# Patient Record
Sex: FEMALE | Race: WHITE | NOT HISPANIC OR LATINO
[De-identification: names, ages, dates, MRNs, and addresses within clinical notes are randomized per-mention and may not be internally consistent; named-entity substitution may affect disease eponyms.]

---

## 2017-10-02 ENCOUNTER — APPOINTMENT (OUTPATIENT)
Dept: INTERNAL MEDICINE | Facility: CLINIC | Age: 57
End: 2017-10-02
Payer: COMMERCIAL

## 2017-10-02 VITALS
WEIGHT: 293 LBS | BODY MASS INDEX: 55.32 KG/M2 | SYSTOLIC BLOOD PRESSURE: 132 MMHG | HEART RATE: 74 BPM | DIASTOLIC BLOOD PRESSURE: 80 MMHG | RESPIRATION RATE: 14 BRPM | HEIGHT: 61 IN

## 2017-10-02 PROCEDURE — 90686 IIV4 VACC NO PRSV 0.5 ML IM: CPT

## 2017-10-02 PROCEDURE — 99215 OFFICE O/P EST HI 40 MIN: CPT | Mod: 25

## 2017-10-02 PROCEDURE — G0008: CPT

## 2017-11-03 ENCOUNTER — EMERGENCY (EMERGENCY)
Facility: HOSPITAL | Age: 57
LOS: 1 days | Discharge: HOME | End: 2017-11-03
Admitting: INTERNAL MEDICINE
Payer: COMMERCIAL

## 2017-11-03 DIAGNOSIS — Z88.5 ALLERGY STATUS TO NARCOTIC AGENT: ICD-10-CM

## 2017-11-03 DIAGNOSIS — Z98.890 OTHER SPECIFIED POSTPROCEDURAL STATES: ICD-10-CM

## 2017-11-03 DIAGNOSIS — N83.201 UNSPECIFIED OVARIAN CYST, RIGHT SIDE: ICD-10-CM

## 2017-11-03 DIAGNOSIS — R10.84 GENERALIZED ABDOMINAL PAIN: ICD-10-CM

## 2017-11-03 PROCEDURE — 99285 EMERGENCY DEPT VISIT HI MDM: CPT

## 2017-11-04 PROCEDURE — 82150 ASSAY OF AMYLASE: CPT

## 2017-11-04 PROCEDURE — 96361 HYDRATE IV INFUSION ADD-ON: CPT

## 2017-11-04 PROCEDURE — 76830 TRANSVAGINAL US NON-OB: CPT | Mod: 26

## 2017-11-04 PROCEDURE — 96365 THER/PROPH/DIAG IV INF INIT: CPT | Mod: XU

## 2017-11-04 PROCEDURE — 85027 COMPLETE CBC AUTOMATED: CPT

## 2017-11-04 PROCEDURE — 74177 CT ABD & PELVIS W/CONTRAST: CPT | Mod: 26

## 2017-11-04 PROCEDURE — 81003 URINALYSIS AUTO W/O SCOPE: CPT

## 2017-11-04 PROCEDURE — 85610 PROTHROMBIN TIME: CPT

## 2017-11-04 PROCEDURE — 85730 THROMBOPLASTIN TIME PARTIAL: CPT

## 2017-11-04 PROCEDURE — 76830 TRANSVAGINAL US NON-OB: CPT

## 2017-11-04 PROCEDURE — 80076 HEPATIC FUNCTION PANEL: CPT

## 2017-11-04 PROCEDURE — 99284 EMERGENCY DEPT VISIT MOD MDM: CPT | Mod: 25

## 2017-11-04 PROCEDURE — 74177 CT ABD & PELVIS W/CONTRAST: CPT

## 2017-11-04 PROCEDURE — 96375 TX/PRO/DX INJ NEW DRUG ADDON: CPT

## 2017-11-04 PROCEDURE — 80048 BASIC METABOLIC PNL TOTAL CA: CPT

## 2017-11-04 PROCEDURE — 83690 ASSAY OF LIPASE: CPT

## 2017-11-08 ENCOUNTER — APPOINTMENT (OUTPATIENT)
Dept: SURGERY | Facility: CLINIC | Age: 57
End: 2017-11-08
Payer: COMMERCIAL

## 2017-11-08 VITALS — BODY MASS INDEX: 55.32 KG/M2 | HEIGHT: 61 IN | WEIGHT: 293 LBS

## 2017-11-08 DIAGNOSIS — Z83.3 FAMILY HISTORY OF DIABETES MELLITUS: ICD-10-CM

## 2017-11-08 DIAGNOSIS — Z86.018 PERSONAL HISTORY OF OTHER BENIGN NEOPLASM: ICD-10-CM

## 2017-11-08 PROCEDURE — 99203 OFFICE O/P NEW LOW 30 MIN: CPT

## 2018-02-01 ENCOUNTER — APPOINTMENT (OUTPATIENT)
Dept: INTERNAL MEDICINE | Facility: CLINIC | Age: 58
End: 2018-02-01
Payer: COMMERCIAL

## 2018-02-01 VITALS
HEIGHT: 61 IN | RESPIRATION RATE: 15 BRPM | BODY MASS INDEX: 55.32 KG/M2 | WEIGHT: 293 LBS | HEART RATE: 76 BPM | DIASTOLIC BLOOD PRESSURE: 78 MMHG | TEMPERATURE: 100.3 F | SYSTOLIC BLOOD PRESSURE: 127 MMHG

## 2018-02-01 DIAGNOSIS — Z87.09 PERSONAL HISTORY OF OTHER DISEASES OF THE RESPIRATORY SYSTEM: ICD-10-CM

## 2018-02-01 DIAGNOSIS — M54.9 DORSALGIA, UNSPECIFIED: ICD-10-CM

## 2018-02-01 PROCEDURE — 99214 OFFICE O/P EST MOD 30 MIN: CPT

## 2018-07-13 ENCOUNTER — LABORATORY RESULT (OUTPATIENT)
Age: 58
End: 2018-07-13

## 2018-07-13 ENCOUNTER — APPOINTMENT (OUTPATIENT)
Dept: INTERNAL MEDICINE | Facility: CLINIC | Age: 58
End: 2018-07-13
Payer: COMMERCIAL

## 2018-07-13 VITALS
RESPIRATION RATE: 16 BRPM | SYSTOLIC BLOOD PRESSURE: 136 MMHG | TEMPERATURE: 99 F | WEIGHT: 293 LBS | BODY MASS INDEX: 57.52 KG/M2 | HEART RATE: 82 BPM | DIASTOLIC BLOOD PRESSURE: 70 MMHG | HEIGHT: 60 IN | OXYGEN SATURATION: 98 %

## 2018-07-13 DIAGNOSIS — Z78.9 OTHER SPECIFIED HEALTH STATUS: ICD-10-CM

## 2018-07-13 DIAGNOSIS — Z72.3 LACK OF PHYSICAL EXERCISE: ICD-10-CM

## 2018-07-13 PROCEDURE — 99215 OFFICE O/P EST HI 40 MIN: CPT

## 2018-07-13 RX ORDER — METHOCARBAMOL 500 MG/1
500 TABLET, FILM COATED ORAL
Qty: 45 | Refills: 0 | Status: DISCONTINUED | COMMUNITY
Start: 2017-10-02 | End: 2018-07-13

## 2018-07-13 RX ORDER — NAPROXEN 500 MG/1
500 TABLET ORAL
Qty: 180 | Refills: 2 | Status: DISCONTINUED | COMMUNITY
Start: 2017-10-02 | End: 2018-07-13

## 2018-07-13 RX ORDER — NAPROXEN 500 MG/1
500 TABLET ORAL
Qty: 60 | Refills: 0 | Status: DISCONTINUED | COMMUNITY
Start: 2017-10-02 | End: 2018-07-13

## 2018-07-13 RX ORDER — OSELTAMIVIR PHOSPHATE 75 MG/1
75 CAPSULE ORAL TWICE DAILY
Qty: 10 | Refills: 0 | Status: DISCONTINUED | COMMUNITY
Start: 2018-02-01 | End: 2018-07-13

## 2018-07-13 RX ORDER — IBUPROFEN 800 MG/1
800 TABLET, FILM COATED ORAL
Qty: 30 | Refills: 0 | Status: DISCONTINUED | COMMUNITY
Start: 2017-11-04 | End: 2018-07-13

## 2018-07-13 RX ORDER — AZITHROMYCIN 250 MG/1
250 TABLET, FILM COATED ORAL
Qty: 6 | Refills: 1 | Status: DISCONTINUED | COMMUNITY
Start: 2018-02-01 | End: 2018-07-13

## 2018-07-13 RX ORDER — FLUTICASONE PROPIONATE 50 UG/1
50 SPRAY, METERED NASAL DAILY
Qty: 1 | Refills: 3 | Status: DISCONTINUED | COMMUNITY
Start: 2018-02-01 | End: 2018-07-13

## 2018-07-13 NOTE — PHYSICAL EXAM
[No Acute Distress] : no acute distress [Well Nourished] : well nourished [Well Developed] : well developed [Well-Appearing] : well-appearing [Normal Voice/Communication] : normal voice/communication [Normal Sclera/Conjunctiva] : normal sclera/conjunctiva [PERRL] : pupils equal round and reactive to light [EOMI] : extraocular movements intact [Normal Outer Ear/Nose] : the outer ears and nose were normal in appearance [Normal Oropharynx] : the oropharynx was normal [Normal TMs] : both tympanic membranes were normal [Normal Nasal Mucosa] : the nasal mucosa was normal [No JVD] : no jugular venous distention [Supple] : supple [No Lymphadenopathy] : no lymphadenopathy [Thyroid Normal, No Nodules] : the thyroid was normal and there were no nodules present [No Respiratory Distress] : no respiratory distress  [Clear to Auscultation] : lungs were clear to auscultation bilaterally [No Accessory Muscle Use] : no accessory muscle use [Normal Rate] : normal rate  [Regular Rhythm] : with a regular rhythm [Normal S1, S2] : normal S1 and S2 [No Murmur] : no murmur heard [No Carotid Bruits] : no carotid bruits [No Abdominal Bruit] : a ~M bruit was not heard ~T in the abdomen [No Varicosities] : no varicosities [Pedal Pulses Present] : the pedal pulses are present [No Edema] : there was no peripheral edema [No Extremity Clubbing/Cyanosis] : no extremity clubbing/cyanosis [No Palpable Aorta] : no palpable aorta [Declined Breast Exam] : declined breast exam  [Soft] : abdomen soft [Non Tender] : non-tender [Non-distended] : non-distended [No Masses] : no abdominal mass palpated [No HSM] : no HSM [Normal Bowel Sounds] : normal bowel sounds [No Hernias] : no hernias [Declined Rectal Exam] : declined rectal exam [Normal Supraclavicular Nodes] : no supraclavicular lymphadenopathy [Normal Axillary Nodes] : no axillary lymphadenopathy [Normal Posterior Cervical Nodes] : no posterior cervical lymphadenopathy [Normal Femoral Nodes] : no femoral lymphadenopathy [Normal Anterior Cervical Nodes] : no anterior cervical lymphadenopathy [Normal Inguinal Nodes] : no inguinal lymphadenopathy [No CVA Tenderness] : no CVA  tenderness [No Spinal Tenderness] : no spinal tenderness [No Joint Swelling] : no joint swelling [Grossly Normal Strength/Tone] : grossly normal strength/tone [No Rash] : no rash [No Skin Lesions] : no skin lesions [Normal Gait] : normal gait [Coordination Grossly Intact] : coordination grossly intact [No Focal Deficits] : no focal deficits [Deep Tendon Reflexes (DTR)] : deep tendon reflexes were 2+ and symmetric [Speech Grossly Normal] : speech grossly normal [Memory Grossly Normal] : memory grossly normal [Normal Affect] : the affect was normal [Alert and Oriented x3] : oriented to person, place, and time [Normal Mood] : the mood was normal [Normal Insight/Judgement] : insight and judgment were intact [Kyphosis] : no kyphosis [Scoliosis] : no scoliosis [Acne] : no acne

## 2018-07-13 NOTE — ASSESSMENT
[FreeTextEntry1] : His exam shows obese woman in no acute distress at rest blood pressure 136/75 with 345 pounds BMI of 67.31 temperature 90.9°F orally pulse is 82 respirations 14 HEENT was unremarkable chest with decreased breath sounds cardiovascular exam was regular abdomen was soft and nontender extremities showed no clubbing cyanosis or edema there was no CVA tenderness neurologic exam was nonfocal lower back pain worse with movement no pain with rest or palpation suggest a bulging disc treated with a Medrol pack heat drop-off blood tests and a urine and a urine culture if no improvement by Monday to obtain x-rays and/or MRI

## 2018-07-13 NOTE — HISTORY OF PRESENT ILLNESS
[FreeTextEntry1] : Comes to the office for followup to review her medications discuss her overall health and to speak about some new lower back  pain she's been having for a week  [de-identified] : -58 year-old morbidly obese female comes to the office with a history of lower extremity edema previous back pain complaining for the last week of lower back pain worse on the right side exacerbated by movements. She denies radiating pain down her leg there is no numbness or weakness the pain only hurts when she moves or stands up he has not had any urinary symptoms she has been taking ibuprofen with some relief she denies temperature chills sweats or myalgias she has had no dysuria or urinary frequency or burning denies abdominal pain nausea vomiting diarrhea constipation rectal bleeding or melena her ankles have been swollen chronically her appetite has not changed and her weight is same

## 2018-07-13 NOTE — HEALTH RISK ASSESSMENT
[No falls in past year] : Patient reported no falls in the past year [0] : 2) Feeling down, depressed, or hopeless: Not at all (0) [[id number]] : 0

## 2018-07-14 ENCOUNTER — LABORATORY RESULT (OUTPATIENT)
Age: 58
End: 2018-07-14

## 2018-07-16 LAB
25(OH)D3 SERPL-MCNC: 13.4 NG/ML
ALBUMIN SERPL ELPH-MCNC: 4.1 G/DL
ALP BLD-CCNC: 93 U/L
ALT SERPL-CCNC: 12 U/L
ANION GAP SERPL CALC-SCNC: 12 MMOL/L
APPEARANCE: ABNORMAL
APPEARANCE: ABNORMAL
AST SERPL-CCNC: 19 U/L
BASOPHILS # BLD AUTO: 0.07 K/UL
BASOPHILS NFR BLD AUTO: 0.9 %
BILIRUB SERPL-MCNC: 0.4 MG/DL
BILIRUBIN URINE: NEGATIVE
BILIRUBIN URINE: NEGATIVE
BLOOD URINE: NEGATIVE
BLOOD URINE: NEGATIVE
BUN SERPL-MCNC: 11 MG/DL
CALCIUM SERPL-MCNC: 9.7 MG/DL
CHLORIDE SERPL-SCNC: 102 MMOL/L
CHOLEST SERPL-MCNC: 167 MG/DL
CHOLEST/HDLC SERPL: 3 RATIO
CO2 SERPL-SCNC: 27 MMOL/L
COLOR: YELLOW
COLOR: YELLOW
CREAT SERPL-MCNC: 0.69 MG/DL
CRP SERPL HS-MCNC: 6.2 MG/L
EOSINOPHIL # BLD AUTO: 0 K/UL
EOSINOPHIL NFR BLD AUTO: 0 %
GLUCOSE BS SERPL-MCNC: 118 MG/DL
GLUCOSE QUALITATIVE U: NEGATIVE MG/DL
GLUCOSE QUALITATIVE U: NEGATIVE MG/DL
GLUCOSE SERPL-MCNC: 119 MG/DL
HBA1C MFR BLD HPLC: 6.3 %
HCT VFR BLD CALC: 40.4 %
HDLC SERPL-MCNC: 55 MG/DL
HGB BLD-MCNC: 12.1 G/DL
KETONES URINE: NEGATIVE
KETONES URINE: NEGATIVE
LDLC SERPL CALC-MCNC: 101 MG/DL
LEUKOCYTE ESTERASE URINE: NEGATIVE
LEUKOCYTE ESTERASE URINE: NEGATIVE
LYMPHOCYTES # BLD AUTO: 1.74 K/UL
LYMPHOCYTES NFR BLD AUTO: 22.1 %
MAN DIFF?: NORMAL
MCHC RBC-ENTMCNC: 21.3 PG
MCHC RBC-ENTMCNC: 30 GM/DL
MCV RBC AUTO: 71.1 FL
MONOCYTES # BLD AUTO: 0.56 K/UL
MONOCYTES NFR BLD AUTO: 7.1 %
NEUTROPHILS # BLD AUTO: 5.49 K/UL
NEUTROPHILS NFR BLD AUTO: 69.9 %
NITRITE URINE: NEGATIVE
NITRITE URINE: NEGATIVE
PH URINE: 5.5
PH URINE: 7
PLATELET # BLD AUTO: 308 K/UL
POTASSIUM SERPL-SCNC: 4.6 MMOL/L
PROT SERPL-MCNC: 8.3 G/DL
PROTEIN URINE: 30 MG/DL
PROTEIN URINE: 30 MG/DL
RBC # BLD: 5.68 M/UL
RBC # FLD: 20.1 %
SODIUM SERPL-SCNC: 141 MMOL/L
SPECIFIC GRAVITY URINE: 1.02
SPECIFIC GRAVITY URINE: 1.02
T4 FREE SERPL-MCNC: 1.1 NG/DL
TRIGL SERPL-MCNC: 53 MG/DL
TSH SERPL-ACNC: 1.02 UIU/ML
UROBILINOGEN URINE: NEGATIVE MG/DL
UROBILINOGEN URINE: NEGATIVE MG/DL
VIT B12 SERPL-MCNC: 293 PG/ML
WBC # FLD AUTO: 7.86 K/UL

## 2018-09-09 DIAGNOSIS — R51 HEADACHE: ICD-10-CM

## 2018-10-04 PROBLEM — R51 NEW ONSET OF HEADACHES: Status: ACTIVE | Noted: 2018-10-04

## 2018-10-30 ENCOUNTER — APPOINTMENT (OUTPATIENT)
Dept: NEUROLOGY | Facility: CLINIC | Age: 58
End: 2018-10-30
Payer: COMMERCIAL

## 2018-10-30 VITALS
WEIGHT: 293 LBS | BODY MASS INDEX: 57.52 KG/M2 | OXYGEN SATURATION: 98 % | HEIGHT: 60 IN | DIASTOLIC BLOOD PRESSURE: 80 MMHG | HEART RATE: 91 BPM | SYSTOLIC BLOOD PRESSURE: 126 MMHG

## 2018-10-30 PROCEDURE — 99244 OFF/OP CNSLTJ NEW/EST MOD 40: CPT

## 2018-10-30 RX ORDER — METHYLPREDNISOLONE 4 MG/1
4 TABLET ORAL
Qty: 1 | Refills: 1 | Status: DISCONTINUED | COMMUNITY
Start: 2018-07-13 | End: 2018-10-30

## 2018-11-11 ENCOUNTER — EMERGENCY (EMERGENCY)
Facility: HOSPITAL | Age: 58
LOS: 1 days | Discharge: HOME | End: 2018-11-11
Attending: EMERGENCY MEDICINE | Admitting: EMERGENCY MEDICINE
Payer: COMMERCIAL

## 2018-11-11 VITALS — HEART RATE: 84 BPM | RESPIRATION RATE: 18 BRPM | WEIGHT: 293 LBS | OXYGEN SATURATION: 95 % | TEMPERATURE: 98 F

## 2018-11-11 VITALS
SYSTOLIC BLOOD PRESSURE: 149 MMHG | RESPIRATION RATE: 18 BRPM | HEART RATE: 79 BPM | OXYGEN SATURATION: 96 % | DIASTOLIC BLOOD PRESSURE: 82 MMHG

## 2018-11-11 LAB
ALBUMIN SERPL ELPH-MCNC: 3.5 G/DL — SIGNIFICANT CHANGE UP (ref 3.3–5)
ALP SERPL-CCNC: 102 U/L — SIGNIFICANT CHANGE UP (ref 40–120)
ALT FLD-CCNC: 23 U/L DA — SIGNIFICANT CHANGE UP (ref 10–45)
ANION GAP SERPL CALC-SCNC: 6 MMOL/L — SIGNIFICANT CHANGE UP (ref 5–17)
ANISOCYTOSIS BLD QL: SLIGHT — SIGNIFICANT CHANGE UP
AST SERPL-CCNC: 23 U/L — SIGNIFICANT CHANGE UP (ref 10–40)
BILIRUB SERPL-MCNC: 0.2 MG/DL — SIGNIFICANT CHANGE UP (ref 0.2–1.2)
BUN SERPL-MCNC: 10 MG/DL — SIGNIFICANT CHANGE UP (ref 7–23)
CALCIUM SERPL-MCNC: 8.8 MG/DL — SIGNIFICANT CHANGE UP (ref 8.4–10.5)
CHLORIDE SERPL-SCNC: 102 MMOL/L — SIGNIFICANT CHANGE UP (ref 96–108)
CO2 SERPL-SCNC: 32 MMOL/L — HIGH (ref 22–31)
CREAT SERPL-MCNC: 0.71 MG/DL — SIGNIFICANT CHANGE UP (ref 0.5–1.3)
ELLIPTOCYTES BLD QL SMEAR: SLIGHT — SIGNIFICANT CHANGE UP
GIANT PLATELETS BLD QL SMEAR: PRESENT — SIGNIFICANT CHANGE UP
GLUCOSE SERPL-MCNC: 103 MG/DL — HIGH (ref 70–99)
HCT VFR BLD CALC: 36.6 % — SIGNIFICANT CHANGE UP (ref 34.5–45)
HGB BLD-MCNC: 11.3 G/DL — LOW (ref 11.5–15.5)
HYPOCHROMIA BLD QL: SLIGHT — SIGNIFICANT CHANGE UP
LG PLATELETS BLD QL AUTO: SLIGHT — SIGNIFICANT CHANGE UP
LIDOCAIN IGE QN: 199 U/L — SIGNIFICANT CHANGE UP (ref 73–393)
LYMPHOCYTES # BLD AUTO: 19 % — SIGNIFICANT CHANGE UP (ref 13–44)
MCHC RBC-ENTMCNC: 21.8 PG — LOW (ref 27–34)
MCHC RBC-ENTMCNC: 30.8 GM/DL — LOW (ref 32–36)
MCV RBC AUTO: 70.9 FL — LOW (ref 80–100)
MICROCYTES BLD QL: SIGNIFICANT CHANGE UP
MONOCYTES NFR BLD AUTO: 6 % — SIGNIFICANT CHANGE UP (ref 2–14)
NEUTROPHILS NFR BLD AUTO: 75 % — SIGNIFICANT CHANGE UP (ref 43–77)
PLAT MORPH BLD: NORMAL — SIGNIFICANT CHANGE UP
PLATELET # BLD AUTO: 281 K/UL — SIGNIFICANT CHANGE UP (ref 150–400)
POIKILOCYTOSIS BLD QL AUTO: SLIGHT — SIGNIFICANT CHANGE UP
POTASSIUM SERPL-MCNC: 4 MMOL/L — SIGNIFICANT CHANGE UP (ref 3.5–5.3)
POTASSIUM SERPL-SCNC: 4 MMOL/L — SIGNIFICANT CHANGE UP (ref 3.5–5.3)
PROT SERPL-MCNC: 8.4 G/DL — HIGH (ref 6–8.3)
RBC # BLD: 5.17 M/UL — SIGNIFICANT CHANGE UP (ref 3.8–5.2)
RBC # FLD: 16.9 % — HIGH (ref 10.3–14.5)
RBC BLD AUTO: ABNORMAL
SODIUM SERPL-SCNC: 140 MMOL/L — SIGNIFICANT CHANGE UP (ref 135–145)
TROPONIN I SERPL-MCNC: <.017 NG/ML — LOW (ref 0.02–0.06)
WBC # BLD: 7.5 K/UL — SIGNIFICANT CHANGE UP (ref 3.8–10.5)
WBC # FLD AUTO: 7.5 K/UL — SIGNIFICANT CHANGE UP (ref 3.8–10.5)

## 2018-11-11 PROCEDURE — 96374 THER/PROPH/DIAG INJ IV PUSH: CPT

## 2018-11-11 PROCEDURE — 93005 ELECTROCARDIOGRAM TRACING: CPT

## 2018-11-11 PROCEDURE — 76705 ECHO EXAM OF ABDOMEN: CPT | Mod: 26

## 2018-11-11 PROCEDURE — 80053 COMPREHEN METABOLIC PANEL: CPT

## 2018-11-11 PROCEDURE — 83690 ASSAY OF LIPASE: CPT

## 2018-11-11 PROCEDURE — 93010 ELECTROCARDIOGRAM REPORT: CPT

## 2018-11-11 PROCEDURE — 84484 ASSAY OF TROPONIN QUANT: CPT

## 2018-11-11 PROCEDURE — 76705 ECHO EXAM OF ABDOMEN: CPT

## 2018-11-11 PROCEDURE — 96375 TX/PRO/DX INJ NEW DRUG ADDON: CPT

## 2018-11-11 PROCEDURE — 85027 COMPLETE CBC AUTOMATED: CPT

## 2018-11-11 PROCEDURE — 99284 EMERGENCY DEPT VISIT MOD MDM: CPT | Mod: 25

## 2018-11-11 PROCEDURE — 99285 EMERGENCY DEPT VISIT HI MDM: CPT

## 2018-11-11 RX ORDER — MORPHINE SULFATE 50 MG/1
4 CAPSULE, EXTENDED RELEASE ORAL ONCE
Qty: 0 | Refills: 0 | Status: DISCONTINUED | OUTPATIENT
Start: 2018-11-11 | End: 2018-11-11

## 2018-11-11 RX ORDER — FAMOTIDINE 10 MG/ML
20 INJECTION INTRAVENOUS ONCE
Qty: 0 | Refills: 0 | Status: COMPLETED | OUTPATIENT
Start: 2018-11-11 | End: 2018-11-11

## 2018-11-11 RX ORDER — ONDANSETRON 8 MG/1
1 TABLET, FILM COATED ORAL
Qty: 6 | Refills: 0
Start: 2018-11-11 | End: 2018-11-12

## 2018-11-11 RX ORDER — ONDANSETRON 8 MG/1
4 TABLET, FILM COATED ORAL ONCE
Qty: 0 | Refills: 0 | Status: COMPLETED | OUTPATIENT
Start: 2018-11-11 | End: 2018-11-11

## 2018-11-11 RX ADMIN — MORPHINE SULFATE 4 MILLIGRAM(S): 50 CAPSULE, EXTENDED RELEASE ORAL at 14:34

## 2018-11-11 RX ADMIN — MORPHINE SULFATE 4 MILLIGRAM(S): 50 CAPSULE, EXTENDED RELEASE ORAL at 15:04

## 2018-11-11 RX ADMIN — ONDANSETRON 4 MILLIGRAM(S): 8 TABLET, FILM COATED ORAL at 14:34

## 2018-11-11 RX ADMIN — FAMOTIDINE 20 MILLIGRAM(S): 10 INJECTION INTRAVENOUS at 14:34

## 2018-11-11 RX ADMIN — FAMOTIDINE 100 MILLIGRAM(S): 10 INJECTION INTRAVENOUS at 14:34

## 2018-11-11 NOTE — ED ADULT NURSE NOTE - NSIMPLEMENTINTERV_GEN_ALL_ED
Implemented All Universal Safety Interventions:  [place] to call system. Call bell, personal items and telephone within reach. Instruct patient to call for assistance. Room bathroom lighting operational. Non-slip footwear when patient is off stretcher. Physically safe environment: no spills, clutter or unnecessary equipment. Stretcher in lowest position, wheels locked, appropriate side rails in place.

## 2018-11-11 NOTE — ED PROVIDER NOTE - OBJECTIVE STATEMENT
58 yr old obese female with hx of gallstones presents c/o diffuse abdominal pain, mostly RUQ, with associated vomiting x 4 today. Pain after eating this morning. Pt denies any fever. chills or any other symptoms.

## 2018-11-11 NOTE — ED PROVIDER NOTE - MEDICAL DECISION MAKING DETAILS
58 yr old obese female with hx of gallstones presents c/o diffuse abdominal pain, mostly RUQ, with associated vomiting x 4 today. Pain after eating this morning. Pt denies any fever. chills or any other symptoms.: ttp generalized mostly RUQ: labs, lipase, trop, ekg, ivf, pain control, ct/ us- re eval

## 2018-11-11 NOTE — ED PROVIDER NOTE - ATTENDING CONTRIBUTION TO CARE
Yung with NATALI Garrison. Patient with RUQ tenderness. Obese. Plan for labs and u/s with symptomatic control prn. No RLQ tenderness. Non toxic appearing. I performed a face to face bedside interview with patient regarding history of present illness, review of symptoms and past medical history. I completed an independent physical exam.  I have discussed the patient's plan of care with Physician Assistant (PA). I agree with note as stated above, having amended the EMR as needed to reflect my findings.   This includes History of Present Illness, HIV, Past Medical/Surgical/Family/Social History, Allergies and Home Medications, Review of Systems, Physical Exam, and any Progress Notes during the time I functioned as the attending physician for this patient.

## 2018-11-11 NOTE — ED ADULT NURSE NOTE - OBJECTIVE STATEMENT
pt c/o abd pain. reprots recently dx with gallstones. reports nausea. resp even and unlabored. denies cp, sob, vomiting, chills. no further complaints. pa at bedside.

## 2018-11-11 NOTE — ED PROVIDER NOTE - PROGRESS NOTE DETAILS
US showing gallstones and pt refused CT. Pt feeling better. Abdomen soft and non tender at this time. Pt stable for discharge and to follow up with surgeon outpt. Pt agrees with plan.

## 2018-11-15 ENCOUNTER — APPOINTMENT (OUTPATIENT)
Dept: INTERNAL MEDICINE | Facility: CLINIC | Age: 58
End: 2018-11-15
Payer: COMMERCIAL

## 2018-11-15 VITALS
SYSTOLIC BLOOD PRESSURE: 122 MMHG | RESPIRATION RATE: 15 BRPM | DIASTOLIC BLOOD PRESSURE: 75 MMHG | BODY MASS INDEX: 57.52 KG/M2 | WEIGHT: 293 LBS | HEIGHT: 60 IN | HEART RATE: 88 BPM

## 2018-11-15 PROCEDURE — 99215 OFFICE O/P EST HI 40 MIN: CPT

## 2018-11-16 NOTE — ASSESSMENT
[FreeTextEntry1] : Physical exam shows a morbidly obese female in no acute distress her height was 5 feet her weight was 342 pounds blood pressure 122/75 heart rate of 88 respiratory rate of 16 HEENT was unremarkable chest was clear cardiovascular was regular abdomen was soft obeseo evidence of tenderness rebound or guarding extremities showed no clubbing cyanosis or edema neurologic exam was nonfocal patient mentioned some questionable dark stools several weeks ago I placed her on pantoprazole. and she has made an appointment with a surgeon who is also a bariatric surgeon if symptoms recur she needs to return to the emergency room for immediate attention

## 2018-11-16 NOTE — REVIEW OF SYSTEMS
[Abdominal Pain] : abdominal pain [Vomiting] : vomiting [Fever] : no fever [Chills] : no chills [Fatigue] : no fatigue [Hot Flashes] : no hot flashes [Night Sweats] : no night sweats [Recent Change In Weight] : ~T no recent weight change [Discharge] : no discharge [Pain] : no pain [Redness] : no redness [Dryness] : no dryness  [Vision Problems] : no vision problems [Itching] : no itching [Earache] : no earache [Hearing Loss] : no hearing loss [Nosebleed] : no nosebleeds [Hoarseness] : no hoarseness [Nasal Discharge] : no nasal discharge [Sore Throat] : no sore throat [Postnasal Drip] : no postnasal drip [Chest Pain] : no chest pain [Palpitations] : no palpitations [Leg Claudication] : no leg claudication [Lower Ext Edema] : no lower extremity edema [Orthopnea] : no orthopnea [Paroysmal Nocturnal Dyspnea] : no paroysmal nocturnal dyspnea [Shortness Of Breath] : no shortness of breath [Wheezing] : no wheezing [Cough] : no cough [Dyspnea on Exertion] : no dyspnea on exertion [Nausea] : no nausea [Constipation] : no constipation [Diarrhea] : diarrhea [Heartburn] : no heartburn [Melena] : no melena [Dysuria] : no dysuria [Incontinence] : no incontinence [Nocturia] : no nocturia [Poor Libido] : libido not poor [Hematuria] : no hematuria [Frequency] : no frequency [Vaginal Discharge] : no vaginal discharge [Dysmenorrhea] : no dysmenorrhea [Joint Pain] : no joint pain [Joint Stiffness] : no joint stiffness [Joint Swelling] : no joint swelling [Muscle Weakness] : no muscle weakness [Muscle Pain] : no muscle pain [Back Pain] : no back pain [Itching] : no itching [Mole Changes] : no mole changes [Nail Changes] : no nail changes [Hair Changes] : no hair changes [Skin Rash] : no skin rash [Headache] : no headache [Dizziness] : no dizziness [Fainting] : no fainting [Confusion] : no confusion [Memory Loss] : no memory loss [Unsteady Walking] : no ataxia [Suicidal] : not suicidal [Insomnia] : no insomnia [Anxiety] : no anxiety [Depression] : no depression [Easy Bleeding] : no easy bleeding [Easy Bruising] : no easy bruising [Swollen Glands] : no swollen glands [FreeTextEntry7] : epigastric

## 2018-11-16 NOTE — HEALTH RISK ASSESSMENT
[No falls in past year] : Patient reported no falls in the past year [0] : 2) Feeling down, depressed, or hopeless: Not at all (0) [[id number]] : 0

## 2018-11-16 NOTE — HISTORY OF PRESENT ILLNESS
[FreeTextEntry1] : Comes to the office for followup to review her medications and discuss her overall health recent visit to the emergency room for abdominal pain and vomiting [de-identified] : 58-year-old woman with a history of morbid obesity chronic migraines lower back pain TUMS to the office after being seen in the emergency room for abdominal pain and vomiting patient claims that she has had several episodes prior. The pain was in the upper abdomen/right upper quadrant midepigastric she is currently without symptoms workup in the emergency room her ultrasound which showed gallstones and sludge there was no wall thickening or pericholecystic fluid bowels have been regular she denies diarrhea constipation bright red blood per rectum or black stools her appetite has been good her weight is stable she denies temperature chills sweats myalgias sinus congestion sore throat cough wheezing

## 2018-11-30 ENCOUNTER — APPOINTMENT (OUTPATIENT)
Dept: SURGERY | Facility: CLINIC | Age: 58
End: 2018-11-30
Payer: COMMERCIAL

## 2018-11-30 VITALS
BODY MASS INDEX: 55.32 KG/M2 | HEART RATE: 94 BPM | SYSTOLIC BLOOD PRESSURE: 162 MMHG | DIASTOLIC BLOOD PRESSURE: 88 MMHG | OXYGEN SATURATION: 100 % | TEMPERATURE: 98.2 F | HEIGHT: 61 IN | WEIGHT: 293 LBS | RESPIRATION RATE: 18 BRPM

## 2018-11-30 PROCEDURE — 99245 OFF/OP CONSLTJ NEW/EST HI 55: CPT

## 2018-11-30 RX ORDER — PANTOPRAZOLE 40 MG/1
40 TABLET, DELAYED RELEASE ORAL
Qty: 30 | Refills: 5 | Status: DISCONTINUED | COMMUNITY
Start: 2018-11-15 | End: 2018-11-30

## 2018-12-17 ENCOUNTER — APPOINTMENT (OUTPATIENT)
Dept: NUCLEAR MEDICINE | Facility: IMAGING CENTER | Age: 58
End: 2018-12-17

## 2019-01-05 ENCOUNTER — EMERGENCY (EMERGENCY)
Facility: HOSPITAL | Age: 59
LOS: 1 days | Discharge: HOME | End: 2019-01-05
Attending: EMERGENCY MEDICINE | Admitting: EMERGENCY MEDICINE
Payer: COMMERCIAL

## 2019-01-05 VITALS
SYSTOLIC BLOOD PRESSURE: 154 MMHG | RESPIRATION RATE: 18 BRPM | TEMPERATURE: 100 F | DIASTOLIC BLOOD PRESSURE: 71 MMHG | HEART RATE: 99 BPM | OXYGEN SATURATION: 98 %

## 2019-01-05 VITALS
SYSTOLIC BLOOD PRESSURE: 192 MMHG | DIASTOLIC BLOOD PRESSURE: 88 MMHG | OXYGEN SATURATION: 96 % | TEMPERATURE: 101 F | WEIGHT: 293 LBS | RESPIRATION RATE: 18 BRPM | HEART RATE: 114 BPM | HEIGHT: 60 IN

## 2019-01-05 DIAGNOSIS — R50.9 FEVER, UNSPECIFIED: ICD-10-CM

## 2019-01-05 LAB
ALBUMIN SERPL ELPH-MCNC: 3.7 G/DL — SIGNIFICANT CHANGE UP (ref 3.3–5)
ALP SERPL-CCNC: 101 U/L — SIGNIFICANT CHANGE UP (ref 40–120)
ALT FLD-CCNC: 23 U/L DA — SIGNIFICANT CHANGE UP (ref 10–45)
ANION GAP SERPL CALC-SCNC: 11 MMOL/L — SIGNIFICANT CHANGE UP (ref 5–17)
APPEARANCE UR: CLEAR — SIGNIFICANT CHANGE UP
APTT BLD: 31.5 SEC — SIGNIFICANT CHANGE UP (ref 27.5–36.3)
AST SERPL-CCNC: 26 U/L — SIGNIFICANT CHANGE UP (ref 10–40)
BACTERIA # UR AUTO: NEGATIVE /HPF — SIGNIFICANT CHANGE UP
BASOPHILS # BLD AUTO: 0.1 K/UL — SIGNIFICANT CHANGE UP (ref 0–0.2)
BASOPHILS NFR BLD AUTO: 0.5 % — SIGNIFICANT CHANGE UP (ref 0–2)
BILIRUB SERPL-MCNC: 0.4 MG/DL — SIGNIFICANT CHANGE UP (ref 0.2–1.2)
BILIRUB UR-MCNC: NEGATIVE — SIGNIFICANT CHANGE UP
BUN SERPL-MCNC: 11 MG/DL — SIGNIFICANT CHANGE UP (ref 7–23)
CALCIUM SERPL-MCNC: 9.3 MG/DL — SIGNIFICANT CHANGE UP (ref 8.4–10.5)
CHLORIDE SERPL-SCNC: 98 MMOL/L — SIGNIFICANT CHANGE UP (ref 96–108)
CO2 SERPL-SCNC: 26 MMOL/L — SIGNIFICANT CHANGE UP (ref 22–31)
COLOR SPEC: YELLOW — SIGNIFICANT CHANGE UP
CREAT SERPL-MCNC: 0.77 MG/DL — SIGNIFICANT CHANGE UP (ref 0.5–1.3)
DIFF PNL FLD: ABNORMAL
EOSINOPHIL # BLD AUTO: 0 K/UL — SIGNIFICANT CHANGE UP (ref 0–0.5)
EOSINOPHIL NFR BLD AUTO: 0.3 % — SIGNIFICANT CHANGE UP (ref 0–6)
EPI CELLS # UR: SIGNIFICANT CHANGE UP
FLU A RESULT: SIGNIFICANT CHANGE UP
FLU A RESULT: SIGNIFICANT CHANGE UP
FLUAV AG NPH QL: SIGNIFICANT CHANGE UP
FLUBV AG NPH QL: SIGNIFICANT CHANGE UP
GLUCOSE SERPL-MCNC: 138 MG/DL — HIGH (ref 70–99)
GLUCOSE UR QL: NEGATIVE — SIGNIFICANT CHANGE UP
HCT VFR BLD CALC: 36.1 % — SIGNIFICANT CHANGE UP (ref 34.5–45)
HGB BLD-MCNC: 10.7 G/DL — LOW (ref 11.5–15.5)
INR BLD: 1.16 RATIO — SIGNIFICANT CHANGE UP (ref 0.88–1.16)
KETONES UR-MCNC: ABNORMAL
LACTATE SERPL-SCNC: 1.5 MMOL/L — SIGNIFICANT CHANGE UP (ref 0.7–2)
LEUKOCYTE ESTERASE UR-ACNC: NEGATIVE — SIGNIFICANT CHANGE UP
LYMPHOCYTES # BLD AUTO: 0.8 K/UL — LOW (ref 1–3.3)
LYMPHOCYTES # BLD AUTO: 5.3 % — LOW (ref 13–44)
MCHC RBC-ENTMCNC: 19.5 PG — LOW (ref 27–34)
MCHC RBC-ENTMCNC: 29.6 GM/DL — LOW (ref 32–36)
MCV RBC AUTO: 65.9 FL — LOW (ref 80–100)
MONOCYTES # BLD AUTO: 0.7 K/UL — SIGNIFICANT CHANGE UP (ref 0–0.9)
MONOCYTES NFR BLD AUTO: 4.9 % — SIGNIFICANT CHANGE UP (ref 1–9)
NEUTROPHILS # BLD AUTO: 13.1 K/UL — HIGH (ref 1.8–7.4)
NEUTROPHILS NFR BLD AUTO: 89.1 % — HIGH (ref 43–77)
NITRITE UR-MCNC: NEGATIVE — SIGNIFICANT CHANGE UP
PH UR: 8 — SIGNIFICANT CHANGE UP (ref 5–8)
PLATELET # BLD AUTO: 290 K/UL — SIGNIFICANT CHANGE UP (ref 150–400)
POTASSIUM SERPL-MCNC: 3.7 MMOL/L — SIGNIFICANT CHANGE UP (ref 3.5–5.3)
POTASSIUM SERPL-SCNC: 3.7 MMOL/L — SIGNIFICANT CHANGE UP (ref 3.5–5.3)
PROT SERPL-MCNC: 8.7 G/DL — HIGH (ref 6–8.3)
PROT UR-MCNC: NEGATIVE — SIGNIFICANT CHANGE UP
PROTHROM AB SERPL-ACNC: 13 SEC — HIGH (ref 10–12.9)
RBC # BLD: 5.48 M/UL — HIGH (ref 3.8–5.2)
RBC # FLD: 16.6 % — HIGH (ref 10.3–14.5)
RBC CASTS # UR COMP ASSIST: SIGNIFICANT CHANGE UP /HPF (ref 0–4)
RSV RESULT: SIGNIFICANT CHANGE UP
RSV RNA RESP QL NAA+PROBE: SIGNIFICANT CHANGE UP
SODIUM SERPL-SCNC: 135 MMOL/L — SIGNIFICANT CHANGE UP (ref 135–145)
SP GR SPEC: 1.01 — SIGNIFICANT CHANGE UP (ref 1.01–1.02)
UROBILINOGEN FLD QL: NEGATIVE — SIGNIFICANT CHANGE UP
WBC # BLD: 14.7 K/UL — HIGH (ref 3.8–10.5)
WBC # FLD AUTO: 14.7 K/UL — HIGH (ref 3.8–10.5)
WBC UR QL: NEGATIVE /HPF — SIGNIFICANT CHANGE UP (ref 0–5)

## 2019-01-05 PROCEDURE — 99284 EMERGENCY DEPT VISIT MOD MDM: CPT

## 2019-01-05 PROCEDURE — 80053 COMPREHEN METABOLIC PANEL: CPT

## 2019-01-05 PROCEDURE — 85610 PROTHROMBIN TIME: CPT

## 2019-01-05 PROCEDURE — 85730 THROMBOPLASTIN TIME PARTIAL: CPT

## 2019-01-05 PROCEDURE — 87631 RESP VIRUS 3-5 TARGETS: CPT

## 2019-01-05 PROCEDURE — 83605 ASSAY OF LACTIC ACID: CPT

## 2019-01-05 PROCEDURE — 71045 X-RAY EXAM CHEST 1 VIEW: CPT

## 2019-01-05 PROCEDURE — 87040 BLOOD CULTURE FOR BACTERIA: CPT

## 2019-01-05 PROCEDURE — 71045 X-RAY EXAM CHEST 1 VIEW: CPT | Mod: 26

## 2019-01-05 PROCEDURE — 81001 URINALYSIS AUTO W/SCOPE: CPT

## 2019-01-05 PROCEDURE — 87086 URINE CULTURE/COLONY COUNT: CPT

## 2019-01-05 PROCEDURE — 99284 EMERGENCY DEPT VISIT MOD MDM: CPT | Mod: 25

## 2019-01-05 PROCEDURE — 96360 HYDRATION IV INFUSION INIT: CPT

## 2019-01-05 PROCEDURE — 96361 HYDRATE IV INFUSION ADD-ON: CPT

## 2019-01-05 PROCEDURE — 85027 COMPLETE CBC AUTOMATED: CPT

## 2019-01-05 RX ORDER — IBUPROFEN 200 MG
600 TABLET ORAL ONCE
Qty: 0 | Refills: 0 | Status: COMPLETED | OUTPATIENT
Start: 2019-01-05 | End: 2019-01-05

## 2019-01-05 RX ORDER — ACETAMINOPHEN 500 MG
975 TABLET ORAL ONCE
Qty: 0 | Refills: 0 | Status: COMPLETED | OUTPATIENT
Start: 2019-01-05 | End: 2019-01-05

## 2019-01-05 RX ORDER — OXYCODONE HYDROCHLORIDE 5 MG/1
5 TABLET ORAL ONCE
Qty: 0 | Refills: 0 | Status: DISCONTINUED | OUTPATIENT
Start: 2019-01-05 | End: 2019-01-05

## 2019-01-05 RX ORDER — SODIUM CHLORIDE 9 MG/ML
2000 INJECTION INTRAMUSCULAR; INTRAVENOUS; SUBCUTANEOUS ONCE
Qty: 0 | Refills: 0 | Status: COMPLETED | OUTPATIENT
Start: 2019-01-05 | End: 2019-01-05

## 2019-01-05 RX ORDER — SODIUM CHLORIDE 9 MG/ML
1000 INJECTION INTRAMUSCULAR; INTRAVENOUS; SUBCUTANEOUS ONCE
Qty: 0 | Refills: 0 | Status: COMPLETED | OUTPATIENT
Start: 2019-01-05 | End: 2019-01-05

## 2019-01-05 RX ADMIN — SODIUM CHLORIDE 1000 MILLILITER(S): 9 INJECTION INTRAMUSCULAR; INTRAVENOUS; SUBCUTANEOUS at 20:23

## 2019-01-05 RX ADMIN — SODIUM CHLORIDE 1000 MILLILITER(S): 9 INJECTION INTRAMUSCULAR; INTRAVENOUS; SUBCUTANEOUS at 19:12

## 2019-01-05 RX ADMIN — Medication 975 MILLIGRAM(S): at 18:00

## 2019-01-05 RX ADMIN — Medication 975 MILLIGRAM(S): at 17:02

## 2019-01-05 RX ADMIN — SODIUM CHLORIDE 2000 MILLILITER(S): 9 INJECTION INTRAMUSCULAR; INTRAVENOUS; SUBCUTANEOUS at 18:04

## 2019-01-05 RX ADMIN — Medication 600 MILLIGRAM(S): at 19:12

## 2019-01-05 RX ADMIN — SODIUM CHLORIDE 2000 MILLILITER(S): 9 INJECTION INTRAMUSCULAR; INTRAVENOUS; SUBCUTANEOUS at 17:02

## 2019-01-05 RX ADMIN — Medication 600 MILLIGRAM(S): at 19:53

## 2019-01-05 RX ADMIN — OXYCODONE HYDROCHLORIDE 5 MILLIGRAM(S): 5 TABLET ORAL at 20:26

## 2019-01-05 NOTE — ED PROVIDER NOTE - MEDICAL DECISION MAKING DETAILS
58 yr old obese female presents c/o fever, bodyaches, neck pain starting today after running errands. Pt went to urgent care, and sent to ED for further eval. Pt was given zofran at urgent care prior to arrival. Denies any chest pain, sob, cough, v/d, sick contacts or recent travel. Pt tested for flu at urgent care, but unknown results. sepsis work up, ivf given, hold off on abx due to likely flu

## 2019-01-05 NOTE — ED PROVIDER NOTE - PHYSICAL EXAMINATION
Gen:  alert, awake, no acute distress, obese  Head:  atraumatic, normocephalic  HEENT: PERRLA, EOMI, normal nose, normal oropharynx, no tonsillar edema, erythema, or exudate  full rom neck, no meningismus  CV:  rrr, nl S1, S2, no m/r/g  Pulm:  lungs CTA b/l  Abd: s/nt/nd, +BS  MSK:  moving all extremities, no back midline ttp, no stepoffs, no cva TTP  Neuro:  grossly intact, no focal deficits  Skin:  clear, dry, intact  Psych: AOx3, normal affect, no apparent risk to self or others

## 2019-01-05 NOTE — ED PROVIDER NOTE - ATTENDING CONTRIBUTION TO CARE
Dr. Nj: I performed a face to face bedside interview with patient regarding history of present illness, review of symptoms and past medical history. I completed an independent physical exam.  I have discussed patient's plan of care with PA.   I agree with note as stated above, having amended the EMR as needed to reflect my findings.   This includes HISTORY OF PRESENT ILLNESS, HIV, PAST MEDICAL/SURGICAL/FAMILY/SOCIAL HISTORY, ALLERGIES AND HOME MEDICATIONS, REVIEW OF SYSTEMS, PHYSICAL EXAM, and any PROGRESS NOTES during the time I functioned as the attending physician for this patient.    dr nj: 58 yr old obese female presents c/o fever, bodyaches, neck pain starting today after running errands. Pt went to urgent care, and sent to ED for further eval. Pt was given zofran at urgent care prior to arrival. Denies any chest pain, sob, cough, v/d, sick contacts or recent travel. Pt tested for flu at urgent care, but unknown results. sepsis work up, ivf given, hold off on abx due to likely flu

## 2019-01-05 NOTE — ED ADULT TRIAGE NOTE - CHIEF COMPLAINT QUOTE
I just came from the Urgent care and they told me to come here, I have fever and my neck hurts started today

## 2019-01-05 NOTE — ED ADULT NURSE NOTE - NSIMPLEMENTINTERV_GEN_ALL_ED
Implemented All Universal Safety Interventions:  [place] to call system. Call bell, personal items and telephone within reach. Instruct patient to call for assistance. Room bathroom lighting operational. Non-slip footwear when patient is off stretcher. Physically safe environment: no spills, clutter or unnecessary equipment. Stretcher in lowest position, wheels locked, appropriate side rails in place.

## 2019-01-05 NOTE — ED PROVIDER NOTE - OBJECTIVE STATEMENT
58 yr old obese female presents c/o fever, bodyaches, neck pain starting today after running errands. Pt went to urgent care, and sent to ED for further eval. Pt was given zofran at urgent care prior to arrival. Denies any chest pain, sob, cough, v/d, sick contacts or recent travel. Pt tested for flu at urgent care, but unknown results.

## 2019-01-05 NOTE — ED PROVIDER NOTE - NSFOLLOWUPINSTRUCTIONS_ED_ALL_ED_FT
Follow up with your primary care provider 24-48 hours  Take your tamiflu as prescribed  Take motrin as directed  Any worsening of symptoms or new concerning symptoms return to the ED

## 2019-01-06 ENCOUNTER — TRANSCRIPTION ENCOUNTER (OUTPATIENT)
Age: 59
End: 2019-01-06

## 2019-01-06 LAB
CULTURE RESULTS: SIGNIFICANT CHANGE UP
SPECIMEN SOURCE: SIGNIFICANT CHANGE UP

## 2019-01-07 ENCOUNTER — APPOINTMENT (OUTPATIENT)
Dept: NUCLEAR MEDICINE | Facility: CLINIC | Age: 59
End: 2019-01-07

## 2019-01-08 ENCOUNTER — CHART COPY (OUTPATIENT)
Age: 59
End: 2019-01-08

## 2019-01-10 LAB
CULTURE RESULTS: SIGNIFICANT CHANGE UP
CULTURE RESULTS: SIGNIFICANT CHANGE UP
SPECIMEN SOURCE: SIGNIFICANT CHANGE UP
SPECIMEN SOURCE: SIGNIFICANT CHANGE UP

## 2019-02-01 ENCOUNTER — CHART COPY (OUTPATIENT)
Age: 59
End: 2019-02-01

## 2019-02-04 ENCOUNTER — APPOINTMENT (OUTPATIENT)
Dept: NUCLEAR MEDICINE | Facility: CLINIC | Age: 59
End: 2019-02-04

## 2019-02-11 ENCOUNTER — APPOINTMENT (OUTPATIENT)
Dept: NUCLEAR MEDICINE | Facility: CLINIC | Age: 59
End: 2019-02-11

## 2019-02-11 ENCOUNTER — OUTPATIENT (OUTPATIENT)
Dept: OUTPATIENT SERVICES | Facility: HOSPITAL | Age: 59
LOS: 1 days | End: 2019-02-11
Payer: COMMERCIAL

## 2019-02-11 DIAGNOSIS — Z00.8 ENCOUNTER FOR OTHER GENERAL EXAMINATION: ICD-10-CM

## 2019-02-11 PROCEDURE — 78815 PET IMAGE W/CT SKULL-THIGH: CPT | Mod: 26,PI

## 2019-02-11 PROCEDURE — A9587: CPT

## 2019-02-11 PROCEDURE — 78815 PET IMAGE W/CT SKULL-THIGH: CPT

## 2019-02-12 ENCOUNTER — CHART COPY (OUTPATIENT)
Age: 59
End: 2019-02-12

## 2019-02-15 ENCOUNTER — OUTPATIENT (OUTPATIENT)
Dept: OUTPATIENT SERVICES | Facility: HOSPITAL | Age: 59
LOS: 1 days | Discharge: HOME | End: 2019-02-15

## 2019-02-15 DIAGNOSIS — C7A.8 OTHER MALIGNANT NEUROENDOCRINE TUMORS: ICD-10-CM

## 2019-02-18 ENCOUNTER — OUTPATIENT (OUTPATIENT)
Dept: OUTPATIENT SERVICES | Facility: HOSPITAL | Age: 59
LOS: 1 days | End: 2019-02-18
Payer: COMMERCIAL

## 2019-02-18 ENCOUNTER — APPOINTMENT (OUTPATIENT)
Dept: MRI IMAGING | Facility: CLINIC | Age: 59
End: 2019-02-18
Payer: COMMERCIAL

## 2019-02-18 DIAGNOSIS — R16.0 HEPATOMEGALY, NOT ELSEWHERE CLASSIFIED: ICD-10-CM

## 2019-02-18 PROCEDURE — A9585: CPT

## 2019-02-18 PROCEDURE — 74183 MRI ABD W/O CNTR FLWD CNTR: CPT | Mod: 26

## 2019-02-18 PROCEDURE — 74183 MRI ABD W/O CNTR FLWD CNTR: CPT

## 2019-02-20 ENCOUNTER — APPOINTMENT (OUTPATIENT)
Dept: SURGICAL ONCOLOGY | Facility: CLINIC | Age: 59
End: 2019-02-20
Payer: COMMERCIAL

## 2019-02-20 VITALS
WEIGHT: 293 LBS | HEIGHT: 61 IN | HEART RATE: 92 BPM | DIASTOLIC BLOOD PRESSURE: 84 MMHG | SYSTOLIC BLOOD PRESSURE: 201 MMHG | RESPIRATION RATE: 15 BRPM | BODY MASS INDEX: 55.32 KG/M2

## 2019-02-20 PROCEDURE — 99244 OFF/OP CNSLTJ NEW/EST MOD 40: CPT

## 2019-02-22 ENCOUNTER — RX RENEWAL (OUTPATIENT)
Age: 59
End: 2019-02-22

## 2019-02-22 DIAGNOSIS — K63.9 DISEASE OF INTESTINE, UNSPECIFIED: ICD-10-CM

## 2019-02-26 ENCOUNTER — APPOINTMENT (OUTPATIENT)
Dept: HEMATOLOGY ONCOLOGY | Facility: CLINIC | Age: 59
End: 2019-02-26
Payer: COMMERCIAL

## 2019-02-26 VITALS
TEMPERATURE: 98.2 F | RESPIRATION RATE: 18 BRPM | HEART RATE: 106 BPM | OXYGEN SATURATION: 98 % | WEIGHT: 293 LBS | BODY MASS INDEX: 55.32 KG/M2 | DIASTOLIC BLOOD PRESSURE: 80 MMHG | HEIGHT: 61.02 IN | SYSTOLIC BLOOD PRESSURE: 164 MMHG

## 2019-02-26 PROCEDURE — 99245 OFF/OP CONSLTJ NEW/EST HI 55: CPT

## 2019-02-26 RX ORDER — SUMATRIPTAN 50 MG/1
50 TABLET, FILM COATED ORAL
Qty: 4 | Refills: 5 | Status: DISCONTINUED | COMMUNITY
Start: 2018-10-04 | End: 2019-02-26

## 2019-02-26 RX ORDER — IBUPROFEN 200 MG/1
200 TABLET ORAL 3 TIMES DAILY
Refills: 0 | Status: DISCONTINUED | COMMUNITY
Start: 2018-07-13 | End: 2019-02-26

## 2019-02-26 RX ORDER — POLYETHYLENE GLYCOL 3350, SODIUM SULFATE, SODIUM CHLORIDE, POTASSIUM CHLORIDE, ASCORBIC ACID, SODIUM ASCORBATE 7.5-2.691G
100 KIT ORAL
Qty: 1 | Refills: 0 | Status: DISCONTINUED | COMMUNITY
Start: 2019-02-22 | End: 2019-02-26

## 2019-02-26 RX ORDER — ONDANSETRON 4 MG/1
4 TABLET ORAL
Qty: 10 | Refills: 3 | Status: DISCONTINUED | COMMUNITY
Start: 2018-10-04 | End: 2019-02-26

## 2019-02-26 RX ORDER — DIAZEPAM 5 MG/1
5 TABLET ORAL
Qty: 2 | Refills: 0 | Status: DISCONTINUED | COMMUNITY
Start: 2019-02-01 | End: 2019-02-26

## 2019-02-26 RX ORDER — NAPROXEN 500 MG/1
500 TABLET ORAL
Refills: 0 | Status: DISCONTINUED | COMMUNITY
End: 2019-02-26

## 2019-02-27 ENCOUNTER — APPOINTMENT (OUTPATIENT)
Dept: GASTROENTEROLOGY | Facility: HOSPITAL | Age: 59
End: 2019-02-27

## 2019-02-27 ENCOUNTER — OUTPATIENT (OUTPATIENT)
Dept: OUTPATIENT SERVICES | Facility: HOSPITAL | Age: 59
LOS: 1 days | Discharge: HOME | End: 2019-02-27
Payer: COMMERCIAL

## 2019-02-27 ENCOUNTER — RESULT REVIEW (OUTPATIENT)
Age: 59
End: 2019-02-27

## 2019-02-27 DIAGNOSIS — K63.9 DISEASE OF INTESTINE, UNSPECIFIED: ICD-10-CM

## 2019-02-27 PROCEDURE — 88305 TISSUE EXAM BY PATHOLOGIST: CPT | Mod: 26

## 2019-02-27 PROCEDURE — 43236 UPPR GI SCOPE W/SUBMUC INJ: CPT | Mod: GC,59

## 2019-02-27 PROCEDURE — 45378 DIAGNOSTIC COLONOSCOPY: CPT | Mod: GC

## 2019-02-27 PROCEDURE — 44360 SMALL BOWEL ENDOSCOPY: CPT | Mod: GC,59

## 2019-02-28 NOTE — PHYSICAL EXAM
[Fully active, able to carry on all pre-disease performance without restriction] : Status 0 - Fully active, able to carry on all pre-disease performance without restriction [Obese] : obese [Normal] : affect appropriate [de-identified] : chronic LE edema and venous changes of skin

## 2019-02-28 NOTE — REASON FOR VISIT
[Initial Consultation] : an initial consultation [FreeTextEntry2] : Mesenteric mass, liver lesions suspicious for neuroendocrine tumor

## 2019-02-28 NOTE — HISTORY OF PRESENT ILLNESS
[Disease: _____________________] : Disease: [unfilled] [AJCC Stage: ____] : AJCC Stage: [unfilled] [de-identified] : 58-year-old F with h/o intermittent abdominal pain, n/v over the last several years (at least 9) thought to be 2/2 gallstones. She has been evaluated in the ER 1-2 per year with these attacks.  CT A/P going as far back as 2010 noted a calcified mass within the mesentery of the small bowel suspicious for carcinoid. In Nov 2017 it was found to be increasing in size from previous imaging. She saw a surgeon at the time however it is unclear if any work up was recommended. \par \par In Nov 2018 she was referred to Dr. Foy for evaluation of gallstones. He noted the mesenteric mass which prompted a PET/DOTATATE to be performed. This showed activity in the mesentery (SUV 59.9) as well as liver, ileal  mass though to be the primary, cystic adnexal mass unchanged since 11/17, possible uptake in the skin of left breast. An MRI Abdomen was performed to evaluate the liver mets and this revealed multiple metastatic lesions. Referred to med onc for further work up. \par \par  [de-identified] : n/a [de-identified] : Oxana presents for an initial consultation. She is asymptomatic at this time. Has not had biopsy. A colo is pending for tomorrow with Dr. Santiago. \par Her appetite is good. She has been gaining weight. Denies any pain. No diarrhea, no flushing. \par + has noted several episodes of blood in urine and on toilet paper when wipes. Unclear if vaginal or urinary bleeding. HAs not had her menses in several years. \par She was told she has stage IV cancer that is "slow growing". \par She is not up tod ate with mammo, gyn eval, colo. \par Follows with PCP intermittently.

## 2019-02-28 NOTE — ASSESSMENT
[Palliative] : Goals of care discussed with patient: Palliative [Palliative Care Plan] : not applicable at this time [FreeTextEntry1] : - will order screening mammogram

## 2019-02-28 NOTE — CONSULT LETTER
[Dear  ___] : Dear  [unfilled], [Courtesy Letter:] : I had the pleasure of seeing your patient, [unfilled], in my office today. [Please see my note below.] : Please see my note below. [Consult Closing:] : Thank you very much for allowing me to participate in the care of this patient.  If you have any questions, please do not hesitate to contact me. [Sincerely,] : Sincerely, [FreeTextEntry3] : Katalina Blackmon D.O. \par Attending Physician \par [facility]\par  \par [facility] \par Tel: [phone]\par Fax: [phone]\par

## 2019-03-05 ENCOUNTER — OUTPATIENT (OUTPATIENT)
Dept: OUTPATIENT SERVICES | Facility: HOSPITAL | Age: 59
LOS: 1 days | End: 2019-03-05
Payer: COMMERCIAL

## 2019-03-05 ENCOUNTER — APPOINTMENT (OUTPATIENT)
Dept: MAMMOGRAPHY | Facility: HOSPITAL | Age: 59
End: 2019-03-05
Payer: COMMERCIAL

## 2019-03-05 DIAGNOSIS — Z00.8 ENCOUNTER FOR OTHER GENERAL EXAMINATION: ICD-10-CM

## 2019-03-05 PROCEDURE — 77063 BREAST TOMOSYNTHESIS BI: CPT | Mod: 26

## 2019-03-05 PROCEDURE — 77067 SCR MAMMO BI INCL CAD: CPT

## 2019-03-05 PROCEDURE — 77063 BREAST TOMOSYNTHESIS BI: CPT

## 2019-03-05 PROCEDURE — 77067 SCR MAMMO BI INCL CAD: CPT | Mod: 26

## 2019-03-08 ENCOUNTER — APPOINTMENT (OUTPATIENT)
Dept: HEMATOLOGY ONCOLOGY | Facility: CLINIC | Age: 59
End: 2019-03-08

## 2019-03-11 ENCOUNTER — APPOINTMENT (OUTPATIENT)
Dept: CARDIOLOGY | Facility: CLINIC | Age: 59
End: 2019-03-11
Payer: COMMERCIAL

## 2019-03-11 ENCOUNTER — NON-APPOINTMENT (OUTPATIENT)
Age: 59
End: 2019-03-11

## 2019-03-11 VITALS
OXYGEN SATURATION: 97 % | SYSTOLIC BLOOD PRESSURE: 182 MMHG | WEIGHT: 293 LBS | RESPIRATION RATE: 17 BRPM | BODY MASS INDEX: 63.82 KG/M2 | DIASTOLIC BLOOD PRESSURE: 92 MMHG | HEART RATE: 84 BPM

## 2019-03-11 DIAGNOSIS — Z82.49 FAMILY HISTORY OF ISCHEMIC HEART DISEASE AND OTHER DISEASES OF THE CIRCULATORY SYSTEM: ICD-10-CM

## 2019-03-11 DIAGNOSIS — R07.9 CHEST PAIN, UNSPECIFIED: ICD-10-CM

## 2019-03-11 PROCEDURE — 93000 ELECTROCARDIOGRAM COMPLETE: CPT

## 2019-03-11 PROCEDURE — 93306 TTE W/DOPPLER COMPLETE: CPT

## 2019-03-11 PROCEDURE — 99244 OFF/OP CNSLTJ NEW/EST MOD 40: CPT

## 2019-03-15 ENCOUNTER — APPOINTMENT (OUTPATIENT)
Dept: ULTRASOUND IMAGING | Facility: HOSPITAL | Age: 59
End: 2019-03-15

## 2019-03-15 ENCOUNTER — OUTPATIENT (OUTPATIENT)
Dept: OUTPATIENT SERVICES | Facility: HOSPITAL | Age: 59
LOS: 1 days | End: 2019-03-15
Payer: COMMERCIAL

## 2019-03-15 ENCOUNTER — APPOINTMENT (OUTPATIENT)
Dept: MAMMOGRAPHY | Facility: HOSPITAL | Age: 59
End: 2019-03-15
Payer: COMMERCIAL

## 2019-03-15 DIAGNOSIS — Z00.8 ENCOUNTER FOR OTHER GENERAL EXAMINATION: ICD-10-CM

## 2019-03-15 PROCEDURE — 76641 ULTRASOUND BREAST COMPLETE: CPT | Mod: 26

## 2019-03-15 PROCEDURE — G0279: CPT

## 2019-03-15 PROCEDURE — 77065 DX MAMMO INCL CAD UNI: CPT

## 2019-03-15 PROCEDURE — 77065 DX MAMMO INCL CAD UNI: CPT | Mod: 26,RT

## 2019-03-15 PROCEDURE — 76641 ULTRASOUND BREAST COMPLETE: CPT

## 2019-03-15 PROCEDURE — G0279: CPT | Mod: 26

## 2019-03-22 ENCOUNTER — APPOINTMENT (OUTPATIENT)
Dept: CARDIOLOGY | Facility: CLINIC | Age: 59
End: 2019-03-22

## 2019-03-28 NOTE — HISTORY OF PRESENT ILLNESS
[de-identified] : Ms. Ratliff is a 57 y/o female initially referred to Dr. Foy for consideration to cholecystectomy from symptomatic gallstones.  During work up with CT abdomen/pelvis, she was found to a have a calcified small bowel mesenteric mass measuring 5 cm concerning for neuroendocrine tumor.  She underwent a Dotatate PET/CT which demonstrated uptake in the mesenteric mass as well as ileal mass and right hepatic lobe lesions concerning for metastatic disease.  A subsequent MRI liver demonstrated numerous hepatic lesions consistent with metastatic disease.

## 2019-03-28 NOTE — REASON FOR VISIT
[Initial Consultation] : an initial consultation for [Referred By: ___] : Referred By: [unfilled] [FreeTextEntry2] : liver lesions/mesenteric mass

## 2019-03-28 NOTE — PHYSICAL EXAM
[Normal] : supple, no neck mass and thyroid not enlarged [Normal Neck Lymph Nodes] : normal neck lymph nodes  [Normal Supraclavicular Lymph Nodes] : normal supraclavicular lymph nodes [Normal Groin Lymph Nodes] : normal groin lymph nodes [Normal Axillary Lymph Nodes] : normal axillary lymph nodes [Normal] : oriented to person, place and time, with appropriate affect [de-identified] : morbidly obese

## 2019-03-28 NOTE — ASSESSMENT
[FreeTextEntry1] : 59 y/o with suspected metastatic neuroendocrine tumor.\par Currently asymptomatic.\par \par Plan: Imaging reviewed.  Multiple liver lesions will be difficult to surgically resect/ablate.  Would recommend liver biopsy for tissue diagnosis and medical oncology for systemic treatment with close monitoring.

## 2019-04-12 ENCOUNTER — OTHER (OUTPATIENT)
Age: 59
End: 2019-04-12

## 2019-04-16 ENCOUNTER — APPOINTMENT (OUTPATIENT)
Dept: CARDIOLOGY | Facility: CLINIC | Age: 59
End: 2019-04-16

## 2019-04-18 ENCOUNTER — APPOINTMENT (OUTPATIENT)
Dept: CARDIOLOGY | Facility: CLINIC | Age: 59
End: 2019-04-18

## 2019-04-19 ENCOUNTER — APPOINTMENT (OUTPATIENT)
Dept: CARDIOLOGY | Facility: CLINIC | Age: 59
End: 2019-04-19
Payer: COMMERCIAL

## 2019-04-19 PROCEDURE — 93015 CV STRESS TEST SUPVJ I&R: CPT

## 2019-04-22 ENCOUNTER — APPOINTMENT (OUTPATIENT)
Dept: CT IMAGING | Facility: HOSPITAL | Age: 59
End: 2019-04-22
Payer: COMMERCIAL

## 2019-04-22 ENCOUNTER — APPOINTMENT (OUTPATIENT)
Age: 59
End: 2019-04-22

## 2019-04-22 ENCOUNTER — OUTPATIENT (OUTPATIENT)
Dept: OUTPATIENT SERVICES | Facility: HOSPITAL | Age: 59
LOS: 1 days | End: 2019-04-22
Payer: COMMERCIAL

## 2019-04-22 DIAGNOSIS — K66.8 OTHER SPECIFIED DISORDERS OF PERITONEUM: ICD-10-CM

## 2019-04-22 PROCEDURE — 71260 CT THORAX DX C+: CPT

## 2019-04-22 PROCEDURE — 74177 CT ABD & PELVIS W/CONTRAST: CPT | Mod: 26

## 2019-04-22 PROCEDURE — 74177 CT ABD & PELVIS W/CONTRAST: CPT

## 2019-04-22 PROCEDURE — 71260 CT THORAX DX C+: CPT | Mod: 26

## 2019-04-22 PROCEDURE — 82565 ASSAY OF CREATININE: CPT

## 2019-04-23 ENCOUNTER — EMERGENCY (EMERGENCY)
Facility: HOSPITAL | Age: 59
LOS: 1 days | Discharge: HOME | End: 2019-04-23
Attending: INTERNAL MEDICINE | Admitting: INTERNAL MEDICINE
Payer: COMMERCIAL

## 2019-04-23 ENCOUNTER — APPOINTMENT (OUTPATIENT)
Age: 59
End: 2019-04-23

## 2019-04-23 ENCOUNTER — OUTPATIENT (OUTPATIENT)
Dept: OUTPATIENT SERVICES | Facility: HOSPITAL | Age: 59
LOS: 1 days | Discharge: HOME | End: 2019-04-23

## 2019-04-23 ENCOUNTER — INPATIENT (INPATIENT)
Facility: HOSPITAL | Age: 59
LOS: 3 days | Discharge: HOME | DRG: 827 | End: 2019-04-27
Attending: STUDENT IN AN ORGANIZED HEALTH CARE EDUCATION/TRAINING PROGRAM | Admitting: HOSPITALIST
Payer: COMMERCIAL

## 2019-04-23 VITALS
RESPIRATION RATE: 18 BRPM | WEIGHT: 293 LBS | HEIGHT: 61 IN | DIASTOLIC BLOOD PRESSURE: 120 MMHG | OXYGEN SATURATION: 98 % | TEMPERATURE: 98 F | HEART RATE: 67 BPM | SYSTOLIC BLOOD PRESSURE: 204 MMHG

## 2019-04-23 VITALS
RESPIRATION RATE: 18 BRPM | SYSTOLIC BLOOD PRESSURE: 167 MMHG | DIASTOLIC BLOOD PRESSURE: 86 MMHG | HEART RATE: 85 BPM | OXYGEN SATURATION: 98 % | TEMPERATURE: 99 F

## 2019-04-23 VITALS
DIASTOLIC BLOOD PRESSURE: 94 MMHG | RESPIRATION RATE: 14 BRPM | OXYGEN SATURATION: 98 % | SYSTOLIC BLOOD PRESSURE: 168 MMHG | HEART RATE: 72 BPM | TEMPERATURE: 99 F

## 2019-04-23 DIAGNOSIS — I89.0 LYMPHEDEMA, NOT ELSEWHERE CLASSIFIED: ICD-10-CM

## 2019-04-23 DIAGNOSIS — I10 ESSENTIAL (PRIMARY) HYPERTENSION: ICD-10-CM

## 2019-04-23 DIAGNOSIS — R10.9 UNSPECIFIED ABDOMINAL PAIN: ICD-10-CM

## 2019-04-23 DIAGNOSIS — Z98.891 HISTORY OF UTERINE SCAR FROM PREVIOUS SURGERY: Chronic | ICD-10-CM

## 2019-04-23 DIAGNOSIS — D64.9 ANEMIA, UNSPECIFIED: ICD-10-CM

## 2019-04-23 DIAGNOSIS — E66.01 MORBID (SEVERE) OBESITY DUE TO EXCESS CALORIES: ICD-10-CM

## 2019-04-23 DIAGNOSIS — R11.2 NAUSEA WITH VOMITING, UNSPECIFIED: ICD-10-CM

## 2019-04-23 DIAGNOSIS — C7A.8 OTHER MALIGNANT NEUROENDOCRINE TUMORS: ICD-10-CM

## 2019-04-23 DIAGNOSIS — K63.9 DISEASE OF INTESTINE, UNSPECIFIED: ICD-10-CM

## 2019-04-23 DIAGNOSIS — Z29.9 ENCOUNTER FOR PROPHYLACTIC MEASURES, UNSPECIFIED: ICD-10-CM

## 2019-04-23 DIAGNOSIS — I35.0 NONRHEUMATIC AORTIC (VALVE) STENOSIS: ICD-10-CM

## 2019-04-23 LAB
ALBUMIN SERPL ELPH-MCNC: 3.5 G/DL — SIGNIFICANT CHANGE UP (ref 3.3–5)
ALBUMIN SERPL ELPH-MCNC: 4.1 G/DL — SIGNIFICANT CHANGE UP (ref 3.3–5)
ALP SERPL-CCNC: 96 U/L — SIGNIFICANT CHANGE UP (ref 40–120)
ALP SERPL-CCNC: 98 U/L — SIGNIFICANT CHANGE UP (ref 40–120)
ALT FLD-CCNC: 14 U/L — SIGNIFICANT CHANGE UP (ref 10–45)
ALT FLD-CCNC: 19 U/L DA — SIGNIFICANT CHANGE UP (ref 10–45)
ANION GAP SERPL CALC-SCNC: 10 MMOL/L — SIGNIFICANT CHANGE UP (ref 5–17)
ANION GAP SERPL CALC-SCNC: 14 MMOL/L — SIGNIFICANT CHANGE UP (ref 5–17)
ANISOCYTOSIS BLD QL: SLIGHT — SIGNIFICANT CHANGE UP
APPEARANCE UR: CLEAR — SIGNIFICANT CHANGE UP
AST SERPL-CCNC: 22 U/L — SIGNIFICANT CHANGE UP (ref 10–40)
AST SERPL-CCNC: 22 U/L — SIGNIFICANT CHANGE UP (ref 10–40)
BACTERIA # UR AUTO: NEGATIVE /HPF — SIGNIFICANT CHANGE UP
BASE EXCESS BLDV CALC-SCNC: 3.6 MMOL/L — HIGH (ref -2–2)
BASOPHILS # BLD AUTO: 0 K/UL — SIGNIFICANT CHANGE UP (ref 0–0.2)
BASOPHILS # BLD AUTO: 0.1 K/UL — SIGNIFICANT CHANGE UP (ref 0–0.2)
BASOPHILS NFR BLD AUTO: 0.3 % — SIGNIFICANT CHANGE UP (ref 0–2)
BASOPHILS NFR BLD AUTO: 1.1 % — SIGNIFICANT CHANGE UP (ref 0–2)
BILIRUB SERPL-MCNC: 0.3 MG/DL — SIGNIFICANT CHANGE UP (ref 0.2–1.2)
BILIRUB SERPL-MCNC: 0.3 MG/DL — SIGNIFICANT CHANGE UP (ref 0.2–1.2)
BILIRUB UR-MCNC: NEGATIVE — SIGNIFICANT CHANGE UP
BUN SERPL-MCNC: 7 MG/DL — SIGNIFICANT CHANGE UP (ref 7–23)
BUN SERPL-MCNC: 9 MG/DL — SIGNIFICANT CHANGE UP (ref 7–23)
CALCIUM SERPL-MCNC: 9.3 MG/DL — SIGNIFICANT CHANGE UP (ref 8.4–10.5)
CALCIUM SERPL-MCNC: 9.3 MG/DL — SIGNIFICANT CHANGE UP (ref 8.4–10.5)
CHLORIDE SERPL-SCNC: 102 MMOL/L — SIGNIFICANT CHANGE UP (ref 96–108)
CHLORIDE SERPL-SCNC: 103 MMOL/L — SIGNIFICANT CHANGE UP (ref 96–108)
CO2 BLDV-SCNC: 31 MMOL/L — HIGH (ref 22–30)
CO2 SERPL-SCNC: 24 MMOL/L — SIGNIFICANT CHANGE UP (ref 22–31)
CO2 SERPL-SCNC: 27 MMOL/L — SIGNIFICANT CHANGE UP (ref 22–31)
COLOR SPEC: YELLOW — SIGNIFICANT CHANGE UP
COMMENT - URINE: SIGNIFICANT CHANGE UP
CREAT SERPL-MCNC: 0.56 MG/DL — SIGNIFICANT CHANGE UP (ref 0.5–1.3)
CREAT SERPL-MCNC: 0.61 MG/DL — SIGNIFICANT CHANGE UP (ref 0.5–1.3)
DACRYOCYTES BLD QL SMEAR: SLIGHT — SIGNIFICANT CHANGE UP
DIFF PNL FLD: ABNORMAL
ELLIPTOCYTES BLD QL SMEAR: SLIGHT — SIGNIFICANT CHANGE UP
EOSINOPHIL # BLD AUTO: 0.1 K/UL — SIGNIFICANT CHANGE UP (ref 0–0.5)
EOSINOPHIL # BLD AUTO: 0.2 K/UL — SIGNIFICANT CHANGE UP (ref 0–0.5)
EOSINOPHIL NFR BLD AUTO: 0.8 % — SIGNIFICANT CHANGE UP (ref 0–6)
EOSINOPHIL NFR BLD AUTO: 2 % — SIGNIFICANT CHANGE UP (ref 0–6)
EPI CELLS # UR: SIGNIFICANT CHANGE UP
GAS PNL BLDV: SIGNIFICANT CHANGE UP
GLUCOSE SERPL-MCNC: 119 MG/DL — HIGH (ref 70–99)
GLUCOSE SERPL-MCNC: 129 MG/DL — HIGH (ref 70–99)
GLUCOSE UR QL: NEGATIVE — SIGNIFICANT CHANGE UP
HCO3 BLDV-SCNC: 29 MMOL/L — SIGNIFICANT CHANGE UP (ref 21–29)
HCT VFR BLD CALC: 35 % — SIGNIFICANT CHANGE UP (ref 34.5–45)
HCT VFR BLD CALC: 36.8 % — SIGNIFICANT CHANGE UP (ref 34.5–45)
HGB BLD-MCNC: 10.8 G/DL — LOW (ref 11.5–15.5)
HGB BLD-MCNC: 10.9 G/DL — LOW (ref 11.5–15.5)
HYPOCHROMIA BLD QL: SLIGHT — SIGNIFICANT CHANGE UP
KETONES UR-MCNC: NEGATIVE — SIGNIFICANT CHANGE UP
LEUKOCYTE ESTERASE UR-ACNC: NEGATIVE — SIGNIFICANT CHANGE UP
LIDOCAIN IGE QN: 336 U/L — SIGNIFICANT CHANGE UP (ref 73–393)
LYMPHOCYTES # BLD AUTO: 1.3 K/UL — SIGNIFICANT CHANGE UP (ref 1–3.3)
LYMPHOCYTES # BLD AUTO: 1.6 K/UL — SIGNIFICANT CHANGE UP (ref 1–3.3)
LYMPHOCYTES # BLD AUTO: 17 % — SIGNIFICANT CHANGE UP (ref 13–44)
LYMPHOCYTES # BLD AUTO: 21.8 % — SIGNIFICANT CHANGE UP (ref 13–44)
MCHC RBC-ENTMCNC: 19.4 PG — LOW (ref 27–34)
MCHC RBC-ENTMCNC: 20.4 PG — LOW (ref 27–34)
MCHC RBC-ENTMCNC: 29.5 GM/DL — LOW (ref 32–36)
MCHC RBC-ENTMCNC: 30.9 GM/DL — LOW (ref 32–36)
MCV RBC AUTO: 65.7 FL — LOW (ref 80–100)
MCV RBC AUTO: 66.2 FL — LOW (ref 80–100)
MICROCYTES BLD QL: SIGNIFICANT CHANGE UP
MONOCYTES # BLD AUTO: 0.5 K/UL — SIGNIFICANT CHANGE UP (ref 0–0.9)
MONOCYTES # BLD AUTO: 0.5 K/UL — SIGNIFICANT CHANGE UP (ref 0–0.9)
MONOCYTES NFR BLD AUTO: 6.2 % — SIGNIFICANT CHANGE UP (ref 2–14)
MONOCYTES NFR BLD AUTO: 6.7 % — SIGNIFICANT CHANGE UP (ref 1–9)
NEUTROPHILS # BLD AUTO: 5.2 K/UL — SIGNIFICANT CHANGE UP (ref 1.8–7.4)
NEUTROPHILS # BLD AUTO: 5.7 K/UL — SIGNIFICANT CHANGE UP (ref 1.8–7.4)
NEUTROPHILS NFR BLD AUTO: 70.9 % — SIGNIFICANT CHANGE UP (ref 43–77)
NEUTROPHILS NFR BLD AUTO: 73.1 % — SIGNIFICANT CHANGE UP (ref 43–77)
NITRITE UR-MCNC: NEGATIVE — SIGNIFICANT CHANGE UP
PCO2 BLDV: 53 MMHG — HIGH (ref 35–50)
PH BLDV: 7.36 — SIGNIFICANT CHANGE UP (ref 7.35–7.45)
PH UR: 8 — SIGNIFICANT CHANGE UP (ref 5–8)
PLAT MORPH BLD: NORMAL — SIGNIFICANT CHANGE UP
PLATELET # BLD AUTO: 258 K/UL — SIGNIFICANT CHANGE UP (ref 150–400)
PLATELET # BLD AUTO: 295 K/UL — SIGNIFICANT CHANGE UP (ref 150–400)
PO2 BLDV: 32 MMHG — SIGNIFICANT CHANGE UP (ref 25–45)
POIKILOCYTOSIS BLD QL AUTO: SLIGHT — SIGNIFICANT CHANGE UP
POTASSIUM SERPL-MCNC: 3.9 MMOL/L — SIGNIFICANT CHANGE UP (ref 3.5–5.3)
POTASSIUM SERPL-MCNC: 4.1 MMOL/L — SIGNIFICANT CHANGE UP (ref 3.5–5.3)
POTASSIUM SERPL-SCNC: 3.9 MMOL/L — SIGNIFICANT CHANGE UP (ref 3.5–5.3)
POTASSIUM SERPL-SCNC: 4.1 MMOL/L — SIGNIFICANT CHANGE UP (ref 3.5–5.3)
PROT SERPL-MCNC: 7.9 G/DL — SIGNIFICANT CHANGE UP (ref 6–8.3)
PROT SERPL-MCNC: 8.3 G/DL — SIGNIFICANT CHANGE UP (ref 6–8.3)
PROT UR-MCNC: NEGATIVE — SIGNIFICANT CHANGE UP
RBC # BLD: 5.28 M/UL — HIGH (ref 3.8–5.2)
RBC # BLD: 5.61 M/UL — HIGH (ref 3.8–5.2)
RBC # FLD: 18.6 % — HIGH (ref 10.3–14.5)
RBC # FLD: 20.5 % — HIGH (ref 10.3–14.5)
RBC BLD AUTO: ABNORMAL
RBC CASTS # UR COMP ASSIST: SIGNIFICANT CHANGE UP /HPF (ref 0–4)
SAO2 % BLDV: 55 % — LOW (ref 67–88)
SODIUM SERPL-SCNC: 140 MMOL/L — SIGNIFICANT CHANGE UP (ref 135–145)
SODIUM SERPL-SCNC: 140 MMOL/L — SIGNIFICANT CHANGE UP (ref 135–145)
SP GR SPEC: 1.01 — SIGNIFICANT CHANGE UP (ref 1.01–1.02)
UROBILINOGEN FLD QL: NEGATIVE — SIGNIFICANT CHANGE UP
WBC # BLD: 7.8 K/UL — SIGNIFICANT CHANGE UP (ref 3.8–10.5)
WBC # BLD: 7.8 K/UL — SIGNIFICANT CHANGE UP (ref 3.8–10.5)
WBC # FLD AUTO: 7.8 K/UL — SIGNIFICANT CHANGE UP (ref 3.8–10.5)
WBC # FLD AUTO: 7.8 K/UL — SIGNIFICANT CHANGE UP (ref 3.8–10.5)
WBC UR QL: SIGNIFICANT CHANGE UP /HPF (ref 0–5)

## 2019-04-23 PROCEDURE — 81001 URINALYSIS AUTO W/SCOPE: CPT

## 2019-04-23 PROCEDURE — 99285 EMERGENCY DEPT VISIT HI MDM: CPT | Mod: 25

## 2019-04-23 PROCEDURE — 36415 COLL VENOUS BLD VENIPUNCTURE: CPT

## 2019-04-23 PROCEDURE — 96376 TX/PRO/DX INJ SAME DRUG ADON: CPT

## 2019-04-23 PROCEDURE — 99223 1ST HOSP IP/OBS HIGH 75: CPT | Mod: GC

## 2019-04-23 PROCEDURE — 85027 COMPLETE CBC AUTOMATED: CPT

## 2019-04-23 PROCEDURE — 96374 THER/PROPH/DIAG INJ IV PUSH: CPT

## 2019-04-23 PROCEDURE — 83690 ASSAY OF LIPASE: CPT

## 2019-04-23 PROCEDURE — 96375 TX/PRO/DX INJ NEW DRUG ADDON: CPT

## 2019-04-23 PROCEDURE — 99285 EMERGENCY DEPT VISIT HI MDM: CPT

## 2019-04-23 PROCEDURE — 80053 COMPREHEN METABOLIC PANEL: CPT

## 2019-04-23 RX ORDER — ONDANSETRON 8 MG/1
4 TABLET, FILM COATED ORAL EVERY 6 HOURS
Qty: 0 | Refills: 0 | Status: DISCONTINUED | OUTPATIENT
Start: 2019-04-23 | End: 2019-04-27

## 2019-04-23 RX ORDER — ONDANSETRON 8 MG/1
4 TABLET, FILM COATED ORAL ONCE
Qty: 0 | Refills: 0 | Status: COMPLETED | OUTPATIENT
Start: 2019-04-23 | End: 2019-04-23

## 2019-04-23 RX ORDER — SODIUM CHLORIDE 9 MG/ML
3 INJECTION INTRAMUSCULAR; INTRAVENOUS; SUBCUTANEOUS ONCE
Qty: 0 | Refills: 0 | Status: COMPLETED | OUTPATIENT
Start: 2019-04-23 | End: 2019-04-23

## 2019-04-23 RX ORDER — HYDRALAZINE HCL 50 MG
10 TABLET ORAL EVERY 6 HOURS
Qty: 0 | Refills: 0 | Status: DISCONTINUED | OUTPATIENT
Start: 2019-04-23 | End: 2019-04-27

## 2019-04-23 RX ORDER — FAMOTIDINE 10 MG/ML
20 INJECTION INTRAVENOUS DAILY
Qty: 0 | Refills: 0 | Status: DISCONTINUED | OUTPATIENT
Start: 2019-04-23 | End: 2019-04-23

## 2019-04-23 RX ORDER — ENOXAPARIN SODIUM 100 MG/ML
40 INJECTION SUBCUTANEOUS DAILY
Qty: 0 | Refills: 0 | Status: DISCONTINUED | OUTPATIENT
Start: 2019-04-23 | End: 2019-04-27

## 2019-04-23 RX ORDER — ONDANSETRON 8 MG/1
8 TABLET, FILM COATED ORAL ONCE
Qty: 0 | Refills: 0 | Status: COMPLETED | OUTPATIENT
Start: 2019-04-23 | End: 2019-04-23

## 2019-04-23 RX ORDER — MORPHINE SULFATE 50 MG/1
8 CAPSULE, EXTENDED RELEASE ORAL EVERY 4 HOURS
Qty: 0 | Refills: 0 | Status: DISCONTINUED | OUTPATIENT
Start: 2019-04-23 | End: 2019-04-24

## 2019-04-23 RX ORDER — FAMOTIDINE 10 MG/ML
20 INJECTION INTRAVENOUS DAILY
Qty: 0 | Refills: 0 | Status: DISCONTINUED | OUTPATIENT
Start: 2019-04-23 | End: 2019-04-27

## 2019-04-23 RX ORDER — MORPHINE SULFATE 50 MG/1
8 CAPSULE, EXTENDED RELEASE ORAL ONCE
Qty: 0 | Refills: 0 | Status: DISCONTINUED | OUTPATIENT
Start: 2019-04-23 | End: 2019-04-23

## 2019-04-23 RX ORDER — AMLODIPINE BESYLATE 2.5 MG/1
5 TABLET ORAL DAILY
Qty: 0 | Refills: 0 | Status: DISCONTINUED | OUTPATIENT
Start: 2019-04-23 | End: 2019-04-26

## 2019-04-23 RX ORDER — MORPHINE SULFATE 50 MG/1
4 CAPSULE, EXTENDED RELEASE ORAL ONCE
Qty: 0 | Refills: 0 | Status: DISCONTINUED | OUTPATIENT
Start: 2019-04-23 | End: 2019-04-23

## 2019-04-23 RX ORDER — SODIUM CHLORIDE 9 MG/ML
1000 INJECTION INTRAMUSCULAR; INTRAVENOUS; SUBCUTANEOUS
Qty: 0 | Refills: 0 | Status: DISCONTINUED | OUTPATIENT
Start: 2019-04-23 | End: 2019-04-27

## 2019-04-23 RX ADMIN — MORPHINE SULFATE 8 MILLIGRAM(S): 50 CAPSULE, EXTENDED RELEASE ORAL at 17:19

## 2019-04-23 RX ADMIN — MORPHINE SULFATE 4 MILLIGRAM(S): 50 CAPSULE, EXTENDED RELEASE ORAL at 14:08

## 2019-04-23 RX ADMIN — ONDANSETRON 8 MILLIGRAM(S): 8 TABLET, FILM COATED ORAL at 17:30

## 2019-04-23 RX ADMIN — ONDANSETRON 4 MILLIGRAM(S): 8 TABLET, FILM COATED ORAL at 12:53

## 2019-04-23 RX ADMIN — MORPHINE SULFATE 8 MILLIGRAM(S): 50 CAPSULE, EXTENDED RELEASE ORAL at 21:42

## 2019-04-23 RX ADMIN — MORPHINE SULFATE 8 MILLIGRAM(S): 50 CAPSULE, EXTENDED RELEASE ORAL at 22:16

## 2019-04-23 RX ADMIN — ONDANSETRON 4 MILLIGRAM(S): 8 TABLET, FILM COATED ORAL at 21:51

## 2019-04-23 RX ADMIN — MORPHINE SULFATE 4 MILLIGRAM(S): 50 CAPSULE, EXTENDED RELEASE ORAL at 12:53

## 2019-04-23 RX ADMIN — SODIUM CHLORIDE 3 MILLILITER(S): 9 INJECTION INTRAMUSCULAR; INTRAVENOUS; SUBCUTANEOUS at 12:53

## 2019-04-23 NOTE — ED ADULT NURSE NOTE - OBJECTIVE STATEMENT
54YOF pmh obesity, mesenteric presents to ED transfer from [place] c/o nausea/vomitting. Abd soft, nondistended, nontender. Pt had one episode of emesis at ED. Pt denies blood in vomit. Pt denies CP, SOB, HA, weakness, fever, chills, burning upon urination. Safety and comfort measures provided. No visitors at bedside. Will continue to monitor.

## 2019-04-23 NOTE — H&P ADULT - PROBLEM SELECTOR PLAN 3
- chronic lymphedema- currently at baseline  - no acute intervention at this time - c/w 5mg amlodipine

## 2019-04-23 NOTE — ED ADULT TRIAGE NOTE - BSA (M2)
Abnormal TFTs upon admit.  Unclear if secondary to illness, but with evidence of iatrogenic hyperthyroidism in past while on above weight based dose.     Continue LT4 ng 125mcg daily (decreased from 150mcg)  Repeat TFTs in 4 weeks (due ~4/15)     2.34

## 2019-04-23 NOTE — ED PROVIDER NOTE - ENMT, MLM
85 year old female from home lives with daughter, walks with a walker with PMH of Severe Dementia, Afib (not on AC by choice), CHF echo in 2017 showed severe LV global dysfunction ,Intubations x 4 in past for CHF, CAD s/p 5 stents , HTN, osteoarthritis was brought to the ED by daughter for inability to walk and poor PO, urinary incontinence in take x 1 week. Admitted for FTT, UTI. 85 year old female from home lives with daughter, walks with a walker with PMH of Severe Dementia, Afib (not on AC by choice), CHF echo in 2017 showed severe LV global dysfunction ,Intubations x 4 in past for CHF, CAD s/p 5 stents , HTN, osteoarthritis was brought to the ED by daughter for inability to walk and poor PO, urinary incontinence in take x 1 week. Admitted for FTT, UTI. 85 year old female from home lives with daughter, walks with a walker with PMH of Severe Dementia, Afib (not on AC by choice), CHF echo in 2017 showed severe LV global dysfunction ,Intubations x 4 in past for CHF, CAD s/p 5 stents , HTN, osteoarthritis was brought to the ED by daughter for inability to walk and poor PO, urinary incontinence in take x 1 week. Admitted for FTT, UTI. 85 year old female from home lives with daughter, walks with a walker with PMH of Severe Dementia, Afib (not on AC by choice), CHF echo in 2017 showed severe LV global dysfunction ,Intubations x 4 in past for CHF, CAD s/p 5 stents , HTN, osteoarthritis was brought to the ED by daughter for inability to walk and poor PO, urinary incontinence in take x 1 week. Admitted for FTT, UTI. 85 year old female from home lives with daughter, walks with a walker with PMH of Severe Dementia, Afib (not on AC by choice), CHF echo in 2017 showed severe LV global dysfunction ,Intubations x 4 in past for CHF, CAD s/p 5 stents , HTN, osteoarthritis was brought to the ED by daughter for inability to walk and poor PO, urinary incontinence in take x 1 week. Admitted for FTT, UTI. 85 year old female from home lives with daughter, walks with a walker with PMH of Severe Dementia, Afib (not on AC by choice), CHF echo in 2017 showed severe LV global dysfunction ,Intubations x 4 in past for CHF, CAD s/p 5 stents , HTN, osteoarthritis was brought to the ED by daughter for inability to walk and poor PO, urinary incontinence in take x 1 week. Admitted for FTT, UTI. Patient is a 85 year old female from home lives with daughter, walks with a walker with PMH of Severe Dementia, Afib (not on AC by choice), CHF echo in 2017 showed severe LV global dysfunction ,Intubations x 4 in past for CHF, CAD s/p 5 stents , HTN, osteoarthritis was brought to the ED by daughter for inability to walk and poor PO, urinary incontinence in take x 1 week. Admitted for FTT, UTI. Patient is a 85 year old female from home lives with daughter, walks with a walker with PMH of Severe Dementia, Afib (not on AC by choice), CHF echo in 2017 showed severe LV global dysfunction ,Intubations x 4 in past for CHF, CAD s/p 5 stents , HTN, osteoarthritis was brought to the ED by daughter for inability to walk and poor PO, urinary incontinence in take x 1 week. Admitted for FTT, UTI. Patient is a 85 year old female from home lives with daughter, walks with a walker with PMH of Severe Dementia, Afib (not on AC by choice), CHF echo in 2017 showed severe LV global dysfunction ,Intubations x 4 in past for CHF, CAD s/p 5 stents , HTN, osteoarthritis was brought to the ED by daughter for inability to walk and poor PO, urinary incontinence in take x 1 week. Admitted for FTT, UTI. 85 year old female from home lives with daughter, walks with a walker with PMH of Severe Dementia, Afib (not on AC by choice), CHF echo in 2017 showed severe LV global dysfunction ,Intubations x 4 in past for CHF, CAD s/p 5 stents , HTN, osteoarthritis was brought to the ED by daughter for inability to walk and poor PO, urinary incontinence in take x 1 week. Admitted for FTT, UTI. 85 year old female from home lives with daughter, walks with a walker with PMH of Severe Dementia, Afib (not on AC by choice), CHF echo in 2017 showed severe LV global dysfunction ,Intubations x 4 in past for CHF, CAD s/p 5 stents , HTN, osteoarthritis was brought to the ED by daughter for inability to walk and poor PO, urinary incontinence in take x 1 week. Admitted for FTT, UTI. Airway patent, Nasal mucosa clear. Mouth with normal mucosa. Throat has no vesicles, no oropharyngeal exudates and uvula is midline.

## 2019-04-23 NOTE — ED PROVIDER NOTE - CLINICAL SUMMARY MEDICAL DECISION MAKING FREE TEXT BOX
57 yo F pmHx mesenteric mass followed by Dr. Blackmon of oncology here today for abdominal pain x 1 day. VSS. Mild distress secondary to pain. + abdominal tenderness. CT done yesterday as outpatient with slightly enlarged mesenteric mass from previous scan in 2017. Labs here unremarkable. Patient's pain refractory to 8mg morphine here. Contacted Dr. Blackmon's office, spoke to nurse who recommended transfer to [facility] for evaluation by Oncology team. Initiated transfer, case discussed with Dr. Triplett who will accept the patient for ED to ED transfer.

## 2019-04-23 NOTE — H&P ADULT - NSHPPHYSICALEXAM_GEN_ALL_CORE
Vital Signs Last 24 Hrs  T(C): 37.3 (23 Apr 2019 20:07), Max: 37.3 (23 Apr 2019 20:07)  T(F): 99.1 (23 Apr 2019 20:07), Max: 99.1 (23 Apr 2019 20:07)  HR: 96 (23 Apr 2019 20:07) (67 - 96)  BP: 187/68 (23 Apr 2019 20:07) (167/86 - 204/120)  BP(mean): --  RR: 20 (23 Apr 2019 20:07) (14 - 20)  SpO2: 96% (23 Apr 2019 20:07) (96% - 99%)]    General: morbidly obese female, sitting retching in bed, with blue colored hair  Neurology: A&Ox3, non focal exam  Head:  Normocephalic, atraumatic  ENT:  Mucosa moist, no ulcerations  Neck:  Supple, no sinuses or palpable masses  Respiratory: CTA B/L anteriorly  CV: RRR, S1S2, no murmurs or rubs  Abdominal: Soft, diffusely tender to palpation, +BS, unable to palpate mass  MSK: marked lymphedema, venous stasis dermatitis bilaterally Vital Signs Last 24 Hrs  T(C): 37.3 (23 Apr 2019 20:07), Max: 37.3 (23 Apr 2019 20:07)  T(F): 99.1 (23 Apr 2019 20:07), Max: 99.1 (23 Apr 2019 20:07)  HR: 96 (23 Apr 2019 20:07) (67 - 96)  BP: 187/68 (23 Apr 2019 20:07) (167/86 - 204/120)  BP(mean): --  RR: 20 (23 Apr 2019 20:07) (14 - 20)  SpO2: 96% (23 Apr 2019 20:07) (96% - 99%)]    General: morbidly obese female, sitting retching in bed, with blue colored hair  Neurology: A&Ox3, non focal exam  Head:  Normocephalic, atraumatic  ENT:  Mucosa moist, no ulcerations  Neck:  Supple, no sinuses or palpable masses  Respiratory: CTA B/L anteriorly  CV: RRR, S1S2, no murmurs or rubs  Abdominal: Soft, diffusely tender to palpation, +BS, unable to palpate mass  MSK: marked lymphedema, venous stasis dermatitis bilaterally  PSYCH: normal behavior. flat affect

## 2019-04-23 NOTE — ED ADULT TRIAGE NOTE - CHIEF COMPLAINT QUOTE
I have abdominal pain and vomiting, I was recently diagnosed with stomach cancer and had a CT Yesterday with oral and IV contrast. I have not felt right since

## 2019-04-23 NOTE — H&P ADULT - ASSESSMENT
58F with PMhx of HTN, lymphedema,  and mesenteric mass concerning for neuroendocrine tumor presents to the ED with abdominal pain, likely in setting of PO contrast and abdomina mass.

## 2019-04-23 NOTE — H&P ADULT - PROBLEM SELECTOR PLAN 1
- patient presenting with nausea, vomiting and abdominal - new onset after imaging yesterday  - may be consequence of contrast material  - c/w zofran 4mg IV q6 hours- will check EKG to evaluate for qtc  - c/w morphine 8mg IV q4 hours PRN moderate-severe pain- currently this dose is controlling pain  - advance diet as tolerated - patient presenting with nausea, vomiting and abdominal - new onset after imaging yesterday  - may be consequence of contrast material  - c/w zofran 4mg IV q6 hours- will check EKG to evaluate for qtc  - c/w morphine 8mg IV q4 hours PRN moderate-severe pain- currently this dose is controlling pain  - advance diet as tolerated; gentle hydration for now - patient presenting with nausea, vomiting and abdominal - new onset after imaging yesterday  - pt believes it may be consequence of contrast material, similar to episode in past  - c/w zofran 4mg IV q6 hours- will check EKG to evaluate for qtc  - c/w morphine 8mg IV q4 hours PRN moderate-severe pain- currently this dose is controlling pain  - advance diet as tolerated; gentle hydration for now

## 2019-04-23 NOTE — ED PROVIDER NOTE - CLINICAL SUMMARY MEDICAL DECISION MAKING FREE TEXT BOX
Moises MARAVILLA MD PGY1: 54 F recent mesenteric mass dx p/w persistnet abdominal pain, nausea nad emesis since receiving CTAP yesterday c/f mass effect from mass vs reaction to contrast. Will c/s oncology and admit for further oncology management.

## 2019-04-23 NOTE — H&P ADULT - PROBLEM SELECTOR PLAN 2
- patient with suspicion of metastatic neuroendocrine tumor- has not initiated any treatment at this time  - CT scan shows interval increase of her mesenteric mass and MR from Feb demonstrates metastatic hepatic lesions  - will call oncology in AM to evaluate for any further inpatient workup  - ileal biopsy unrevealing - patient with suspicion of metastatic neuroendocrine tumor- has not initiated any treatment at this time  - CT scan shows interval increase of her mesenteric mass and MR from Feb demonstrates metastatic hepatic lesions  - will call oncology in AM to evaluate for any further inpatient workup  - ileal biopsy unrevealing, may need another biopsy for tissue diagnosis (consider GI or IR evaluation for biopsy- ileal vs mesenteric masss)

## 2019-04-23 NOTE — H&P ADULT - PROBLEM SELECTOR PLAN 4
- c/w 5mg amlodipine - patient with HTN on amlodipine  - A1c in 2018= 6.3  - will need counseling for nutrition and weight loss, and possible outpatient evaluation for surgical intervetion - patient with HTN on amlodipine  - hydralazine 10 mg PO q 6 bp >170  - A1c in 2018= 6.3  - will need counseling for nutrition and weight loss, and possible outpatient evaluation for surgical intervention

## 2019-04-23 NOTE — ED PROVIDER NOTE - OBJECTIVE STATEMENT
Moises MARAVILLA MD PGY1: 54 F PMH obesity and mesenteric mass p/w persistent nausea and emesis and severe abdominal pain s/p CTAP with PO and iV contrast yesterday since this morning. Had CT at [place] that just demonstrated the mesenteric mass again. Patient's current complaint is severe abdominal pain and nausea.

## 2019-04-23 NOTE — PATIENT PROFILE ADULT - TYPE OF EQUIPMENT CURRENTLY USED AT HOME
Patient has missed their second appointment today. Please send the patient a second No-show warning letter.      Thanks,     Deon Cranker, CMA only for walking long distances/cane, straight

## 2019-04-23 NOTE — ED PROVIDER NOTE - ATTENDING CONTRIBUTION TO CARE
Private Physician Jasmin Blackmon onc/azar  58y female pmh mesnnteric mass, Lymphedema, Obesity, gallstones. Comes to ed complains of abd pain. Pt seen earlier today at [place] ed. CT  show interval change with increase size of mass. Pt complains of nausea and vomiting abd pain onset this am. PE Morbidly obese female in obvious abd pain. NCAT neck supple chest clear anterior & posterior abd mild gen abd ttp, Neruog gcs 15 speech fluent power v5/5 all extr  Malachi Phan MD, Facep

## 2019-04-23 NOTE — H&P ADULT - PROBLEM SELECTOR PLAN 5
- outpatient echo revealing moderate AS  - EF 55% and no segmental wall motion abnormalities  - underwent treadmill stress test on 4/19 which had to be stopped due to patient's poor functional capacity (METS=2)

## 2019-04-23 NOTE — H&P ADULT - PROBLEM SELECTOR PLAN 7
- lovenox subq  - clear liquid diet-advance as tolerated - chronic lymphedema- currently at baseline  - no acute intervention at this time

## 2019-04-23 NOTE — H&P ADULT - NSHPLABSRESULTS_GEN_ALL_CORE
10.8   7.8   )-----------( 258      ( 2019 17:21 )             35.0           140  |  102  |  7   ----------------------------<  119<H>  3.9   |  24  |  0.56    Ca    9.3      2019 17:21    TPro  7.9  /  Alb  4.1  /  TBili  0.3  /  DBili  x   /  AST  22  /  ALT  14  /  AlkPhos  96                  17:21 - VBG - pH: 7.36  | pCO2: 53    | pO2: 32    | Lactate:            Urinalysis Basic - ( 2019 12:45 )    Color: Yellow / Appearance: Clear / S.015 / pH: x  Gluc: x / Ketone: Negative  / Bili: Negative / Urobili: Negative   Blood: x / Protein: Negative / Nitrite: Negative   Leuk Esterase: Negative / RBC: 0-4 /HPF / WBC 0-2 /HPF   Sq Epi: x / Non Sq Epi: Neg.-Few / Bacteria: Negative /HPF      < from: CT Abdomen and Pelvis w/ Oral Cont and w/ IV Cont (19 @ 10:54) >    IMPRESSION:     Slight interval enlargement of the partially calcified mesenteric mass   compared to 2017. 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     Stable right adnexal cystic lesion with a septation, not significantly   changed in appearance compared to prior studies.     Multiple small bilateral lung nodules measuring up to 5 mm in the right   lower lobe. Right lower lobe nodules unchanged compared to 2015. Consider   6-12 month follow-up chest CT to assure stability.      < end of copied text > Personally reviewed imaging, labs, ekg.    10.8   7.8   )-----------( 258      ( 2019 17:21 )             35.0           140  |  102  |  7   ----------------------------<  119<H>  3.9   |  24  |  0.56    Ca    9.3      2019 17:21    TPro  7.9  /  Alb  4.1  /  TBili  0.3  /  DBili  x   /  AST  22  /  ALT  14  /  AlkPhos  96                  17:21 - VBG - pH: 7.36  | pCO2: 53    | pO2: 32    | Lactate:            Urinalysis Basic - ( 2019 12:45 )    Color: Yellow / Appearance: Clear / S.015 / pH: x  Gluc: x / Ketone: Negative  / Bili: Negative / Urobili: Negative   Blood: x / Protein: Negative / Nitrite: Negative   Leuk Esterase: Negative / RBC: 0-4 /HPF / WBC 0-2 /HPF   Sq Epi: x / Non Sq Epi: Neg.-Few / Bacteria: Negative /HPF      < from: CT Abdomen and Pelvis w/ Oral Cont and w/ IV Cont (19 @ 10:54) >    IMPRESSION:     Slight interval enlargement of the partially calcified mesenteric mass   compared to 2017. 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     Stable right adnexal cystic lesion with a septation, not significantly   changed in appearance compared to prior studies.     Multiple small bilateral lung nodules measuring up to 5 mm in the right   lower lobe. Right lower lobe nodules unchanged compared to 2015. Consider   6-12 month follow-up chest CT to assure stability.      < end of copied text >

## 2019-04-23 NOTE — ED PROVIDER NOTE - ATTENDING CONTRIBUTION TO CARE
cc abdominal pain recent ct shows enlarged calcific mesentric mass  abd ct epigastric , periumblical tenderness  d/w dr mckay pmd recommended d/w dr rollins pt oncologist  Dr. Austin:  I have reviewed and discussed with the PA/ resident the case specifics, including the history, physical assessment, evaluation, conclusion, laboratory results, and medical plan. I agree with the contents, and conclusions. I have personally examined, and interviewed the patient.

## 2019-04-23 NOTE — ED PROVIDER NOTE - PROGRESS NOTE DETAILS
Case discussed with Dr. Jasmin Blackmon's nurse (patient's oncologist). Recommends patient be transferred to [facility] for evaluation by Oncology team Transfer center contacted, spoke to Dr. Triplett (ED attending) who will accept patient as ED to ED transfer with oncology agreeing to see and eval patient in ED

## 2019-04-23 NOTE — ED PROVIDER NOTE - CONSTITUTIONAL, MLM
normal... Morbidly obese female, mild distress secondary to pain,  awake, alert, oriented to person, place, time/situation and in no apparent distress.

## 2019-04-23 NOTE — ED ADULT NURSE NOTE - CHIEF COMPLAINT
The patient is a 58y Female complaining of abdominal pain. 28y F with no pertinent PMHx, on HSV prophylaxis (Valtrex), nkda, presents to ED from PCP's office for allergic reaction. Pt states she woke up yesterday with mild periorbital hives, which worsened throughout the day. She woke up this morning with swelling to her bilateral knees, arms, ankles, and feet, as well as her upper lip. She also began experiencing nausea, prompting her to see a GP at [facility] earlier today. Pt was not given medications at [facility] 2/2 her being nauseous and tachy, and was referred to ED for IV meds. No hx of asthma or eczema. Pt recently traveled to [place], but denies any hiking or outdoor activities. No known exposure to anything new. Denies tongue discomfort, difficulty swallowing, throat itching, CP, SOB, wheezing, diarrhea, abd cramping, or other sxs.

## 2019-04-23 NOTE — ED ADULT NURSE NOTE - NSIMPLEMENTINTERV_GEN_ALL_ED
Implemented All Fall with Harm Risk Interventions:  [place] to call system. Call bell, personal items and telephone within reach. Instruct patient to call for assistance. Room bathroom lighting operational. Non-slip footwear when patient is off stretcher. Physically safe environment: no spills, clutter or unnecessary equipment. Stretcher in lowest position, wheels locked, appropriate side rails in place. Provide visual cue, wrist band, yellow gown, etc. Monitor gait and stability. Monitor for mental status changes and reorient to person, place, and time. Review medications for side effects contributing to fall risk. Reinforce activity limits and safety measures with patient and family. Provide visual clues: red socks.

## 2019-04-23 NOTE — ED ADULT NURSE NOTE - OBJECTIVE STATEMENT
Pt presents to the ER complaining of abdominal pain that started since yesterday. Pt had an abdominal CT-scan yesterday with contrast. Pt stated that she was getting up for work today when she felt abdominal pain 10/10 and had two periods of vomiting. Pt was diagnose with gastric cancer yesterday.

## 2019-04-23 NOTE — ED ADULT NURSE NOTE - NSIMPLEMENTINTERV_GEN_ALL_ED
Implemented All Universal Safety Interventions:  [place] to call system. Call bell, personal items and telephone within reach. Instruct patient to call for assistance. Room bathroom lighting operational. Non-slip footwear when patient is off stretcher. Physically safe environment: no spills, clutter or unnecessary equipment. Stretcher in lowest position, wheels locked, appropriate side rails in place.

## 2019-04-23 NOTE — ED PROVIDER NOTE - PMH
Cancer  abdominal  Gallstones Cancer  abdominal  Gallstones    Lymphedema    Mesenteric mass    Morbid obesity

## 2019-04-23 NOTE — H&P ADULT - NSICDXPASTMEDICALHX_GEN_ALL_CORE_FT
PAST MEDICAL HISTORY:  Cancer abdominal    Gallstones     Lymphedema     Mesenteric mass     Morbid obesity

## 2019-04-23 NOTE — H&P ADULT - HISTORY OF PRESENT ILLNESS
58F with PMhx of HTN, lymphedema,  and mesenteric mass concerning for neuroendocrine tumor presents to the ED with abdominal pain. Patient has a known mass as far back as 2010, and has undergone imaging since that time. She underwent a PET/DOTATATE scan which showed activity in the mesentery liver and ileal mass thought to be the primary. MRI from earlier this year demonstrated multiple metastatic lesions. She was sent to Dr. Blackmon for further evaluation. 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 She underwent a colonoscopy with ileal biopsy which revealed lymphoid aggregates.    Patient states current symptoms started yesterday after her CT scan which she underwent for evaluation of her disease per her oncologist. She reports she has had a reaction to IV contrast in the past where she had nausea and vomiting. Her scan yesterday required IV and PO contrast. She states she had some nausea after the test yesterday and had to leave early from work. She  drank a lot of juice as she was advised to drink fluids after her test due to the IV contrast. She was able to eat some dinner and went to bed and in the morning was again feeling nauseous with abdominal pain. She called Dr. Blackmon who advised her to take zofran, but patient continued to have marked pain and nausea and went to [facility] ED. She was transferred from there to [facility] after discuss with Dr. Blackmon's office.    Patient endorses headaches when pain is very severe. She denies any chest pain or SOB. She endorses abdominal pain all across the belly, and denies diarhea or constipation. Denies vomiting but endorses marked wretching.     In the ED, patient's /94, HR 72, RR 18, O2 98%, and T 98.6. Patient received IV morphine 4mg x2 in eliz cove and 8mg x1 in NS as well as IV Zofran

## 2019-04-23 NOTE — H&P ADULT - NSHPREVIEWOFSYSTEMS_GEN_ALL_CORE
REVIEW OF SYSTEMS:    CONSTITUTIONAL: No weakness, fevers or chills  EYES/ENT: No visual changes;  No vertigo or throat pain   NECK: No pain or stiffness  RESPIRATORY: No cough, wheezing, hemoptysis; No shortness of breath  CARDIOVASCULAR: No chest pain or palpitations  GASTROINTESTINAL: +abd pain, + nausea, no vomiting, or hematemesis; No diarrhea or constipation. No melena or hematochezia.  GENITOURINARY: No dysuria, frequency or hematuria  NEUROLOGICAL: No numbness or weakness  SKIN: No itching, rashes  LYMPHATIC: chronic lower extremity edema REVIEW OF SYSTEMS:    CONSTITUTIONAL: No weakness, fevers or chills  EYES/ENT: No visual changes;  No vertigo or throat pain   NECK: No pain or stiffness  RESPIRATORY: No cough, wheezing, hemoptysis; No shortness of breath  CARDIOVASCULAR: No chest pain or palpitations  GASTROINTESTINAL: +abd pain, + nausea, no vomiting, or hematemesis; No diarrhea or constipation. No melena or hematochezia.  GENITOURINARY: No dysuria, frequency or hematuria  NEUROLOGICAL: No numbness or weakness  SKIN: No itching, rashes  LYMPHATIC: chronic lower extremity edema  PSYCH: No anxiety

## 2019-04-23 NOTE — ED PROVIDER NOTE - OBJECTIVE STATEMENT
57 yo F pmHx as noted in chart here today for abdominal pain. Of n 57 yo F pmHx as noted in chart here today for abdominal pain x 1 day. Patient describes atraumatic constant sharp intense pain in upper abdomen that began this morning. Patient denies history of similar symptoms. Of note, she states she had an outpatient contrast CT scan of her abdomen yesterday as part of evaluation of mesenteric mass. Patient is concerned current symptoms are the result of contrast dye. Patient notes associated nausea and NBNB vomitting. She states she has not been able to tolerate PO since this morning. She has not taken any medications for her symptoms. She denies any fever, chills, dysuria, hematuria, flank, pain, frequency, chest pain, SOB, headache, or dizziness.

## 2019-04-24 ENCOUNTER — APPOINTMENT (OUTPATIENT)
Dept: HEMATOLOGY ONCOLOGY | Facility: CLINIC | Age: 59
End: 2019-04-24

## 2019-04-24 LAB
ALBUMIN SERPL ELPH-MCNC: 3.8 G/DL — SIGNIFICANT CHANGE UP (ref 3.3–5)
ALP SERPL-CCNC: 92 U/L — SIGNIFICANT CHANGE UP (ref 40–120)
ALT FLD-CCNC: 15 U/L — SIGNIFICANT CHANGE UP (ref 10–45)
ANION GAP SERPL CALC-SCNC: 13 MMOL/L — SIGNIFICANT CHANGE UP (ref 5–17)
AST SERPL-CCNC: 19 U/L — SIGNIFICANT CHANGE UP (ref 10–40)
BASOPHILS # BLD AUTO: 0.1 K/UL — SIGNIFICANT CHANGE UP (ref 0–0.2)
BASOPHILS NFR BLD AUTO: 1 % — SIGNIFICANT CHANGE UP (ref 0–2)
BILIRUB SERPL-MCNC: 0.3 MG/DL — SIGNIFICANT CHANGE UP (ref 0.2–1.2)
BUN SERPL-MCNC: 10 MG/DL — SIGNIFICANT CHANGE UP (ref 7–23)
CALCIUM SERPL-MCNC: 9.3 MG/DL — SIGNIFICANT CHANGE UP (ref 8.4–10.5)
CHLORIDE SERPL-SCNC: 100 MMOL/L — SIGNIFICANT CHANGE UP (ref 96–108)
CO2 SERPL-SCNC: 27 MMOL/L — SIGNIFICANT CHANGE UP (ref 22–31)
CREAT SERPL-MCNC: 0.58 MG/DL — SIGNIFICANT CHANGE UP (ref 0.5–1.3)
EOSINOPHIL # BLD AUTO: 0 K/UL — SIGNIFICANT CHANGE UP (ref 0–0.5)
EOSINOPHIL NFR BLD AUTO: 0.1 % — SIGNIFICANT CHANGE UP (ref 0–6)
FERRITIN SERPL-MCNC: 9 NG/ML — LOW (ref 15–150)
GLUCOSE SERPL-MCNC: 126 MG/DL — HIGH (ref 70–99)
HBA1C BLD-MCNC: 6.4 % — HIGH (ref 4–5.6)
HCT VFR BLD CALC: 35.8 % — SIGNIFICANT CHANGE UP (ref 34.5–45)
HCV AB S/CO SERPL IA: 0.13 S/CO — SIGNIFICANT CHANGE UP (ref 0–0.99)
HCV AB SERPL-IMP: SIGNIFICANT CHANGE UP
HGB BLD-MCNC: 10.3 G/DL — LOW (ref 11.5–15.5)
IRON SATN MFR SERPL: 22 UG/DL — LOW (ref 30–160)
IRON SATN MFR SERPL: 5 % — LOW (ref 14–50)
LYMPHOCYTES # BLD AUTO: 0.9 K/UL — LOW (ref 1–3.3)
LYMPHOCYTES # BLD AUTO: 10.5 % — LOW (ref 13–44)
MAGNESIUM SERPL-MCNC: 2.1 MG/DL — SIGNIFICANT CHANGE UP (ref 1.6–2.6)
MCHC RBC-ENTMCNC: 18.9 PG — LOW (ref 27–34)
MCHC RBC-ENTMCNC: 28.8 GM/DL — LOW (ref 32–36)
MCV RBC AUTO: 65.7 FL — LOW (ref 80–100)
MONOCYTES # BLD AUTO: 0.5 K/UL — SIGNIFICANT CHANGE UP (ref 0–0.9)
MONOCYTES NFR BLD AUTO: 6.2 % — SIGNIFICANT CHANGE UP (ref 2–14)
NEUTROPHILS # BLD AUTO: 7 K/UL — SIGNIFICANT CHANGE UP (ref 1.8–7.4)
NEUTROPHILS NFR BLD AUTO: 82.2 % — HIGH (ref 43–77)
PHOSPHATE SERPL-MCNC: 3.7 MG/DL — SIGNIFICANT CHANGE UP (ref 2.5–4.5)
PLATELET # BLD AUTO: 284 K/UL — SIGNIFICANT CHANGE UP (ref 150–400)
POTASSIUM SERPL-MCNC: 3.8 MMOL/L — SIGNIFICANT CHANGE UP (ref 3.5–5.3)
POTASSIUM SERPL-SCNC: 3.8 MMOL/L — SIGNIFICANT CHANGE UP (ref 3.5–5.3)
PROT SERPL-MCNC: 7.9 G/DL — SIGNIFICANT CHANGE UP (ref 6–8.3)
RBC # BLD: 5.46 M/UL — HIGH (ref 3.8–5.2)
RBC # FLD: 20.4 % — HIGH (ref 10.3–14.5)
SODIUM SERPL-SCNC: 140 MMOL/L — SIGNIFICANT CHANGE UP (ref 135–145)
TIBC SERPL-MCNC: 455 UG/DL — HIGH (ref 220–430)
UIBC SERPL-MCNC: 433 UG/DL — HIGH (ref 110–370)
WBC # BLD: 8.5 K/UL — SIGNIFICANT CHANGE UP (ref 3.8–10.5)
WBC # FLD AUTO: 8.5 K/UL — SIGNIFICANT CHANGE UP (ref 3.8–10.5)

## 2019-04-24 PROCEDURE — 99223 1ST HOSP IP/OBS HIGH 75: CPT

## 2019-04-24 PROCEDURE — 93010 ELECTROCARDIOGRAM REPORT: CPT

## 2019-04-24 PROCEDURE — 99222 1ST HOSP IP/OBS MODERATE 55: CPT | Mod: GC

## 2019-04-24 PROCEDURE — 99233 SBSQ HOSP IP/OBS HIGH 50: CPT | Mod: GC

## 2019-04-24 RX ORDER — FAMOTIDINE 10 MG/ML
20 INJECTION INTRAVENOUS ONCE
Qty: 0 | Refills: 0 | Status: COMPLETED | OUTPATIENT
Start: 2019-04-24 | End: 2019-04-24

## 2019-04-24 RX ORDER — FERROUS SULFATE 325(65) MG
325 TABLET ORAL DAILY
Qty: 0 | Refills: 0 | Status: DISCONTINUED | OUTPATIENT
Start: 2019-04-24 | End: 2019-04-27

## 2019-04-24 RX ORDER — ACETAMINOPHEN 500 MG
1000 TABLET ORAL ONCE
Qty: 0 | Refills: 0 | Status: DISCONTINUED | OUTPATIENT
Start: 2019-04-24 | End: 2019-04-24

## 2019-04-24 RX ORDER — ACETAMINOPHEN 500 MG
650 TABLET ORAL ONCE
Qty: 0 | Refills: 0 | Status: COMPLETED | OUTPATIENT
Start: 2019-04-24 | End: 2019-04-24

## 2019-04-24 RX ORDER — MORPHINE SULFATE 50 MG/1
4 CAPSULE, EXTENDED RELEASE ORAL ONCE
Qty: 0 | Refills: 0 | Status: DISCONTINUED | OUTPATIENT
Start: 2019-04-24 | End: 2019-04-24

## 2019-04-24 RX ORDER — HYDROMORPHONE HYDROCHLORIDE 2 MG/ML
2 INJECTION INTRAMUSCULAR; INTRAVENOUS; SUBCUTANEOUS EVERY 4 HOURS
Qty: 0 | Refills: 0 | Status: DISCONTINUED | OUTPATIENT
Start: 2019-04-24 | End: 2019-04-27

## 2019-04-24 RX ORDER — HYDROMORPHONE HYDROCHLORIDE 2 MG/ML
2 INJECTION INTRAMUSCULAR; INTRAVENOUS; SUBCUTANEOUS ONCE
Qty: 0 | Refills: 0 | Status: DISCONTINUED | OUTPATIENT
Start: 2019-04-24 | End: 2019-04-24

## 2019-04-24 RX ORDER — MORPHINE SULFATE 50 MG/1
4 CAPSULE, EXTENDED RELEASE ORAL EVERY 4 HOURS
Qty: 0 | Refills: 0 | Status: DISCONTINUED | OUTPATIENT
Start: 2019-04-24 | End: 2019-04-24

## 2019-04-24 RX ORDER — IBUPROFEN 200 MG
400 TABLET ORAL ONCE
Qty: 0 | Refills: 0 | Status: COMPLETED | OUTPATIENT
Start: 2019-04-24 | End: 2019-04-24

## 2019-04-24 RX ADMIN — FAMOTIDINE 20 MILLIGRAM(S): 10 INJECTION INTRAVENOUS at 00:56

## 2019-04-24 RX ADMIN — MORPHINE SULFATE 8 MILLIGRAM(S): 50 CAPSULE, EXTENDED RELEASE ORAL at 04:32

## 2019-04-24 RX ADMIN — Medication 10 MILLIGRAM(S): at 06:11

## 2019-04-24 RX ADMIN — MORPHINE SULFATE 4 MILLIGRAM(S): 50 CAPSULE, EXTENDED RELEASE ORAL at 00:26

## 2019-04-24 RX ADMIN — ONDANSETRON 4 MILLIGRAM(S): 8 TABLET, FILM COATED ORAL at 04:13

## 2019-04-24 RX ADMIN — Medication 650 MILLIGRAM(S): at 04:32

## 2019-04-24 RX ADMIN — MORPHINE SULFATE 8 MILLIGRAM(S): 50 CAPSULE, EXTENDED RELEASE ORAL at 05:05

## 2019-04-24 RX ADMIN — AMLODIPINE BESYLATE 5 MILLIGRAM(S): 2.5 TABLET ORAL at 06:12

## 2019-04-24 RX ADMIN — HYDROMORPHONE HYDROCHLORIDE 2 MILLIGRAM(S): 2 INJECTION INTRAMUSCULAR; INTRAVENOUS; SUBCUTANEOUS at 15:58

## 2019-04-24 RX ADMIN — HYDROMORPHONE HYDROCHLORIDE 2 MILLIGRAM(S): 2 INJECTION INTRAMUSCULAR; INTRAVENOUS; SUBCUTANEOUS at 21:00

## 2019-04-24 RX ADMIN — HYDROMORPHONE HYDROCHLORIDE 2 MILLIGRAM(S): 2 INJECTION INTRAMUSCULAR; INTRAVENOUS; SUBCUTANEOUS at 19:58

## 2019-04-24 RX ADMIN — Medication 325 MILLIGRAM(S): at 13:04

## 2019-04-24 RX ADMIN — ENOXAPARIN SODIUM 40 MILLIGRAM(S): 100 INJECTION SUBCUTANEOUS at 12:55

## 2019-04-24 RX ADMIN — Medication 400 MILLIGRAM(S): at 11:00

## 2019-04-24 RX ADMIN — Medication 400 MILLIGRAM(S): at 10:25

## 2019-04-24 RX ADMIN — SODIUM CHLORIDE 75 MILLILITER(S): 9 INJECTION INTRAMUSCULAR; INTRAVENOUS; SUBCUTANEOUS at 00:25

## 2019-04-24 RX ADMIN — Medication 10 MILLIGRAM(S): at 22:51

## 2019-04-24 RX ADMIN — ONDANSETRON 4 MILLIGRAM(S): 8 TABLET, FILM COATED ORAL at 15:29

## 2019-04-24 RX ADMIN — FAMOTIDINE 20 MILLIGRAM(S): 10 INJECTION INTRAVENOUS at 13:05

## 2019-04-24 RX ADMIN — HYDROMORPHONE HYDROCHLORIDE 2 MILLIGRAM(S): 2 INJECTION INTRAMUSCULAR; INTRAVENOUS; SUBCUTANEOUS at 16:30

## 2019-04-24 RX ADMIN — SODIUM CHLORIDE 75 MILLILITER(S): 9 INJECTION INTRAMUSCULAR; INTRAVENOUS; SUBCUTANEOUS at 17:29

## 2019-04-24 RX ADMIN — MORPHINE SULFATE 4 MILLIGRAM(S): 50 CAPSULE, EXTENDED RELEASE ORAL at 00:58

## 2019-04-24 RX ADMIN — Medication 1 MILLIGRAM(S): at 22:50

## 2019-04-24 NOTE — CONSULT NOTE ADULT - ASSESSMENT
58F w/ long-standing mesenteric mass concerning for neuroendocrine tumor, has been undergoing outpatient workup for definitive diagnosis, presenting with headache, nausea and abdominal pain after contrast CT scans as an outpatient.     # Nausea, vomiting:   - started after CT Scans, likely due to contrast  - labs unremarkable, electrolytes, creatinine  - supportive care    # Mesenteric mass: concerning for neuroendocrine tumor  - s/p colonoscopy/enteroscopy Feb 2019 with inconclusive biopsy results  - CT C/A/P done outpatient to see if there was a better place to biopsy, discussion between Dr. Blackmon (oncology) and Dr. Santiago (GI) regarding possible EUS  - [facility] consulted, appreciate input  - patient has been previously evaluated by surgery (Dr. Foy) and IR (Dr. Priest) for biopsy, but unable to be done due to body habitus   - will follow      Tiara Nick MD  Hematology/Oncology Fellow, PGY-4  pager: [phone]  After 5pm or on weekends, please page the on-call fellow.

## 2019-04-24 NOTE — CONSULT NOTE ADULT - SUBJECTIVE AND OBJECTIVE BOX
HPI:  58F with PMhx of HTN, lymphedema, and mesenteric mass concerning for neuroendocrine tumor presents to the ED with abdominal pain. Patient has a known mass as far back as , and has undergone imaging since that time. She underwent a PET/DOTATATE scan which showed activity in the mesentery liver and ileal mass thought to be the primary. MRI from earlier this year demonstrated multiple metastatic lesions. She was sent to Dr. Blackmon for further evaluation. 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 She underwent a colonoscopy with ileal biopsy which revealed lymphoid aggregates.    Patient states current symptoms started yesterday after her CT scan which she underwent for evaluation of her disease per her oncologist. She reports she has had a reaction to IV contrast in the past where she had nausea and vomiting. Her scan yesterday required IV and PO contrast. She states she had some nausea after the test yesterday and had to leave early from work. She  drank a lot of juice as she was advised to drink fluids after her test due to the IV contrast. She was able to eat some dinner and went to bed and in the morning was again feeling nauseous with abdominal pain. She called Dr. Blackmon who advised her to take zofran, but patient continued to have marked pain and nausea and went to [place] ED. She was transferred from there to NS after discuss with Dr. Blackmon's office.    Patient endorses headaches when pain is very severe. She denies any chest pain or SOB. She endorses abdominal pain all across the belly, and denies diarhea or constipation. Denies vomiting but endorses marked wretching.     ONCOLOGIC HISTORY:   Disease: suspicious for NET   Pathology: n/a   AJCC Stage: IV     Paitent with history of intermittent abdominal pain, n/v over the last several years (at least 9) thought to be 2/2 gallstones. She has been evaluated in the ER 1-2 per year with these attacks. CT A/P going as far back as  noted a calcified mass within the mesentery of the small bowel suspicious for carcinoid. In 2017 it was found to be increasing in size from previous imaging. She saw a surgeon at the time however it is unclear if any work up was recommended.     In 2018 she was referred to Dr. Foy for evaluation of gallstones. He noted the mesenteric mass which prompted a PET/DOTATATE to be performed. This showed activity in the mesentery (SUV 59.9) as well as liver, ileal mass though to be the primary, cystic adnexal mass unchanged since , possible uptake in the skin of left breast. An MRI Abdomen was performed to evaluate the liver mets and this revealed multiple metastatic lesions. Referred to med onc for further work up.      PET scan done 2019 showed possible intraluminal ileal mass concerning for neuroendocrine tumor.  She was referred to [facility], Dr. Donald Santiago, and underwent colonoscopy and enteroscopy on 19.  Pathology from terminal ileum biopsy was inconclusive.     Discussed as an outpatient between Dr. Blackmon and Dr. Santiago, plan was to repeat CT C/A/P and do possible EUS for tissue.  Patient went to CT scans on .         PAST MEDICAL & SURGICAL HISTORY:  Lymphedema  Morbid obesity  Mesenteric mass  Cancer: abdominal  Gallstones  H/O  section      Allergies    codeine (Unknown)  fish (Unknown)      MEDICATIONS  (STANDING):  amLODIPine   Tablet 5 milliGRAM(s) Oral daily  enoxaparin Injectable 40 milliGRAM(s) SubCutaneous daily  famotidine Injectable 20 milliGRAM(s) IV Push daily  sodium chloride 0.9%. 1000 milliLiter(s) (75 mL/Hr) IV Continuous <Continuous>    MEDICATIONS  (PRN):  hydrALAZINE 10 milliGRAM(s) Oral every 6 hours PRN Systolic blood pressure >170  morphine  - Injectable 8 milliGRAM(s) IV Push every 4 hours PRN moderate to severe pain  ondansetron Injectable 4 milliGRAM(s) IV Push every 6 hours PRN Nausea and/or Vomiting      FAMILY HISTORY:  No pertinent family history in first degree relatives      SOCIAL HISTORY: No EtOH, no tobacco      REVIEW OF SYSTEMS:  CONSTITUTIONAL: No weakness, fevers or chills  EYES/ENT: No visual changes;  No vertigo or throat pain   NECK: No pain or stiffness  RESPIRATORY: No cough, wheezing, hemoptysis; No shortness of breath  CARDIOVASCULAR: No chest pain or palpitations  GASTROINTESTINAL: No abdominal or epigastric pain. No nausea, vomiting, or hematemesis; No diarrhea or constipation. No melena or hematochezia.  GENITOURINARY: No dysuria, frequency or hematuria  NEUROLOGICAL: No numbness or weakness  SKIN: No itching, burning, rashes, or lesions   All other review of systems is negative unless indicated above.    Height (cm): 154.94 ( @ 21:17), 154.94 ( @ 11:44)  Weight (kg): 154.6 ( 21:17), 149.7 ( 11:44)  BMI (kg/m2): 64.4 ( @ 21:17), 62.4 ( 11:44)  BSA (m2): 2.37 ( 21:17), 2.34 ( 11:44)    T(F): 98.3 (19 @ 04:27), Max: 99.1 (19 @ 20:07)  HR: 95 (19 @ :27)  BP: 173/92 (19 @ 04:27)  RR: 20 (19 @ 04:27)  SpO2: 95% (19:27)  Wt(kg): --    GENERAL: NAD, well-developed  HEAD:  Atraumatic, Normocephalic  EYES: EOMI, PERRLA, conjunctiva and sclera clear  NECK: Supple, No JVD  CHEST/LUNG: Clear to auscultation bilaterally; No wheeze  HEART: Regular rate and rhythm; No murmurs, rubs, or gallops  ABDOMEN: Soft, Nontender, Nondistended; Bowel sounds present  EXTREMITIES:  2+ Peripheral Pulses, No clubbing, cyanosis, or edema  NEUROLOGY: non-focal  SKIN: No rashes or lesions                          10.3   8.5   )-----------( 284      ( 2019 06:53 )             35.8           140  |  100  |  10  ----------------------------<  126<H>  3.8   |  27  |  0.58    Ca    9.3      2019 06:53  Phos  3.7       Mg     2.1         TPro  7.9  /  Alb  3.8  /  TBili  0.3  /  DBili  x   /  AST  19  /  ALT  15  /  AlkPhos  92        Magnesium, Serum: 2.1 mg/dL ( @ 06:53)  Phosphorus Level, Serum: 3.7 mg/dL ( @ 06:53)      RADIOLOGY:   EXAM:  CT CHEST IC    EXAM:  CT ABDOMEN AND PELVIS OC IC      PROCEDURE DATE:  2019      INTERPRETATION:  CLINICAL INFORMATION: Calcified mesenteric mass    COMPARISON: CTs of the abdomen pelvis dated dating back to 2010 as wellas MRI of the abdomen dated 2018.    PROCEDURE:   CT of the Chest, Abdomen and Pelvis was performed with intravenous contrast.   Intravenous contrast: 90 ml Omnipaque 350. 10 ml discarded.  Oral contrast: positive contrast was administered.  Sagittal and coronal reformats were performed.    FINDINGS:    CHEST:     LUNGS AND LARGE AIRWAYS: Patent central airways. Small right basilar atelectasis. There are multiple bilateral small pulmonary nodules. A right lower lobe measuring up to 5 mm (series 3, image 43), unchanged in   appearance from prior studies.  PLEURA: No pleural effusion.  VESSELS: Within normal limits.  HEART: Heart size is normal. No pericardial effusion.  MEDIASTINUM AND MOISÉS: No lymphadenopathy.  CHEST WALL AND LOWER NECK: Multiple venous collaterals in the left chest wall.    ABDOMEN AND PELVIS:    LIVER: Within normal limits.  BILE DUCTS: Normal caliber.  GALLBLADDER: Gallstones.  SPLEEN: Within normal limits.  PANCREAS: Within normal limits.  ADRENALS: Within normal limits.  KIDNEYS/URETERS: Within normal limits.    BLADDER: Within normal limits.  REPRODUCTIVE ORGANS: Fibroid uterus. IUD in place. 6.4 cm right adnexal cyst with a septation.    BOWEL: No bowel obstruction.  PERITONEUM/MESENTERY: There is a mass in the central mesentery containing coarse calcifications, measuring 4.9 x 3.0 x 3.4 cm (previously 4.5 x 2.5 x 3.0 cm remeasured).  VESSELS:  Within normal limits.  RETROPERITONEUM: No lymphadenopathy.    ABDOMINAL WALL: Within normal limits.  BONES: Degenerative changes of the spine.    IMPRESSION:     Slight interval enlargement of the partially calcified mesenteric mass compared to 2017. Findings may represent sclerosing mesenteritis, however malignant lesion such as carcinoid cannot be entirely excluded.     Stable right adnexal cystic lesion with a septation, not significantly changed in appearance compared to prior studies.     Multiple small bilateral lung nodules measuring up to 5 mm in the right lower lobe. Right lower lobe nodules unchanged compared to 2015. Consider 6-12 month follow-up chest CT to assure stability.      JUHI SIDDIQUI   This document has been electronically signed. 2019  4:08PM HPI:  58F with PMhx of HTN, lymphedema, and mesenteric mass concerning for neuroendocrine tumor presents to the ED with abdominal pain. Patient has a known mass as far back as , and has undergone imaging since that time. She underwent a PET/DOTATATE scan which showed activity in the mesentery liver and ileal mass thought to be the primary. MRI from earlier this year demonstrated multiple metastatic lesions. She was sent to Dr. Blackmon for further evaluation. 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 She underwent a colonoscopy with ileal biopsy which revealed lymphoid aggregates.    Patient states current symptoms started yesterday after her CT scan which she underwent for evaluation of her disease per her oncologist. She reports she has had a reaction to IV contrast in the past where she had nausea and vomiting. Her scan yesterday required IV and PO contrast. She states she had some nausea after the test yesterday and had to leave early from work. She  drank a lot of juice as she was advised to drink fluids after her test due to the IV contrast. She was able to eat some dinner and went to bed and in the morning was again feeling nauseous with abdominal pain. She called Dr. Blackmon who advised her to take zofran, but patient continued to have marked pain and nausea and went to [place] ED. She was transferred from there to NS after discuss with Dr. Blackmon's office.    Patient endorses headaches when pain is very severe. She denies any chest pain or SOB. She endorses abdominal pain all across the belly, and denies diarhea or constipation. Denies vomiting but endorses marked wretching.     ONCOLOGIC HISTORY:   Disease: suspicious for NET   Pathology: n/a   AJCC Stage: IV     Paitent with history of intermittent abdominal pain, n/v over the last several years (at least 9) thought to be 2/2 gallstones. She has been evaluated in the ER 1-2 per year with these attacks. CT A/P going as far back as  noted a calcified mass within the mesentery of the small bowel suspicious for carcinoid. In 2017 it was found to be increasing in size from previous imaging. She saw a surgeon at the time however it is unclear if any work up was recommended.     In 2018 she was referred to Dr. Foy for evaluation of gallstones. He noted the mesenteric mass which prompted a PET/DOTATATE to be performed. This showed activity in the mesentery (SUV 59.9) as well as liver, ileal mass though to be the primary, cystic adnexal mass unchanged since , possible uptake in the skin of left breast. An MRI Abdomen was performed to evaluate the liver mets and this revealed multiple metastatic lesions. Referred to med onc for further work up.      PET scan done 2019 showed possible intraluminal ileal mass concerning for neuroendocrine tumor.  She was referred to [facility], Dr. Donald Santiago, and underwent colonoscopy and enteroscopy on 19.  Pathology from terminal ileum biopsy was inconclusive.     Discussed as an outpatient between Dr. Blackmon and Dr. Santiago, plan was to repeat CT C/A/P and do possible EUS for tissue.  Patient went to CT scans on .         PAST MEDICAL & SURGICAL HISTORY:  Lymphedema  Morbid obesity  Mesenteric mass  Cancer: abdominal  Gallstones  H/O  section      Allergies  codeine (Unknown)  fish (Unknown)      MEDICATIONS  (STANDING):  amLODIPine   Tablet 5 milliGRAM(s) Oral daily  enoxaparin Injectable 40 milliGRAM(s) SubCutaneous daily  famotidine Injectable 20 milliGRAM(s) IV Push daily  sodium chloride 0.9%. 1000 milliLiter(s) (75 mL/Hr) IV Continuous <Continuous>    MEDICATIONS  (PRN):  hydrALAZINE 10 milliGRAM(s) Oral every 6 hours PRN Systolic blood pressure >170  morphine  - Injectable 8 milliGRAM(s) IV Push every 4 hours PRN moderate to severe pain  ondansetron Injectable 4 milliGRAM(s) IV Push every 6 hours PRN Nausea and/or Vomiting      FAMILY HISTORY:  No pertinent family history in first degree relatives      SOCIAL HISTORY: No EtOH, no tobacco      REVIEW OF SYSTEMS:  CONSTITUTIONAL: No weakness, fevers or chills  EYES/ENT: No visual changes;  No vertigo or throat pain   NECK: No pain or stiffness  RESPIRATORY: No cough, wheezing, hemoptysis; No shortness of breath  CARDIOVASCULAR: No chest pain or palpitations  GASTROINTESTINAL: No abdominal or epigastric pain. No nausea, vomiting, or hematemesis; No diarrhea or constipation. No melena or hematochezia.  GENITOURINARY: No dysuria, frequency or hematuria  NEUROLOGICAL: No numbness or weakness  SKIN: No itching, burning, rashes, or lesions   All other review of systems is negative unless indicated above.    Height (cm): 154.94 ( @ 21:17), 154.94 ( @ 11:44)  Weight (kg): 154.6 ( 21:17), 149.7 ( 11:44)  BMI (kg/m2): 64.4 ( @ 21:17), 62.4 ( 11:44)  BSA (m2): 2.37 ( 21:17), 2.34 ( 11:44)    T(F): 98.3 (19 @ 04:27), Max: 99.1 (19 @ 20:07)  HR: 95 (19 @ :27)  BP: 173/92 (19 @ 04:27)  RR: 20 (19 @ 04:27)  SpO2: 95% (19:27)  Wt(kg): --    GENERAL: NAD, well-developed  HEAD:  Atraumatic, Normocephalic  EYES: EOMI, PERRLA, conjunctiva and sclera clear  NECK: Supple, No JVD  CHEST/LUNG: Clear to auscultation bilaterally; No wheeze  HEART: Regular rate and rhythm; No murmurs, rubs, or gallops  ABDOMEN: Soft, Nontender, Nondistended; Bowel sounds present  EXTREMITIES:  2+ Peripheral Pulses, No clubbing, cyanosis, or edema  NEUROLOGY: non-focal  SKIN: No rashes or lesions                          10.3   8.5   )-----------( 284      ( 2019 06:53 )             35.8           140  |  100  |  10  ----------------------------<  126<H>  3.8   |  27  |  0.58    Ca    9.3      2019 06:53  Phos  3.7       Mg     2.1         TPro  7.9  /  Alb  3.8  /  TBili  0.3  /  DBili  x   /  AST  19  /  ALT  15  /  AlkPhos  92        Magnesium, Serum: 2.1 mg/dL ( @ 06:53)  Phosphorus Level, Serum: 3.7 mg/dL ( @ 06:53)      RADIOLOGY:   EXAM:  CT CHEST IC    EXAM:  CT ABDOMEN AND PELVIS OC IC      PROCEDURE DATE:  2019      INTERPRETATION:  CLINICAL INFORMATION: Calcified mesenteric mass    COMPARISON: CTs of the abdomen pelvis dated dating back to 2010 as wellas MRI of the abdomen dated 2018.    PROCEDURE:   CT of the Chest, Abdomen and Pelvis was performed with intravenous contrast.   Intravenous contrast: 90 ml Omnipaque 350. 10 ml discarded.  Oral contrast: positive contrast was administered.  Sagittal and coronal reformats were performed.    FINDINGS:    CHEST:     LUNGS AND LARGE AIRWAYS: Patent central airways. Small right basilar atelectasis. There are multiple bilateral small pulmonary nodules. A right lower lobe measuring up to 5 mm (series 3, image 43), unchanged in   appearance from prior studies.  PLEURA: No pleural effusion.  VESSELS: Within normal limits.  HEART: Heart size is normal. No pericardial effusion.  MEDIASTINUM AND MOISÉS: No lymphadenopathy.  CHEST WALL AND LOWER NECK: Multiple venous collaterals in the left chest wall.    ABDOMEN AND PELVIS:    LIVER: Within normal limits.  BILE DUCTS: Normal caliber.  GALLBLADDER: Gallstones.  SPLEEN: Within normal limits.  PANCREAS: Within normal limits.  ADRENALS: Within normal limits.  KIDNEYS/URETERS: Within normal limits.    BLADDER: Within normal limits.  REPRODUCTIVE ORGANS: Fibroid uterus. IUD in place. 6.4 cm right adnexal cyst with a septation.    BOWEL: No bowel obstruction.  PERITONEUM/MESENTERY: There is a mass in the central mesentery containing coarse calcifications, measuring 4.9 x 3.0 x 3.4 cm (previously 4.5 x 2.5 x 3.0 cm remeasured).  VESSELS:  Within normal limits.  RETROPERITONEUM: No lymphadenopathy.    ABDOMINAL WALL: Within normal limits.  BONES: Degenerative changes of the spine.    IMPRESSION:     Slight interval enlargement of the partially calcified mesenteric mass compared to 2017. Findings may represent sclerosing mesenteritis, however malignant lesion such as carcinoid cannot be entirely excluded.     Stable right adnexal cystic lesion with a septation, not significantly changed in appearance compared to prior studies.     Multiple small bilateral lung nodules measuring up to 5 mm in the right lower lobe. Right lower lobe nodules unchanged compared to 2015. Consider 6-12 month follow-up chest CT to assure stability.      JUHI SIDDIQUI   This document has been electronically signed. 2019  4:08PM HPI:  58F with PMhx of HTN, lymphedema, and mesenteric mass concerning for neuroendocrine tumor presents to the ED with abdominal pain. Patient has a known mass as far back as , and has undergone imaging since that time. She underwent a PET/DOTATATE scan which showed activity in the mesentery liver and ileal mass thought to be the primary. MRI from earlier this year demonstrated multiple metastatic lesions. She was sent to Dr. Blackmon for further evaluation. 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 She underwent a colonoscopy with ileal biopsy which revealed lymphoid aggregates.    Patient states current symptoms started yesterday after her CT scan which she underwent for evaluation of her disease per her oncologist. She reports she has had a reaction to IV contrast in the past where she had nausea and vomiting. Her scan yesterday required IV and PO contrast. She states she had some nausea after the test yesterday and had to leave early from work. She  drank a lot of juice as she was advised to drink fluids after her test due to the IV contrast. She was able to eat some dinner and went to bed and in the morning was again feeling nauseous with abdominal pain. She called Dr. Blackmon who advised her to take zofran, but patient continued to have marked pain and nausea and went to [place] ED. She was transferred from there to NS after discuss with Dr. Blackmon's office.    Patient endorses headaches when pain is very severe. She denies any chest pain or SOB. She endorses abdominal pain all across the belly, and denies diarhea or constipation. Denies vomiting but endorses marked wretching.     ONCOLOGIC HISTORY:   Disease: suspicious for NET   Pathology: n/a   AJCC Stage: IV     Paitent with history of intermittent abdominal pain, n/v over the last several years (at least 9) thought to be 2/2 gallstones. She has been evaluated in the ER 1-2 per year with these attacks. CT A/P going as far back as  noted a calcified mass within the mesentery of the small bowel suspicious for carcinoid. In 2017 it was found to be increasing in size from previous imaging. She saw a surgeon at the time however it is unclear if any work up was recommended.     In 2018 she was referred to Dr. Foy for evaluation of gallstones. He noted the mesenteric mass which prompted a PET/DOTATATE to be performed. This showed activity in the mesentery (SUV 59.9) as well as liver, ileal mass though to be the primary, cystic adnexal mass unchanged since , possible uptake in the skin of left breast. An MRI Abdomen was performed to evaluate the liver mets and this revealed multiple metastatic lesions. Referred to med onc for further work up.      PET scan done 2019 showed possible intraluminal ileal mass concerning for neuroendocrine tumor.  She was referred to [facility], Dr. Donald Santiago, and underwent colonoscopy and enteroscopy on 19.  Pathology from terminal ileum biopsy was inconclusive.     Discussed as an outpatient between Dr. Blackmon and Dr. Santiago, plan was to repeat CT C/A/P and do possible EUS for tissue.  Patient went to CT scans on .         PAST MEDICAL & SURGICAL HISTORY:  Lymphedema  Morbid obesity  Mesenteric mass  Cancer: abdominal  Gallstones  H/O  section      Allergies  codeine (Unknown)  fish (Unknown)      MEDICATIONS  (STANDING):  amLODIPine   Tablet 5 milliGRAM(s) Oral daily  enoxaparin Injectable 40 milliGRAM(s) SubCutaneous daily  famotidine Injectable 20 milliGRAM(s) IV Push daily  sodium chloride 0.9%. 1000 milliLiter(s) (75 mL/Hr) IV Continuous <Continuous>    MEDICATIONS  (PRN):  hydrALAZINE 10 milliGRAM(s) Oral every 6 hours PRN Systolic blood pressure >170  morphine  - Injectable 8 milliGRAM(s) IV Push every 4 hours PRN moderate to severe pain  ondansetron Injectable 4 milliGRAM(s) IV Push every 6 hours PRN Nausea and/or Vomiting      FAMILY HISTORY:  No pertinent family history in first degree relatives      SOCIAL HISTORY: No EtOH, no tobacco    REVIEW OF SYSTEMS:  CONSTITUTIONAL: +HEADACHE   EYES/ENT: No visual changes;  No vertigo or throat pain   NECK: No pain or stiffness  RESPIRATORY: No cough, wheezing, hemoptysis; No shortness of breath  CARDIOVASCULAR: No chest pain or palpitations  GASTROINTESTINAL: +ABDOMINAL PAIN, No nausea, vomiting, or hematemesis; No diarrhea or constipation. No melena or hematochezia.  GENITOURINARY: No dysuria, frequency or hematuria  NEUROLOGICAL: No numbness or weakness  SKIN: No itching, burning, rashes, or lesions   All other review of systems is negative unless indicated above.      Height (cm): 154.94 ( @ 21:17), 154.94 ( @ 11:44)  Weight (kg): 154.6 ( 21:17), 149.7 ( 11:44)  BMI (kg/m2): 64.4 ( 21:17), 62.4 ( 11:44)  BSA (m2): 2.37 ( 21:17), 2.34 (:44)      T(F): 98.3 (19 @ :27), Max: 99.1 (19 @ 20:07)  HR: 95 (19 @ :27)  BP: 173/92 (19 @ 04:27)  RR: 20 (19:27)  SpO2: 95% (19:27)      GENERAL: NAD, obese  HEAD:  Atraumatic, Normocephalic  EYES: EOMI, conjunctiva and sclera clear  NECK: Supple  CHEST/LUNG: Clear to auscultation bilaterally; No wheezes or crackles   HEART: Regular rate and rhythm; No murmurs, rubs, or gallops  ABDOMEN: Soft, Nontender, Nondistended; Bowel sounds present  EXTREMITIES:  No clubbing, cyanosis, or edema  NEUROLOGY: non-focal  SKIN: No rashes or lesions                          10.3   8.5   )-----------( 284      ( 2019 06:53 )             35.8           140  |  100  |  10  ----------------------------<  126<H>  3.8   |  27  |  0.58    Ca    9.3      2019 06:53  Phos  3.7       Mg     2.1         TPro  7.9  /  Alb  3.8  /  TBili  0.3  /  DBili  x   /  AST  19  /  ALT  15  /  AlkPhos  92        Magnesium, Serum: 2.1 mg/dL ( @ 06:53)  Phosphorus Level, Serum: 3.7 mg/dL ( @ 06:53)      RADIOLOGY:   EXAM:  CT CHEST IC    EXAM:  CT ABDOMEN AND PELVIS OC IC      PROCEDURE DATE:  2019      INTERPRETATION:  CLINICAL INFORMATION: Calcified mesenteric mass    COMPARISON: CTs of the abdomen pelvis dated dating back to 2010 as wellas MRI of the abdomen dated 2018.    PROCEDURE:   CT of the Chest, Abdomen and Pelvis was performed with intravenous contrast.   Intravenous contrast: 90 ml Omnipaque 350. 10 ml discarded.  Oral contrast: positive contrast was administered.  Sagittal and coronal reformats were performed.    FINDINGS:    CHEST:     LUNGS AND LARGE AIRWAYS: Patent central airways. Small right basilar atelectasis. There are multiple bilateral small pulmonary nodules. A right lower lobe measuring up to 5 mm (series 3, image 43), unchanged in   appearance from prior studies.  PLEURA: No pleural effusion.  VESSELS: Within normal limits.  HEART: Heart size is normal. No pericardial effusion.  MEDIASTINUM AND MOISÉS: No lymphadenopathy.  CHEST WALL AND LOWER NECK: Multiple venous collaterals in the left chest wall.    ABDOMEN AND PELVIS:    LIVER: Within normal limits.  BILE DUCTS: Normal caliber.  GALLBLADDER: Gallstones.  SPLEEN: Within normal limits.  PANCREAS: Within normal limits.  ADRENALS: Within normal limits.  KIDNEYS/URETERS: Within normal limits.    BLADDER: Within normal limits.  REPRODUCTIVE ORGANS: Fibroid uterus. IUD in place. 6.4 cm right adnexal cyst with a septation.    BOWEL: No bowel obstruction.  PERITONEUM/MESENTERY: There is a mass in the central mesentery containing coarse calcifications, measuring 4.9 x 3.0 x 3.4 cm (previously 4.5 x 2.5 x 3.0 cm remeasured).  VESSELS:  Within normal limits.  RETROPERITONEUM: No lymphadenopathy.    ABDOMINAL WALL: Within normal limits.  BONES: Degenerative changes of the spine.    IMPRESSION:     Slight interval enlargement of the partially calcified mesenteric mass compared to 2017. Findings may represent sclerosing mesenteritis, however malignant lesion such as carcinoid cannot be entirely excluded.     Stable right adnexal cystic lesion with a septation, not significantly changed in appearance compared to prior studies.     Multiple small bilateral lung nodules measuring up to 5 mm in the right lower lobe. Right lower lobe nodules unchanged compared to 2015. Consider 6-12 month follow-up chest CT to assure stability.      JUHI SIDDIQUI   This document has been electronically signed. 2019  4:08PM

## 2019-04-24 NOTE — CONSULT NOTE ADULT - SUBJECTIVE AND OBJECTIVE BOX
Chief Complaint:  Patient is a 58y old  Female who presents with a chief complaint of abdominal pain (2019 08:50)      HPI:  58F with HTN, lymphedema, and mesenteric mass concerning for neuroendocrine tumor presents to the ED 19 with abdominal pain. Patient has a known mass as far back as  with surveillance imaging; most recently she had PET scan which showed activity in mesentary, liver and ileum, she is s/p Enteroscopy (unremarkable) and colonoscopy with ileal intubation (ileal inflammation, pathology c/w lymphoid aggregates). The patient underwent repeat CT ; She did not tolerate the procedure well and had nausea and vomiting thought to be secondary to contrast. The patient spoke to Dr. Blackmon's office and then came to ED for evaluation.    Allergies:  codeine (Unknown)  fish (Unknown)      Home Medications:  Home Medications:   * Patient Currently Takes Medications as of 2019 22:22 documented in Structured Notes  · 	Zofran ODT 4 mg oral tablet, disintegratin tab(s) orally 3 times a day PRN zofran, Last Dose Taken:    · 	Norvasc 5 mg oral tablet: 1 tab(s) orally once a day, Last Dose Taken:          Hospital Medications:  amLODIPine   Tablet 5 milliGRAM(s) Oral daily  enoxaparin Injectable 40 milliGRAM(s) SubCutaneous daily  famotidine Injectable 20 milliGRAM(s) IV Push daily  hydrALAZINE 10 milliGRAM(s) Oral every 6 hours PRN  ibuprofen  Tablet. 400 milliGRAM(s) Oral once  morphine  - Injectable 4 milliGRAM(s) IV Push every 4 hours PRN  ondansetron Injectable 4 milliGRAM(s) IV Push every 6 hours PRN  sodium chloride 0.9%. 1000 milliLiter(s) IV Continuous <Continuous>      PMHX/PSHX:  Lymphedema  Morbid obesity  Mesenteric mass  Cancer  Gallstones  H/O  section  No significant past surgical history      Family history:  No pertinent family history in first degree relatives      Social History: no tobacco, no ETOH, no illicit drug use; works as an , lives with sons     ROS:     General:  No wt loss, fevers, chills, night sweats, fatigue,   Eyes:  Good vision, no reported pain  ENT:  No sore throat, pain, runny nose, dysphagia  CV:  No pain, palpitations, hypo/hypertension  Resp:  No dyspnea, cough, tachypnea, wheezing  GI:  No pain, No nausea, No vomiting, No diarrhea, No constipation, No weight loss, No fever, No pruritis, No rectal bleeding, No tarry stools, No dysphagia,  :  No pain, bleeding, incontinence, nocturia  Muscle:  No pain, weakness  Neuro:  No weakness, tingling, memory problems  Psych:  No fatigue, insomnia, mood problems, depression  Endocrine:  No polyuria, polydipsia, cold/heat intolerance  Heme:  No petechiae, ecchymosis, easy bruisability  Skin:  No rash, tattoos, scars, edema      PHYSICAL EXAM:     GENERAL:  Appears stated age, well-groomed, well-nourished, no distress  HEENT:  NC/AT,  conjunctivae clear and pink, no thyromegaly, nodules, adenopathy, no JVD, sclera -anicteric  CHEST:  Full & symmetric excursion, no increased effort, breath sounds clear  HEART:  Regular rhythm, S1, S2, no murmur/rub/S3/S4, no abdominal bruit, no edema  ABDOMEN:  Soft, non-tender, non-distended, normoactive bowel sounds  EXTEREMITIES:  no cyanosis,clubbing or edema  SKIN:  No rash/erythema, intact   NEURO:  Alert, oriented, no asterixis, no tremor, no encephalopathy    Vital Signs:  Vital Signs Last 24 Hrs  T(C): 36.8 (2019 04:27), Max: 37.3 (2019 20:07)  T(F): 98.3 (2019 04:27), Max: 99.1 (2019 20:07)  HR: 95 (2019 04:27) (67 - 99)  BP: 173/92 (2019 04:27) (166/85 - 204/120)  BP(mean): --  RR: 20 (2019 04:27) (14 - 20)  SpO2: 95% (2019 04:27) (95% - 99%)  Daily Height in cm: 154.94 (2019 21:17)    Daily     LABS:                        10.3   8.5   )-----------( 284      ( 2019 06:53 )             35.8     Mean Cell Volume: 65.7 fl (-19 @ 06:53)        140  |  100  |  10  ----------------------------<  126<H>  3.8   |  27  |  0.58    Ca    9.3      2019 06:53  Phos  3.7       Mg     2.1         TPro  7.9  /  Alb  3.8  /  TBili  0.3  /  DBili  x   /  AST  19  /  ALT  15  /  AlkPhos  92      LIVER FUNCTIONS - ( 2019 06:53 )  Alb: 3.8 g/dL / Pro: 7.9 g/dL / ALK PHOS: 92 U/L / ALT: 15 U/L / AST: 19 U/L / GGT: x             Urinalysis Basic - ( 2019 12:45 )    Color: Yellow / Appearance: Clear / S.015 / pH: x  Gluc: x / Ketone: Negative  / Bili: Negative / Urobili: Negative   Blood: x / Protein: Negative / Nitrite: Negative   Leuk Esterase: Negative / RBC: 0-4 /HPF / WBC 0-2 /HPF   Sq Epi: x / Non Sq Epi: Neg.-Few / Bacteria: Negative /HPF      Amylase Serum--      Lipase [id number]       Ammonia--                          10.3   8.5   )-----------( 284      ( 2019 06:53 )             35.8                         10.8   7.8   )-----------( 258      ( 2019 17:21 )             35.0                         10.9   7.8   )-----------( 295      ( 2019 12:30 )             36.8     Imaging:  < from: CT Abdomen and Pelvis w/ Oral Cont and w/ IV Cont (19 @ 10:54) >  EXAM:  CT CHEST IC    EXAM:  CT ABDOMEN AND PELVIS OC IC        PROCEDURE DATE:  2019          INTERPRETATION:  CLINICAL INFORMATION: Calcified mesenteric mass    COMPARISON: CTs of the abdomen pelvis dated dating back to 2010 as   wellas MRI of the abdomen dated 2018.    PROCEDURE:   CT of the Chest, Abdomen and Pelvis was performed with intravenous   contrast.   Intravenous contrast: 90 ml Omnipaque 350. 10 ml discarded.  Oral contrast: positive contrast was administered.  Sagittal and coronal reformats were performed.    FINDINGS:    CHEST:     LUNGS AND LARGE AIRWAYS: Patent central airways. Small right basilar   atelectasis. There are multiple bilateral small pulmonary nodules. A   right lower lobe measuring up to 5 mm (series 3, image 43), unchanged in   appearance from prior studies.  PLEURA: No pleural effusion.  VESSELS: Within normal limits.  HEART: Heart size is normal. No pericardial effusion.  MEDIASTINUM AND MOISÉS: No lymphadenopathy.  CHEST WALL AND LOWER NECK: Multiple venous collaterals in the left chest   wall.    ABDOMEN AND PELVIS:    LIVER: Within normal limits.  BILE DUCTS: Normal caliber.  GALLBLADDER: Gallstones.  SPLEEN: Within normal limits.  PANCREAS: Within normal limits.  ADRENALS: Within normal limits.  KIDNEYS/URETERS: Within normal limits.    BLADDER: Within normal limits.  REPRODUCTIVE ORGANS: Fibroid uterus. IUD in place. 6.4 cm right adnexal   cyst with a septation.    BOWEL: No bowel obstruction.  PERITONEUM/MESENTERY: There is a mass in the central mesentery containing   coarse calcifications, measuring 4.9 x 3.0 x 3.4 cm (previously 4.5 x 2.5   x 3.0 cm remeasured).  VESSELS:  Within normal limits.  RETROPERITONEUM: No lymphadenopathy.    ABDOMINAL WALL: Within normal limits.  BONES: Degenerative changes of the spine.    IMPRESSION:     Slight interval enlargement of the partially calcified mesenteric mass   compared to 2017. 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     Stable right adnexal cystic lesion with a septation, not significantly   changed in appearance compared to prior studies.     Multiple small bilateral lung nodules measuring up to 5 mm in the right   lower lobe. Right lower lobe nodules unchanged compared to 2015. Consider   6-12 month follow-up chest CT to assure stability.    < end of copied text >      < from: Colonoscopy (19 @ 10:09) >  [facility]  _______________________________________________________________________________  Patient Name: Oxana Ratliff          Procedure Date: 2019 10:09 AM  MRN: [id number]                     Account Number: [id number]  YOB: 1960              Admit Type: Outpatient  Room: [place] [address]                         Gender: Female  Attending MD: DANIA MARTINS MD        _______________________________________________________________________________     Procedure:           Colonoscopy  Indications:         Abnormal PET scan of the GI tract, avid lesions in the                        small bowel  Providers:           DANIA MARTINS MD, OSCAR HAJI MD (Fellow), DOMENICA KESSLER MD (Fellow)  Medicines:           Monitored Anesthesia Care  Complications:       No immediate complications. Estimated blood loss: None.  Procedure:           Pre-Anesthesia Assessment:                       - Prior to the procedure, a History and Physical was                        performed, and patient medications and allergies were                        reviewed. The risks and benefits of the procedure and                        the sedation options and risks were discussed with the          patient. All questions were answered and informed                        consent was obtained. Patient identification and                        proposed procedure were verified by the physician, the                        nurse and the anesthesiologist in the pre-procedure area                        in the procedure room in the endoscopy suite.                        Prophylactic Antibiotics: The patient does not require                        prophylactic antibiotics. Prior Anticoagulants: The                        patient has taken no previous anticoagulant or                        antiplatelet agents. ASA Grade Assessment: III - A                        patient with severe systemic disease. After reviewing   the risks and benefits, the patient was deemed in                        satisfactory condition to undergo the procedure. The                        anesthesia plan was to use monitored anesthesia care                        (MAC). Immediately prior to administration of                        medications, the patient was re-assessed for adequacy to                        receive sedatives. The heart rate, respiratory rate,                        oxygen saturations, blood pressure, adequacy of                      pulmonary ventilation, and response to care were                        monitored throughout the procedure. The physical status                        of the patient was re-assessed after the procedure.                       After I obtained informed consent, the scope was passed                        under direct vision. Throughout the procedure, the                        patient's blood pressure, pulse, and oxygen saturations                        were monitored continuously. The Colonoscope was                        introduced through the anus and advanced to the terminal                        ileum. The colonoscopy was performed without difficulty.                        The patient tolerated the procedure well. The quality of                        the bowel preparation was fair.                                                                                   Findings:       The perianal and digital rectal examinations were normal. Pertinent        negatives include no palpable rectal lesions.       A diffuse area of the terminal ileum, particularly the IC valve was        nodular and friable. Biopsies were taken with a cold forceps for        histology.       The terminal ileum was evaluated up to 30 cm from the ICV       The remainder of the exam in the terminal ileum was normal.       The exam was otherwise normal throughout the examined colon.                                                                                   Impression:          - Nodular friable mucosa in the terminal ileum,                        particularly the IC valve. Biopsied.  Recommendation:      - Await pathology results.                       - Outpatient follow up with Dr Martins ([phone]).                                                             Attending Participation:       I was present and participated during the entire procedure, including        non-key portions.                    < end of copied text >    < from: Enteroscopy With Fluoroscopy (19 @ 10:08) >  [facility]  _______________________________________________________________________________  Patient Name: Oxana Ratliff          Procedure Date: 2019 10:08 AM  MRN: [id number]                     Account Number: [id number]  YOB: 1960              Admit Type: Outpatient  Room: [place] [address]                         Gender: Female  Attending MD: DANIA MARTINS MD        _______________________________________________________________________________     Procedure:           Upper Device-Assisted Enteroscopy with Fluoroscopy  Indications:         Abnormal PET scan of the GI tract, avid lesions in the                        small bowel  Providers:           DANIA MARTINS MD, OSCAR HAJI MD (Fellow), DOMENICA KESSLER MD (Fellow)  Medicines:           Monitored Anesthesia Care  Complications:       No immediate complications. Estimated blood loss: None.  Procedure:           Pre-Anesthesia Assessment:                       - Prior tothe procedure, a History and Physical was                        performed, and patient medications and allergies were                        reviewed. The risks and benefits of the procedure and                        the sedation options and risks were discussed with the                        patient. All questions were answered and informed                        consent was obtained. Patient identification and                        proposed procedure were verified by the physician, the                     nurse and the anesthesiologist in the pre-procedure area                        in the procedure room in the endoscopy suite.                        Prophylactic Antibiotics: The patient does not require                        prophylactic antibiotics. Prior Anticoagulants: The                        patient has taken no previous anticoagulant or                        antiplatelet agents. ASA Grade Assessment: III - A                        patient with severe systemic disease.After reviewing                        the risks and benefits, the patient was deemed in                        satisfactory condition to undergo the procedure. The                        anesthesia plan was to use monitored anesthesia care                (MAC). Immediately prior to administration of                        medications, the patient was re-assessed for adequacy to                        receive sedatives. The heart rate, respiratory rate,                        oxygen saturations, blood pressure, adequacy of                        pulmonary ventilation, and response to care were                        monitored throughout the procedure. The physical status                        of the patient was re-assessed after the procedure.                       After obtaining informed consent, the endoscope was                        passed under direct vision and under fluoroscopic                        guidance using the device-assisted technique. Throughout            the procedure, the patient's blood pressure, pulse, and                        oxygen saturations were monitored continuously. The                        Colonoscope was introduced through the mouth and                        advanced to thedistal jejunum. The upper                        device-assisted enteroscopy was accomplished without                        difficulty. The patient tolerated the procedure well.            Findings:       The examined esophagus was normal.       The Z-line was regular and was found 35 cm from the incisors.       The entire examined stomach was normal.       The exam of the duodenum was otherwise normal.       There was no evidence of significant pathology in the jejunum up to the        distal jejunum. Area was tattooed with an injection of Maggie ink to susie        the most distal extent.                                                                                   Impression:          - Normal esophagus.                       - Z-line regular, 35 cm from the incisors.                       - Normal stomach.                       - The examined portion of the jejunum was normal.                        Tattooed.                     - No specimens collected.  Recommendation:      - Resume previous diet.                       - Follow up pathology results from colonoscopy.                       - Outpatient follow up with Dr Martins ([phone]).                                                                      < end of copied text >    Surgical Pathology Report (19 @ 11:05)    Surgical Pathology Report:   ACCESSION No:  [id number]    CHOLOOXANA PHILLIPS                       1        Surgical Final Report          Final Diagnosis  Terminal ileum, biopsy  - Ileal mucosa with prominent lymphoid aggregates    Verified by: Maria Del Rosario Pierre M.D.  (Electronic Signature)  Reported on: 19 10:50 EST, [address] [facility], [place]  [address]  _________________________________________________________________    Clinical History  58 female with liver metastases, suspected neuroendocrine tumor    Specimen(s) Submitted  1-Terminal ileum bx    Gross Description  The specimen is received in formalin and the specimen container  is labeled: Terminal ileum biopsy.  It consists of five pieces of  pink-white soft tissue ranging from 0.2-0.3 cm in greatest  dimension.  Entirely submitted.  One cassette.    In addition to other data that may appear on the specimen  container, the label has been inspected to confirm the presence  of the patient's name and date of birth.  Erica Brown 2019 18:45

## 2019-04-24 NOTE — PROGRESS NOTE ADULT - ATTENDING COMMENTS
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 Try to minimize use of opioids as pt with worsening HA afterwards  Plan for inpatient EUS for diagnosis  Appreciate heme/onc and GI recs    Kimberly Goldstein MD  Division of Hospital Medicine  Pager: [phone]  Office: [phone]

## 2019-04-24 NOTE — CONSULT NOTE ADULT - ASSESSMENT
Impression:  1)Mesenteric mass- unclear etiology, potential NET. Impression:  1)Mesenteric mass- unclear etiology, potential NET. s/p Enteroscopy and Colonoscopy with TI intubation that was grossly unrevealing.     Recommendations:  -diet as tolerated  -monitor WBC and CMP  -CT imaging reviewed, suspect we can reach mass via EUS from duodenum.  Will discuss with Dr. Santiago further re: timing of procedure.  -supportive care per primary team  -discussed with Oncology team     Please call with questions  Hortencia Dutton  GI Fellow  Pager: [phone]

## 2019-04-24 NOTE — PROGRESS NOTE ADULT - SUBJECTIVE AND OBJECTIVE BOX
Authored by Dr Jose Raul Paulson [phone] [facility] / [id number] [facility]    Patient is a 58y old  Female who presents with a chief complaint of abdominal pain (2019 22:11)    SUBJECTIVE / OVERNIGHT EVENTS:    58F with PMhx of HTN, lymphedema,  and mesenteric mass concerning for neuroendocrine tumor who p/w abdominal pain. Patient has a known mass as far back as , and has undergone imaging since that time. She underwent a PET/DOTATATE scan which showed activity in the mesentery liver and ileal mass thought to be the primary. MRI from earlier this year demonstrated multiple metastatic lesions. She was sent to Dr. Blackmon for further evaluation. 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 She underwent a colonoscopy with ileal biopsy which revealed lymphoid aggregates.    Patient states current symptoms started  after her CT scan which she underwent for evaluation of her disease per her oncologist. She reported she has had a reaction to IV contrast in the past where she had nausea and vomiting. Her scan yesterday required IV and PO contrast. She states she had some nausea after the test and had to leave early from work. She drank a lot of juice as she was advised to drink fluids after her test due to the IV contrast. She was able to eat some dinner and went to bed and in the morning was again feeling nauseous with abdominal pain. She called Dr. Blackmon who advised her to take zofran, but patient continued to have marked pain and nausea and went to [place] ED. She was transferred from there to NS after discuss with Dr. Blackmon's office.    In the ED, patient's /94, HR 72, RR 18, O2 98%, and T 98.6. Patient received IV morphine 4mg x2 in eliz cove and 8mg x1 in NS as well as IV Zofran         MEDICATIONS  (STANDING):  amLODIPine   Tablet 5 milliGRAM(s) Oral daily  enoxaparin Injectable 40 milliGRAM(s) SubCutaneous daily  famotidine Injectable 20 milliGRAM(s) IV Push daily  sodium chloride 0.9%. 1000 milliLiter(s) (75 mL/Hr) IV Continuous <Continuous>    MEDICATIONS  (PRN):  hydrALAZINE 10 milliGRAM(s) Oral every 6 hours PRN Systolic blood pressure >170  morphine  - Injectable 8 milliGRAM(s) IV Push every 4 hours PRN moderate to severe pain  ondansetron Injectable 4 milliGRAM(s) IV Push every 6 hours PRN Nausea and/or Vomiting      Vital Signs Last 24 Hrs  T(C): 36.8 (2019 04:27), Max: 37.3 (2019 20:07)  T(F): 98.3 (2019 04:27), Max: 99.1 (2019 20:07)  HR: 95 (2019 04:27) (67 - 99)  BP: 173/92 (2019 04:27) (166/85 - 204/120)  BP(mean): --  RR: 20 (2019 04:27) (14 - 20)  SpO2: 95% (2019 04:27) (95% - 99%)  CAPILLARY BLOOD GLUCOSE        I&O's Summary      PHYSICAL EXAM  General: morbidly obese female, sitting retching in bed, with blue colored hair  Neurology: A&Ox3, non focal exam  Head:  Normocephalic, atraumatic  ENT:  Mucosa moist, no ulcerations  Neck:  Supple, no sinuses or palpable masses  Respiratory: CTA B/L anteriorly  CV: RRR, S1S2, no murmurs or rubs  Abdominal: Soft, diffusely tender to palpation, +BS, unable to palpate mass  MSK: marked lymphedema, venous stasis dermatitis bilaterally  PSYCH: normal behavior. flat affect    LABS:                        10.8   7.8   )-----------( 258      ( 2019 17:21 )             35.0     04-24    140  |  100  |  10  ----------------------------<  126<H>  3.8   |  27  |  0.58    Ca    9.3      2019 06:53  Phos  3.7     04-24  Mg     2.1     04-24    TPro  7.9  /  Alb  3.8  /  TBili  0.3  /  DBili  x   /  AST  19  /  ALT  15  /  AlkPhos  92  04-24          Urinalysis Basic - ( 2019 12:45 )    Color: Yellow / Appearance: Clear / S.015 / pH: x  Gluc: x / Ketone: Negative  / Bili: Negative / Urobili: Negative   Blood: x / Protein: Negative / Nitrite: Negative   Leuk Esterase: Negative / RBC: 0-4 /HPF / WBC 0-2 /HPF   Sq Epi: x / Non Sq Epi: Neg.-Few / Bacteria: Negative /HPF          RADIOLOGY & ADDITIONAL TESTS:    Imaging Personally Reviewed:  Consultant(s) Notes Reviewed:    Care Discussed with Consultants/Other Providers: Authored by Dr Jose Raul Paulson [phone] [facility] / [id number] [facility]    Patient is a 58y old  Female who presents with a chief complaint of abdominal pain (2019 22:11)    SUBJECTIVE / OVERNIGHT EVENTS:    58F with PMhx of HTN, lymphedema,  and mesenteric mass concerning for neuroendocrine tumor who p/w abdominal pain. Patient has a known mass as far back as , and has undergone imaging since that time. She underwent a PET/DOTATATE scan which showed activity in the mesentery liver and ileal mass thought to be the primary. MRI from earlier this year demonstrated multiple metastatic lesions. She was sent to Dr. Blackmon for further evaluation. 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 She underwent a colonoscopy with ileal biopsy which revealed lymphoid aggregates.    Patient states current symptoms started  after her CT scan which she underwent for evaluation of her disease per her oncologist. She reported she has had a reaction to IV contrast in the past where she had nausea and vomiting. Her scan yesterday required IV and PO contrast. She states she had some nausea after the test and had to leave early from work. She drank a lot of juice as she was advised to drink fluids after her test due to the IV contrast. She was able to eat some dinner and went to bed and in the morning was again feeling nauseous with abdominal pain. She called Dr. Blackmon who advised her to take zofran, but patient continued to have marked pain and nausea and went to [place] ED. She was transferred from there to NS after discuss with Dr. Blackmon's office.    In the ED, patient's /94, HR 72, RR 18, O2 98%, and T 98.6. Patient received IV morphine 4mg x2 in eliz cove and 8mg x1 in NS as well as IV Zofran     This morning pt endorses continued N/V, headache and abd pain. She described sharp diffuse abd pain, worse at the epigastrum, which began after N/V. Endorses minimal PO intake (half an apple juice consumed at bedside).    MEDICATIONS  (STANDING):  amLODIPine   Tablet 5 milliGRAM(s) Oral daily  enoxaparin Injectable 40 milliGRAM(s) SubCutaneous daily  famotidine Injectable 20 milliGRAM(s) IV Push daily  sodium chloride 0.9%. 1000 milliLiter(s) (75 mL/Hr) IV Continuous <Continuous>    MEDICATIONS  (PRN):  hydrALAZINE 10 milliGRAM(s) Oral every 6 hours PRN Systolic blood pressure >170  morphine  - Injectable 8 milliGRAM(s) IV Push every 4 hours PRN moderate to severe pain  ondansetron Injectable 4 milliGRAM(s) IV Push every 6 hours PRN Nausea and/or Vomiting      Vital Signs Last 24 Hrs  T(C): 36.8 (2019 04:27), Max: 37.3 (2019 20:07)  T(F): 98.3 (2019 04:27), Max: 99.1 (2019 20:07)  HR: 95 (2019 04:27) (67 - 99)  BP: 173/92 (2019 04:27) (166/85 - 204/120)  BP(mean): --  RR: 20 (2019 04:27) (14 - 20)  SpO2: 95% (2019 04:27) (95% - 99%)  CAPILLARY BLOOD GLUCOSE        I&O's Summary      PHYSICAL EXAM  General: morbidly obese female, sitting retching in bed, with blue colored hair  Neurology: A&Ox3, non focal exam  Head:  Normocephalic, atraumatic  ENT:  Mucosa moist, no ulcerations  Neck:  Supple, no sinuses or palpable masses  Respiratory: CTA B/L anteriorly  CV: RRR, S1S2, no murmurs or rubs  Abdominal: Soft, diffusely tender to palpation, +BS, unable to palpate mass  MSK: marked lymphedema, venous stasis dermatitis bilaterally  PSYCH: normal behavior. flat affect    LABS:                        10.8   7.8   )-----------( 258      ( 2019 17:21 )             35.0     04-24    140  |  100  |  10  ----------------------------<  126<H>  3.8   |  27  |  0.58    Ca    9.3      2019 06:53  Phos  3.7     04-24  Mg     2.1     04-24    TPro  7.9  /  Alb  3.8  /  TBili  0.3  /  DBili  x   /  AST  19  /  ALT  15  /  AlkPhos  92  04-24          Urinalysis Basic - ( 2019 12:45 )    Color: Yellow / Appearance: Clear / S.015 / pH: x  Gluc: x / Ketone: Negative  / Bili: Negative / Urobili: Negative   Blood: x / Protein: Negative / Nitrite: Negative   Leuk Esterase: Negative / RBC: 0-4 /HPF / WBC 0-2 /HPF   Sq Epi: x / Non Sq Epi: Neg.-Few / Bacteria: Negative /HPF          RADIOLOGY & ADDITIONAL TESTS:    Imaging Personally Reviewed:  Consultant(s) Notes Reviewed: GI, Heme/onc    Care Discussed with Consultants/Other Providers: Heme/onc

## 2019-04-25 ENCOUNTER — RESULT REVIEW (OUTPATIENT)
Age: 59
End: 2019-04-25

## 2019-04-25 LAB
ANION GAP SERPL CALC-SCNC: 10 MMOL/L — SIGNIFICANT CHANGE UP (ref 5–17)
ANION GAP SERPL CALC-SCNC: 12 MMOL/L — SIGNIFICANT CHANGE UP (ref 5–17)
APTT BLD: 28.3 SEC — SIGNIFICANT CHANGE UP (ref 27.5–36.3)
BUN SERPL-MCNC: 11 MG/DL — SIGNIFICANT CHANGE UP (ref 7–23)
BUN SERPL-MCNC: 9 MG/DL — SIGNIFICANT CHANGE UP (ref 7–23)
CALCIUM SERPL-MCNC: 6.6 MG/DL — LOW (ref 8.4–10.5)
CALCIUM SERPL-MCNC: 9.3 MG/DL — SIGNIFICANT CHANGE UP (ref 8.4–10.5)
CHLORIDE SERPL-SCNC: 101 MMOL/L — SIGNIFICANT CHANGE UP (ref 96–108)
CHLORIDE SERPL-SCNC: 113 MMOL/L — HIGH (ref 96–108)
CO2 SERPL-SCNC: 20 MMOL/L — LOW (ref 22–31)
CO2 SERPL-SCNC: 27 MMOL/L — SIGNIFICANT CHANGE UP (ref 22–31)
CREAT SERPL-MCNC: 0.43 MG/DL — LOW (ref 0.5–1.3)
CREAT SERPL-MCNC: 0.62 MG/DL — SIGNIFICANT CHANGE UP (ref 0.5–1.3)
GLUCOSE SERPL-MCNC: 102 MG/DL — HIGH (ref 70–99)
GLUCOSE SERPL-MCNC: 78 MG/DL — SIGNIFICANT CHANGE UP (ref 70–99)
HCT VFR BLD CALC: 29.3 % — LOW (ref 34.5–45)
HCT VFR BLD CALC: 34.4 % — LOW (ref 34.5–45)
HGB BLD-MCNC: 10.3 G/DL — LOW (ref 11.5–15.5)
HGB BLD-MCNC: 8.7 G/DL — LOW (ref 11.5–15.5)
INR BLD: 1.24 RATIO — HIGH (ref 0.88–1.16)
MAGNESIUM SERPL-MCNC: 1.6 MG/DL — SIGNIFICANT CHANGE UP (ref 1.6–2.6)
MCHC RBC-ENTMCNC: 20 PG — LOW (ref 27–34)
MCHC RBC-ENTMCNC: 20 PG — LOW (ref 27–34)
MCHC RBC-ENTMCNC: 29.8 GM/DL — LOW (ref 32–36)
MCHC RBC-ENTMCNC: 30 GM/DL — LOW (ref 32–36)
MCV RBC AUTO: 66.6 FL — LOW (ref 80–100)
MCV RBC AUTO: 67.1 FL — LOW (ref 80–100)
PHOSPHATE SERPL-MCNC: 2.5 MG/DL — SIGNIFICANT CHANGE UP (ref 2.5–4.5)
PLATELET # BLD AUTO: 207 K/UL — SIGNIFICANT CHANGE UP (ref 150–400)
PLATELET # BLD AUTO: 244 K/UL — SIGNIFICANT CHANGE UP (ref 150–400)
POTASSIUM SERPL-MCNC: 2.8 MMOL/L — CRITICAL LOW (ref 3.5–5.3)
POTASSIUM SERPL-MCNC: 3.7 MMOL/L — SIGNIFICANT CHANGE UP (ref 3.5–5.3)
POTASSIUM SERPL-SCNC: 2.8 MMOL/L — CRITICAL LOW (ref 3.5–5.3)
POTASSIUM SERPL-SCNC: 3.7 MMOL/L — SIGNIFICANT CHANGE UP (ref 3.5–5.3)
PROTHROM AB SERPL-ACNC: 14.3 SEC — HIGH (ref 10–12.9)
RBC # BLD: 4.37 M/UL — SIGNIFICANT CHANGE UP (ref 3.8–5.2)
RBC # BLD: 5.16 M/UL — SIGNIFICANT CHANGE UP (ref 3.8–5.2)
RBC # FLD: 20.2 % — HIGH (ref 10.3–14.5)
RBC # FLD: 20.4 % — HIGH (ref 10.3–14.5)
SODIUM SERPL-SCNC: 140 MMOL/L — SIGNIFICANT CHANGE UP (ref 135–145)
SODIUM SERPL-SCNC: 143 MMOL/L — SIGNIFICANT CHANGE UP (ref 135–145)
WBC # BLD: 10.2 K/UL — SIGNIFICANT CHANGE UP (ref 3.8–10.5)
WBC # BLD: 7.9 K/UL — SIGNIFICANT CHANGE UP (ref 3.8–10.5)
WBC # FLD AUTO: 10.2 K/UL — SIGNIFICANT CHANGE UP (ref 3.8–10.5)
WBC # FLD AUTO: 7.9 K/UL — SIGNIFICANT CHANGE UP (ref 3.8–10.5)

## 2019-04-25 PROCEDURE — 99232 SBSQ HOSP IP/OBS MODERATE 35: CPT | Mod: GC

## 2019-04-25 PROCEDURE — 99233 SBSQ HOSP IP/OBS HIGH 50: CPT | Mod: GC

## 2019-04-25 RX ORDER — IBUPROFEN 200 MG
600 TABLET ORAL EVERY 8 HOURS
Qty: 0 | Refills: 0 | Status: DISCONTINUED | OUTPATIENT
Start: 2019-04-25 | End: 2019-04-25

## 2019-04-25 RX ORDER — IBUPROFEN 200 MG
600 TABLET ORAL EVERY 8 HOURS
Qty: 0 | Refills: 0 | Status: DISCONTINUED | OUTPATIENT
Start: 2019-04-25 | End: 2019-04-27

## 2019-04-25 RX ADMIN — HYDROMORPHONE HYDROCHLORIDE 2 MILLIGRAM(S): 2 INJECTION INTRAMUSCULAR; INTRAVENOUS; SUBCUTANEOUS at 10:21

## 2019-04-25 RX ADMIN — SODIUM CHLORIDE 75 MILLILITER(S): 9 INJECTION INTRAMUSCULAR; INTRAVENOUS; SUBCUTANEOUS at 05:09

## 2019-04-25 RX ADMIN — ONDANSETRON 4 MILLIGRAM(S): 8 TABLET, FILM COATED ORAL at 06:43

## 2019-04-25 RX ADMIN — Medication 325 MILLIGRAM(S): at 11:16

## 2019-04-25 RX ADMIN — HYDROMORPHONE HYDROCHLORIDE 2 MILLIGRAM(S): 2 INJECTION INTRAMUSCULAR; INTRAVENOUS; SUBCUTANEOUS at 23:45

## 2019-04-25 RX ADMIN — HYDROMORPHONE HYDROCHLORIDE 2 MILLIGRAM(S): 2 INJECTION INTRAMUSCULAR; INTRAVENOUS; SUBCUTANEOUS at 23:16

## 2019-04-25 RX ADMIN — Medication 600 MILLIGRAM(S): at 10:15

## 2019-04-25 RX ADMIN — Medication 600 MILLIGRAM(S): at 23:45

## 2019-04-25 RX ADMIN — HYDROMORPHONE HYDROCHLORIDE 2 MILLIGRAM(S): 2 INJECTION INTRAMUSCULAR; INTRAVENOUS; SUBCUTANEOUS at 02:30

## 2019-04-25 RX ADMIN — HYDROMORPHONE HYDROCHLORIDE 2 MILLIGRAM(S): 2 INJECTION INTRAMUSCULAR; INTRAVENOUS; SUBCUTANEOUS at 17:24

## 2019-04-25 RX ADMIN — SODIUM CHLORIDE 75 MILLILITER(S): 9 INJECTION INTRAMUSCULAR; INTRAVENOUS; SUBCUTANEOUS at 18:05

## 2019-04-25 RX ADMIN — ENOXAPARIN SODIUM 40 MILLIGRAM(S): 100 INJECTION SUBCUTANEOUS at 11:15

## 2019-04-25 RX ADMIN — HYDROMORPHONE HYDROCHLORIDE 2 MILLIGRAM(S): 2 INJECTION INTRAMUSCULAR; INTRAVENOUS; SUBCUTANEOUS at 06:30

## 2019-04-25 RX ADMIN — Medication 600 MILLIGRAM(S): at 11:03

## 2019-04-25 RX ADMIN — HYDROMORPHONE HYDROCHLORIDE 2 MILLIGRAM(S): 2 INJECTION INTRAMUSCULAR; INTRAVENOUS; SUBCUTANEOUS at 07:00

## 2019-04-25 RX ADMIN — Medication 600 MILLIGRAM(S): at 23:15

## 2019-04-25 RX ADMIN — FAMOTIDINE 20 MILLIGRAM(S): 10 INJECTION INTRAVENOUS at 11:16

## 2019-04-25 RX ADMIN — AMLODIPINE BESYLATE 5 MILLIGRAM(S): 2.5 TABLET ORAL at 05:09

## 2019-04-25 RX ADMIN — HYDROMORPHONE HYDROCHLORIDE 2 MILLIGRAM(S): 2 INJECTION INTRAMUSCULAR; INTRAVENOUS; SUBCUTANEOUS at 16:42

## 2019-04-25 RX ADMIN — HYDROMORPHONE HYDROCHLORIDE 2 MILLIGRAM(S): 2 INJECTION INTRAMUSCULAR; INTRAVENOUS; SUBCUTANEOUS at 01:29

## 2019-04-25 RX ADMIN — HYDROMORPHONE HYDROCHLORIDE 2 MILLIGRAM(S): 2 INJECTION INTRAMUSCULAR; INTRAVENOUS; SUBCUTANEOUS at 11:03

## 2019-04-25 NOTE — PROGRESS NOTE ADULT - ATTENDING COMMENTS
Pt with severe abdominal pain, nausea/vomiting, requiring IV dilaudid, zofran as well as ativan  Continue supportive care for symptom control  Plan for EGD/EUS tomorrow for biopsy of the mass    Kimberly Goldstein MD  Division of Hospital Medicine  Pager: [phone]  Office: [phone]

## 2019-04-25 NOTE — PROGRESS NOTE ADULT - SUBJECTIVE AND OBJECTIVE BOX
Chief Complaint:  Patient is a 58y old  Female who presents with a chief complaint of abdominal pain (2019 07:29)      Interval Events:   Patient complaining of persistent abdominal pain today; comes and goes. Mild nausea, no vomiting. No fevers.     Hospital Medications:  aluminum hydroxide/magnesium hydroxide/simethicone Suspension 30 milliLiter(s) Oral every 4 hours PRN  amLODIPine   Tablet 5 milliGRAM(s) Oral daily  enoxaparin Injectable 40 milliGRAM(s) SubCutaneous daily  famotidine Injectable 20 milliGRAM(s) IV Push daily  ferrous    sulfate 325 milliGRAM(s) Oral daily  hydrALAZINE 10 milliGRAM(s) Oral every 6 hours PRN  HYDROmorphone  Injectable 2 milliGRAM(s) IV Push every 4 hours PRN  LORazepam     Tablet 1 milliGRAM(s) Oral every 8 hours PRN  ondansetron Injectable 4 milliGRAM(s) IV Push every 6 hours PRN  sodium chloride 0.9%. 1000 milliLiter(s) IV Continuous <Continuous>        PHYSICAL EXAM:   Vital Signs:  Vital Signs Last 24 Hrs  T(C): 36.7 (2019 04:31), Max: 36.9 (2019 21:39)  T(F): 98 (2019 04:31), Max: 98.4 (2019 21:39)  HR: 88 (2019 04:31) (71 - 88)  BP: 161/85 (2019 04:31) (137/87 - 161/85)  BP(mean): --  RR: 18 (2019 04:31) (18 - 18)  SpO2: 95% (2019 04:31) (93% - 98%)  Daily     Daily     GENERAL:  Appears stated age,  no distress  HEENT:   sclera -anicteric  CHEST:   no increased effort, breath sounds clear  HEART:  Regular rhythm, S1, S2,   ABDOMEN:  Soft, non-tender, non-distended, normoactive bowel sounds,  Morbid ovesity   EXTEREMITIES:  no cyanosis,clubbing or edema  SKIN:  No rash/no jaundice   NEURO:  Alert, oriented    LABS:                        8.7    7.9   )-----------( 207      ( 2019 06:37 )             29.3     Mean Cell Volume: 67.1 fl (-19 @ 06:37)        143  |  113<H>  |  9   ----------------------------<  78  2.8<LL>   |  20<L>  |  0.43<L>    Ca    6.6<L>      2019 06:36  Phos  2.5       Mg     1.6         TPro  7.9  /  Alb  3.8  /  TBili  0.3  /  DBili  x   /  AST  19  /  ALT  15  /  AlkPhos  92      LIVER FUNCTIONS - ( 2019 06:53 )  Alb: 3.8 g/dL / Pro: 7.9 g/dL / ALK PHOS: 92 U/L / ALT: 15 U/L / AST: 19 U/L / GGT: x           PT/INR - ( 2019 06:37 )   PT: 14.3 sec;   INR: 1.24 ratio         PTT - ( 2019 06:37 )  PTT:28.3 sec  Urinalysis Basic - ( 2019 12:45 )    Color: Yellow / Appearance: Clear / S.015 / pH: x  Gluc: x / Ketone: Negative  / Bili: Negative / Urobili: Negative   Blood: x / Protein: Negative / Nitrite: Negative   Leuk Esterase: Negative / RBC: 0-4 /HPF / WBC 0-2 /HPF   Sq Epi: x / Non Sq Epi: Neg.-Few / Bacteria: Negative /HPF                              8.7    7.9   )-----------( 207      ( 2019 06:37 )             29.3                         10.3   8.5   )-----------( 284      ( 2019 06:53 )             35.8                         10.8   7.8   )-----------( 258      ( 2019 17:21 )             35.0                         10.9   7.8   )-----------( 295      ( 2019 12:30 )             36.8     Imaging:  < from: CT Abdomen and Pelvis w/ Oral Cont and w/ IV Cont (19 @ 10:54) >  EXAM:  CT CHEST IC    EXAM:  CT ABDOMEN AND PELVIS OC IC        PROCEDURE DATE:  2019          INTERPRETATION:  CLINICAL INFORMATION: Calcified mesenteric mass    COMPARISON: CTs of the abdomen pelvis dated dating back to 2010 as   wellas MRI of the abdomen dated 2018.    PROCEDURE:   CT of the Chest, Abdomen and Pelvis was performed with intravenous   contrast.   Intravenous contrast: 90 ml Omnipaque 350. 10 ml discarded.  Oral contrast: positive contrast was administered.  Sagittal and coronal reformats were performed.    FINDINGS:    CHEST:     LUNGS AND LARGE AIRWAYS: Patent central airways. Small right basilar   atelectasis. There are multiple bilateral small pulmonary nodules. A   right lower lobe measuring up to 5 mm (series 3, image 43), unchanged in   appearance from prior studies.  PLEURA: No pleural effusion.  VESSELS: Within normal limits.  HEART: Heart size is normal. No pericardial effusion.  MEDIASTINUM AND MOISÉS: No lymphadenopathy.  CHEST WALL AND LOWER NECK: Multiple venous collaterals in the left chest   wall.    ABDOMEN AND PELVIS:    LIVER: Within normal limits.  BILE DUCTS: Normal caliber.  GALLBLADDER: Gallstones.  SPLEEN: Within normal limits.  PANCREAS: Within normal limits.  ADRENALS: Within normal limits.  KIDNEYS/URETERS: Within normal limits.    BLADDER: Within normal limits.  REPRODUCTIVE ORGANS: Fibroid uterus. IUD in place. 6.4 cm right adnexal   cyst with a septation.    BOWEL: No bowel obstruction.  PERITONEUM/MESENTERY: There is a mass in the central mesentery containing   coarse calcifications, measuring 4.9 x 3.0 x 3.4 cm (previously 4.5 x 2.5   x 3.0 cm remeasured).  VESSELS:  Within normal limits.  RETROPERITONEUM: No lymphadenopathy.    ABDOMINAL WALL: Within normal limits.  BONES: Degenerative changes of the spine.    IMPRESSION:     Slight interval enlargement of the partially calcified mesenteric mass   compared to 2017. 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     Stable right adnexal cystic lesion with a septation, not significantly   changed in appearance compared to prior studies.     Multiple small bilateral lung nodules measuring up to 5 mm in the right   lower lobe. Right lower lobe nodules unchanged compared to 2015. Consider   6-12 month follow-up chest CT to assure stability.    < end of copied text >

## 2019-04-25 NOTE — PROGRESS NOTE ADULT - SUBJECTIVE AND OBJECTIVE BOX
Authored by Dr Jose Raul Paulson [phone] [facility] / [id number] [facility]    Patient is a 58y old  Female who presents with a chief complaint of abdominal pain (2019 10:08)    SUBJECTIVE / OVERNIGHT EVENTS: No acute events overnight     MEDICATIONS  (STANDING):  amLODIPine   Tablet 5 milliGRAM(s) Oral daily  enoxaparin Injectable 40 milliGRAM(s) SubCutaneous daily  famotidine Injectable 20 milliGRAM(s) IV Push daily  ferrous    sulfate 325 milliGRAM(s) Oral daily  sodium chloride 0.9%. 1000 milliLiter(s) (75 mL/Hr) IV Continuous <Continuous>    MEDICATIONS  (PRN):  aluminum hydroxide/magnesium hydroxide/simethicone Suspension 30 milliLiter(s) Oral every 4 hours PRN Dyspepsia  hydrALAZINE 10 milliGRAM(s) Oral every 6 hours PRN Systolic blood pressure >170  HYDROmorphone  Injectable 2 milliGRAM(s) IV Push every 4 hours PRN Pain  LORazepam     Tablet 1 milliGRAM(s) Oral every 8 hours PRN Anxiety and/or Nausea/Vomiting  ondansetron Injectable 4 milliGRAM(s) IV Push every 6 hours PRN Nausea and/or Vomiting      Vital Signs Last 24 Hrs  T(C): 36.7 (2019 04:31), Max: 36.9 (2019 21:39)  T(F): 98 (2019 04:31), Max: 98.4 (2019 21:39)  HR: 88 (2019 04:31) (71 - 88)  BP: 161/85 (2019 04:31) (137/87 - 161/85)  BP(mean): --  RR: 18 (2019 04:31) (18 - 18)  SpO2: 95% (2019 04:31) (93% - 98%)  CAPILLARY BLOOD GLUCOSE        I&O's Summary      PHYSICAL EXAM  General: morbidly obese female, sitting retching in bed, with blue colored hair  Neurology: A&Ox3, non focal exam  Head:  Normocephalic, atraumatic  ENT:  Mucosa moist, no ulcerations  Neck:  Supple, no sinuses or palpable masses  Respiratory: CTA B/L anteriorly  CV: RRR, S1S2, no murmurs or rubs  Abdominal: Soft, diffusely tender to palpation, +BS, unable to palpate mass  MSK: marked lymphedema, venous stasis dermatitis bilaterally  PSYCH: normal behavior. flat affect    LABS:                        8.7    7.9   )-----------( 207      ( 2019 06:37 )             29.3         143  |  113<H>  |  9   ----------------------------<  78  x    |  20<L>  |  0.43<L>    Ca    6.6<L>      2019 06:36  Phos  2.5       Mg     1.6         TPro  7.9  /  Alb  3.8  /  TBili  0.3  /  DBili  x   /  AST  19  /  ALT  15  /  AlkPhos  92      PT/INR - ( 2019 06:37 )   PT: 14.3 sec;   INR: 1.24 ratio         PTT - ( 2019 06:37 )  PTT:28.3 sec      Urinalysis Basic - ( 2019 12:45 )    Color: Yellow / Appearance: Clear / S.015 / pH: x  Gluc: x / Ketone: Negative  / Bili: Negative / Urobili: Negative   Blood: x / Protein: Negative / Nitrite: Negative   Leuk Esterase: Negative / RBC: 0-4 /HPF / WBC 0-2 /HPF   Sq Epi: x / Non Sq Epi: Neg.-Few / Bacteria: Negative /HPF          RADIOLOGY & ADDITIONAL TESTS:    Imaging Personally Reviewed:  Consultant(s) Notes Reviewed:    Care Discussed with Consultants/Other Providers: Authored by Dr Jose Raul Paulson [phone] [facility] / [id number] [facility]    Patient is a 58y old  Female who presents with a chief complaint of abdominal pain (2019 10:08)    SUBJECTIVE / OVERNIGHT EVENTS: No acute events overnight     This morning pt reports continued N/V, but not headache or abd pain.     MEDICATIONS  (STANDING):  amLODIPine   Tablet 5 milliGRAM(s) Oral daily  enoxaparin Injectable 40 milliGRAM(s) SubCutaneous daily  famotidine Injectable 20 milliGRAM(s) IV Push daily  ferrous    sulfate 325 milliGRAM(s) Oral daily  sodium chloride 0.9%. 1000 milliLiter(s) (75 mL/Hr) IV Continuous <Continuous>    MEDICATIONS  (PRN):  aluminum hydroxide/magnesium hydroxide/simethicone Suspension 30 milliLiter(s) Oral every 4 hours PRN Dyspepsia  hydrALAZINE 10 milliGRAM(s) Oral every 6 hours PRN Systolic blood pressure >170  HYDROmorphone  Injectable 2 milliGRAM(s) IV Push every 4 hours PRN Pain  LORazepam     Tablet 1 milliGRAM(s) Oral every 8 hours PRN Anxiety and/or Nausea/Vomiting  ondansetron Injectable 4 milliGRAM(s) IV Push every 6 hours PRN Nausea and/or Vomiting      Vital Signs Last 24 Hrs  T(C): 36.7 (2019 04:31), Max: 36.9 (2019 21:39)  T(F): 98 (2019 04:31), Max: 98.4 (2019 21:39)  HR: 88 (2019 04:31) (71 - 88)  BP: 161/85 (2019 04:31) (137/87 - 161/85)  BP(mean): --  RR: 18 (2019 04:31) (18 - 18)  SpO2: 95% (2019 04:31) (93% - 98%)  CAPILLARY BLOOD GLUCOSE        I&O's Summary      PHYSICAL EXAM  General: morbidly obese female, sitting retching in bed, with blue colored hair  Neurology: A&Ox3, non focal exam  Head:  Normocephalic, atraumatic  ENT:  Mucosa moist, no ulcerations  Neck:  Supple, no sinuses or palpable masses  Respiratory: CTA B/L anteriorly  CV: RRR, S1S2, no murmurs or rubs  Abdominal: Soft, diffusely tender to palpation, +BS, unable to palpate mass  MSK: marked lymphedema, venous stasis dermatitis bilaterally  PSYCH: normal behavior. flat affect    LABS:                        8.7    7.9   )-----------( 207      ( 2019 06:37 )             29.3         143  |  113<H>  |  9   ----------------------------<  78  x    |  20<L>  |  0.43<L>    Ca    6.6<L>      2019 06:36  Phos  2.5       Mg     1.6         TPro  7.9  /  Alb  3.8  /  TBili  0.3  /  DBili  x   /  AST  19  /  ALT  15  /  AlkPhos  92  -    PT/INR - ( 2019 06:37 )   PT: 14.3 sec;   INR: 1.24 ratio         PTT - ( 2019 06:37 )  PTT:28.3 sec      Urinalysis Basic - ( 2019 12:45 )    Color: Yellow / Appearance: Clear / S.015 / pH: x  Gluc: x / Ketone: Negative  / Bili: Negative / Urobili: Negative   Blood: x / Protein: Negative / Nitrite: Negative   Leuk Esterase: Negative / RBC: 0-4 /HPF / WBC 0-2 /HPF   Sq Epi: x / Non Sq Epi: Neg.-Few / Bacteria: Negative /HPF          RADIOLOGY & ADDITIONAL TESTS:    Imaging Personally Reviewed:  Consultant(s) Notes Reviewed:    Care Discussed with Consultants/Other Providers:

## 2019-04-26 ENCOUNTER — RESULT REVIEW (OUTPATIENT)
Age: 59
End: 2019-04-26

## 2019-04-26 ENCOUNTER — TRANSCRIPTION ENCOUNTER (OUTPATIENT)
Age: 59
End: 2019-04-26

## 2019-04-26 LAB
ALBUMIN SERPL ELPH-MCNC: 3.7 G/DL — SIGNIFICANT CHANGE UP (ref 3.3–5)
ALP SERPL-CCNC: 95 U/L — SIGNIFICANT CHANGE UP (ref 40–120)
ALT FLD-CCNC: 10 U/L — SIGNIFICANT CHANGE UP (ref 10–45)
ANION GAP SERPL CALC-SCNC: 11 MMOL/L — SIGNIFICANT CHANGE UP (ref 5–17)
APTT BLD: 28.6 SEC — SIGNIFICANT CHANGE UP (ref 27.5–36.3)
AST SERPL-CCNC: 17 U/L — SIGNIFICANT CHANGE UP (ref 10–40)
BILIRUB SERPL-MCNC: 0.4 MG/DL — SIGNIFICANT CHANGE UP (ref 0.2–1.2)
BUN SERPL-MCNC: 10 MG/DL — SIGNIFICANT CHANGE UP (ref 7–23)
CALCIUM SERPL-MCNC: 9.3 MG/DL — SIGNIFICANT CHANGE UP (ref 8.4–10.5)
CHLORIDE SERPL-SCNC: 101 MMOL/L — SIGNIFICANT CHANGE UP (ref 96–108)
CO2 SERPL-SCNC: 26 MMOL/L — SIGNIFICANT CHANGE UP (ref 22–31)
CREAT SERPL-MCNC: 0.54 MG/DL — SIGNIFICANT CHANGE UP (ref 0.5–1.3)
GLUCOSE SERPL-MCNC: 98 MG/DL — SIGNIFICANT CHANGE UP (ref 70–99)
HCT VFR BLD CALC: 33 % — LOW (ref 34.5–45)
HGB BLD-MCNC: 9.8 G/DL — LOW (ref 11.5–15.5)
INR BLD: 1.17 RATIO — HIGH (ref 0.88–1.16)
MCHC RBC-ENTMCNC: 19.8 PG — LOW (ref 27–34)
MCHC RBC-ENTMCNC: 29.7 GM/DL — LOW (ref 32–36)
MCV RBC AUTO: 66.7 FL — LOW (ref 80–100)
PLATELET # BLD AUTO: 238 K/UL — SIGNIFICANT CHANGE UP (ref 150–400)
POTASSIUM SERPL-MCNC: 3.7 MMOL/L — SIGNIFICANT CHANGE UP (ref 3.5–5.3)
POTASSIUM SERPL-SCNC: 3.7 MMOL/L — SIGNIFICANT CHANGE UP (ref 3.5–5.3)
PROT SERPL-MCNC: 7.7 G/DL — SIGNIFICANT CHANGE UP (ref 6–8.3)
PROTHROM AB SERPL-ACNC: 13.4 SEC — HIGH (ref 10–12.9)
RBC # BLD: 4.95 M/UL — SIGNIFICANT CHANGE UP (ref 3.8–5.2)
RBC # FLD: 20.5 % — HIGH (ref 10.3–14.5)
SODIUM SERPL-SCNC: 138 MMOL/L — SIGNIFICANT CHANGE UP (ref 135–145)
WBC # BLD: 9.1 K/UL — SIGNIFICANT CHANGE UP (ref 3.8–10.5)
WBC # FLD AUTO: 9.1 K/UL — SIGNIFICANT CHANGE UP (ref 3.8–10.5)

## 2019-04-26 PROCEDURE — 99233 SBSQ HOSP IP/OBS HIGH 50: CPT | Mod: GC

## 2019-04-26 PROCEDURE — 88313 SPECIAL STAINS GROUP 2: CPT | Mod: 26

## 2019-04-26 PROCEDURE — 88312 SPECIAL STAINS GROUP 1: CPT | Mod: 26

## 2019-04-26 PROCEDURE — 88305 TISSUE EXAM BY PATHOLOGIST: CPT | Mod: 26

## 2019-04-26 PROCEDURE — 43242 EGD US FINE NEEDLE BX/ASPIR: CPT | Mod: GC

## 2019-04-26 PROCEDURE — 88342 IMHCHEM/IMCYTCHM 1ST ANTB: CPT | Mod: 26

## 2019-04-26 PROCEDURE — 88307 TISSUE EXAM BY PATHOLOGIST: CPT | Mod: 26

## 2019-04-26 PROCEDURE — 88173 CYTOPATH EVAL FNA REPORT: CPT | Mod: 26

## 2019-04-26 RX ORDER — LABETALOL HCL 100 MG
10 TABLET ORAL ONCE
Qty: 0 | Refills: 0 | Status: DISCONTINUED | OUTPATIENT
Start: 2019-04-26 | End: 2019-04-26

## 2019-04-26 RX ORDER — AMLODIPINE BESYLATE 2.5 MG/1
10 TABLET ORAL DAILY
Qty: 0 | Refills: 0 | Status: DISCONTINUED | OUTPATIENT
Start: 2019-04-26 | End: 2019-04-27

## 2019-04-26 RX ORDER — ACETAMINOPHEN 500 MG
650 TABLET ORAL ONCE
Qty: 0 | Refills: 0 | Status: COMPLETED | OUTPATIENT
Start: 2019-04-26 | End: 2019-04-26

## 2019-04-26 RX ADMIN — ONDANSETRON 4 MILLIGRAM(S): 8 TABLET, FILM COATED ORAL at 12:33

## 2019-04-26 RX ADMIN — Medication 325 MILLIGRAM(S): at 13:33

## 2019-04-26 RX ADMIN — ENOXAPARIN SODIUM 40 MILLIGRAM(S): 100 INJECTION SUBCUTANEOUS at 13:33

## 2019-04-26 RX ADMIN — HYDROMORPHONE HYDROCHLORIDE 2 MILLIGRAM(S): 2 INJECTION INTRAMUSCULAR; INTRAVENOUS; SUBCUTANEOUS at 09:39

## 2019-04-26 RX ADMIN — Medication 600 MILLIGRAM(S): at 19:45

## 2019-04-26 RX ADMIN — Medication 600 MILLIGRAM(S): at 09:38

## 2019-04-26 RX ADMIN — Medication 650 MILLIGRAM(S): at 04:15

## 2019-04-26 RX ADMIN — Medication 600 MILLIGRAM(S): at 10:15

## 2019-04-26 RX ADMIN — HYDROMORPHONE HYDROCHLORIDE 2 MILLIGRAM(S): 2 INJECTION INTRAMUSCULAR; INTRAVENOUS; SUBCUTANEOUS at 10:15

## 2019-04-26 RX ADMIN — Medication 600 MILLIGRAM(S): at 20:27

## 2019-04-26 RX ADMIN — FAMOTIDINE 20 MILLIGRAM(S): 10 INJECTION INTRAVENOUS at 12:35

## 2019-04-26 RX ADMIN — Medication 1 MILLIGRAM(S): at 13:04

## 2019-04-26 RX ADMIN — AMLODIPINE BESYLATE 5 MILLIGRAM(S): 2.5 TABLET ORAL at 06:08

## 2019-04-26 NOTE — DISCHARGE NOTE PROVIDER - NSDCCPCAREPLAN_GEN_ALL_CORE_FT
PRINCIPAL DISCHARGE DIAGNOSIS  Diagnosis: Mesenteric mass  Assessment and Plan of Treatment: You came to the hospital with abdominal pain, nausea and vomiting following your CT scan with constrast. You were treated with pain medications, fluids, and anti-nausea medications. Oncology specialists recommended a biopsy for diagnosis of your abdominal mass. The advanced gastroenterology team was consulted and performed and ultrasound guided endoscopic biopsy.      SECONDARY DISCHARGE DIAGNOSES  Diagnosis: Nausea & vomiting  Assessment and Plan of Treatment: PRINCIPAL DISCHARGE DIAGNOSIS  Diagnosis: Mesenteric mass  Assessment and Plan of Treatment: You came to the hospital with abdominal pain, nausea and vomiting following your CT scan with constrast. You were treated with pain medications, fluids, and anti-nausea medications. Oncology specialists recommended a biopsy for diagnosis of your abdominal mass. The advanced gastroenterology team was consulted and performed and ultrasound guided endoscopic biopsy. You will need to schedule an appointment with your oncologist and gastroenterologist when you leave the hospital to follow up on the results of the biopsy. We have given you a short supply of pain medication to be taken if needed for severe pain. Please call your doctor if your symptoms persist or acutely worsen. PRINCIPAL DISCHARGE DIAGNOSIS  Diagnosis: Mesenteric mass  Assessment and Plan of Treatment: You came to the hospital with abdominal pain, nausea and vomiting following your CT scan with constrast. You were treated with pain medications, fluids, and anti-nausea medications.   Oncology specialists recommended a biopsy for diagnosis of your abdominal mass. The advanced gastroenterology team was consulted and performed and ultrasound guided endoscopic biopsy. You will need to schedule an appointment with your oncologist and gastroenterologist when you leave the hospital to follow up on the results of the biopsy.   As for your pain and gastrointestinal symptoms, it is possible that you are very sensitive to contrast agents and the IV contrast you received is what triggered these symptoms. We have given you a short supply of pain medication (hydromorphone) to be taken if needed for severe pain. You may also take ibuprofen as needed for less severe pain, and Zofran as needed for nausea and/or vomiting.   Please call your doctor if your symptoms persist or acutely worsen.      SECONDARY DISCHARGE DIAGNOSES  Diagnosis: Anemia  Assessment and Plan of Treatment: Blood tests showed that you have anemia (low blood counts) as a result of iron deficiency. We started an iron supplement (ferrous sulfate) which you should continue to take when you leave the hospital. Please be aware, this medication can make your stool appear darker than usual or even black. It can also cause constipation. You may want to take a stool softener for the next couple of weeks to prevent constipation.    Diagnosis: Hypertension  Assessment and Plan of Treatment: Your blood pressure was high during your hospitalization. We adjusted one of your medications, amlodipine, so that you will now be taking a higher dose of 10 milligrams per day. Please follow up with your primary care doctor to check your blood pressure and make sure it is adequately controlled. Your blood pressure should be less than 130/80.

## 2019-04-26 NOTE — PROGRESS NOTE ADULT - SUBJECTIVE AND OBJECTIVE BOX
Pre-Endoscopy Evaluation      Referring Physician: dr. beth slaughter                                Procedure: eus    Indication for Procedure: abdominal mass    Pertinent History: 58y female with PMhx of HTN, Lymphedema,  and mesenteric mass concerning for neuroendocrine tumor with mesenteric mass      Sedation by Anesthesia [X]    PAST MEDICAL & SURGICAL HISTORY:  Lymphedema  Morbid obesity  Mesenteric mass  Cancer: abdominal  Gallstones  H/O  section      PMH of Gastroparesis [ ]  Gastric Surgery [ ]  Gastric Outlet Obstruction [ ]    Allergies:    codeine (Unknown)  fish (Unknown)    Intolerances:    Latex allergy: [ ] yes [X] no    Medications:MEDICATIONS  (STANDING):  amLODIPine   Tablet 5 milliGRAM(s) Oral daily  enoxaparin Injectable 40 milliGRAM(s) SubCutaneous daily  famotidine Injectable 20 milliGRAM(s) IV Push daily  ferrous    sulfate 325 milliGRAM(s) Oral daily  LORazepam   Injectable 1 milliGRAM(s) IV Push once  sodium chloride 0.9%. 1000 milliLiter(s) (75 mL/Hr) IV Continuous <Continuous>    MEDICATIONS  (PRN):  aluminum hydroxide/magnesium hydroxide/simethicone Suspension 30 milliLiter(s) Oral every 4 hours PRN Dyspepsia  hydrALAZINE 10 milliGRAM(s) Oral every 6 hours PRN Systolic blood pressure >170  HYDROmorphone  Injectable 2 milliGRAM(s) IV Push every 4 hours PRN Pain  ibuprofen  Tablet. 600 milliGRAM(s) Oral every 8 hours PRN Moderate Pain (4 - 6)  LORazepam     Tablet 1 milliGRAM(s) Oral every 8 hours PRN Anxiety and/or Nausea/Vomiting  ondansetron Injectable 4 milliGRAM(s) IV Push every 6 hours PRN Nausea and/or Vomiting      Smoking: [ ] yes  [x] no    AICD/PPM: [ ] yes   [X] no    Pertinent lab data:                        9.8    9.1   )-----------( 238      ( 2019 07:20 )             33.0     04-26    138  |  101  |  10  ----------------------------<  98  3.7   |  26  |  0.54    Ca    9.3      2019 07:20  Phos  2.5     04-25  Mg     1.6     04-25    TPro  7.7  /  Alb  3.7  /  TBili  0.4  /  DBili  x   /  AST  17  /  ALT  10  /  AlkPhos  95  04-26    PT/INR - ( 2019 07:20 )   PT: 13.4 sec;   INR: 1.17 ratio         PTT - ( 2019 07:20 )  PTT:28.6 sec        Physical Examination:    Daily   Vital Signs Last 24 Hrs  T(C): 36.9 (2019 11:32), Max: 36.9 (2019 22:01)  T(F): 98.5 (2019 11:32), Max: 98.5 (2019 22:01)  HR: 94 (2019 12:04) (78 - 94)  BP: 162/77 (2019 12:04) (144/76 - 202/104)  BP(mean): --  RR: 18 (2019 11:32) (18 - 18)  SpO2: 95% (2019 11:32) (93% - 95%)    Drug Dosing Weight  Height (cm): 154.94 (2019 21:17)  Weight (kg): 154.6 (2019 21:17)  BMI (kg/m2): 64.4 (2019 21:17)  BSA (m2): 2.37 (2019 21:17)    Constitutional: NAD     Neck:  No JVD    Respiratory: CTAB/L    Cardiovascular: S1 and S2    Gastrointestinal: BS+, soft, NT/ND    Extremities: No peripheral edema    Neurological: A/O x 3    : No Galloway    Skin: No rashes    Comments:    ASA Class: I [ ]  II [ ]  III [x]  IV [ ]    The patient is a suitable candidate for the planned procedure unless box checked [ ]  No, explain:

## 2019-04-26 NOTE — DISCHARGE NOTE PROVIDER - HOSPITAL COURSE
Ms. Ratliff is a 57y/o F with PMhx of HTN, lymphedema, and mesenteric mass concerning for neuroendocrine tumor who presented to the ED with abdominal pain. Patient has a known mass as far back as 2010, and has undergone imaging since that time. She underwent a PET/DOTATATE scan which showed activity in the mesentery liver and ileal mass thought to be the primary. MRI from earlier this year demonstrated multiple metastatic lesions. She was sent to Dr. Blackmon for further evaluation. 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 She underwent a colonoscopy with ileal biopsy which revealed lymphoid aggregates.        Patient reported symptoms started after her CT scan PO&IV contrast with N/V, abdominal pain, and headaches when pain is severe.        In the ED, patient's /94, HR 72, RR 18, O2 98%, and T 98.6. Patient received IV morphine 4mg x2 in eliz cove and 8mg x1 in NS as well as IV Zofran     Pt was treated with supportive care for her pain, headache and nausea/vomiting. Oncology was consulted and recommended EUS guided biopsy for definitive diagnosis of her disease. [facility] was consulted and performed EUS biospy. Ms. Ratliff is a 57y/o F with PMhx of HTN, lymphedema, and mesenteric mass concerning for neuroendocrine tumor who presented to the ED with abdominal pain. Patient has a known mass as far back as 2010, and has undergone imaging since that time. She underwent a PET/DOTATATE scan which showed activity in the mesentery liver and ileal mass thought to be the primary. MRI from earlier this year demonstrated multiple metastatic lesions. She was sent to Dr. Blackmon for further evaluation. 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 She underwent a colonoscopy with ileal biopsy which revealed lymphoid aggregates.        Patient reported symptoms started after her CT scan PO&IV contrast with N/V, abdominal pain, and headaches when pain is severe.        In the ED, patient's /94, HR 72, RR 18, O2 98%, and T 98.6. Patient received IV morphine 4mg x2 in eliz cove and 8mg x1 in NS as well as IV Zofran     Pt was treated with supportive care for her pain, headache and nausea/vomiting. Oncology was consulted and recommended EUS guided biopsy for definitive diagnosis of her disease. [facility] was consulted and performed EUS biospy on 4/26. Her symptoms improved and she was able to tolerate a regular diet on 4/27. She was medically stable for discharge on 4/27 with instructions to follow up with her oncologist and gastroenterologist upon discharge. Ms. Ratliff is a 57y/o F with PMhx of HTN, lymphedema, and mesenteric mass concerning for neuroendocrine tumor who presented to the ED with abdominal pain. Patient has a known mass as far back as 2010, and has undergone imaging since that time. She underwent a PET/DOTATATE scan which showed activity in the mesentery liver and ileal mass thought to be the primary. MRI from earlier this year demonstrated multiple metastatic lesions. She was sent to Dr. Blackmon for further evaluation. 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 She underwent a colonoscopy with ileal biopsy which revealed lymphoid aggregates.        Patient reported symptoms started after her CT scan PO&IV contrast with N/V, abdominal pain, and headaches when pain is severe.        In the ED, patient's /94, HR 72, RR 18, O2 98%, and T 98.6. Patient received IV morphine 4mg x2 in eliz cove and 8mg x1 in NS as well as IV Zofran.     Pt was treated with analgesics and antiemetics for her pain, headache and nausea/vomiting. Oncology was consulted and recommended EUS guided biopsy for definitive diagnosis of her disease. [facility] was consulted and performed EUS biospy on 4/26. Her symptoms improved and she was able to tolerate a regular diet on 4/27. She was medically stable for discharge on 4/27 with instructions to follow up with her oncologist and gastroenterologist upon discharge.

## 2019-04-26 NOTE — PROGRESS NOTE ADULT - SUBJECTIVE AND OBJECTIVE BOX
Authored by Dr Jose Raul Paulson [phone] [facility] / [id number] [address]    Patient is a 58y old  Female who presents with a chief complaint of abdominal pain (25 Apr 2019 09:24)    SUBJECTIVE / OVERNIGHT EVENTS: Overnight pt reported Pain/HA and received Ibuprofen, tylenol, and Dilaudid     MEDICATIONS  (STANDING):  amLODIPine   Tablet 5 milliGRAM(s) Oral daily  enoxaparin Injectable 40 milliGRAM(s) SubCutaneous daily  famotidine Injectable 20 milliGRAM(s) IV Push daily  ferrous    sulfate 325 milliGRAM(s) Oral daily  sodium chloride 0.9%. 1000 milliLiter(s) (75 mL/Hr) IV Continuous <Continuous>    MEDICATIONS  (PRN):  aluminum hydroxide/magnesium hydroxide/simethicone Suspension 30 milliLiter(s) Oral every 4 hours PRN Dyspepsia  hydrALAZINE 10 milliGRAM(s) Oral every 6 hours PRN Systolic blood pressure >170  HYDROmorphone  Injectable 2 milliGRAM(s) IV Push every 4 hours PRN Pain  ibuprofen  Tablet. 600 milliGRAM(s) Oral every 8 hours PRN Moderate Pain (4 - 6)  LORazepam     Tablet 1 milliGRAM(s) Oral every 8 hours PRN Anxiety and/or Nausea/Vomiting  ondansetron Injectable 4 milliGRAM(s) IV Push every 6 hours PRN Nausea and/or Vomiting      Vital Signs Last 24 Hrs  T(C): 36.6 (26 Apr 2019 07:02), Max: 36.9 (25 Apr 2019 22:01)  T(F): 97.9 (26 Apr 2019 07:02), Max: 98.5 (25 Apr 2019 22:01)  HR: 81 (26 Apr 2019 07:02) (78 - 81)  BP: 202/104 (26 Apr 2019 07:02) (176/105 - 202/104)  BP(mean): --  RR: 18 (26 Apr 2019 07:02) (18 - 18)  SpO2: 94% (26 Apr 2019 07:02) (93% - 94%)  CAPILLARY BLOOD GLUCOSE        I&O's Summary      PHYSICAL EXAM  General: morbidly obese female, resting in bed in no NAD  Neurology: A&Ox3, non focal exam  Head:  Normocephalic, atraumatic  ENT:  Mucosa moist, no ulcerations  Neck:  Supple, no sinuses or palpable masses  Respiratory: CTA B/L anteriorly  CV: RRR, S1S2, no murmurs or rubs  Abdominal: Soft, diffusely tender to palpation, +BS, unable to palpate mass  MSK: marked lymphedema, venous stasis dermatitis bilaterally  PSYCH: normal behavior. flat affect    LABS:                        10.3   10.2  )-----------( 244      ( 25 Apr 2019 11:09 )             34.4     04-25    140  |  101  |  11  ----------------------------<  102<H>  3.7   |  27  |  0.62    Ca    9.3      25 Apr 2019 11:09  Phos  2.5     04-25  Mg     1.6     04-25      PT/INR - ( 25 Apr 2019 06:37 )   PT: 14.3 sec;   INR: 1.24 ratio         PTT - ( 25 Apr 2019 06:37 )  PTT:28.3 sec            RADIOLOGY & ADDITIONAL TESTS:    Imaging Personally Reviewed:  Consultant(s) Notes Reviewed:  GI  Care Discussed with Consultants/Other Providers: Authored by Dr Jose Raul Paulson [phone] [facility] / [id number] [address]    Patient is a 58y old  Female who presents with a chief complaint of abdominal pain (25 Apr 2019 09:24)    SUBJECTIVE / OVERNIGHT EVENTS: Overnight pt reported Pain/HA and received Ibuprofen, tylenol, and Dilaudid. BP overnight elevated to 202/104, however provider not notified. This morning BP rechecked using larger cuff and was 144/76.    This morning pt endorses back pain as well as continued nausea, and anxiety related to her procedure today.     MEDICATIONS  (STANDING):  amLODIPine   Tablet 5 milliGRAM(s) Oral daily  enoxaparin Injectable 40 milliGRAM(s) SubCutaneous daily  famotidine Injectable 20 milliGRAM(s) IV Push daily  ferrous    sulfate 325 milliGRAM(s) Oral daily  sodium chloride 0.9%. 1000 milliLiter(s) (75 mL/Hr) IV Continuous <Continuous>    MEDICATIONS  (PRN):  aluminum hydroxide/magnesium hydroxide/simethicone Suspension 30 milliLiter(s) Oral every 4 hours PRN Dyspepsia  hydrALAZINE 10 milliGRAM(s) Oral every 6 hours PRN Systolic blood pressure >170  HYDROmorphone  Injectable 2 milliGRAM(s) IV Push every 4 hours PRN Pain  ibuprofen  Tablet. 600 milliGRAM(s) Oral every 8 hours PRN Moderate Pain (4 - 6)  LORazepam     Tablet 1 milliGRAM(s) Oral every 8 hours PRN Anxiety and/or Nausea/Vomiting  ondansetron Injectable 4 milliGRAM(s) IV Push every 6 hours PRN Nausea and/or Vomiting      Vital Signs Last 24 Hrs  T(C): 36.6 (26 Apr 2019 07:02), Max: 36.9 (25 Apr 2019 22:01)  T(F): 97.9 (26 Apr 2019 07:02), Max: 98.5 (25 Apr 2019 22:01)  HR: 81 (26 Apr 2019 07:02) (78 - 81)  BP: 202/104 (26 Apr 2019 07:02) (176/105 - 202/104)  BP(mean): --  RR: 18 (26 Apr 2019 07:02) (18 - 18)  SpO2: 94% (26 Apr 2019 07:02) (93% - 94%)  CAPILLARY BLOOD GLUCOSE        I&O's Summary      PHYSICAL EXAM  General: morbidly obese female, resting in bed in no NAD  Neurology: A&Ox3, non focal exam  Head:  Normocephalic, atraumatic  ENT:  Mucosa moist, no ulcerations  Neck:  Supple, no sinuses or palpable masses  Respiratory: CTA B/L anteriorly  CV: RRR, S1S2, no murmurs or rubs  Abdominal: Soft, diffusely tender to palpation, +BS, unable to palpate mass  MSK: marked lymphedema, venous stasis dermatitis bilaterally  PSYCH: normal behavior. flat affect, anxiety    LABS:                        10.3   10.2  )-----------( 244      ( 25 Apr 2019 11:09 )             34.4     04-25    140  |  101  |  11  ----------------------------<  102<H>  3.7   |  27  |  0.62    Ca    9.3      25 Apr 2019 11:09  Phos  2.5     04-25  Mg     1.6     04-25      PT/INR - ( 25 Apr 2019 06:37 )   PT: 14.3 sec;   INR: 1.24 ratio         PTT - ( 25 Apr 2019 06:37 )  PTT:28.3 sec            RADIOLOGY & ADDITIONAL TESTS:    Imaging Personally Reviewed:  Consultant(s) Notes Reviewed:  GI  Care Discussed with Consultants/Other Providers:

## 2019-04-26 NOTE — PROGRESS NOTE ADULT - ATTENDING COMMENTS
Pt with severe pain/headache as well as hypertensive urgency - likely pain and anxiety driven.  Continue IV dilaudid, ativan, zofran for symptom control  Plan for EUS this afternoon for tissue diagnosis    Kimberly Goldstein MD  Division of Hospital Medicine  Pager: [phone]  Office: [phone]

## 2019-04-26 NOTE — DISCHARGE NOTE PROVIDER - PROVIDER TOKENS
PROVIDER:[TOKEN:[[id number]],FOLLOWUP:[2 weeks]],PROVIDER:[TOKEN:[[id number]],FOLLOWUP:[2 weeks]]

## 2019-04-26 NOTE — DISCHARGE NOTE PROVIDER - CARE PROVIDER_API CALL
Katalina Blackmon (DO)  HematologyOncology; Internal Medicine  [address] [place] [address]  Phone: [phone]  Fax: [phone]  Follow Up Time: 2 weeks    Donald Santiago (MD)  Gastroenterology; Internal Medicine  [address] [place] [id number]  Phone: [phone]  Fax: [phone]  Follow Up Time: 2 weeks

## 2019-04-27 ENCOUNTER — TRANSCRIPTION ENCOUNTER (OUTPATIENT)
Age: 59
End: 2019-04-27

## 2019-04-27 VITALS
RESPIRATION RATE: 18 BRPM | DIASTOLIC BLOOD PRESSURE: 80 MMHG | TEMPERATURE: 98 F | OXYGEN SATURATION: 98 % | HEART RATE: 77 BPM | SYSTOLIC BLOOD PRESSURE: 131 MMHG

## 2019-04-27 PROCEDURE — 80053 COMPREHEN METABOLIC PANEL: CPT

## 2019-04-27 PROCEDURE — 83036 HEMOGLOBIN GLYCOSYLATED A1C: CPT

## 2019-04-27 PROCEDURE — 88312 SPECIAL STAINS GROUP 1: CPT

## 2019-04-27 PROCEDURE — 88342 IMHCHEM/IMCYTCHM 1ST ANTB: CPT

## 2019-04-27 PROCEDURE — 99239 HOSP IP/OBS DSCHRG MGMT >30: CPT | Mod: GC

## 2019-04-27 PROCEDURE — 99285 EMERGENCY DEPT VISIT HI MDM: CPT | Mod: 25

## 2019-04-27 PROCEDURE — 88307 TISSUE EXAM BY PATHOLOGIST: CPT

## 2019-04-27 PROCEDURE — 80048 BASIC METABOLIC PNL TOTAL CA: CPT

## 2019-04-27 PROCEDURE — 84100 ASSAY OF PHOSPHORUS: CPT

## 2019-04-27 PROCEDURE — 88305 TISSUE EXAM BY PATHOLOGIST: CPT

## 2019-04-27 PROCEDURE — 82728 ASSAY OF FERRITIN: CPT

## 2019-04-27 PROCEDURE — 82803 BLOOD GASES ANY COMBINATION: CPT

## 2019-04-27 PROCEDURE — 83735 ASSAY OF MAGNESIUM: CPT

## 2019-04-27 PROCEDURE — 85610 PROTHROMBIN TIME: CPT

## 2019-04-27 PROCEDURE — 96374 THER/PROPH/DIAG INJ IV PUSH: CPT

## 2019-04-27 PROCEDURE — 93005 ELECTROCARDIOGRAM TRACING: CPT

## 2019-04-27 PROCEDURE — 96375 TX/PRO/DX INJ NEW DRUG ADDON: CPT

## 2019-04-27 PROCEDURE — 83550 IRON BINDING TEST: CPT

## 2019-04-27 PROCEDURE — 83540 ASSAY OF IRON: CPT

## 2019-04-27 PROCEDURE — 85730 THROMBOPLASTIN TIME PARTIAL: CPT

## 2019-04-27 PROCEDURE — 88173 CYTOPATH EVAL FNA REPORT: CPT

## 2019-04-27 PROCEDURE — 88313 SPECIAL STAINS GROUP 2: CPT

## 2019-04-27 PROCEDURE — 86803 HEPATITIS C AB TEST: CPT

## 2019-04-27 PROCEDURE — 85027 COMPLETE CBC AUTOMATED: CPT

## 2019-04-27 RX ORDER — HYDROMORPHONE HYDROCHLORIDE 2 MG/ML
1 INJECTION INTRAMUSCULAR; INTRAVENOUS; SUBCUTANEOUS
Qty: 20 | Refills: 0
Start: 2019-04-27 | End: 2019-05-01

## 2019-04-27 RX ORDER — AMLODIPINE BESYLATE 2.5 MG/1
0.5 TABLET ORAL
Qty: 30 | Refills: 0 | DISCHARGE
Start: 2019-04-27 | End: 2019-05-26

## 2019-04-27 RX ORDER — FERROUS SULFATE 325(65) MG
1 TABLET ORAL
Qty: 30 | Refills: 0
Start: 2019-04-27 | End: 2019-05-26

## 2019-04-27 RX ORDER — AMLODIPINE BESYLATE 2.5 MG/1
1 TABLET ORAL
Qty: 0 | Refills: 0 | COMMUNITY

## 2019-04-27 RX ORDER — IBUPROFEN 200 MG
1 TABLET ORAL
Qty: 0 | Refills: 0 | DISCHARGE
Start: 2019-04-27

## 2019-04-27 RX ORDER — AMLODIPINE BESYLATE 2.5 MG/1
1 TABLET ORAL
Qty: 30 | Refills: 0
Start: 2019-04-27 | End: 2019-05-26

## 2019-04-27 RX ADMIN — Medication 600 MILLIGRAM(S): at 13:45

## 2019-04-27 RX ADMIN — FAMOTIDINE 20 MILLIGRAM(S): 10 INJECTION INTRAVENOUS at 13:07

## 2019-04-27 RX ADMIN — Medication 600 MILLIGRAM(S): at 04:37

## 2019-04-27 RX ADMIN — Medication 325 MILLIGRAM(S): at 11:13

## 2019-04-27 RX ADMIN — Medication 600 MILLIGRAM(S): at 13:07

## 2019-04-27 RX ADMIN — ENOXAPARIN SODIUM 40 MILLIGRAM(S): 100 INJECTION SUBCUTANEOUS at 11:13

## 2019-04-27 RX ADMIN — AMLODIPINE BESYLATE 10 MILLIGRAM(S): 2.5 TABLET ORAL at 05:16

## 2019-04-27 NOTE — PROGRESS NOTE ADULT - PROBLEM SELECTOR PLAN 2
- patient with suspicion of metastatic neuroendocrine tumor- has not initiated any treatment at this time  - CT scan shows interval increase of her mesenteric mass and MR from Feb demonstrates metastatic hepatic lesions  - oncology c/s pending to evaluate for any further inpatient workup  - ileal biopsy unrevealing, may need another biopsy for tissue diagnosis (consider GI or IR evaluation for biopsy- ileal vs mesenteric masss)
- patient with suspicion of metastatic neuroendocrine tumor- has not initiated any treatment at this time  - CT scan shows interval increase of her mesenteric mass and MR from Feb demonstrates metastatic hepatic lesions  - oncology c/s: requesting EUS biopsy for definitive Dx  - [facility] c/s:  EUS Bx 4/26
- patient with suspicion of metastatic neuroendocrine tumor- has not initiated any treatment at this time  - CT scan shows interval increase of her mesenteric mass and MR from Feb demonstrates metastatic hepatic lesions  - S/p EUS 4/26  - F/u outpatient with Oncology/[facility] for biopsy results
- patient with suspicion of metastatic neuroendocrine tumor- has not initiated any treatment at this time  - CT scan shows interval increase of her mesenteric mass and MR from Feb demonstrates metastatic hepatic lesions  - oncology c/s: requesting EUS biopsy for definitive Dx  - [facility] c/s: pending scheduling for EUS Bx

## 2019-04-27 NOTE — PROGRESS NOTE ADULT - PROBLEM SELECTOR PROBLEM 4
Morbid obesity with BMI of 60.0-69.9, adult

## 2019-04-27 NOTE — PROGRESS NOTE ADULT - PROBLEM SELECTOR PROBLEM 5
Aortic stenosis, moderate

## 2019-04-27 NOTE — PROGRESS NOTE ADULT - PROBLEM SELECTOR PLAN 1
- patient presenting with nausea, vomiting and abdominal - new onset after imaging   - pt believes it may be consequence of contrast material, similar to episode in past  - c/w zofran 4mg IV q6 hours- will check EKG to evaluate for qtc  - dilaudid, ibuprofen/tylenol for pain. ativan for anxiety/nausea    - NPO for EUS
- patient presenting with nausea, vomiting and abdominal - new onset after imaging yesterday  - pt believes it may be consequence of contrast material, similar to episode in past  - c/w zofran 4mg IV q6 hours- will check EKG to evaluate for qtc  - c/w morphine 8mg IV q4 hours PRN moderate-severe pain- currently this dose is controlling pain  - advance diet as tolerated; gentle hydration for now
- patient presenting with nausea, vomiting and abdominal - new onset after imaging   - pt believes it may be consequence of contrast material, similar to episode in past  - c/w zofran 4mg IV q6 hours- will check EKG to evaluate for qtc  - dilaudid, ibuprofen/tylenol for pain. ativan for anxiety/nausea
- patient presenting with nausea, vomiting and abdominal - new onset after imaging yesterday  - pt believes it may be consequence of contrast material, similar to episode in past  - c/w zofran 4mg IV q6 hours- will check EKG to evaluate for qtc  - d/c opioids: ibuprofen/tylenol for pain. ativan for anxiety/nausea    - advance diet as tolerated; gentle hydration for now

## 2019-04-27 NOTE — PROGRESS NOTE ADULT - PROBLEM SELECTOR PLAN 7
- chronic lymphedema- currently at baseline  - no acute intervention at this time

## 2019-04-27 NOTE — DISCHARGE NOTE NURSING/CASE MANAGEMENT/SOCIAL WORK - NSDCDPATPORTLINK_GEN_ALL_CORE
You can access the [facility] Portal, offered by [facility], by registering with the following website: [email or web address]

## 2019-04-27 NOTE — PROGRESS NOTE ADULT - ASSESSMENT
58F with PMhx of HTN, lymphedema,  and mesenteric mass concerning for neuroendocrine tumor presents to the ED with abdominal pain, likely in setting of PO contrast and abdomina mass.
58F with PMhx of HTN, lymphedema,  and mesenteric mass concerning for neuroendocrine tumor presents to the ED with abdominal pain, likely in setting of PO contrast and abdomina mass. Now pending EUS with biopsy.
Impression:  1)Mesenteric mass- unclear etiology, potential NET. s/p Enteroscopy and Colonoscopy with TI intubation that was grossly unrevealing.     Recommendations:  -diet as tolerated  -monitor WBC and CMP  -plan for EGD, EUS tomorrow with biopsies if possible  Risks, benefits, alternatives described to patient including, but not limited to, bleeding, infection, organ damage, perforation, missed lesions and risks of anesthesia. Patient understands and agrees to these risks.  -please make NPO past MN tonight   -supportive care per primary team        Please call with questions  Hortencia Dutton  GI Fellow  Pager: [phone]
58F with PMhx of HTN, lymphedema,  and mesenteric mass concerning for neuroendocrine tumor presents to the ED with abdominal pain, likely in setting of PO contrast and abdomina mass, now s/p EUS with biopsy.
58F with PMhx of HTN, lymphedema,  and mesenteric mass concerning for neuroendocrine tumor presents to the ED with abdominal pain, likely in setting of PO contrast and abdomina mass.

## 2019-04-27 NOTE — PROGRESS NOTE ADULT - ATTENDING COMMENTS
58F with presumed neuroendocrine tumor s/p EUS guided biopsy (4/26) for tissue diagnosis without complication. Her abdominal/back pain now resolved and residual headache. Pt feels she would do better at home now that pain is under control. She is medically stable for discharge with close follow up with Dr. Blackmon upon discharge to follow up on the biopsy result and to discuss further treatment options. Pt in agreement with dc planning. All questions and concerns were addressed    48 minutes spent on discharge process    Kimberly Goldstein MD  Division of Hospital Medicine  Pager: [phone]  Office: [phone]

## 2019-04-27 NOTE — PROGRESS NOTE ADULT - PROBLEM SELECTOR PLAN 6
- Hgb 10.8- at baseline (microcytic)  - stable, continue to monitor
- Hgb 10.8- at baseline (microcytic)  - will check iron studies with AM labs; consider possible beta thal

## 2019-04-27 NOTE — PROGRESS NOTE ADULT - PROBLEM SELECTOR PLAN 8
- lovenox subq  - DASH/TLC  - Dispo: home today
- lovenox subq  - clear liquid diet-advance as tolerated
- lovenox subq  - NPO
- lovenox subq  - clear liquid diet-advance as tolerated

## 2019-04-27 NOTE — PROVIDER CONTACT NOTE (OTHER) - ACTION/TREATMENT ORDERED:
dr to come see pt dr to come see pt--- give pt prn motrin order and monitor DKA (diabetic ketoacidoses)

## 2019-04-27 NOTE — PROGRESS NOTE ADULT - SUBJECTIVE AND OBJECTIVE BOX
CHOLOUCHE PHILLIPS  Patient is a 58y old  Female who presents with a chief complaint of abdominal pain (26 Apr 2019 18:00)    INTERVAL HPI / SUBJECTIVE:  Underwent EUS with biopsy yesterday. No overnight events reported. States symptoms are improved today. Minimal nausea or pain.     REVIEW OF SYSTEMS:   Constitutional: no fatigue, fever, chills, generalized weakness  HEENT: no eye pain, vision changes, rhinorrhea, sore throat  Respiratory: no cough, wheezing, shortness of breath  CV: no chest pain, palpitations, dyspnea on exertion, orthopnea, PND  GI: no decreased appetite, nausea, vomiting, abdominal pain, diarrhea, constipation, melena  : no dysuria, hematuria, urinary frequency, incontinence, nocturia  MSK: no joint or muscle pain, muscle weakness, joint swelling  Skin: no rashes, bruising, hair loss, color change  Neuro: no headache, dizziness, fainting, paresthesias, memory loss  Endo: no heat or cold intolerance, increased thirst, hot flashes  Psych: no changes in mood    MEDICATIONS:   MEDICATIONS  (STANDING):  amLODIPine   Tablet 10 milliGRAM(s) Oral daily  enoxaparin Injectable 40 milliGRAM(s) SubCutaneous daily  famotidine Injectable 20 milliGRAM(s) IV Push daily  ferrous    sulfate 325 milliGRAM(s) Oral daily  LORazepam   Injectable 1 milliGRAM(s) IV Push once  sodium chloride 0.9%. 1000 milliLiter(s) (75 mL/Hr) IV Continuous <Continuous>    MEDICATIONS  (PRN):  aluminum hydroxide/magnesium hydroxide/simethicone Suspension 30 milliLiter(s) Oral every 4 hours PRN Dyspepsia  hydrALAZINE 10 milliGRAM(s) Oral every 6 hours PRN Systolic blood pressure >170  HYDROmorphone  Injectable 2 milliGRAM(s) IV Push every 4 hours PRN Pain  ibuprofen  Tablet. 600 milliGRAM(s) Oral every 8 hours PRN Moderate Pain (4 - 6)  LORazepam     Tablet 1 milliGRAM(s) Oral every 8 hours PRN Anxiety and/or Nausea/Vomiting  ondansetron Injectable 4 milliGRAM(s) IV Push every 6 hours PRN Nausea and/or Vomiting    ALLERGIES:   codeine (Unknown)  fish (Unknown)      OBJECTIVE:  Vital Signs Last 24 Hrs  T(C): 36.8 (27 Apr 2019 14:29), Max: 36.9 (26 Apr 2019 21:33)  T(F): 98.2 (27 Apr 2019 14:29), Max: 98.4 (26 Apr 2019 21:33)  HR: 77 (27 Apr 2019 14:29) (77 - 87)  BP: 131/80 (27 Apr 2019 14:29) (126/81 - 148/78)  RR: 18 (27 Apr 2019 14:29) (16 - 18)  SpO2: 98% (27 Apr 2019 14:29) (94% - 98%)    PHYSICAL EXAM:  General: NAD  HEENT: EOMI, PERRL, conjunctiva and sclera clear, normal oropharynx  Neck: Supple, no JVD, normal thyroid  Chest/Lungs: CTA B/L, no rales, rhonchi, rub or wheezing  Heart: RRR, normal S1, S2, no murmurs or gallops  Abdomen: Soft, obese, mild diffuse tenderness to palpation, normoactive bowel sounds  Extremities: Bilateral lymphedema, difficult to palpate LE pulses  Skin: Warm, well-perfused, no rashes or lesions  Neurological: A&Ox3, no focal deficits      LABS:  No AM labs    IMAGING:   No new imaging.     Labs and imaging personally reviewed and interpreted.   Consult notes reviewed.

## 2019-04-27 NOTE — PROGRESS NOTE ADULT - PROBLEM SELECTOR PLAN 3
- c/w 5mg amlodipine
- continue increased dose amlodipine 10mg daily

## 2019-04-27 NOTE — PROGRESS NOTE ADULT - PROBLEM SELECTOR PLAN 4
- patient with HTN on amlodipine  - hydralazine 10 mg PO q 6 bp >170  - A1c in 2018= 6.3  - will need counseling for nutrition and weight loss, and possible outpatient evaluation for surgical intervention

## 2019-04-29 ENCOUNTER — INBOUND DOCUMENT (OUTPATIENT)
Age: 59
End: 2019-04-29

## 2019-04-29 PROBLEM — I89.0 LYMPHEDEMA, NOT ELSEWHERE CLASSIFIED: Chronic | Status: ACTIVE | Noted: 2019-04-23

## 2019-04-29 PROBLEM — K63.9 DISEASE OF INTESTINE, UNSPECIFIED: Chronic | Status: ACTIVE | Noted: 2019-04-23

## 2019-04-29 PROBLEM — E66.01 MORBID (SEVERE) OBESITY DUE TO EXCESS CALORIES: Chronic | Status: ACTIVE | Noted: 2019-04-23

## 2019-05-01 LAB — SURGICAL PATHOLOGY STUDY: SIGNIFICANT CHANGE UP

## 2019-05-03 ENCOUNTER — APPOINTMENT (OUTPATIENT)
Dept: HEMATOLOGY ONCOLOGY | Facility: CLINIC | Age: 59
End: 2019-05-03
Payer: COMMERCIAL

## 2019-05-03 VITALS
BODY MASS INDEX: 62.43 KG/M2 | RESPIRATION RATE: 17 BRPM | TEMPERATURE: 98 F | OXYGEN SATURATION: 100 % | WEIGHT: 293 LBS | DIASTOLIC BLOOD PRESSURE: 75 MMHG | HEART RATE: 88 BPM | SYSTOLIC BLOOD PRESSURE: 170 MMHG

## 2019-05-03 VITALS — DIASTOLIC BLOOD PRESSURE: 83 MMHG | SYSTOLIC BLOOD PRESSURE: 144 MMHG

## 2019-05-03 PROCEDURE — 99213 OFFICE O/P EST LOW 20 MIN: CPT

## 2019-05-05 NOTE — CONSULT LETTER
[Dear  ___] : Dear  [unfilled], [Courtesy Letter:] : I had the pleasure of seeing your patient, [unfilled], in my office today. [Consult Closing:] : Thank you very much for allowing me to participate in the care of this patient.  If you have any questions, please do not hesitate to contact me. [Please see my note below.] : Please see my note below. [Sincerely,] : Sincerely, [FreeTextEntry3] : Katalina Blackmon D.O. \par Attending Physician \par [facility]\par  \par [facility] \par Tel: [phone]\par Fax: [phone]\par

## 2019-05-05 NOTE — HISTORY OF PRESENT ILLNESS
[Disease: _____________________] : Disease: [unfilled] [AJCC Stage: ____] : AJCC Stage: [unfilled] [de-identified] : 58-year-old F with h/o intermittent abdominal pain, n/v over the last several years (at least 9) thought to be 2/2 gallstones. She has been evaluated in the ER 1-2 per year with these attacks.  CT A/P going as far back as 2010 noted a calcified mass within the mesentery of the small bowel suspicious for carcinoid. In Nov 2017 it was found to be increasing in size from previous imaging. She saw a surgeon at the time however it is unclear if any work up was recommended. \par \par In Nov 2018 she was referred to Dr. Foy for evaluation of gallstones. He noted the mesenteric mass which prompted a PET/DOTATATE to be performed. This showed activity in the mesentery (SUV 59.9) as well as liver, ileal  mass though to be the primary, cystic adnexal mass unchanged since 11/17, possible uptake in the skin of left breast. An MRI Abdomen was performed to evaluate the liver mets and this revealed multiple metastatic lesions. Referred to med onc for further work up. \par \par 2/14/19: Enteroscopy/[place]: no mass noted, erythema in the terminal ileum, s/p biopsy with negative path \par 4/22/19: CT CAP: multiple small b/l lung nodules, slight enlargement of calcified mesenteric mass. \par 4/23-4/26/19: admitted to [facility] with N/V post scan. Underwent EUS with negative biopsy \par \par \par  [de-identified] : n/a [de-identified] : [place] presents for f/u. Was recently d/c from hospital after being admitted for n/v and back pain following ct scan. Underwent EUS with bx of liver which was negative. \par She denies any n/v, abdominal or back pain today. Frustrated that there is not a diagnosis yet.

## 2019-05-05 NOTE — PHYSICAL EXAM
[Fully active, able to carry on all pre-disease performance without restriction] : Status 0 - Fully active, able to carry on all pre-disease performance without restriction [Obese] : obese [Normal] : grossly intact [de-identified] : chronic LE edema and venous changes of skin

## 2019-05-05 NOTE — ASSESSMENT
[Palliative Care Plan] : not applicable at this time [Palliative] : Goals of care discussed with patient: Palliative

## 2019-05-21 ENCOUNTER — APPOINTMENT (OUTPATIENT)
Dept: CARDIOLOGY | Facility: CLINIC | Age: 59
End: 2019-05-21
Payer: COMMERCIAL

## 2019-05-21 PROCEDURE — 93015 CV STRESS TEST SUPVJ I&R: CPT

## 2019-05-23 ENCOUNTER — APPOINTMENT (OUTPATIENT)
Dept: CARDIOLOGY | Facility: CLINIC | Age: 59
End: 2019-05-23
Payer: COMMERCIAL

## 2019-05-23 PROCEDURE — A9500: CPT

## 2019-05-23 PROCEDURE — 78452 HT MUSCLE IMAGE SPECT MULT: CPT

## 2019-05-23 PROCEDURE — ZZZZZ: CPT

## 2019-05-29 ENCOUNTER — OUTPATIENT (OUTPATIENT)
Dept: OUTPATIENT SERVICES | Facility: HOSPITAL | Age: 59
LOS: 1 days | End: 2019-05-29
Payer: COMMERCIAL

## 2019-05-29 VITALS
HEIGHT: 61 IN | WEIGHT: 293 LBS | DIASTOLIC BLOOD PRESSURE: 86 MMHG | OXYGEN SATURATION: 98 % | RESPIRATION RATE: 14 BRPM | TEMPERATURE: 99 F | SYSTOLIC BLOOD PRESSURE: 181 MMHG | HEART RATE: 96 BPM

## 2019-05-29 DIAGNOSIS — Z98.891 HISTORY OF UTERINE SCAR FROM PREVIOUS SURGERY: Chronic | ICD-10-CM

## 2019-05-29 DIAGNOSIS — R94.39 ABNORMAL RESULT OF OTHER CARDIOVASCULAR FUNCTION STUDY: ICD-10-CM

## 2019-05-29 LAB
ALBUMIN SERPL ELPH-MCNC: 4.5 G/DL — SIGNIFICANT CHANGE UP (ref 3.3–5)
ALP SERPL-CCNC: 103 U/L — SIGNIFICANT CHANGE UP (ref 40–120)
ALT FLD-CCNC: 18 U/L — SIGNIFICANT CHANGE UP (ref 10–45)
ANION GAP SERPL CALC-SCNC: 12 MMOL/L — SIGNIFICANT CHANGE UP (ref 5–17)
AST SERPL-CCNC: 19 U/L — SIGNIFICANT CHANGE UP (ref 10–40)
BILIRUB SERPL-MCNC: 0.3 MG/DL — SIGNIFICANT CHANGE UP (ref 0.2–1.2)
BUN SERPL-MCNC: 10 MG/DL — SIGNIFICANT CHANGE UP (ref 7–23)
CALCIUM SERPL-MCNC: 10 MG/DL — SIGNIFICANT CHANGE UP (ref 8.4–10.5)
CHLORIDE SERPL-SCNC: 100 MMOL/L — SIGNIFICANT CHANGE UP (ref 96–108)
CO2 SERPL-SCNC: 26 MMOL/L — SIGNIFICANT CHANGE UP (ref 22–31)
CREAT SERPL-MCNC: 0.6 MG/DL — SIGNIFICANT CHANGE UP (ref 0.5–1.3)
GLUCOSE SERPL-MCNC: 176 MG/DL — HIGH (ref 70–99)
HCT VFR BLD CALC: 42.6 % — SIGNIFICANT CHANGE UP (ref 34.5–45)
HGB BLD-MCNC: 13.1 G/DL — SIGNIFICANT CHANGE UP (ref 11.5–15.5)
MCHC RBC-ENTMCNC: 22.2 PG — LOW (ref 27–34)
MCHC RBC-ENTMCNC: 30.6 GM/DL — LOW (ref 32–36)
MCV RBC AUTO: 72.6 FL — LOW (ref 80–100)
PLATELET # BLD AUTO: 287 K/UL — SIGNIFICANT CHANGE UP (ref 150–400)
POTASSIUM SERPL-MCNC: 4.7 MMOL/L — SIGNIFICANT CHANGE UP (ref 3.5–5.3)
POTASSIUM SERPL-SCNC: 4.7 MMOL/L — SIGNIFICANT CHANGE UP (ref 3.5–5.3)
PROT SERPL-MCNC: 8.5 G/DL — HIGH (ref 6–8.3)
RBC # BLD: 5.88 M/UL — HIGH (ref 3.8–5.2)
RBC # FLD: 24 % — HIGH (ref 10.3–14.5)
SODIUM SERPL-SCNC: 138 MMOL/L — SIGNIFICANT CHANGE UP (ref 135–145)
WBC # BLD: 5.4 K/UL — SIGNIFICANT CHANGE UP (ref 3.8–10.5)
WBC # FLD AUTO: 5.4 K/UL — SIGNIFICANT CHANGE UP (ref 3.8–10.5)

## 2019-05-29 PROCEDURE — 93454 CORONARY ARTERY ANGIO S&I: CPT

## 2019-05-29 PROCEDURE — 99152 MOD SED SAME PHYS/QHP 5/>YRS: CPT

## 2019-05-29 PROCEDURE — 99152 MOD SED SAME PHYS/QHP 5/>YRS: CPT | Mod: GC

## 2019-05-29 PROCEDURE — C1769: CPT

## 2019-05-29 PROCEDURE — 80053 COMPREHEN METABOLIC PANEL: CPT

## 2019-05-29 PROCEDURE — 85027 COMPLETE CBC AUTOMATED: CPT

## 2019-05-29 PROCEDURE — 93454 CORONARY ARTERY ANGIO S&I: CPT | Mod: 26,GC

## 2019-05-29 PROCEDURE — C1887: CPT

## 2019-05-29 PROCEDURE — 93010 ELECTROCARDIOGRAM REPORT: CPT

## 2019-05-29 PROCEDURE — 93005 ELECTROCARDIOGRAM TRACING: CPT

## 2019-05-29 PROCEDURE — C1894: CPT

## 2019-05-29 RX ORDER — AMLODIPINE BESYLATE 2.5 MG/1
10 TABLET ORAL ONCE
Refills: 0 | Status: COMPLETED | OUTPATIENT
Start: 2019-05-29 | End: 2019-05-29

## 2019-05-29 RX ORDER — SODIUM CHLORIDE 9 MG/ML
3 INJECTION INTRAMUSCULAR; INTRAVENOUS; SUBCUTANEOUS EVERY 8 HOURS
Refills: 0 | Status: DISCONTINUED | OUTPATIENT
Start: 2019-05-29 | End: 2019-06-13

## 2019-05-29 RX ADMIN — AMLODIPINE BESYLATE 10 MILLIGRAM(S): 2.5 TABLET ORAL at 18:23

## 2019-05-29 NOTE — H&P CARDIOLOGY - HISTORY OF PRESENT ILLNESS
58F with PMhx of HTN, lymphedema,  and mesenteric mass concerning for neuroendocrine tumor presents today for cardiac angiogram.    Patient states current symptoms started yesterday after her CT scan which she underwent for evaluation of her disease per her oncologist. She reports she has had a reaction to IV contrast in the past where she had nausea and vomiting. Her scan yesterday required IV and PO contrast. She states she had some nausea after the test yesterday and had to leave early from work. She  drank a lot of juice as she was advised to drink fluids after her test due to the IV contrast. She was able to eat some dinner and went to bed and in the morning was again feeling nauseous with abdominal pain. She called Dr. Blackmon who advised her to take zofran, but patient continued to have marked pain and nausea and went to [place] ED. She was transferred from there to [facility] after discuss with Dr. Blackmon's office. 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 Patient seen and evaluated by Dr. Murray, recommend NST, which reveals large to moderate to severe defects in anterior and anterospetal, anterolateral walls that are predominantly reversible consistent with ischemia. Patient denies SOB, PND orthopnea, chest pain or palpitations

## 2019-05-29 NOTE — H&P CARDIOLOGY - PMH
Cancer  abdominal  Gallstones    Lymphedema    Mesenteric mass    Morbid obesity Anemia  Fe def  Cancer  abdominal  Gallstones    Lymphedema    Mesenteric mass    Morbid obesity

## 2019-06-05 ENCOUNTER — APPOINTMENT (OUTPATIENT)
Dept: GASTROENTEROLOGY | Facility: HOSPITAL | Age: 59
End: 2019-06-05

## 2019-06-05 ENCOUNTER — RESULT REVIEW (OUTPATIENT)
Age: 59
End: 2019-06-05

## 2019-06-05 ENCOUNTER — OUTPATIENT (OUTPATIENT)
Dept: OUTPATIENT SERVICES | Facility: HOSPITAL | Age: 59
LOS: 1 days | End: 2019-06-05
Payer: COMMERCIAL

## 2019-06-05 DIAGNOSIS — K63.9 DISEASE OF INTESTINE, UNSPECIFIED: ICD-10-CM

## 2019-06-05 DIAGNOSIS — Z98.891 HISTORY OF UTERINE SCAR FROM PREVIOUS SURGERY: Chronic | ICD-10-CM

## 2019-06-05 PROCEDURE — 88341 IMHCHEM/IMCYTCHM EA ADD ANTB: CPT | Mod: 26,59

## 2019-06-05 PROCEDURE — 88313 SPECIAL STAINS GROUP 2: CPT | Mod: 26

## 2019-06-05 PROCEDURE — 88341 IMHCHEM/IMCYTCHM EA ADD ANTB: CPT

## 2019-06-05 PROCEDURE — 88360 TUMOR IMMUNOHISTOCHEM/MANUAL: CPT

## 2019-06-05 PROCEDURE — 88360 TUMOR IMMUNOHISTOCHEM/MANUAL: CPT | Mod: 26

## 2019-06-05 PROCEDURE — 43242 EGD US FINE NEEDLE BX/ASPIR: CPT

## 2019-06-05 PROCEDURE — 43238 EGD US FINE NEEDLE BX/ASPIR: CPT | Mod: GC,52

## 2019-06-05 PROCEDURE — 88307 TISSUE EXAM BY PATHOLOGIST: CPT

## 2019-06-05 PROCEDURE — 88342 IMHCHEM/IMCYTCHM 1ST ANTB: CPT | Mod: 26,59

## 2019-06-05 PROCEDURE — 88313 SPECIAL STAINS GROUP 2: CPT

## 2019-06-05 PROCEDURE — 88307 TISSUE EXAM BY PATHOLOGIST: CPT | Mod: 26

## 2019-06-10 ENCOUNTER — MEDICATION RENEWAL (OUTPATIENT)
Age: 59
End: 2019-06-10

## 2019-06-11 LAB — SURGICAL PATHOLOGY STUDY: SIGNIFICANT CHANGE UP

## 2019-06-13 ENCOUNTER — OUTPATIENT (OUTPATIENT)
Dept: OUTPATIENT SERVICES | Facility: HOSPITAL | Age: 59
LOS: 1 days | Discharge: HOME | End: 2019-06-13

## 2019-06-13 DIAGNOSIS — Z98.891 HISTORY OF UTERINE SCAR FROM PREVIOUS SURGERY: Chronic | ICD-10-CM

## 2019-06-13 DIAGNOSIS — C7A.8 OTHER MALIGNANT NEUROENDOCRINE TUMORS: ICD-10-CM

## 2019-06-14 ENCOUNTER — APPOINTMENT (OUTPATIENT)
Dept: CARDIOLOGY | Facility: CLINIC | Age: 59
End: 2019-06-14
Payer: COMMERCIAL

## 2019-06-14 ENCOUNTER — NON-APPOINTMENT (OUTPATIENT)
Age: 59
End: 2019-06-14

## 2019-06-14 VITALS
WEIGHT: 293 LBS | RESPIRATION RATE: 18 BRPM | OXYGEN SATURATION: 98 % | BODY MASS INDEX: 55.32 KG/M2 | HEIGHT: 61 IN | SYSTOLIC BLOOD PRESSURE: 172 MMHG | DIASTOLIC BLOOD PRESSURE: 78 MMHG | HEART RATE: 79 BPM

## 2019-06-14 PROCEDURE — 99214 OFFICE O/P EST MOD 30 MIN: CPT

## 2019-06-14 PROCEDURE — 93000 ELECTROCARDIOGRAM COMPLETE: CPT

## 2019-06-17 ENCOUNTER — RESULT REVIEW (OUTPATIENT)
Age: 59
End: 2019-06-17

## 2019-06-17 ENCOUNTER — APPOINTMENT (OUTPATIENT)
Dept: HEMATOLOGY ONCOLOGY | Facility: CLINIC | Age: 59
End: 2019-06-17
Payer: COMMERCIAL

## 2019-06-17 VITALS
TEMPERATURE: 98.5 F | OXYGEN SATURATION: 98 % | RESPIRATION RATE: 18 BRPM | WEIGHT: 293 LBS | SYSTOLIC BLOOD PRESSURE: 148 MMHG | BODY MASS INDEX: 63.32 KG/M2 | DIASTOLIC BLOOD PRESSURE: 80 MMHG | HEART RATE: 101 BPM

## 2019-06-17 LAB
BASOPHILS # BLD AUTO: 0.1 K/UL — SIGNIFICANT CHANGE UP (ref 0–0.2)
BASOPHILS NFR BLD AUTO: 1.3 % — SIGNIFICANT CHANGE UP (ref 0–2)
EOSINOPHIL # BLD AUTO: 0.4 K/UL — SIGNIFICANT CHANGE UP (ref 0–0.5)
EOSINOPHIL NFR BLD AUTO: 6.2 % — HIGH (ref 0–6)
HCT VFR BLD CALC: 44 % — SIGNIFICANT CHANGE UP (ref 34.5–45)
HGB BLD-MCNC: 13.5 G/DL — SIGNIFICANT CHANGE UP (ref 11.5–15.5)
LYMPHOCYTES # BLD AUTO: 1.6 K/UL — SIGNIFICANT CHANGE UP (ref 1–3.3)
LYMPHOCYTES # BLD AUTO: 26.8 % — SIGNIFICANT CHANGE UP (ref 13–44)
MCHC RBC-ENTMCNC: 23.1 PG — LOW (ref 27–34)
MCHC RBC-ENTMCNC: 30.8 G/DL — LOW (ref 32–36)
MCV RBC AUTO: 74.9 FL — LOW (ref 80–100)
MONOCYTES # BLD AUTO: 0.5 K/UL — SIGNIFICANT CHANGE UP (ref 0–0.9)
MONOCYTES NFR BLD AUTO: 7.9 % — SIGNIFICANT CHANGE UP (ref 2–14)
NEUTROPHILS # BLD AUTO: 3.5 K/UL — SIGNIFICANT CHANGE UP (ref 1.8–7.4)
NEUTROPHILS NFR BLD AUTO: 57.7 % — SIGNIFICANT CHANGE UP (ref 43–77)
PLATELET # BLD AUTO: 264 K/UL — SIGNIFICANT CHANGE UP (ref 150–400)
RBC # BLD: 5.87 M/UL — HIGH (ref 3.8–5.2)
RBC # FLD: 24.8 % — HIGH (ref 10.3–14.5)
WBC # BLD: 6 K/UL — SIGNIFICANT CHANGE UP (ref 3.8–10.5)
WBC # FLD AUTO: 6 K/UL — SIGNIFICANT CHANGE UP (ref 3.8–10.5)

## 2019-06-17 PROCEDURE — 99213 OFFICE O/P EST LOW 20 MIN: CPT

## 2019-06-21 ENCOUNTER — APPOINTMENT (OUTPATIENT)
Age: 59
End: 2019-06-21

## 2019-06-21 LAB
ALBUMIN SERPL ELPH-MCNC: 4.4 G/DL
ALP BLD-CCNC: 103 U/L
ALT SERPL-CCNC: 16 U/L
ANION GAP SERPL CALC-SCNC: 13 MMOL/L
AST SERPL-CCNC: 17 U/L
BILIRUB SERPL-MCNC: 0.2 MG/DL
BUN SERPL-MCNC: 10 MG/DL
CALCIUM SERPL-MCNC: 9.5 MG/DL
CGA SERPL-MCNC: 64 NG/ML
CHLORIDE SERPL-SCNC: 104 MMOL/L
CO2 SERPL-SCNC: 26 MMOL/L
CREAT SERPL-MCNC: 0.72 MG/DL
GLUCOSE SERPL-MCNC: 105 MG/DL
POTASSIUM SERPL-SCNC: 4.6 MMOL/L
PROT SERPL-MCNC: 7.8 G/DL
SODIUM SERPL-SCNC: 143 MMOL/L

## 2019-06-22 ENCOUNTER — MOBILE ON CALL (OUTPATIENT)
Age: 59
End: 2019-06-22

## 2019-06-25 ENCOUNTER — MEDICATION RENEWAL (OUTPATIENT)
Age: 59
End: 2019-06-25

## 2019-06-28 ENCOUNTER — MEDICATION RENEWAL (OUTPATIENT)
Age: 59
End: 2019-06-28

## 2019-06-30 NOTE — HISTORY OF PRESENT ILLNESS
[Disease: _____________________] : Disease: [unfilled] [AJCC Stage: ____] : AJCC Stage: [unfilled] [de-identified] : 58-year-old F with h/o intermittent abdominal pain, n/v over the last several years (at least 9) thought to be 2/2 gallstones. She has been evaluated in the ER 1-2 per year with these attacks.  CT A/P going as far back as 2010 noted a calcified mass within the mesentery of the small bowel suspicious for carcinoid. In Nov 2017 it was found to be increasing in size from previous imaging. She saw a surgeon at the time however it is unclear if any work up was recommended. \par \par In Nov 2018 she was referred to Dr. Foy for evaluation of gallstones. He noted the mesenteric mass which prompted a PET/DOTATATE to be performed. This showed activity in the mesentery (SUV 59.9) as well as liver, ileal  mass though to be the primary, cystic adnexal mass unchanged since 11/17, possible uptake in the skin of left breast. An MRI Abdomen was performed to evaluate the liver mets and this revealed multiple metastatic lesions. Referred to med onc for further work up. \par \par 2/14/19: Enteroscopy/[place]: no mass noted, erythema in the terminal ileum, s/p biopsy with negative path \par 4/22/19: CT CAP: multiple small b/l lung nodules, slight enlargement of calcified mesenteric mass. \par 4/23-4/26/19: admitted to [facility] with N/V post scan. 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 Pt overall feels well. Denies any c/o. \par Had cardiac angiogram, no blockage seen. \par Denies any pain, diarrhea, n/v.

## 2019-06-30 NOTE — PHYSICAL EXAM
[Fully active, able to carry on all pre-disease performance without restriction] : Status 0 - Fully active, able to carry on all pre-disease performance without restriction [Obese] : obese [Normal] : affect appropriate [de-identified] : chronic LE edema and venous changes of skin

## 2019-07-09 ENCOUNTER — RX RENEWAL (OUTPATIENT)
Age: 59
End: 2019-07-09

## 2019-07-15 ENCOUNTER — APPOINTMENT (OUTPATIENT)
Dept: INTERNAL MEDICINE | Facility: CLINIC | Age: 59
End: 2019-07-15
Payer: COMMERCIAL

## 2019-07-15 VITALS
DIASTOLIC BLOOD PRESSURE: 80 MMHG | SYSTOLIC BLOOD PRESSURE: 128 MMHG | BODY MASS INDEX: 55.32 KG/M2 | RESPIRATION RATE: 16 BRPM | HEIGHT: 61 IN | WEIGHT: 293 LBS | HEART RATE: 78 BPM

## 2019-07-15 PROCEDURE — 99215 OFFICE O/P EST HI 40 MIN: CPT

## 2019-07-15 RX ORDER — AMLODIPINE BESYLATE 5 MG/1
5 TABLET ORAL DAILY
Qty: 30 | Refills: 1 | Status: DISCONTINUED | COMMUNITY
Start: 2019-03-11 | End: 2019-07-15

## 2019-07-15 RX ORDER — METOPROLOL SUCCINATE 100 MG/1
100 TABLET, EXTENDED RELEASE ORAL
Refills: 0 | Status: DISCONTINUED | COMMUNITY
End: 2019-07-15

## 2019-07-15 RX ORDER — DILTIAZEM HYDROCHLORIDE 240 MG/1
240 CAPSULE, EXTENDED RELEASE ORAL
Refills: 0 | Status: DISCONTINUED | COMMUNITY
End: 2019-07-15

## 2019-07-16 ENCOUNTER — OUTPATIENT (OUTPATIENT)
Dept: OUTPATIENT SERVICES | Facility: HOSPITAL | Age: 59
LOS: 1 days | Discharge: HOME | End: 2019-07-16

## 2019-07-16 ENCOUNTER — OUTPATIENT (OUTPATIENT)
Dept: OUTPATIENT SERVICES | Facility: HOSPITAL | Age: 59
LOS: 1 days | End: 2019-07-16
Payer: COMMERCIAL

## 2019-07-16 ENCOUNTER — APPOINTMENT (OUTPATIENT)
Dept: MRI IMAGING | Facility: HOSPITAL | Age: 59
End: 2019-07-16
Payer: COMMERCIAL

## 2019-07-16 ENCOUNTER — APPOINTMENT (OUTPATIENT)
Dept: CT IMAGING | Facility: HOSPITAL | Age: 59
End: 2019-07-16
Payer: COMMERCIAL

## 2019-07-16 DIAGNOSIS — C7A.8 OTHER MALIGNANT NEUROENDOCRINE TUMORS: ICD-10-CM

## 2019-07-16 DIAGNOSIS — Z00.8 ENCOUNTER FOR OTHER GENERAL EXAMINATION: ICD-10-CM

## 2019-07-16 DIAGNOSIS — Z98.891 HISTORY OF UTERINE SCAR FROM PREVIOUS SURGERY: Chronic | ICD-10-CM

## 2019-07-16 PROCEDURE — 71250 CT THORAX DX C-: CPT | Mod: 26

## 2019-07-16 PROCEDURE — 71250 CT THORAX DX C-: CPT

## 2019-07-16 NOTE — REVIEW OF SYSTEMS

## 2019-07-16 NOTE — HISTORY OF PRESENT ILLNESS
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 She denies temperature chills sweats or myalgias she's had no headaches sinus congestion sore throat cough wheezing pleurisy chest pain shortness of breath exertional dyspnea lightheadedness palpitations dizziness vertigo or syncope . She has mild abdominal pain slight nausea at times without vomiting diarrhea constipation bright red blood per rectum or black stools her appetite has been good her weight has been stable she denies significant musculoskeletal complaints except at night once to urinate but denies dysuria or gross hematuria she has had no recent skin rashes

## 2019-07-16 NOTE — ASSESSMENT
[FreeTextEntry1] : Physical exam shows an obese female in no acute distress pressure 128/80 height 5 feet 1 inch weight 328 pounds heart rate is 74 respiratory rate of 16 HEENT was unremarkable chest was clear cardiovascular exam shows a normal S1 and S2 abdomen was soft and nontender extremities show trace bilateral edema neurologic exam was nonfocal she is scheduled for imaging studies to reevaluate her tomorrow slip was given for complete blood tests including CBC full chemistries lipid profile thyroid profile hemoglobin A1c CRP urinalysis measles mumps and rubella antibodies and vitamin D 3 and B12.. of note a year ago she underwent a cardiac catheterization with clean coronary arteries she is up-to-date with her ophthalmologist will attempt to see a dermatologist this year.Blood pressure well controlled at present visit

## 2019-07-16 NOTE — PHYSICAL EXAM
[No Acute Distress] : no acute distress [Well Nourished] : well nourished [Well Developed] : well developed [Well-Appearing] : well-appearing [Normal Voice/Communication] : normal voice/communication [Normal Sclera/Conjunctiva] : normal sclera/conjunctiva [PERRL] : pupils equal round and reactive to light [EOMI] : extraocular movements intact [Normal Outer Ear/Nose] : the outer ears and nose were normal in appearance [Normal Oropharynx] : the oropharynx was normal [Normal TMs] : both tympanic membranes were normal [Normal Nasal Mucosa] : the nasal mucosa was normal [No JVD] : no jugular venous distention [No Lymphadenopathy] : no lymphadenopathy [Supple] : supple [Thyroid Normal, No Nodules] : the thyroid was normal and there were no nodules present [No Respiratory Distress] : no respiratory distress  [No Accessory Muscle Use] : no accessory muscle use [Clear to Auscultation] : lungs were clear to auscultation bilaterally [Normal Percussion] : the chest was normal to percussion [Normal Rate] : normal rate  [Regular Rhythm] : with a regular rhythm [Normal S1, S2] : normal S1 and S2 [No Murmur] : no murmur heard [No Carotid Bruits] : no carotid bruits [No Abdominal Bruit] : a ~M bruit was not heard ~T in the abdomen [No Varicosities] : no varicosities [Pedal Pulses Present] : the pedal pulses are present [No Edema] : there was no peripheral edema [No Palpable Aorta] : no palpable aorta [No Extremity Clubbing/Cyanosis] : no extremity clubbing/cyanosis [Declined Breast Exam] : declined breast exam  [Soft] : abdomen soft [Non Tender] : non-tender [Non-distended] : non-distended [No Masses] : no abdominal mass palpated [No HSM] : no HSM [Normal Bowel Sounds] : normal bowel sounds [No Hernias] : no hernias [Declined Rectal Exam] : declined rectal exam [Normal Supraclavicular Nodes] : no supraclavicular lymphadenopathy [Normal Axillary Nodes] : no axillary lymphadenopathy [Normal Posterior Cervical Nodes] : no posterior cervical lymphadenopathy [Normal Anterior Cervical Nodes] : no anterior cervical lymphadenopathy [Normal Inguinal Nodes] : no inguinal lymphadenopathy [Normal Femoral Nodes] : no femoral lymphadenopathy [No CVA Tenderness] : no CVA  tenderness [No Spinal Tenderness] : no spinal tenderness [No Joint Swelling] : no joint swelling [Grossly Normal Strength/Tone] : grossly normal strength/tone [No Rash] : no rash [No Skin Lesions] : no skin lesions [Coordination Grossly Intact] : coordination grossly intact [No Focal Deficits] : no focal deficits [Normal Gait] : normal gait [Deep Tendon Reflexes (DTR)] : deep tendon reflexes were 2+ and symmetric [Speech Grossly Normal] : speech grossly normal [Memory Grossly Normal] : memory grossly normal [Normal Affect] : the affect was normal [Alert and Oriented x3] : oriented to person, place, and time [Normal Mood] : the mood was normal [Normal Insight/Judgement] : insight and judgment were intact [Acne] : no acne

## 2019-07-19 ENCOUNTER — APPOINTMENT (OUTPATIENT)
Dept: HEMATOLOGY ONCOLOGY | Facility: CLINIC | Age: 59
End: 2019-07-19
Payer: COMMERCIAL

## 2019-07-19 ENCOUNTER — APPOINTMENT (OUTPATIENT)
Age: 59
End: 2019-07-19

## 2019-07-19 VITALS
DIASTOLIC BLOOD PRESSURE: 81 MMHG | TEMPERATURE: 99.4 F | OXYGEN SATURATION: 98 % | SYSTOLIC BLOOD PRESSURE: 142 MMHG | BODY MASS INDEX: 64.57 KG/M2 | HEART RATE: 86 BPM | WEIGHT: 293 LBS | RESPIRATION RATE: 15 BRPM

## 2019-07-19 DIAGNOSIS — R92.8 OTHER ABNORMAL AND INCONCLUSIVE FINDINGS ON DIAGNOSTIC IMAGING OF BREAST: ICD-10-CM

## 2019-07-19 PROCEDURE — 99214 OFFICE O/P EST MOD 30 MIN: CPT

## 2019-07-23 ENCOUNTER — FORM ENCOUNTER (OUTPATIENT)
Age: 59
End: 2019-07-23

## 2019-07-24 ENCOUNTER — APPOINTMENT (OUTPATIENT)
Dept: MRI IMAGING | Facility: CLINIC | Age: 59
End: 2019-07-24
Payer: COMMERCIAL

## 2019-07-24 ENCOUNTER — OUTPATIENT (OUTPATIENT)
Dept: OUTPATIENT SERVICES | Facility: HOSPITAL | Age: 59
LOS: 1 days | End: 2019-07-24
Payer: COMMERCIAL

## 2019-07-24 DIAGNOSIS — Z98.891 HISTORY OF UTERINE SCAR FROM PREVIOUS SURGERY: Chronic | ICD-10-CM

## 2019-07-24 DIAGNOSIS — Z00.8 ENCOUNTER FOR OTHER GENERAL EXAMINATION: ICD-10-CM

## 2019-07-24 PROCEDURE — 74183 MRI ABD W/O CNTR FLWD CNTR: CPT

## 2019-07-24 PROCEDURE — 74183 MRI ABD W/O CNTR FLWD CNTR: CPT | Mod: 26

## 2019-07-24 PROCEDURE — A9585: CPT

## 2019-07-24 PROCEDURE — 72197 MRI PELVIS W/O & W/DYE: CPT | Mod: 26

## 2019-07-24 PROCEDURE — 72197 MRI PELVIS W/O & W/DYE: CPT

## 2019-08-02 ENCOUNTER — LABORATORY RESULT (OUTPATIENT)
Age: 59
End: 2019-08-02

## 2019-08-03 PROBLEM — R92.8 ABNORMAL MAMMOGRAM: Status: ACTIVE | Noted: 2019-07-19

## 2019-08-03 NOTE — HISTORY OF PRESENT ILLNESS
[Disease: _____________________] : Disease: [unfilled] [AJCC Stage: ____] : AJCC Stage: [unfilled] [Therapy: ___] : Therapy: [unfilled] [de-identified] : 58-year-old F with h/o intermittent abdominal pain, n/v over the last several years (at least 9) thought to be 2/2 gallstones. She has been evaluated in the ER 1-2 per year with these attacks.  CT A/P going as far back as 2010 noted a calcified mass within the mesentery of the small bowel suspicious for carcinoid. In Nov 2017 it was found to be increasing in size from previous imaging. She saw a surgeon at the time however it is unclear if any work up was recommended. \par \par In Nov 2018 she was referred to Dr. Foy for evaluation of gallstones. He noted the mesenteric mass which prompted a PET/DOTATATE to be performed. This showed activity in the mesentery (SUV 59.9) as well as liver, ileal  mass though to be the primary, cystic adnexal mass unchanged since 11/17, possible uptake in the skin of left breast. An MRI Abdomen was performed to evaluate the liver mets and this revealed multiple metastatic lesions. Referred to med onc for further work up. \par \par 2/14/19: Enteroscopy/[place]: no mass noted, erythema in the terminal ileum, s/p biopsy with negative path \par 4/22/19: CT CAP: multiple small b/l lung nodules, slight enlargement of calcified mesenteric mass. \par 4/23-4/26/19: admitted to [facility] with N/V post scan. 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 This now has resolved. Gained 15 lbs since last visit. \par c/o post prandial RUQ/epigastric pain. Is concerned that she may have inflamed GB

## 2019-08-03 NOTE — CONSULT LETTER
[Courtesy Letter:] : I had the pleasure of seeing your patient, [unfilled], in my office today. [Dear  ___] : Dear  [unfilled], [Please see my note below.] : Please see my note below. [Consult Closing:] : Thank you very much for allowing me to participate in the care of this patient.  If you have any questions, please do not hesitate to contact me. [Sincerely,] : Sincerely, [FreeTextEntry3] : Katalina Blackmon D.O. \par Attending Physician \par [facility]\par  \par [facility] \par Tel: [phone]\par Fax: [phone]\par

## 2019-08-03 NOTE — PHYSICAL EXAM
[Fully active, able to carry on all pre-disease performance without restriction] : Status 0 - Fully active, able to carry on all pre-disease performance without restriction [Obese] : obese [Normal] : affect appropriate [de-identified] : chronic LE edema and venous changes of skin

## 2019-08-03 NOTE — REVIEW OF SYSTEMS
[Recent Change In Weight] : ~T recent weight change [Abdominal Pain] : abdominal pain [Negative] : Allergic/Immunologic

## 2019-08-07 ENCOUNTER — RX RENEWAL (OUTPATIENT)
Age: 59
End: 2019-08-07

## 2019-08-12 NOTE — PROGRESS NOTE ADULT - PROBLEM/PLAN-2
DISPLAY PLAN FREE TEXT
DISPLAY PLAN FREE TEXT
(0) no hurt
DISPLAY PLAN FREE TEXT
DISPLAY PLAN FREE TEXT

## 2019-08-14 LAB
25(OH)D3 SERPL-MCNC: 10.8 NG/ML
ALBUMIN SERPL ELPH-MCNC: 4.1 G/DL
ALP BLD-CCNC: 95 U/L
ALT SERPL-CCNC: 15 U/L
ANION GAP SERPL CALC-SCNC: 17 MMOL/L
AST SERPL-CCNC: 13 U/L
BASOPHILS # BLD AUTO: 0.1 K/UL
BASOPHILS NFR BLD AUTO: 1.8 %
BILIRUB SERPL-MCNC: 0.3 MG/DL
BUN SERPL-MCNC: 12 MG/DL
CALCIUM SERPL-MCNC: 9.2 MG/DL
CHLORIDE SERPL-SCNC: 102 MMOL/L
CHOLEST SERPL-MCNC: 162 MG/DL
CHOLEST/HDLC SERPL: 3.1 RATIO
CO2 SERPL-SCNC: 26 MMOL/L
CREAT SERPL-MCNC: 0.73 MG/DL
CRP SERPL HS-MCNC: 5.86 MG/L
EOSINOPHIL # BLD AUTO: 0.41 K/UL
EOSINOPHIL NFR BLD AUTO: 7.3 %
ESTIMATED AVERAGE GLUCOSE: 140 MG/DL
GLUCOSE BS SERPL-MCNC: 138 MG/DL
GLUCOSE SERPL-MCNC: 139 MG/DL
HBA1C MFR BLD HPLC: 6.5 %
HCT VFR BLD CALC: 46.7 %
HDLC SERPL-MCNC: 53 MG/DL
HGB BLD-MCNC: 13.8 G/DL
LDLC SERPL CALC-MCNC: 91 MG/DL
LYMPHOCYTES # BLD AUTO: 1.38 K/UL
LYMPHOCYTES NFR BLD AUTO: 24.6 %
MAN DIFF?: NORMAL
MCHC RBC-ENTMCNC: 25.2 PG
MCHC RBC-ENTMCNC: 29.6 GM/DL
MCV RBC AUTO: 85.2 FL
MEV IGG FLD QL IA: >300 AU/ML
MEV IGG+IGM SER-IMP: POSITIVE
MONOCYTES # BLD AUTO: 0.2 K/UL
MONOCYTES NFR BLD AUTO: 3.6 %
MUV AB SER-ACNC: POSITIVE
MUV IGG SER QL IA: >300 AU/ML
NEUTROPHILS # BLD AUTO: 3.52 K/UL
NEUTROPHILS NFR BLD AUTO: 62.7 %
PLATELET # BLD AUTO: 256 K/UL
POTASSIUM SERPL-SCNC: 6.1 MMOL/L
PROT SERPL-MCNC: 7.4 G/DL
RBC # BLD: 5.48 M/UL
RBC # FLD: 20.4 %
RUBV IGG FLD-ACNC: 23.1 INDEX
RUBV IGG SER-IMP: POSITIVE
SODIUM SERPL-SCNC: 145 MMOL/L
T4 FREE SERPL-MCNC: 0.9 NG/DL
TRIGL SERPL-MCNC: 91 MG/DL
TSH SERPL-ACNC: 1.76 UIU/ML
VIT B12 SERPL-MCNC: 277 PG/ML
WBC # FLD AUTO: 5.62 K/UL

## 2019-08-15 ENCOUNTER — OUTPATIENT (OUTPATIENT)
Dept: OUTPATIENT SERVICES | Facility: HOSPITAL | Age: 59
LOS: 1 days | Discharge: HOME | End: 2019-08-15

## 2019-08-15 DIAGNOSIS — Z98.891 HISTORY OF UTERINE SCAR FROM PREVIOUS SURGERY: Chronic | ICD-10-CM

## 2019-08-15 DIAGNOSIS — C7A.8 OTHER MALIGNANT NEUROENDOCRINE TUMORS: ICD-10-CM

## 2019-08-19 ENCOUNTER — APPOINTMENT (OUTPATIENT)
Age: 59
End: 2019-08-19
Payer: COMMERCIAL

## 2019-08-19 ENCOUNTER — APPOINTMENT (OUTPATIENT)
Age: 59
End: 2019-08-19

## 2019-08-19 VITALS
TEMPERATURE: 97.8 F | RESPIRATION RATE: 16 BRPM | SYSTOLIC BLOOD PRESSURE: 148 MMHG | BODY MASS INDEX: 64.57 KG/M2 | DIASTOLIC BLOOD PRESSURE: 82 MMHG | HEART RATE: 74 BPM | OXYGEN SATURATION: 97 % | WEIGHT: 293 LBS

## 2019-08-19 DIAGNOSIS — E87.5 HYPERKALEMIA: ICD-10-CM

## 2019-08-19 DIAGNOSIS — R21 RASH AND OTHER NONSPECIFIC SKIN ERUPTION: ICD-10-CM

## 2019-08-19 LAB
ALBUMIN SERPL ELPH-MCNC: 4 G/DL
ALP BLD-CCNC: 95 U/L
ALT SERPL-CCNC: 14 U/L
ANION GAP SERPL CALC-SCNC: 11 MMOL/L
AST SERPL-CCNC: 18 U/L
BILIRUB SERPL-MCNC: 0.3 MG/DL
BUN SERPL-MCNC: 14 MG/DL
CALCIUM SERPL-MCNC: 9.2 MG/DL
CHLORIDE SERPL-SCNC: 103 MMOL/L
CO2 SERPL-SCNC: 25 MMOL/L
CREAT SERPL-MCNC: 0.69 MG/DL
GLUCOSE SERPL-MCNC: 162 MG/DL
POTASSIUM SERPL-SCNC: 4.7 MMOL/L
PROT SERPL-MCNC: 7.3 G/DL
SODIUM SERPL-SCNC: 139 MMOL/L

## 2019-08-19 PROCEDURE — 99214 OFFICE O/P EST MOD 30 MIN: CPT

## 2019-08-30 ENCOUNTER — RX CHANGE (OUTPATIENT)
Age: 59
End: 2019-08-30

## 2019-09-02 PROBLEM — E87.5 HYPERKALEMIA: Status: ACTIVE | Noted: 2019-08-14

## 2019-09-02 PROBLEM — R21 RASH: Status: ACTIVE | Noted: 2019-08-14

## 2019-09-02 NOTE — CONSULT LETTER
[Dear  ___] : Dear  [unfilled], [Please see my note below.] : Please see my note below. [Courtesy Letter:] : I had the pleasure of seeing your patient, [unfilled], in my office today. [Consult Closing:] : Thank you very much for allowing me to participate in the care of this patient.  If you have any questions, please do not hesitate to contact me. [Sincerely,] : Sincerely, [DrKelle  ___] : Dr. DU [FreeTextEntry3] : Katalina Blackmon D.O. \par Attending Physician \par [facility]\par  \par [facility] \par Tel: [phone]\par Fax: [phone]\par

## 2019-09-02 NOTE — HISTORY OF PRESENT ILLNESS
[Disease: _____________________] : Disease: [unfilled] [AJCC Stage: ____] : AJCC Stage: [unfilled] [Therapy: ___] : Therapy: [unfilled] [de-identified] : 58-year-old F with h/o intermittent abdominal pain, n/v over the last several years (at least 9) thought to be 2/2 gallstones. She has been evaluated in the ER 1-2 per year with these attacks.  CT A/P going as far back as 2010 noted a calcified mass within the mesentery of the small bowel suspicious for carcinoid. In Nov 2017 it was found to be increasing in size from previous imaging. She saw a surgeon at the time however it is unclear if any work up was recommended. \par \par In Nov 2018 she was referred to Dr. Foy for evaluation of gallstones. He noted the mesenteric mass which prompted a PET/DOTATATE to be performed. This showed activity in the mesentery (SUV 59.9) as well as liver, ileal  mass though to be the primary, cystic adnexal mass unchanged since 11/17, possible uptake in the skin of left breast. An MRI Abdomen was performed to evaluate the liver mets and this revealed multiple metastatic lesions. Referred to med onc for further work up. \par \par 2/14/19: Enteroscopy/[place]: no mass noted, erythema in the terminal ileum, s/p biopsy with negative path \par 4/22/19: CT CAP: multiple small b/l lung nodules, slight enlargement of calcified mesenteric mass. \par 4/23-4/26/19: admitted to [facility] with N/V post scan. 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 Weight is stable. Denies any n/v. No significant diarrhea after most recent injection. \par Energy level is stable. No other new c/o. we reviewed results of most recent MRI Ab/pelvis\par \par Dr. Eliel Zafar (vascular surgeon)

## 2019-09-02 NOTE — PHYSICAL EXAM
[Fully active, able to carry on all pre-disease performance without restriction] : Status 0 - Fully active, able to carry on all pre-disease performance without restriction [Obese] : obese [Normal] : grossly intact [de-identified] : chronic LE edema and venous changes of skin

## 2019-09-12 ENCOUNTER — APPOINTMENT (OUTPATIENT)
Dept: ORTHOPEDIC SURGERY | Facility: CLINIC | Age: 59
End: 2019-09-12

## 2019-09-13 ENCOUNTER — FORM ENCOUNTER (OUTPATIENT)
Age: 59
End: 2019-09-13

## 2019-09-14 ENCOUNTER — OUTPATIENT (OUTPATIENT)
Dept: OUTPATIENT SERVICES | Facility: HOSPITAL | Age: 59
LOS: 1 days | End: 2019-09-14
Payer: COMMERCIAL

## 2019-09-14 ENCOUNTER — APPOINTMENT (OUTPATIENT)
Dept: MAMMOGRAPHY | Facility: CLINIC | Age: 59
End: 2019-09-14
Payer: COMMERCIAL

## 2019-09-14 DIAGNOSIS — Z00.8 ENCOUNTER FOR OTHER GENERAL EXAMINATION: ICD-10-CM

## 2019-09-14 DIAGNOSIS — Z98.891 HISTORY OF UTERINE SCAR FROM PREVIOUS SURGERY: Chronic | ICD-10-CM

## 2019-09-14 PROCEDURE — G0279: CPT

## 2019-09-14 PROCEDURE — G0279: CPT | Mod: 26

## 2019-09-14 PROCEDURE — 77065 DX MAMMO INCL CAD UNI: CPT | Mod: 26,RT

## 2019-09-14 PROCEDURE — 77065 DX MAMMO INCL CAD UNI: CPT

## 2019-09-16 ENCOUNTER — APPOINTMENT (OUTPATIENT)
Dept: ORTHOPEDIC SURGERY | Facility: CLINIC | Age: 59
End: 2019-09-16
Payer: COMMERCIAL

## 2019-09-16 VITALS
DIASTOLIC BLOOD PRESSURE: 87 MMHG | HEART RATE: 78 BPM | HEIGHT: 61 IN | WEIGHT: 293 LBS | BODY MASS INDEX: 55.32 KG/M2 | SYSTOLIC BLOOD PRESSURE: 156 MMHG

## 2019-09-16 PROCEDURE — 73562 X-RAY EXAM OF KNEE 3: CPT | Mod: 50

## 2019-09-16 PROCEDURE — 20610 DRAIN/INJ JOINT/BURSA W/O US: CPT | Mod: 50

## 2019-09-16 PROCEDURE — 99204 OFFICE O/P NEW MOD 45 MIN: CPT | Mod: 25

## 2019-09-18 ENCOUNTER — OUTPATIENT (OUTPATIENT)
Dept: OUTPATIENT SERVICES | Facility: HOSPITAL | Age: 59
LOS: 1 days | Discharge: HOME | End: 2019-09-18

## 2019-09-18 DIAGNOSIS — C7A.8 OTHER MALIGNANT NEUROENDOCRINE TUMORS: ICD-10-CM

## 2019-09-18 DIAGNOSIS — Z98.891 HISTORY OF UTERINE SCAR FROM PREVIOUS SURGERY: Chronic | ICD-10-CM

## 2019-09-20 ENCOUNTER — APPOINTMENT (OUTPATIENT)
Dept: INFUSION THERAPY | Facility: HOSPITAL | Age: 59
End: 2019-09-20

## 2019-10-02 ENCOUNTER — RX RENEWAL (OUTPATIENT)
Age: 59
End: 2019-10-02

## 2019-10-04 ENCOUNTER — APPOINTMENT (OUTPATIENT)
Dept: HEMATOLOGY ONCOLOGY | Facility: CLINIC | Age: 59
End: 2019-10-04
Payer: COMMERCIAL

## 2019-10-04 VITALS
WEIGHT: 293 LBS | OXYGEN SATURATION: 99 % | TEMPERATURE: 97.6 F | DIASTOLIC BLOOD PRESSURE: 90 MMHG | BODY MASS INDEX: 63.94 KG/M2 | SYSTOLIC BLOOD PRESSURE: 173 MMHG | HEART RATE: 89 BPM | RESPIRATION RATE: 20 BRPM

## 2019-10-04 PROCEDURE — 99213 OFFICE O/P EST LOW 20 MIN: CPT

## 2019-10-13 ENCOUNTER — FORM ENCOUNTER (OUTPATIENT)
Age: 59
End: 2019-10-13

## 2019-10-14 ENCOUNTER — OUTPATIENT (OUTPATIENT)
Dept: OUTPATIENT SERVICES | Facility: HOSPITAL | Age: 59
LOS: 1 days | End: 2019-10-14
Payer: COMMERCIAL

## 2019-10-14 ENCOUNTER — APPOINTMENT (OUTPATIENT)
Dept: MRI IMAGING | Facility: CLINIC | Age: 59
End: 2019-10-14
Payer: COMMERCIAL

## 2019-10-14 DIAGNOSIS — Z98.891 HISTORY OF UTERINE SCAR FROM PREVIOUS SURGERY: Chronic | ICD-10-CM

## 2019-10-14 DIAGNOSIS — Z00.8 ENCOUNTER FOR OTHER GENERAL EXAMINATION: ICD-10-CM

## 2019-10-14 PROCEDURE — 74183 MRI ABD W/O CNTR FLWD CNTR: CPT | Mod: 26

## 2019-10-14 PROCEDURE — 74183 MRI ABD W/O CNTR FLWD CNTR: CPT

## 2019-10-18 ENCOUNTER — APPOINTMENT (OUTPATIENT)
Age: 59
End: 2019-10-18

## 2019-10-21 ENCOUNTER — OUTPATIENT (OUTPATIENT)
Dept: OUTPATIENT SERVICES | Facility: HOSPITAL | Age: 59
LOS: 1 days | Discharge: HOME | End: 2019-10-21

## 2019-10-21 DIAGNOSIS — C7A.8 OTHER MALIGNANT NEUROENDOCRINE TUMORS: ICD-10-CM

## 2019-10-21 DIAGNOSIS — Z98.891 HISTORY OF UTERINE SCAR FROM PREVIOUS SURGERY: Chronic | ICD-10-CM

## 2019-10-23 ENCOUNTER — INBOUND DOCUMENT (OUTPATIENT)
Age: 59
End: 2019-10-23

## 2019-10-24 ENCOUNTER — APPOINTMENT (OUTPATIENT)
Dept: ORTHOPEDIC SURGERY | Facility: CLINIC | Age: 59
End: 2019-10-24
Payer: COMMERCIAL

## 2019-10-24 ENCOUNTER — RX RENEWAL (OUTPATIENT)
Age: 59
End: 2019-10-24

## 2019-10-24 DIAGNOSIS — M25.512 PAIN IN LEFT SHOULDER: ICD-10-CM

## 2019-10-24 DIAGNOSIS — M75.82 OTHER SHOULDER LESIONS, LEFT SHOULDER: ICD-10-CM

## 2019-10-24 PROCEDURE — 20610 DRAIN/INJ JOINT/BURSA W/O US: CPT | Mod: LT

## 2019-10-24 PROCEDURE — 73030 X-RAY EXAM OF SHOULDER: CPT | Mod: LT

## 2019-10-24 PROCEDURE — 99214 OFFICE O/P EST MOD 30 MIN: CPT | Mod: 25

## 2019-10-25 ENCOUNTER — RESULT REVIEW (OUTPATIENT)
Age: 59
End: 2019-10-25

## 2019-10-25 ENCOUNTER — APPOINTMENT (OUTPATIENT)
Dept: HEMATOLOGY ONCOLOGY | Facility: CLINIC | Age: 59
End: 2019-10-25
Payer: COMMERCIAL

## 2019-10-25 VITALS
RESPIRATION RATE: 20 BRPM | HEART RATE: 69 BPM | TEMPERATURE: 98.4 F | WEIGHT: 293 LBS | OXYGEN SATURATION: 100 % | BODY MASS INDEX: 63.73 KG/M2 | SYSTOLIC BLOOD PRESSURE: 159 MMHG | DIASTOLIC BLOOD PRESSURE: 76 MMHG

## 2019-10-25 LAB
BASOPHILS # BLD AUTO: 0 K/UL — SIGNIFICANT CHANGE UP (ref 0–0.2)
BASOPHILS NFR BLD AUTO: 0.1 % — SIGNIFICANT CHANGE UP (ref 0–2)
EOSINOPHIL # BLD AUTO: 0 K/UL — SIGNIFICANT CHANGE UP (ref 0–0.5)
EOSINOPHIL NFR BLD AUTO: 0.3 % — SIGNIFICANT CHANGE UP (ref 0–6)
HCT VFR BLD CALC: 45.2 % — HIGH (ref 34.5–45)
HGB BLD-MCNC: 14.9 G/DL — SIGNIFICANT CHANGE UP (ref 11.5–15.5)
LYMPHOCYTES # BLD AUTO: 1.6 K/UL — SIGNIFICANT CHANGE UP (ref 1–3.3)
LYMPHOCYTES # BLD AUTO: 13.6 % — SIGNIFICANT CHANGE UP (ref 13–44)
MCHC RBC-ENTMCNC: 30 PG — SIGNIFICANT CHANGE UP (ref 27–34)
MCHC RBC-ENTMCNC: 33 G/DL — SIGNIFICANT CHANGE UP (ref 32–36)
MCV RBC AUTO: 90.9 FL — SIGNIFICANT CHANGE UP (ref 80–100)
MONOCYTES # BLD AUTO: 0.6 K/UL — SIGNIFICANT CHANGE UP (ref 0–0.9)
MONOCYTES NFR BLD AUTO: 4.8 % — SIGNIFICANT CHANGE UP (ref 2–14)
NEUTROPHILS # BLD AUTO: 9.6 K/UL — HIGH (ref 1.8–7.4)
NEUTROPHILS NFR BLD AUTO: 81.3 % — HIGH (ref 43–77)
PLATELET # BLD AUTO: 310 K/UL — SIGNIFICANT CHANGE UP (ref 150–400)
RBC # BLD: 4.97 M/UL — SIGNIFICANT CHANGE UP (ref 3.8–5.2)
RBC # FLD: 14.3 % — SIGNIFICANT CHANGE UP (ref 10.3–14.5)
WBC # BLD: 11.8 K/UL — HIGH (ref 3.8–10.5)
WBC # FLD AUTO: 11.8 K/UL — HIGH (ref 3.8–10.5)

## 2019-10-25 PROCEDURE — 99213 OFFICE O/P EST LOW 20 MIN: CPT

## 2019-10-26 LAB
ALBUMIN SERPL ELPH-MCNC: 4.5 G/DL
ALP BLD-CCNC: 118 U/L
ALT SERPL-CCNC: 29 U/L
ANION GAP SERPL CALC-SCNC: 13 MMOL/L
AST SERPL-CCNC: 22 U/L
BILIRUB SERPL-MCNC: 0.3 MG/DL
BUN SERPL-MCNC: 16 MG/DL
CALCIUM SERPL-MCNC: 10 MG/DL
CHLORIDE SERPL-SCNC: 102 MMOL/L
CO2 SERPL-SCNC: 27 MMOL/L
CREAT SERPL-MCNC: 0.67 MG/DL
GLUCOSE SERPL-MCNC: 124 MG/DL
POTASSIUM SERPL-SCNC: 5.3 MMOL/L
PROT SERPL-MCNC: 8 G/DL
SODIUM SERPL-SCNC: 142 MMOL/L

## 2019-10-28 ENCOUNTER — FORM ENCOUNTER (OUTPATIENT)
Age: 59
End: 2019-10-28

## 2019-10-29 ENCOUNTER — APPOINTMENT (OUTPATIENT)
Dept: CT IMAGING | Facility: CLINIC | Age: 59
End: 2019-10-29
Payer: COMMERCIAL

## 2019-10-29 ENCOUNTER — OUTPATIENT (OUTPATIENT)
Dept: OUTPATIENT SERVICES | Facility: HOSPITAL | Age: 59
LOS: 1 days | End: 2019-10-29
Payer: COMMERCIAL

## 2019-10-29 DIAGNOSIS — Z98.891 HISTORY OF UTERINE SCAR FROM PREVIOUS SURGERY: Chronic | ICD-10-CM

## 2019-10-29 DIAGNOSIS — D3A.8 OTHER BENIGN NEUROENDOCRINE TUMORS: ICD-10-CM

## 2019-10-29 LAB — CGA SERPL-MCNC: 41 NG/ML

## 2019-10-29 PROCEDURE — 71250 CT THORAX DX C-: CPT

## 2019-10-29 PROCEDURE — 71250 CT THORAX DX C-: CPT | Mod: 26

## 2019-11-09 NOTE — PHYSICAL EXAM
[Fully active, able to carry on all pre-disease performance without restriction] : Status 0 - Fully active, able to carry on all pre-disease performance without restriction [Obese] : obese [Normal] : affect appropriate [de-identified] : chronic LE edema and venous changes of skin

## 2019-11-09 NOTE — HISTORY OF PRESENT ILLNESS
[Disease: _____________________] : Disease: [unfilled] [AJCC Stage: ____] : AJCC Stage: [unfilled] [Therapy: ___] : Therapy: [unfilled] [de-identified] : 58-year-old F with h/o intermittent abdominal pain, n/v over the last several years (at least 9) thought to be 2/2 gallstones. She has been evaluated in the ER 1-2 per year with these attacks.  CT A/P going as far back as 2010 noted a calcified mass within the mesentery of the small bowel suspicious for carcinoid. In Nov 2017 it was found to be increasing in size from previous imaging. She saw a surgeon at the time however it is unclear if any work up was recommended. \par \par In Nov 2018 she was referred to Dr. Foy for evaluation of gallstones. He noted the mesenteric mass which prompted a PET/DOTATATE to be performed. This showed activity in the mesentery (SUV 59.9) as well as liver, ileal  mass though to be the primary, cystic adnexal mass unchanged since 11/17, possible uptake in the skin of left breast. An MRI Abdomen was performed to evaluate the liver mets and this revealed multiple metastatic lesions. Referred to med onc for further work up. \par \par 2/14/19: Enteroscopy/[place]: no mass noted, erythema in the terminal ileum, s/p biopsy with negative path \par 4/22/19: CT CAP: multiple small b/l lung nodules, slight enlargement of calcified mesenteric mass. \par 4/23-4/26/19: admitted to [facility] with N/V post scan. 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 Was premedicated with Valium and zofran which did not help. \par Today c/o shoulder pain. Saw ortho who gave an injection. \par

## 2019-11-09 NOTE — CONSULT LETTER
[Dear  ___] : Dear  [unfilled], [Courtesy Letter:] : I had the pleasure of seeing your patient, [unfilled], in my office today. [Please see my note below.] : Please see my note below. [Consult Closing:] : Thank you very much for allowing me to participate in the care of this patient.  If you have any questions, please do not hesitate to contact me. [Sincerely,] : Sincerely, [FreeTextEntry3] : Katalina Blackmon D.O. \par Attending Physician \par [facility]\par  \par [facility] \par Tel: [phone]\par Fax: [phone]\par

## 2019-11-15 ENCOUNTER — APPOINTMENT (OUTPATIENT)
Age: 59
End: 2019-11-15

## 2019-11-19 NOTE — HISTORY OF PRESENT ILLNESS
[Disease: _____________________] : Disease: [unfilled] [AJCC Stage: ____] : AJCC Stage: [unfilled] [Therapy: ___] : Therapy: [unfilled] [de-identified] : 58-year-old F with h/o intermittent abdominal pain, n/v over the last several years (at least 9) thought to be 2/2 gallstones. She has been evaluated in the ER 1-2 per year with these attacks.  CT A/P going as far back as 2010 noted a calcified mass within the mesentery of the small bowel suspicious for carcinoid. In Nov 2017 it was found to be increasing in size from previous imaging. She saw a surgeon at the time however it is unclear if any work up was recommended. \par \par In Nov 2018 she was referred to Dr. Foy for evaluation of gallstones. He noted the mesenteric mass which prompted a PET/DOTATATE to be performed. This showed activity in the mesentery (SUV 59.9) as well as liver, ileal  mass though to be the primary, cystic adnexal mass unchanged since 11/17, possible uptake in the skin of left breast. An MRI Abdomen was performed to evaluate the liver mets and this revealed multiple metastatic lesions. Referred to med onc for further work up. \par \par 2/14/19: Enteroscopy/[place]: no mass noted, erythema in the terminal ileum, s/p biopsy with negative path \par 4/22/19: CT CAP: multiple small b/l lung nodules, slight enlargement of calcified mesenteric mass. \par 4/23-4/26/19: admitted to [facility] with N/V post scan. 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 Appetite and energy level are stable. No n/v.

## 2019-11-24 ENCOUNTER — RX RENEWAL (OUTPATIENT)
Age: 59
End: 2019-11-24

## 2019-12-06 ENCOUNTER — OUTPATIENT (OUTPATIENT)
Dept: OUTPATIENT SERVICES | Facility: HOSPITAL | Age: 59
LOS: 1 days | Discharge: HOME | End: 2019-12-06

## 2019-12-06 DIAGNOSIS — C7A.8 OTHER MALIGNANT NEUROENDOCRINE TUMORS: ICD-10-CM

## 2019-12-06 DIAGNOSIS — Z98.891 HISTORY OF UTERINE SCAR FROM PREVIOUS SURGERY: Chronic | ICD-10-CM

## 2019-12-10 ENCOUNTER — APPOINTMENT (OUTPATIENT)
Dept: OBGYN | Facility: CLINIC | Age: 59
End: 2019-12-10
Payer: COMMERCIAL

## 2019-12-10 VITALS
BODY MASS INDEX: 55.32 KG/M2 | HEART RATE: 89 BPM | OXYGEN SATURATION: 98 % | WEIGHT: 293 LBS | SYSTOLIC BLOOD PRESSURE: 142 MMHG | DIASTOLIC BLOOD PRESSURE: 72 MMHG | HEIGHT: 61 IN

## 2019-12-10 DIAGNOSIS — Z87.42 PERSONAL HISTORY OF OTHER DISEASES OF THE FEMALE GENITAL TRACT: ICD-10-CM

## 2019-12-10 PROCEDURE — 99214 OFFICE O/P EST MOD 30 MIN: CPT

## 2019-12-10 NOTE — REVIEW OF SYSTEMS
[Lower Ext Edema] : lower extremity edema [Abdominal Pain] : abdominal pain [Abn Vag Bleeding] : abnormal vaginal bleeding [Fever] : no fever [Feeling Tired] : not feeling tired [Recent Wt Gain ___ Lbs] : no recent weight gain [Pain] : no pain [Redness] : no redness [Sight Problems] : no sight problems [Discharge] : no discharge [Dec Hearing] : no hearing loss [Nosebleeds] : no nosebleeds [Nasal Discharge] : no nasal discharge [Sore Throat] : no sore throat [Heart Rate Is Fast] : the heart rate was not fast [Chest Pain] : no chest pain [Palpitations] : no palpitations [Dyspnea] : no shortness of breath [Wheezing] : no wheezing [Cough] : no cough [SOB on Exertion] : no shortness of breath during exertion [Vomiting] : no vomiting [Constipation] : no constipation [Diarrhea] : no diarrhea [Heartburn] : no heartburn [Melena] : no melena [Dysuria] : no dysuria [Incontinence] : no incontinence [Pelvic Pain] : no pelvic pain [Frequency] : no frequency [Urgency] : no urgency [Urethral Discharge] : no abnormal urethral discharge [Arthralgias] : no arthralgias [Joint Pain] : no joint pain [Joint Swelling] : no joint swelling [Joint Stiffness] : no joint stiffness [Skin Lesions] : no skin lesions [Change In A Mole] : no change in a mole [Breast Lump] : no breast lump [Breast Pain] : no breast pain [Convulsions] : no convulsions [Dizziness] : no dizziness [Fainting] : no fainting [Headache] : no headache [Sleep Disturbances] : no sleep disturbances [Anxiety] : no anxiety [Depression] : no depression [Hot Flashes] : no hot flashes [Muscle Weakness] : no muscle weakness [Deepening Voice] : no deepening of the voice [Easy Bleeding] : does not bleed easily [Easy Bruising] : does not bruise easily [Swollen Glands] : no swollen glands [Swollen Glands In The Neck] : no swollen glands in the neck [Feeling Weak] : no feelings of weakness

## 2019-12-10 NOTE — PHYSICAL EXAM
[No Lesions] : no genitalia lesions [Vulvitis] : no vulvitis [Vulvar Atrophy] : no vulvar atrophy [Labia Majora Erythema] : no erythema of the labia majora [Labia Minora Erythema] : no erythema of the labia minora [Labia Majora] : labia major [Atrophy] : atrophy [Labia Minora] : labia minora [Normal] : clitoris [Erythema] : no erythema [Cystocele] : no cystocele [Rectocele] : no rectocele [Dry Mucosa] : dry mucosa [Moderate] : there was moderate vaginal bleeding [Uterine Prolapse] : no uterine prolapse [Discharge] : had no discharge [Erosion] : had no erosions [Soft] :  the cervix was soft [Motion Tenderness] : there was no cervical motion tenderness [Pap Obtained] : a Pap smear was performed [Enlarged ___ wks] : not enlarged [Normal Position] : in a normal position [Tenderness] : nontender [Adnexa Tenderness] : were not tender [Uterine Adnexae] : were not tender and not enlarged [Mass ___ cm] : no uterine mass was palpated [Ovarian Mass (___ Cm)] : there were no adnexal masses

## 2019-12-10 NOTE — CHIEF COMPLAINT
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

## 2019-12-12 LAB — HPV HIGH+LOW RISK DNA PNL CVX: NOT DETECTED

## 2019-12-13 ENCOUNTER — APPOINTMENT (OUTPATIENT)
Age: 59
End: 2019-12-13

## 2019-12-16 ENCOUNTER — FORM ENCOUNTER (OUTPATIENT)
Age: 59
End: 2019-12-16

## 2019-12-17 ENCOUNTER — OUTPATIENT (OUTPATIENT)
Dept: OUTPATIENT SERVICES | Facility: HOSPITAL | Age: 59
LOS: 1 days | End: 2019-12-17
Payer: COMMERCIAL

## 2019-12-17 ENCOUNTER — RX RENEWAL (OUTPATIENT)
Age: 59
End: 2019-12-17

## 2019-12-17 ENCOUNTER — APPOINTMENT (OUTPATIENT)
Dept: ULTRASOUND IMAGING | Facility: CLINIC | Age: 59
End: 2019-12-17
Payer: COMMERCIAL

## 2019-12-17 DIAGNOSIS — Z00.8 ENCOUNTER FOR OTHER GENERAL EXAMINATION: ICD-10-CM

## 2019-12-17 DIAGNOSIS — Z98.891 HISTORY OF UTERINE SCAR FROM PREVIOUS SURGERY: Chronic | ICD-10-CM

## 2019-12-17 PROCEDURE — 76830 TRANSVAGINAL US NON-OB: CPT | Mod: 26

## 2019-12-17 PROCEDURE — 76856 US EXAM PELVIC COMPLETE: CPT

## 2019-12-17 PROCEDURE — 76830 TRANSVAGINAL US NON-OB: CPT

## 2019-12-17 PROCEDURE — 76856 US EXAM PELVIC COMPLETE: CPT | Mod: 26,59

## 2019-12-19 LAB — CYTOLOGY CVX/VAG DOC THIN PREP: ABNORMAL

## 2019-12-20 ENCOUNTER — RESULT REVIEW (OUTPATIENT)
Age: 59
End: 2019-12-20

## 2019-12-20 ENCOUNTER — APPOINTMENT (OUTPATIENT)
Dept: HEMATOLOGY ONCOLOGY | Facility: CLINIC | Age: 59
End: 2019-12-20
Payer: COMMERCIAL

## 2019-12-20 VITALS
WEIGHT: 293 LBS | BODY MASS INDEX: 63.73 KG/M2 | RESPIRATION RATE: 22 BRPM | HEART RATE: 77 BPM | OXYGEN SATURATION: 99 % | TEMPERATURE: 98.7 F | SYSTOLIC BLOOD PRESSURE: 174 MMHG | DIASTOLIC BLOOD PRESSURE: 95 MMHG

## 2019-12-20 LAB
ALBUMIN SERPL ELPH-MCNC: 4.4 G/DL
ALP BLD-CCNC: 97 U/L
ALT SERPL-CCNC: 14 U/L
ANION GAP SERPL CALC-SCNC: 12 MMOL/L
AST SERPL-CCNC: 18 U/L
BASOPHILS # BLD AUTO: 0.1 K/UL — SIGNIFICANT CHANGE UP (ref 0–0.2)
BASOPHILS NFR BLD AUTO: 1.8 % — SIGNIFICANT CHANGE UP (ref 0–2)
BILIRUB SERPL-MCNC: 0.3 MG/DL
BUN SERPL-MCNC: 14 MG/DL
CALCIUM SERPL-MCNC: 9.7 MG/DL
CHLORIDE SERPL-SCNC: 101 MMOL/L
CO2 SERPL-SCNC: 28 MMOL/L
CREAT SERPL-MCNC: 0.76 MG/DL
EOSINOPHIL # BLD AUTO: 0.3 K/UL — SIGNIFICANT CHANGE UP (ref 0–0.5)
EOSINOPHIL NFR BLD AUTO: 4.5 % — SIGNIFICANT CHANGE UP (ref 0–6)
GLUCOSE SERPL-MCNC: 129 MG/DL
HCT VFR BLD CALC: 45.3 % — HIGH (ref 34.5–45)
HGB BLD-MCNC: 14.6 G/DL — SIGNIFICANT CHANGE UP (ref 11.5–15.5)
LYMPHOCYTES # BLD AUTO: 2 K/UL — SIGNIFICANT CHANGE UP (ref 1–3.3)
LYMPHOCYTES # BLD AUTO: 28.4 % — SIGNIFICANT CHANGE UP (ref 13–44)
MCHC RBC-ENTMCNC: 29.6 PG — SIGNIFICANT CHANGE UP (ref 27–34)
MCHC RBC-ENTMCNC: 32.2 G/DL — SIGNIFICANT CHANGE UP (ref 32–36)
MCV RBC AUTO: 91.8 FL — SIGNIFICANT CHANGE UP (ref 80–100)
MONOCYTES # BLD AUTO: 0.5 K/UL — SIGNIFICANT CHANGE UP (ref 0–0.9)
MONOCYTES NFR BLD AUTO: 6.6 % — SIGNIFICANT CHANGE UP (ref 2–14)
NEUTROPHILS # BLD AUTO: 4.1 K/UL — SIGNIFICANT CHANGE UP (ref 1.8–7.4)
NEUTROPHILS NFR BLD AUTO: 58.6 % — SIGNIFICANT CHANGE UP (ref 43–77)
PLATELET # BLD AUTO: 256 K/UL — SIGNIFICANT CHANGE UP (ref 150–400)
POTASSIUM SERPL-SCNC: 4.6 MMOL/L
PROT SERPL-MCNC: 7.4 G/DL
RBC # BLD: 4.93 M/UL — SIGNIFICANT CHANGE UP (ref 3.8–5.2)
RBC # FLD: 12.9 % — SIGNIFICANT CHANGE UP (ref 10.3–14.5)
SODIUM SERPL-SCNC: 141 MMOL/L
WBC # BLD: 7 K/UL — SIGNIFICANT CHANGE UP (ref 3.8–10.5)
WBC # FLD AUTO: 7 K/UL — SIGNIFICANT CHANGE UP (ref 3.8–10.5)

## 2019-12-20 PROCEDURE — 99214 OFFICE O/P EST MOD 30 MIN: CPT

## 2019-12-20 NOTE — HISTORY OF PRESENT ILLNESS
[de-identified] : 58-year-old F with h/o intermittent abdominal pain, n/v over the last several years (at least 9) thought to be 2/2 gallstones. She has been evaluated in the ER 1-2 per year with these attacks.  CT A/P going as far back as 2010 noted a calcified mass within the mesentery of the small bowel suspicious for carcinoid. In Nov 2017 it was found to be increasing in size from previous imaging. She saw a surgeon at the time however it is unclear if any work up was recommended. \par \par In Nov 2018 she was referred to Dr. Foy for evaluation of gallstones. He noted the mesenteric mass which prompted a PET/DOTATATE to be performed. This showed activity in the mesentery (SUV 59.9) as well as liver, ileal  mass though to be the primary, cystic adnexal mass unchanged since 11/17, possible uptake in the skin of left breast. An MRI Abdomen was performed to evaluate the liver mets and this revealed multiple metastatic lesions. Referred to med onc for further work up. \par \par 2/14/19: Enteroscopy/[place]: no mass noted, erythema in the terminal ileum, s/p biopsy with negative path \par 4/22/19: CT CAP: multiple small b/l lung nodules, slight enlargement of calcified mesenteric mass. \par 4/23-4/26/19: admitted to [facility] with N/V post scan. 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 Saw gyn who ordered TV US. He recommended a D&C. This has not yet been scheduled yet. \par Has been having mild intermittent RUQ pain. Does not take anything for it. \elyssa Planning to go to [place] at the end of Jan.

## 2019-12-23 ENCOUNTER — APPOINTMENT (OUTPATIENT)
Dept: OBGYN | Facility: CLINIC | Age: 59
End: 2019-12-23
Payer: COMMERCIAL

## 2019-12-23 VITALS
WEIGHT: 293 LBS | BODY MASS INDEX: 55.32 KG/M2 | OXYGEN SATURATION: 99 % | SYSTOLIC BLOOD PRESSURE: 122 MMHG | HEIGHT: 61 IN | DIASTOLIC BLOOD PRESSURE: 58 MMHG | HEART RATE: 91 BPM

## 2019-12-23 DIAGNOSIS — Z87.42 PERSONAL HISTORY OF OTHER DISEASES OF THE FEMALE GENITAL TRACT: ICD-10-CM

## 2019-12-23 LAB — CGA SERPL-MCNC: 44 NG/ML

## 2019-12-23 PROCEDURE — 99213 OFFICE O/P EST LOW 20 MIN: CPT

## 2019-12-23 NOTE — CHIEF COMPLAINT
[Follow Up] : follow up GYN visit [FreeTextEntry1] : F/U PMB  Pelvic sono showed 7 cm Rt ovarian cyst and IUD in place  is 48

## 2019-12-24 LAB — CANCER AG125 SERPL-ACNC: 49 U/ML

## 2019-12-27 ENCOUNTER — RX RENEWAL (OUTPATIENT)
Age: 59
End: 2019-12-27

## 2020-01-06 ENCOUNTER — APPOINTMENT (OUTPATIENT)
Dept: GYNECOLOGIC ONCOLOGY | Facility: CLINIC | Age: 60
End: 2020-01-06
Payer: COMMERCIAL

## 2020-01-06 ENCOUNTER — INPATIENT (INPATIENT)
Facility: HOSPITAL | Age: 60
LOS: 2 days | Discharge: HOME | DRG: 603 | End: 2020-01-09
Attending: INTERNAL MEDICINE | Admitting: INTERNAL MEDICINE
Payer: COMMERCIAL

## 2020-01-06 ENCOUNTER — EMERGENCY (EMERGENCY)
Facility: HOSPITAL | Age: 60
LOS: 1 days | End: 2020-01-06

## 2020-01-06 VITALS
OXYGEN SATURATION: 99 % | SYSTOLIC BLOOD PRESSURE: 155 MMHG | HEIGHT: 61 IN | RESPIRATION RATE: 16 BRPM | TEMPERATURE: 98 F | HEART RATE: 89 BPM | DIASTOLIC BLOOD PRESSURE: 74 MMHG | WEIGHT: 293 LBS

## 2020-01-06 VITALS
HEART RATE: 79 BPM | DIASTOLIC BLOOD PRESSURE: 85 MMHG | OXYGEN SATURATION: 100 % | WEIGHT: 293 LBS | HEIGHT: 61 IN | TEMPERATURE: 98 F | RESPIRATION RATE: 16 BRPM | SYSTOLIC BLOOD PRESSURE: 143 MMHG

## 2020-01-06 VITALS
HEIGHT: 61 IN | WEIGHT: 293 LBS | SYSTOLIC BLOOD PRESSURE: 120 MMHG | DIASTOLIC BLOOD PRESSURE: 50 MMHG | BODY MASS INDEX: 55.32 KG/M2

## 2020-01-06 DIAGNOSIS — Z98.891 HISTORY OF UTERINE SCAR FROM PREVIOUS SURGERY: Chronic | ICD-10-CM

## 2020-01-06 LAB
ANION GAP SERPL CALC-SCNC: 9 MMOL/L — SIGNIFICANT CHANGE UP (ref 5–17)
APTT BLD: 27.6 SEC — SIGNIFICANT CHANGE UP (ref 27.5–36.3)
BASOPHILS # BLD AUTO: 0.05 K/UL — SIGNIFICANT CHANGE UP (ref 0–0.2)
BASOPHILS NFR BLD AUTO: 0.7 % — SIGNIFICANT CHANGE UP (ref 0–2)
BUN SERPL-MCNC: 15 MG/DL — SIGNIFICANT CHANGE UP (ref 7–23)
CALCIUM SERPL-MCNC: 8.9 MG/DL — SIGNIFICANT CHANGE UP (ref 8.4–10.5)
CHLORIDE SERPL-SCNC: 102 MMOL/L — SIGNIFICANT CHANGE UP (ref 96–108)
CO2 SERPL-SCNC: 28 MMOL/L — SIGNIFICANT CHANGE UP (ref 22–31)
CREAT SERPL-MCNC: 0.81 MG/DL — SIGNIFICANT CHANGE UP (ref 0.5–1.3)
EOSINOPHIL # BLD AUTO: 0.25 K/UL — SIGNIFICANT CHANGE UP (ref 0–0.5)
EOSINOPHIL NFR BLD AUTO: 3.6 % — SIGNIFICANT CHANGE UP (ref 0–6)
GLUCOSE SERPL-MCNC: 181 MG/DL — HIGH (ref 70–99)
HCT VFR BLD CALC: 42.5 % — SIGNIFICANT CHANGE UP (ref 34.5–45)
HGB BLD-MCNC: 13.6 G/DL — SIGNIFICANT CHANGE UP (ref 11.5–15.5)
IMM GRANULOCYTES NFR BLD AUTO: 0.6 % — SIGNIFICANT CHANGE UP (ref 0–1.5)
INR BLD: 1.12 RATIO — SIGNIFICANT CHANGE UP (ref 0.88–1.16)
LYMPHOCYTES # BLD AUTO: 1.43 K/UL — SIGNIFICANT CHANGE UP (ref 1–3.3)
LYMPHOCYTES # BLD AUTO: 20.3 % — SIGNIFICANT CHANGE UP (ref 13–44)
MCHC RBC-ENTMCNC: 29.1 PG — SIGNIFICANT CHANGE UP (ref 27–34)
MCHC RBC-ENTMCNC: 32 GM/DL — SIGNIFICANT CHANGE UP (ref 32–36)
MCV RBC AUTO: 91 FL — SIGNIFICANT CHANGE UP (ref 80–100)
MONOCYTES # BLD AUTO: 0.5 K/UL — SIGNIFICANT CHANGE UP (ref 0–0.9)
MONOCYTES NFR BLD AUTO: 7.1 % — SIGNIFICANT CHANGE UP (ref 2–14)
NEUTROPHILS # BLD AUTO: 4.76 K/UL — SIGNIFICANT CHANGE UP (ref 1.8–7.4)
NEUTROPHILS NFR BLD AUTO: 67.7 % — SIGNIFICANT CHANGE UP (ref 43–77)
NRBC # BLD: 0 /100 WBCS — SIGNIFICANT CHANGE UP (ref 0–0)
PLATELET # BLD AUTO: 240 K/UL — SIGNIFICANT CHANGE UP (ref 150–400)
POTASSIUM SERPL-MCNC: 4.8 MMOL/L — SIGNIFICANT CHANGE UP (ref 3.5–5.3)
POTASSIUM SERPL-SCNC: 4.8 MMOL/L — SIGNIFICANT CHANGE UP (ref 3.5–5.3)
PROTHROM AB SERPL-ACNC: 12.6 SEC — SIGNIFICANT CHANGE UP (ref 10–12.9)
RBC # BLD: 4.67 M/UL — SIGNIFICANT CHANGE UP (ref 3.8–5.2)
RBC # FLD: 13.3 % — SIGNIFICANT CHANGE UP (ref 10.3–14.5)
SODIUM SERPL-SCNC: 139 MMOL/L — SIGNIFICANT CHANGE UP (ref 135–145)
WBC # BLD: 7.03 K/UL — SIGNIFICANT CHANGE UP (ref 3.8–10.5)
WBC # FLD AUTO: 7.03 K/UL — SIGNIFICANT CHANGE UP (ref 3.8–10.5)

## 2020-01-06 PROCEDURE — 93971 EXTREMITY STUDY: CPT | Mod: 26,RT

## 2020-01-06 PROCEDURE — 99205 OFFICE O/P NEW HI 60 MIN: CPT

## 2020-01-06 PROCEDURE — 99285 EMERGENCY DEPT VISIT HI MDM: CPT

## 2020-01-06 PROCEDURE — 71045 X-RAY EXAM CHEST 1 VIEW: CPT | Mod: 26

## 2020-01-06 RX ORDER — AMPICILLIN SODIUM AND SULBACTAM SODIUM 250; 125 MG/ML; MG/ML
3 INJECTION, POWDER, FOR SUSPENSION INTRAMUSCULAR; INTRAVENOUS ONCE
Refills: 0 | Status: COMPLETED | OUTPATIENT
Start: 2020-01-06 | End: 2020-01-06

## 2020-01-06 RX ORDER — VANCOMYCIN HCL 1 G
1000 VIAL (EA) INTRAVENOUS ONCE
Refills: 0 | Status: COMPLETED | OUTPATIENT
Start: 2020-01-06 | End: 2020-01-06

## 2020-01-06 RX ADMIN — AMPICILLIN SODIUM AND SULBACTAM SODIUM 200 GRAM(S): 250; 125 INJECTION, POWDER, FOR SUSPENSION INTRAMUSCULAR; INTRAVENOUS at 22:50

## 2020-01-06 RX ADMIN — AMPICILLIN SODIUM AND SULBACTAM SODIUM 3 GRAM(S): 250; 125 INJECTION, POWDER, FOR SUSPENSION INTRAMUSCULAR; INTRAVENOUS at 23:50

## 2020-01-06 RX ADMIN — Medication 250 MILLIGRAM(S): at 23:55

## 2020-01-06 NOTE — ED ADULT NURSE NOTE - OBJECTIVE STATEMENT
Pt is alert ,came to the ER due to right leg pain and swelling for 3 days now. Pt had DVT on the same leg 3  years ago. No SOB or chest pain.

## 2020-01-06 NOTE — ED ADULT TRIAGE NOTE - CHIEF COMPLAINT QUOTE
Patient presents with right lower leg swelling, warm and pain for 1.5 days. Patient presents with right lower leg swelling, warmth and pain for 1.5 days.

## 2020-01-06 NOTE — ED ADULT NURSE NOTE - NS PRO AD BILL OF RIGHTS
Acute Diarrhea in Children   AMBULATORY CARE:   Acute diarrhea  starts quickly and lasts a short time, usually 1 to 3 days  It can last up to 2 weeks  Signs and symptoms that may happen with acute diarrhea:  Your child may have several loose bowel movements throughout the day  He or she may also have any of the following:  · A rash    · Abdominal pain     · Fever    · Nausea and vomiting    · Loss of appetite    · Symptoms of dehydration such as dry mouth and lips, crying without tears, dark yellow urine, and urinating little or not at all  Call 911 for any of the following:   · You cannot wake your child  · Your child has a seizure   Seek care immediately if:   · Your child seems confused  · Your child has repeated vomiting and cannot drink any liquids  · Your child's bowel movements contain blood or mucus  · Your child cries without tears  · Your child's eyes look sunken in, or the soft spot on your infant's head looks sunken in     · Your child has severe abdominal pain  · Your child urinates less than usual, or his urine is dark yellow  · Your child has no wet diapers for 6 to 8 hours  Contact your child's healthcare provider if:   · Your child has a fever of 102°F (38 8°C) or higher  · Your child has worsening abdominal pain  · Your child is more irritable, fussy, or tired than usual      · Your child has a dry mouth and lips  · Your child has dry, cool skin  · Your child is losing weight  · Your child's diarrhea lasts longer than 1 to 2 weeks  · You have questions or concerns about your child's condition or care  Treatment for your child's acute diarrhea:  Acute diarrhea usually gets better without treatment  Medicines may be given to treat an infection caused by bacteria or parasites  Do not give your child over-the-counter diarrhea medicine unless directed by his or her healthcare provider     Manage your child's diarrhea:   · Give your child plenty of liquids  This will help prevent dehydration  Ask how much liquid your child should drink each day and which liquids are best for him or her  Give your baby extra breast milk or formula to prevent dehydration  If you feed your baby formula, give him or her lactose free formula while he or she is sick  · Give your child oral rehydration solution as directed  Oral rehydration solution (ORS) has the right amounts of water, salts, and sugar that your child needs to replace lost body fluids  Ask what kind of ORS your child needs and how much he or she should drink  You can buy an ORS at most grocery stores and pharmacies  · Continue to feed your child regular foods  Your child can continue to eat the foods he or she normally eats  You may need to feed your child smaller amounts of food than normal  You may also need to give your child foods that he or she can tolerate  These may include rice, potatoes, and bread  It also includes fruits (bananas, melon), and well-cooked vegetables  Avoid giving your child foods that are high in fiber, fat, and sugar  Also avoid giving your child dairy and red meat until his or her diarrhea is gone  Prevent acute diarrhea:   · Remind your child to wash his or her hands well and often  He or she should use soap and water  Your child should wash his or her hands after using the toilet and before he or she eats  You should wash your hands before you prepare your child's food and after you change a diaper  · Keep bathroom surfaces clean  This helps prevent the spread of germs that cause acute diarrhea  · Cook meat as directed before you feed it to your child  ¨ Cook ground meat  to 160°F      ¨ Cook ground poultry, whole poultry, or cuts of poultry  to at least 165°F  Remove the meat from heat  Let it stand for 3 minutes before you feed it to your child  ¨ Cook whole cuts of meat other than poultry  to at least 145°F  Remove the meat from heat   Let it stand for 3 minutes before you feed it to your child  · Place raw or cooked meat in the refrigerator as soon as possible  Bacteria can grow in meat that is left at room temperature too long  · Peel and wash fruits and vegetables before you feed them to your child  This will help remove any germs that might be on the food  · Wash dishes that have touched raw meat in hot water with soap  This includes cutting boards, utensils, dishes, and serving containers  · Ask your child's healthcare provider about the rotavirus vaccine  This vaccine helps to prevent diarrhea caused by the rotavirus  · Give your child filtered or treated water when you travel  If you and your child travel to countries outside of the [address] [facility] and [place], make sure the drinking water is safe  If you do not know if the water is safe, you and your child should drink bottled water only  Do not put ice in your child's drinks  · Do not give your child raw or undercooked oysters, clams, or mussels  These foods may be contaminated and cause infection  Follow up with your child's healthcare provider as directed:  Write down your questions so you remember to ask them during your child's visits  © 2017 [employer]  Information is for End User's use only and may not be sold, redistributed or otherwise used for commercial purposes  All illustrations and images included in CareNotes® are the copyrighted property of A D [employer] M [employer] Inc  or [employer]  The above information is an  only  It is not intended as medical advice for individual conditions or treatments  Talk to your doctor, nurse or pharmacist before following any medical regimen to see if it is safe and effective for you  Yes

## 2020-01-07 DIAGNOSIS — L03.115 CELLULITIS OF RIGHT LOWER LIMB: ICD-10-CM

## 2020-01-07 LAB
ALBUMIN SERPL ELPH-MCNC: 3.3 G/DL — SIGNIFICANT CHANGE UP (ref 3.3–5)
ALP SERPL-CCNC: 95 U/L — SIGNIFICANT CHANGE UP (ref 40–120)
ALT FLD-CCNC: 18 U/L — SIGNIFICANT CHANGE UP (ref 10–45)
ANION GAP SERPL CALC-SCNC: 11 MMOL/L — SIGNIFICANT CHANGE UP (ref 5–17)
AST SERPL-CCNC: 42 U/L — HIGH (ref 10–40)
BILIRUB SERPL-MCNC: 0.7 MG/DL — SIGNIFICANT CHANGE UP (ref 0.2–1.2)
BUN SERPL-MCNC: 15 MG/DL — SIGNIFICANT CHANGE UP (ref 7–23)
CALCIUM SERPL-MCNC: 8.6 MG/DL — SIGNIFICANT CHANGE UP (ref 8.4–10.5)
CHLORIDE SERPL-SCNC: 104 MMOL/L — SIGNIFICANT CHANGE UP (ref 96–108)
CO2 SERPL-SCNC: 27 MMOL/L — SIGNIFICANT CHANGE UP (ref 22–31)
CREAT SERPL-MCNC: 0.76 MG/DL — SIGNIFICANT CHANGE UP (ref 0.5–1.3)
GLUCOSE SERPL-MCNC: 121 MG/DL — HIGH (ref 70–99)
HBA1C BLD-MCNC: 6.6 % — HIGH (ref 4–5.6)
HCT VFR BLD CALC: 39.1 % — SIGNIFICANT CHANGE UP (ref 34.5–45)
HGB BLD-MCNC: 12.3 G/DL — SIGNIFICANT CHANGE UP (ref 11.5–15.5)
HPV HIGH+LOW RISK DNA PNL CVX: NOT DETECTED
MCHC RBC-ENTMCNC: 28.5 PG — SIGNIFICANT CHANGE UP (ref 27–34)
MCHC RBC-ENTMCNC: 31.5 GM/DL — LOW (ref 32–36)
MCV RBC AUTO: 90.7 FL — SIGNIFICANT CHANGE UP (ref 80–100)
NRBC # BLD: 0 /100 WBCS — SIGNIFICANT CHANGE UP (ref 0–0)
PLATELET # BLD AUTO: 239 K/UL — SIGNIFICANT CHANGE UP (ref 150–400)
POTASSIUM SERPL-MCNC: 3.7 MMOL/L — SIGNIFICANT CHANGE UP (ref 3.5–5.3)
POTASSIUM SERPL-SCNC: 3.7 MMOL/L — SIGNIFICANT CHANGE UP (ref 3.5–5.3)
PROT SERPL-MCNC: 8.5 G/DL — HIGH (ref 6–8.3)
RBC # BLD: 4.31 M/UL — SIGNIFICANT CHANGE UP (ref 3.8–5.2)
RBC # FLD: 13.3 % — SIGNIFICANT CHANGE UP (ref 10.3–14.5)
SODIUM SERPL-SCNC: 142 MMOL/L — SIGNIFICANT CHANGE UP (ref 135–145)
WBC # BLD: 6.19 K/UL — SIGNIFICANT CHANGE UP (ref 3.8–10.5)
WBC # FLD AUTO: 6.19 K/UL — SIGNIFICANT CHANGE UP (ref 3.8–10.5)

## 2020-01-07 PROCEDURE — 99222 1ST HOSP IP/OBS MODERATE 55: CPT

## 2020-01-07 PROCEDURE — 99235 HOSP IP/OBS SAME DATE MOD 70: CPT

## 2020-01-07 PROCEDURE — 93010 ELECTROCARDIOGRAM REPORT: CPT

## 2020-01-07 RX ORDER — PANTOPRAZOLE SODIUM 20 MG/1
40 TABLET, DELAYED RELEASE ORAL
Refills: 0 | Status: DISCONTINUED | OUTPATIENT
Start: 2020-01-07 | End: 2020-01-09

## 2020-01-07 RX ORDER — ENOXAPARIN SODIUM 100 MG/ML
40 INJECTION SUBCUTANEOUS DAILY
Refills: 0 | Status: DISCONTINUED | OUTPATIENT
Start: 2020-01-07 | End: 2020-01-09

## 2020-01-07 RX ORDER — AMLODIPINE BESYLATE 2.5 MG/1
10 TABLET ORAL DAILY
Refills: 0 | Status: DISCONTINUED | OUTPATIENT
Start: 2020-01-07 | End: 2020-01-09

## 2020-01-07 RX ORDER — LOSARTAN POTASSIUM 100 MG/1
100 TABLET, FILM COATED ORAL DAILY
Refills: 0 | Status: DISCONTINUED | OUTPATIENT
Start: 2020-01-07 | End: 2020-01-09

## 2020-01-07 RX ORDER — FERROUS SULFATE 325(65) MG
325 TABLET ORAL DAILY
Refills: 0 | Status: DISCONTINUED | OUTPATIENT
Start: 2020-01-07 | End: 2020-01-09

## 2020-01-07 RX ORDER — ACETAMINOPHEN 500 MG
650 TABLET ORAL EVERY 6 HOURS
Refills: 0 | Status: DISCONTINUED | OUTPATIENT
Start: 2020-01-07 | End: 2020-01-09

## 2020-01-07 RX ORDER — CEFAZOLIN SODIUM 1 G
1000 VIAL (EA) INJECTION EVERY 8 HOURS
Refills: 0 | Status: DISCONTINUED | OUTPATIENT
Start: 2020-01-07 | End: 2020-01-09

## 2020-01-07 RX ADMIN — Medication 1000 MILLIGRAM(S): at 00:55

## 2020-01-07 RX ADMIN — LOSARTAN POTASSIUM 100 MILLIGRAM(S): 100 TABLET, FILM COATED ORAL at 06:39

## 2020-01-07 RX ADMIN — AMLODIPINE BESYLATE 10 MILLIGRAM(S): 2.5 TABLET ORAL at 06:39

## 2020-01-07 RX ADMIN — Medication 325 MILLIGRAM(S): at 12:44

## 2020-01-07 RX ADMIN — Medication 100 MILLIGRAM(S): at 06:39

## 2020-01-07 RX ADMIN — Medication 100 MILLIGRAM(S): at 21:26

## 2020-01-07 RX ADMIN — Medication 100 MILLIGRAM(S): at 14:43

## 2020-01-07 RX ADMIN — ENOXAPARIN SODIUM 40 MILLIGRAM(S): 100 INJECTION SUBCUTANEOUS at 12:44

## 2020-01-07 RX ADMIN — PANTOPRAZOLE SODIUM 40 MILLIGRAM(S): 20 TABLET, DELAYED RELEASE ORAL at 06:39

## 2020-01-07 NOTE — CHART NOTE - NSCHARTNOTEFT_GEN_A_CORE
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 Bass, labs and consultant notes reviewed.   Anticipate D/C in next 24-48 hrs.

## 2020-01-07 NOTE — ED PROVIDER NOTE - CLINICAL SUMMARY MEDICAL DECISION MAKING FREE TEXT BOX
RLE swelling, redness, pain with fever.  no sepsis criteria met. likely RLE cellulitis. r/o dvt.  check labs, US Duplex .  check labs. give iv abx.  given comorbidities and chronic lymphedema, and rapid progression of sxs, would admit for iv abx.

## 2020-01-07 NOTE — ED PROVIDER NOTE - ENMT, MLM
Airway patent, Nasal mucosa clear. Mouth with normal mucosa. Throat has no vesicles, no oropharyngeal exudates and uvula is midline. TMs normal, intact

## 2020-01-07 NOTE — H&P ADULT - NSHPLABSRESULTS_GEN_ALL_CORE
13.6   7.03  )-----------( 240      ( 06 Jan 2020 22:45 )             42.5       01-06    139  |  102  |  15  ----------------------------<  181<H>  4.8   |  28  |  0.81    Ca    8.9      06 Jan 2020 22:45    TPro  8.5<H>  /  Alb  3.3  /  TBili  0.7  /  DBili  x   /  AST  42<H>  /  ALT  18  /  AlkPhos  95  01-06         LIVER FUNCTIONS - ( 06 Jan 2020 22:45 )  Alb: 3.3 g/dL / Pro: 8.5 g/dL / ALK PHOS: 95 U/L / ALT: 18 U/L / AST: 42 U/L / GGT: x               PT/INR - ( 06 Jan 2020 22:45 )   PT: 12.6 sec;   INR: 1.12 ratio         PTT - ( 06 Jan 2020 22:45 )  PTT:27.6 sec      EKG: NSR at 76bpm, no acute st changes      CXR: wet read increased interstitial markings.

## 2020-01-07 NOTE — ED PROVIDER NOTE - OBJECTIVE STATEMENT
pt c h/o chronic lymphedema, stage 4 mesenteric cancer, p/w RLE pain, moderate, sharp for last 2 days with increased warmth, swelling, redness. no injury. + fever 100.5  2 days ago.  no chest pain, sob. no n/v.

## 2020-01-07 NOTE — H&P ADULT - NSHPPHYSICALEXAM_GEN_ALL_CORE
Vital Signs Last 24 Hrs  T(C): 36.9 (06 Jan 2020 21:51), Max: 36.9 (06 Jan 2020 21:51)  T(F): 98.5 (06 Jan 2020 21:51), Max: 98.5 (06 Jan 2020 21:51)  HR: 79 (06 Jan 2020 21:51) (79 - 89)  BP: 143/85 (06 Jan 2020 21:51) (143/85 - 155/74)  BP(mean): --  RR: 16 (06 Jan 2020 21:51) (16 - 16)  SpO2: 100% (06 Jan 2020 21:51) (99% - 100%)  Daily Height in cm: 154.94 (06 Jan 2020 21:51)    Daily   CAPILLARY BLOOD GLUCOSE        I&O's Summary      GENERAL: NAD, morbid obesity  HEAD:  Normocephalic  EYES: EOMI, PERRLA, conjunctiva and sclera clear  ENMT: No tonsillar erythema, exudates, or enlargement; Moist mucous membranes, No lesions  NECK: Supple, No JVD, no bruit, normal thyroid  NERVOUS SYSTEM:  Alert & Oriented X3, Good concentration; grossly  Motor Strength 5/5 B/L upper and lower extremities; DTRs 2+ intact and symmetric  CHEST/LUNG: Clear to auscultation bilaterally; No rales, rhonchi, wheezing, or rubs  HEART: Regular rate and rhythm; No murmurs, rubs, or gallops  ABDOMEN: Soft, Nontender, Nondistended; Bowel sounds present  EXTREMITIES:  2+ Peripheral Pulses, No clubbing, cyanosis. RLE with cellulitis along dorsum of foot extending to calf and distal thigh. no open lesions or blisters  LYMPH: No lymphadenopathy noted  SKIN: No rashes or lesions

## 2020-01-07 NOTE — ED PROVIDER NOTE - SKIN RASH DESCRIPTION
R lower leg with diffuse anterior moderate erythema with increased warmth, mild ttp. chronic lymphedema , R lower leg with increased swelling vs left.  2+ DP. nl distal sensation and strength

## 2020-01-07 NOTE — PATIENT PROFILE ADULT - TRANSPORTATION
Discussed with pt. Will ERX HCTZ to [facility]. He will call next week with readings prior to going out of town.   no

## 2020-01-07 NOTE — H&P ADULT - NSICDXPASTMEDICALHX_GEN_ALL_CORE_FT
PAST MEDICAL HISTORY:  Anemia Fe def    Cancer abdominal    Gallstones     Lymphedema     Mesenteric mass     Morbid obesity

## 2020-01-07 NOTE — CONSULT NOTE ADULT - SUBJECTIVE AND OBJECTIVE BOX
HPI:   Patient is a 59y female with neruoendocrine tumor mets to lung and liver or lutreotide shots, bilateral leg lymphedema, prior episodes of cellulitis legs who comes in with red, swollen painful right leg, had some fever. No trauma, has a cat but no scratch.     REVIEW OF SYSTEMS:  All other review of systems negative (Comprehensive ROS)    PAST MEDICAL & SURGICAL HISTORY:  Anemia: Fe def  Lymphedema  Morbid obesity  Mesenteric mass  Cancer: abdominal  Gallstones  H/O  section      Allergies    codeine (Unknown)  fish (Unknown)  IV contrast (Short breath; Anaphylaxis)    Intolerances        Antimicrobials Day #  :2  ceFAZolin   IVPB 1000 milliGRAM(s) IV Intermittent every 8 hours    Other Medications:  acetaminophen   Tablet .. 650 milliGRAM(s) Oral every 6 hours PRN  amLODIPine   Tablet 10 milliGRAM(s) Oral daily  enoxaparin Injectable 40 milliGRAM(s) SubCutaneous daily  ferrous    sulfate 325 milliGRAM(s) Oral daily  losartan 100 milliGRAM(s) Oral daily  pantoprazole    Tablet 40 milliGRAM(s) Oral before breakfast      FAMILY HISTORY:  FH: HTN (hypertension)      SOCIAL HISTORY:  Smoking: [ ]Yes [ x]No  ETOH: [ ]Yes [ x]No  Drug Use: [ ]Yes [ x]No   [ x] Single[ ]    T(F): 98.3 (20 @ 10:23), Max: 99 (20 @ 05:22)  HR: 71 (20 @ 10:23)  BP: 141/76 (20 @ 10:23)  RR: 16 (20 @ 10:23)  SpO2: 96% (20 @ 10:23)  Wt(kg): --    PHYSICAL EXAM:  General: alert, no acute distress  Eyes:  anicteric, no conjunctival injection, no discharge  Oropharynx: no lesions or injection 	  Neck: supple, without adenopathy  Lungs: clear to auscultation  Heart: regular rate and rhythm; 2/6 sys m  Abdomen: soft, nondistended, nontender, without mass or organomegaly  Skin: no lesions  Extremities: right leg edema, red, receding from line, no streak, no groin tender. left leg no redness  Neurologic: alert, oriented, moves all extremities    LAB RESULTS:                        12.3   6.19  )-----------( 239      ( 2020 06:20 )             39.1         142  |  104  |  15  ----------------------------<  121<H>  3.7   |  27  |  0.76    Ca    8.6      2020 06:20    TPro  8.5<H>  /  Alb  3.3  /  TBili  0.7  /  DBili  x   /  AST  42<H>  /  ALT  18  /  AlkPhos  95  06    LIVER FUNCTIONS - ( 2020 22:45 )  Alb: 3.3 g/dL / Pro: 8.5 g/dL / ALK PHOS: 95 U/L / ALT: 18 U/L / AST: 42 U/L / GGT: x                   RADIOLOGY REVIEWED:  < from:  Duplex Venous Lower Ext Ltd, Right (20 @ 23:45) >  IMPRESSION:     No evidence of right lower extremity deep venous thrombosis.    < end of copied text >    Impression: Patient with leg lymphedema with met neuroendocrine tumor on lutreotide shots comes in with red swollen painful right leg. SHe has a cat but no scratch and is responding to cefazolin. Doubt pasteurella.   Recommendations:  continue cefazolin  elevate leg  hope for po antibiotic in next 24-48 hours  would recommend keeping antibiotics at home after this in case of another case of cellulitis which she remains at risk for

## 2020-01-07 NOTE — H&P ADULT - HISTORY OF PRESENT ILLNESS
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 Pt denied any specific trauma to the R leg but noticed increased swelling, redness and tenderness in the RLE started 2 days ago with some difficulty ambulating. Initially had a fever of 100.8 2 days ago but none since. Denied any chills, NVD, SOB, cough, dysuria. Pt has hx of cellulitis about every 2 years or so with no hx of MRSA. In ED, afebrile, did not meet sepsis criteria, wbc: 7.

## 2020-01-07 NOTE — H&P ADULT - ASSESSMENT
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     # HTN  cont amlodipine and losartan    #DVT/GI proph.

## 2020-01-07 NOTE — H&P ADULT - NSHPREVIEWOFSYSTEMS_GEN_ALL_CORE
CONSTITUTIONAL: + fever, weight loss, or fatigue  EYES: No eye pain, visual disturbances, or discharge  ENMT:  No difficulty hearing, tinnitus, vertigo; No sinus or throat pain  NECK: No pain or stiffness  RESPIRATORY: No cough, wheezing, chills or hemoptysis; No shortness of breath  CARDIOVASCULAR: No chest pain, palpitations, dizziness, or leg swelling  GASTROINTESTINAL: No abdominal or epigastric pain. No nausea, vomiting, or hematemesis; No diarrhea or constipation. No melena or hematochezia.  GENITOURINARY: No dysuria, frequency, hematuria, or incontinence  NEUROLOGICAL: No headaches, memory loss, loss of strength, numbness, or tremors  SKIN: No itching, burning, rashes, or lesions   LYMPH NODES: No enlarged glands  ENDOCRINE: No heat or cold intolerance; No hair loss  MUSCULOSKELETAL: RLE pain as per HPI  PSYCHIATRIC: No depression, anxiety, mood swings, or difficulty sleeping  HEME/LYMPH: No easy bruising, or bleeding gums  ALLERY AND IMMUNOLOGIC: No hives or eczema    IMPROVE VTE Individual Risk Assessment          RISK                                                          Points  [  ] Previous VTE                                                3  [  ] Thrombophilia                                             2  [  ] Lower limb paralysis                                   2        (unable to hold up >15 seconds)    [ 2 ] Current Cancer                                             2         (within 6 months)  [  ] Immobilization > 24 hrs                              1  [  ] ICU/CCU stay > 24 hours                             1  [  ] Age > 60                                                         1    IMPROVE VTE Score:         [     2    ]    Total Risk Factor Score:    0 - 1:   Consider IPC  >2 - 3:  Thromboprophylaxis required (enoxaparin or SQ heparin)        >4:   High Risk: Thromboprophylaxis required (enoxaparin or SQ heparin), optional add IPC  **If CONTRAINDICATION to enoxaparin or SQ heparin, USE IPCs**

## 2020-01-08 ENCOUNTER — OUTPATIENT (OUTPATIENT)
Dept: OUTPATIENT SERVICES | Facility: HOSPITAL | Age: 60
LOS: 1 days | Discharge: HOME | End: 2020-01-08

## 2020-01-08 DIAGNOSIS — Z98.891 HISTORY OF UTERINE SCAR FROM PREVIOUS SURGERY: Chronic | ICD-10-CM

## 2020-01-08 DIAGNOSIS — C7A.8 OTHER MALIGNANT NEUROENDOCRINE TUMORS: ICD-10-CM

## 2020-01-08 DIAGNOSIS — E66.01 MORBID (SEVERE) OBESITY DUE TO EXCESS CALORIES: ICD-10-CM

## 2020-01-08 PROCEDURE — 99233 SBSQ HOSP IP/OBS HIGH 50: CPT

## 2020-01-08 RX ADMIN — Medication 325 MILLIGRAM(S): at 11:53

## 2020-01-08 RX ADMIN — ENOXAPARIN SODIUM 40 MILLIGRAM(S): 100 INJECTION SUBCUTANEOUS at 11:53

## 2020-01-08 RX ADMIN — Medication 100 MILLIGRAM(S): at 21:02

## 2020-01-08 RX ADMIN — Medication 100 MILLIGRAM(S): at 13:11

## 2020-01-08 RX ADMIN — PANTOPRAZOLE SODIUM 40 MILLIGRAM(S): 20 TABLET, DELAYED RELEASE ORAL at 05:42

## 2020-01-08 RX ADMIN — LOSARTAN POTASSIUM 100 MILLIGRAM(S): 100 TABLET, FILM COATED ORAL at 05:42

## 2020-01-08 RX ADMIN — Medication 100 MILLIGRAM(S): at 05:42

## 2020-01-08 RX ADMIN — AMLODIPINE BESYLATE 10 MILLIGRAM(S): 2.5 TABLET ORAL at 05:42

## 2020-01-08 NOTE — PROGRESS NOTE ADULT - ASSESSMENT
Afebrile white count normal  infectious disease note appreciated less tenderness continue IV antibiotics for now patient's morbid obesity we discussed with her at length hemoglobin A1c noted to be 6.6

## 2020-01-09 ENCOUNTER — TRANSCRIPTION ENCOUNTER (OUTPATIENT)
Age: 60
End: 2020-01-09

## 2020-01-09 VITALS — HEIGHT: 61 IN | WEIGHT: 293 LBS

## 2020-01-09 PROCEDURE — 93971 EXTREMITY STUDY: CPT

## 2020-01-09 PROCEDURE — 96365 THER/PROPH/DIAG IV INF INIT: CPT

## 2020-01-09 PROCEDURE — 83036 HEMOGLOBIN GLYCOSYLATED A1C: CPT

## 2020-01-09 PROCEDURE — 87040 BLOOD CULTURE FOR BACTERIA: CPT

## 2020-01-09 PROCEDURE — 36415 COLL VENOUS BLD VENIPUNCTURE: CPT

## 2020-01-09 PROCEDURE — 71045 X-RAY EXAM CHEST 1 VIEW: CPT

## 2020-01-09 PROCEDURE — 99239 HOSP IP/OBS DSCHRG MGMT >30: CPT

## 2020-01-09 PROCEDURE — 93005 ELECTROCARDIOGRAM TRACING: CPT

## 2020-01-09 PROCEDURE — 99285 EMERGENCY DEPT VISIT HI MDM: CPT | Mod: 25

## 2020-01-09 PROCEDURE — G0378: CPT

## 2020-01-09 PROCEDURE — 80048 BASIC METABOLIC PNL TOTAL CA: CPT

## 2020-01-09 PROCEDURE — 85027 COMPLETE CBC AUTOMATED: CPT

## 2020-01-09 PROCEDURE — 85610 PROTHROMBIN TIME: CPT

## 2020-01-09 PROCEDURE — 85730 THROMBOPLASTIN TIME PARTIAL: CPT

## 2020-01-09 PROCEDURE — 80053 COMPREHEN METABOLIC PANEL: CPT

## 2020-01-09 PROCEDURE — 96367 TX/PROPH/DG ADDL SEQ IV INF: CPT

## 2020-01-09 RX ORDER — CEPHALEXIN 500 MG
1 CAPSULE ORAL
Qty: 20 | Refills: 0
Start: 2020-01-09 | End: 2020-01-13

## 2020-01-09 RX ADMIN — Medication 100 MILLIGRAM(S): at 05:24

## 2020-01-09 RX ADMIN — LOSARTAN POTASSIUM 100 MILLIGRAM(S): 100 TABLET, FILM COATED ORAL at 05:25

## 2020-01-09 RX ADMIN — Medication 100 MILLIGRAM(S): at 12:15

## 2020-01-09 RX ADMIN — PANTOPRAZOLE SODIUM 40 MILLIGRAM(S): 20 TABLET, DELAYED RELEASE ORAL at 05:24

## 2020-01-09 RX ADMIN — ENOXAPARIN SODIUM 40 MILLIGRAM(S): 100 INJECTION SUBCUTANEOUS at 12:15

## 2020-01-09 RX ADMIN — AMLODIPINE BESYLATE 10 MILLIGRAM(S): 2.5 TABLET ORAL at 05:25

## 2020-01-09 RX ADMIN — Medication 325 MILLIGRAM(S): at 12:15

## 2020-01-09 NOTE — DISCHARGE NOTE NURSING/CASE MANAGEMENT/SOCIAL WORK - PATIENT PORTAL LINK FT
You can access the FollowMyHealth Patient Portal offered by [facility] by registering at the following website: [email or web address]. By joining [employer]’s FollowMyHealth portal, you will also be able to view your health information using other applications (apps) compatible with our system.

## 2020-01-09 NOTE — DISCHARGE NOTE PROVIDER - NSDCCPCAREPLAN_GEN_ALL_CORE_FT
PRINCIPAL DISCHARGE DIAGNOSIS  Diagnosis: Cellulitis of right lower leg  Assessment and Plan of Treatment:

## 2020-01-09 NOTE — DISCHARGE NOTE PROVIDER - NSDCMRMEDTOKEN_GEN_ALL_CORE_FT
amLODIPine 10 mg oral tablet: 1 tab(s) orally once a day  ferrous sulfate 325 mg (65 mg elemental iron) oral tablet: 1 tab(s) orally once a day   Keflex 500 mg oral capsule: 1 cap(s) orally 4 times a day   losartan 100 mg oral tablet: 1 tab(s) orally once a day  Vitamin D3 1000 intl units (25 mcg) oral tablet: 1 tab(s) orally once a day

## 2020-01-09 NOTE — DISCHARGE NOTE PROVIDER - CARE PROVIDER_API CALL
Delroy Bass)  Gastroenterology; Internal Medicine  [address] [place] [address], [address]  [place], [place] [id number]  Phone: [phone]  Fax: [phone]  Follow Up Time:

## 2020-01-09 NOTE — CHART NOTE - NSCHARTNOTEFT_GEN_A_CORE
Reviewed prior progress notes, labs and imaging.    Discussed with Dr. Bass    Primary Diagnosis: right lower ext cellulitis, lymphedema, neuroendocrine tumor      Plan: s/p cefazolin now on po keflex, id following        Anticipated Discharge: today on po keflex

## 2020-01-09 NOTE — DISCHARGE NOTE PROVIDER - NSDCFUSCHEDAPPT_GEN_ALL_CORE_FT
UCHE RICHTER ; 01/10/2020 ; [facility]  UCHE RICHTER ; 01/13/2020 ; [facility]  UCHE RICHTER ; 02/14/2020 ; [facility]  UCHE RICHTER ; 02/28/2020 ; [facility]

## 2020-01-09 NOTE — DISCHARGE NOTE PROVIDER - HOSPITAL COURSE
59 female with stage 4 neuroendocrine tumor of small bowel with mets on monthly lanreotide, cholelithiasis, mod aortic stenosis, lymphedema admitted with right lower extremity cellulitis.  ID consulted, managed with two days of iv cefazolin, clinically improved, cleared for discharge on oral keflex for five more days. Discussed with Dr. Alves who is aware of plan of care.

## 2020-01-10 ENCOUNTER — APPOINTMENT (OUTPATIENT)
Age: 60
End: 2020-01-10

## 2020-01-14 LAB — CYTOLOGY CVX/VAG DOC THIN PREP: NORMAL

## 2020-01-15 ENCOUNTER — APPOINTMENT (OUTPATIENT)
Dept: INTERNAL MEDICINE | Facility: CLINIC | Age: 60
End: 2020-01-15
Payer: COMMERCIAL

## 2020-01-15 VITALS
WEIGHT: 293 LBS | BODY MASS INDEX: 55.32 KG/M2 | HEART RATE: 88 BPM | DIASTOLIC BLOOD PRESSURE: 60 MMHG | HEIGHT: 61 IN | SYSTOLIC BLOOD PRESSURE: 124 MMHG | RESPIRATION RATE: 16 BRPM

## 2020-01-15 VITALS — TEMPERATURE: 98.6 F

## 2020-01-15 DIAGNOSIS — N93.9 ABNORMAL UTERINE AND VAGINAL BLEEDING, UNSPECIFIED: ICD-10-CM

## 2020-01-15 DIAGNOSIS — L03.115 CELLULITIS OF RIGHT LOWER LIMB: ICD-10-CM

## 2020-01-15 PROCEDURE — 99496 TRANSJ CARE MGMT HIGH F2F 7D: CPT | Mod: 25

## 2020-01-15 PROCEDURE — G0447 BEHAVIOR COUNSEL OBESITY 15M: CPT

## 2020-01-15 NOTE — REVIEW OF SYSTEMS
[Lower Ext Edema] : lower extremity edema [Nocturia] : nocturia [Joint Stiffness] : joint stiffness [Anxiety] : anxiety [Fever] : no fever [Chills] : no chills [Hot Flashes] : no hot flashes [Fatigue] : no fatigue [Night Sweats] : no night sweats [Recent Change In Weight] : ~T no recent weight change [Discharge] : no discharge [Pain] : no pain [Redness] : no redness [Vision Problems] : no vision problems [Dryness] : no dryness  [Earache] : no earache [Itching] : no itching [Nosebleed] : no nosebleeds [Hearing Loss] : no hearing loss [Hoarseness] : no hoarseness [Nasal Discharge] : no nasal discharge [Sore Throat] : no sore throat [Postnasal Drip] : no postnasal drip [Chest Pain] : no chest pain [Palpitations] : no palpitations [Orthopnea] : no orthopnea [Leg Claudication] : no leg claudication [Wheezing] : no wheezing [Shortness Of Breath] : no shortness of breath [Dyspnea on Exertion] : no dyspnea on exertion [Abdominal Pain] : no abdominal pain [Cough] : no cough [Nausea] : no nausea [Diarrhea] : diarrhea [Vomiting] : no vomiting [Constipation] : no constipation [Heartburn] : no heartburn [Dysuria] : no dysuria [Melena] : no melena [Incontinence] : no incontinence [Hematuria] : no hematuria [Poor Libido] : libido not poor [Frequency] : no frequency [Vaginal Discharge] : no vaginal discharge [Dysmenorrhea] : no dysmenorrhea [Joint Pain] : no joint pain [Joint Swelling] : no joint swelling [Muscle Pain] : no muscle pain [Muscle Weakness] : no muscle weakness [Back Pain] : no back pain [Mole Changes] : no mole changes [Nail Changes] : no nail changes [Hair Changes] : no hair changes [Skin Rash] : no skin rash [Dizziness] : no dizziness [Headache] : no headache [Fainting] : no fainting [Confusion] : no confusion [Memory Loss] : no memory loss [Unsteady Walking] : no ataxia [Suicidal] : not suicidal [Insomnia] : no insomnia [Easy Bleeding] : no easy bleeding [Depression] : no depression [Easy Bruising] : no easy bruising [Swollen Glands] : no swollen glands [de-identified] : resolved cellulitis RLE

## 2020-01-15 NOTE — PHYSICAL EXAM
[No Acute Distress] : no acute distress [Well Nourished] : well nourished [Well Developed] : well developed [Well-Appearing] : well-appearing [Normal Voice/Communication] : normal voice/communication [Normal Sclera/Conjunctiva] : normal sclera/conjunctiva [EOMI] : extraocular movements intact [PERRL] : pupils equal round and reactive to light [Normal Outer Ear/Nose] : the outer ears and nose were normal in appearance [Normal Oropharynx] : the oropharynx was normal [Normal TMs] : both tympanic membranes were normal [No JVD] : no jugular venous distention [Normal Nasal Mucosa] : the nasal mucosa was normal [No Lymphadenopathy] : no lymphadenopathy [Supple] : supple [Thyroid Normal, No Nodules] : the thyroid was normal and there were no nodules present [No Respiratory Distress] : no respiratory distress  [No Accessory Muscle Use] : no accessory muscle use [Clear to Auscultation] : lungs were clear to auscultation bilaterally [Normal Percussion] : the chest was normal to percussion [Normal Rate] : normal rate  [Normal S1, S2] : normal S1 and S2 [Regular Rhythm] : with a regular rhythm [No Murmur] : no murmur heard [No Carotid Bruits] : no carotid bruits [No Abdominal Bruit] : a ~M bruit was not heard ~T in the abdomen [Pedal Pulses Present] : the pedal pulses are present [No Varicosities] : no varicosities [No Edema] : there was no peripheral edema [No Palpable Aorta] : no palpable aorta [No Extremity Clubbing/Cyanosis] : no extremity clubbing/cyanosis [Declined Breast Exam] : declined breast exam  [Soft] : abdomen soft [Non Tender] : non-tender [No Masses] : no abdominal mass palpated [Non-distended] : non-distended [No HSM] : no HSM [Normal Bowel Sounds] : normal bowel sounds [No Hernias] : no hernias [Declined Rectal Exam] : declined rectal exam [Normal Supraclavicular Nodes] : no supraclavicular lymphadenopathy [Normal Axillary Nodes] : no axillary lymphadenopathy [Normal Posterior Cervical Nodes] : no posterior cervical lymphadenopathy [Normal Anterior Cervical Nodes] : no anterior cervical lymphadenopathy [Normal Inguinal Nodes] : no inguinal lymphadenopathy [Normal Femoral Nodes] : no femoral lymphadenopathy [No CVA Tenderness] : no CVA  tenderness [No Spinal Tenderness] : no spinal tenderness [No Joint Swelling] : no joint swelling [Grossly Normal Strength/Tone] : grossly normal strength/tone [No Rash] : no rash [No Skin Lesions] : no skin lesions [Coordination Grossly Intact] : coordination grossly intact [No Focal Deficits] : no focal deficits [Normal Gait] : normal gait [Deep Tendon Reflexes (DTR)] : deep tendon reflexes were 2+ and symmetric [Speech Grossly Normal] : speech grossly normal [Memory Grossly Normal] : memory grossly normal [Normal Affect] : the affect was normal [Alert and Oriented x3] : oriented to person, place, and time [Normal Mood] : the mood was normal [Normal Insight/Judgement] : insight and judgment were intact [Acne] : no acne

## 2020-01-15 NOTE — HISTORY OF PRESENT ILLNESS
[Post-hospitalization from ___ Hospital] : Post-hospitalization from [unfilled] Hospital [Discharged on ___] : discharged on [unfilled] [FreeTextEntry2] : 59-year-old woman with morbid obesity no endocrine neoplasm of the GI tract chronic migraines recent vaginal bleeding and hypertension comes to the office for transitional care after recent hospitalization for cellulitis of her right lower extremity she has completed a course of Keflex with complete resolution of the redness and tenderness in her right lower leg she denies temperature chills sweats or myalgias she's had no headache sinus congestion sore throat cough wheezing pleurisy chest pain shortness of breath exertional dyspnea lightheadedness palpitations dizziness vertigo or syncope she denies abdominal pain nausea vomiting diarrhea constipation bright red blood per rectum or black stools her appetite has been better weight has been stable she denies significant skeletal complaints she has chronic bilateral leg edema and has had no recent dysuria or gross hematuria she denies any recent skin rashes

## 2020-01-15 NOTE — COUNSELING
[Benefits of weight loss discussed] : Benefits of weight loss discussed [Potential consequences of obesity discussed] : Potential consequences of obesity discussed [Structured Weight Management Program suggested:] : Structured weight management program suggested [Encouraged to increase physical activity] : Encouraged to increase physical activity [Good understanding] : Patient has a good understanding of disease, goals and obesity follow-up plan [FreeTextEntry1] : weight watchers [FreeTextEntry4] : 15

## 2020-01-15 NOTE — ASSESSMENT
[FreeTextEntry1] : Physical exam shows a well-developed female in no acute distress temperature 98.6°F blood pressure 124/60 height of 5 feet 1 inches weight of 330 pounds BMI of 62.35 HEENT was unremarkable chest was clear vascular was regular with no extra heart sounds or murmurs abdomen was soft and nontender extremities showed bilateral edema resolved cellulitis of the right lower extremity neurologic exam was nonfocal patient was given Keflex to keep her around the house if the cellulitis in her leg which has been recurrent thoughts to happen again started up the Keflex patient was seriously told to concerning her weight and the severe medical consequences. She was reinforced at the time to act is now . She seems overwhelmed with the health of her  was in the hospital presently and doesn't seem to think she can now be energy to work diligently on diet Periactin surgery was discussed as well as Weight Watchers or a dietitian

## 2020-01-15 NOTE — HEALTH RISK ASSESSMENT
[HIV test declined] : HIV test declined [Hepatitis C test declined] : Hepatitis C test declined [MammogramDate] : 09/19

## 2020-01-19 ENCOUNTER — RX RENEWAL (OUTPATIENT)
Age: 60
End: 2020-01-19

## 2020-01-24 ENCOUNTER — RX RENEWAL (OUTPATIENT)
Age: 60
End: 2020-01-24

## 2020-01-28 ENCOUNTER — FORM ENCOUNTER (OUTPATIENT)
Age: 60
End: 2020-01-28

## 2020-01-28 RX ORDER — CEPHALEXIN 500 MG/1
500 TABLET ORAL EVERY 8 HOURS
Qty: 42 | Refills: 0 | Status: DISCONTINUED | COMMUNITY
Start: 2020-01-15 | End: 2020-01-28

## 2020-01-29 ENCOUNTER — OUTPATIENT (OUTPATIENT)
Dept: OUTPATIENT SERVICES | Facility: HOSPITAL | Age: 60
LOS: 1 days | End: 2020-01-29
Payer: COMMERCIAL

## 2020-01-29 ENCOUNTER — APPOINTMENT (OUTPATIENT)
Dept: CT IMAGING | Facility: CLINIC | Age: 60
End: 2020-01-29
Payer: COMMERCIAL

## 2020-01-29 DIAGNOSIS — Z98.891 HISTORY OF UTERINE SCAR FROM PREVIOUS SURGERY: Chronic | ICD-10-CM

## 2020-01-29 DIAGNOSIS — D3A.8 OTHER BENIGN NEUROENDOCRINE TUMORS: ICD-10-CM

## 2020-01-29 PROCEDURE — 71250 CT THORAX DX C-: CPT | Mod: 26

## 2020-01-29 PROCEDURE — 71250 CT THORAX DX C-: CPT

## 2020-01-30 ENCOUNTER — MESSAGE (OUTPATIENT)
Age: 60
End: 2020-01-30

## 2020-02-01 ENCOUNTER — FORM ENCOUNTER (OUTPATIENT)
Age: 60
End: 2020-02-01

## 2020-02-02 ENCOUNTER — APPOINTMENT (OUTPATIENT)
Dept: MRI IMAGING | Facility: CLINIC | Age: 60
End: 2020-02-02
Payer: COMMERCIAL

## 2020-02-02 ENCOUNTER — OUTPATIENT (OUTPATIENT)
Dept: OUTPATIENT SERVICES | Facility: HOSPITAL | Age: 60
LOS: 1 days | End: 2020-02-02
Payer: COMMERCIAL

## 2020-02-02 DIAGNOSIS — D3A.8 OTHER BENIGN NEUROENDOCRINE TUMORS: ICD-10-CM

## 2020-02-02 DIAGNOSIS — Z98.891 HISTORY OF UTERINE SCAR FROM PREVIOUS SURGERY: Chronic | ICD-10-CM

## 2020-02-02 PROCEDURE — A9585: CPT

## 2020-02-02 PROCEDURE — 72197 MRI PELVIS W/O & W/DYE: CPT | Mod: 26

## 2020-02-02 PROCEDURE — 72197 MRI PELVIS W/O & W/DYE: CPT

## 2020-02-02 PROCEDURE — 74183 MRI ABD W/O CNTR FLWD CNTR: CPT

## 2020-02-02 PROCEDURE — 74183 MRI ABD W/O CNTR FLWD CNTR: CPT | Mod: 26

## 2020-02-10 ENCOUNTER — OUTPATIENT (OUTPATIENT)
Dept: OUTPATIENT SERVICES | Facility: HOSPITAL | Age: 60
LOS: 1 days | Discharge: HOME | End: 2020-02-10

## 2020-02-10 DIAGNOSIS — C7A.8 OTHER MALIGNANT NEUROENDOCRINE TUMORS: ICD-10-CM

## 2020-02-10 DIAGNOSIS — Z98.891 HISTORY OF UTERINE SCAR FROM PREVIOUS SURGERY: Chronic | ICD-10-CM

## 2020-02-11 ENCOUNTER — RX RENEWAL (OUTPATIENT)
Age: 60
End: 2020-02-11

## 2020-02-14 ENCOUNTER — APPOINTMENT (OUTPATIENT)
Age: 60
End: 2020-02-14

## 2020-02-25 ENCOUNTER — RX RENEWAL (OUTPATIENT)
Age: 60
End: 2020-02-25

## 2020-02-28 ENCOUNTER — APPOINTMENT (OUTPATIENT)
Dept: HEMATOLOGY ONCOLOGY | Facility: CLINIC | Age: 60
End: 2020-02-28
Payer: COMMERCIAL

## 2020-02-28 VITALS
RESPIRATION RATE: 18 BRPM | OXYGEN SATURATION: 99 % | DIASTOLIC BLOOD PRESSURE: 77 MMHG | HEART RATE: 75 BPM | SYSTOLIC BLOOD PRESSURE: 150 MMHG | TEMPERATURE: 98.3 F | WEIGHT: 293 LBS | BODY MASS INDEX: 65.4 KG/M2

## 2020-02-28 PROCEDURE — 99213 OFFICE O/P EST LOW 20 MIN: CPT

## 2020-03-04 ENCOUNTER — APPOINTMENT (OUTPATIENT)
Dept: INTERNAL MEDICINE | Facility: CLINIC | Age: 60
End: 2020-03-04
Payer: COMMERCIAL

## 2020-03-04 VITALS
HEIGHT: 61 IN | TEMPERATURE: 97.9 F | OXYGEN SATURATION: 98 % | BODY MASS INDEX: 55.32 KG/M2 | HEART RATE: 79 BPM | RESPIRATION RATE: 16 BRPM | DIASTOLIC BLOOD PRESSURE: 80 MMHG | SYSTOLIC BLOOD PRESSURE: 138 MMHG | WEIGHT: 293 LBS

## 2020-03-04 DIAGNOSIS — R10.9 UNSPECIFIED ABDOMINAL PAIN: ICD-10-CM

## 2020-03-04 PROCEDURE — G0447 BEHAVIOR COUNSEL OBESITY 15M: CPT

## 2020-03-04 PROCEDURE — G0444 DEPRESSION SCREEN ANNUAL: CPT

## 2020-03-04 PROCEDURE — 99215 OFFICE O/P EST HI 40 MIN: CPT | Mod: 25

## 2020-03-05 NOTE — HISTORY OF PRESENT ILLNESS
[FreeTextEntry1] : Comes to the office for followup to review her medications and discuss her overall health recently complaining of intermittent right upper quadrant discomfort [de-identified] : 59-year-old woman comes to the office for followup with a history of morbid obesity normal endocrine neoplasm metastatic to the liver right adnexal mass hypertension aortic stenosis and chronic bilateral leg edema patient is known to have gallstones and is in complaining of intermittent mild right upper quadrant discomfort there is no clear association of the discomfort to meals it is 2 / 10 and does not radiate to her back. She denies temperature chills sweats or myalgias he's had no headache sinus congestion sore throat cough wheezing pleurisy chest pain shortness of breath exertional dyspnea lightheadedness palpitations dizziness vertigo or syncope she denies abdominal pain nausea vomiting diarrhea constipation bright red blood per rectum or black stools her appetite has been good and her weight has been stable his chronic lower back pain and occasional pain in her knees she urinates frequently but denies dysuria or gross hematuria she has had no recent skin rashes

## 2020-03-05 NOTE — PHYSICAL EXAM
[No Acute Distress] : no acute distress [Well Nourished] : well nourished [Well Developed] : well developed [Well-Appearing] : well-appearing [Normal Voice/Communication] : normal voice/communication [Normal Sclera/Conjunctiva] : normal sclera/conjunctiva [PERRL] : pupils equal round and reactive to light [EOMI] : extraocular movements intact [Normal Outer Ear/Nose] : the outer ears and nose were normal in appearance [Normal Oropharynx] : the oropharynx was normal [Normal TMs] : both tympanic membranes were normal [Normal Nasal Mucosa] : the nasal mucosa was normal [No JVD] : no jugular venous distention [No Lymphadenopathy] : no lymphadenopathy [Supple] : supple [Thyroid Normal, No Nodules] : the thyroid was normal and there were no nodules present [No Respiratory Distress] : no respiratory distress  [No Accessory Muscle Use] : no accessory muscle use [Clear to Auscultation] : lungs were clear to auscultation bilaterally [Normal Percussion] : the chest was normal to percussion [Normal Rate] : normal rate  [Regular Rhythm] : with a regular rhythm [Normal S1, S2] : normal S1 and S2 [No Murmur] : no murmur heard [No Carotid Bruits] : no carotid bruits [No Abdominal Bruit] : a ~M bruit was not heard ~T in the abdomen [No Varicosities] : no varicosities [Pedal Pulses Present] : the pedal pulses are present [No Edema] : there was no peripheral edema [No Palpable Aorta] : no palpable aorta [No Extremity Clubbing/Cyanosis] : no extremity clubbing/cyanosis [Declined Breast Exam] : declined breast exam  [Soft] : abdomen soft [Non-distended] : non-distended [No Masses] : no abdominal mass palpated [No HSM] : no HSM [Normal Bowel Sounds] : normal bowel sounds [No Hernias] : no hernias [Declined Rectal Exam] : declined rectal exam [Normal Supraclavicular Nodes] : no supraclavicular lymphadenopathy [Normal Axillary Nodes] : no axillary lymphadenopathy [Normal Posterior Cervical Nodes] : no posterior cervical lymphadenopathy [Normal Anterior Cervical Nodes] : no anterior cervical lymphadenopathy [Normal Inguinal Nodes] : no inguinal lymphadenopathy [Normal Femoral Nodes] : no femoral lymphadenopathy [No CVA Tenderness] : no CVA  tenderness [No Spinal Tenderness] : no spinal tenderness [No Joint Swelling] : no joint swelling [Grossly Normal Strength/Tone] : grossly normal strength/tone [No Rash] : no rash [No Skin Lesions] : no skin lesions [Coordination Grossly Intact] : coordination grossly intact [No Focal Deficits] : no focal deficits [Normal Gait] : normal gait [Deep Tendon Reflexes (DTR)] : deep tendon reflexes were 2+ and symmetric [Speech Grossly Normal] : speech grossly normal [Memory Grossly Normal] : memory grossly normal [Normal Affect] : the affect was normal [Alert and Oriented x3] : oriented to person, place, and time [Normal Mood] : the mood was normal [Normal Insight/Judgement] : insight and judgment were intact [Acne] : no acne [de-identified] : mild right upper qudrant tenderness

## 2020-03-05 NOTE — HEALTH RISK ASSESSMENT
[] : No [Yes] : Yes [No falls in past year] : Patient reported no falls in the past year [0] : 1) Little interest or pleasure doing things: Not at all (0) [1] : 2) Feeling down, depressed, or hopeless for several days (1) [de-identified] : rarely [AUC4Nixkm] : 1

## 2020-03-05 NOTE — COUNSELING
[Potential consequences of obesity discussed] : Potential consequences of obesity discussed [Benefits of weight loss discussed] : Benefits of weight loss discussed [Structured Weight Management Program suggested:] : Structured weight management program suggested [Encouraged to increase physical activity] : Encouraged to increase physical activity [Good understanding] : Patient has a good understanding of disease, goals and obesity follow-up plan [FreeTextEntry1] : weight watchers [FreeTextEntry4] : 15

## 2020-03-05 NOTE — REVIEW OF SYSTEMS
[Lower Ext Edema] : lower extremity edema [Abdominal Pain] : abdominal pain [Nocturia] : nocturia [Joint Stiffness] : joint stiffness [Anxiety] : anxiety [Fever] : no fever [Chills] : no chills [Fatigue] : no fatigue [Hot Flashes] : no hot flashes [Night Sweats] : no night sweats [Recent Change In Weight] : ~T no recent weight change [Discharge] : no discharge [Pain] : no pain [Redness] : no redness [Dryness] : no dryness  [Vision Problems] : no vision problems [Itching] : no itching [Earache] : no earache [Hearing Loss] : no hearing loss [Nosebleed] : no nosebleeds [Hoarseness] : no hoarseness [Nasal Discharge] : no nasal discharge [Sore Throat] : no sore throat [Postnasal Drip] : no postnasal drip [Chest Pain] : no chest pain [Palpitations] : no palpitations [Leg Claudication] : no leg claudication [Orthopnea] : no orthopnea [Shortness Of Breath] : no shortness of breath [Wheezing] : no wheezing [Cough] : no cough [Dyspnea on Exertion] : no dyspnea on exertion [Nausea] : no nausea [Constipation] : no constipation [Diarrhea] : diarrhea [Vomiting] : no vomiting [Heartburn] : no heartburn [Melena] : no melena [Dysuria] : no dysuria [Incontinence] : no incontinence [Poor Libido] : libido not poor [Hematuria] : no hematuria [Frequency] : no frequency [Vaginal Discharge] : no vaginal discharge [Dysmenorrhea] : no dysmenorrhea [Joint Pain] : no joint pain [Joint Swelling] : no joint swelling [Muscle Weakness] : no muscle weakness [Muscle Pain] : no muscle pain [Back Pain] : no back pain [Mole Changes] : no mole changes [Nail Changes] : no nail changes [Hair Changes] : no hair changes [Skin Rash] : no skin rash [Headache] : no headache [Dizziness] : no dizziness [Fainting] : no fainting [Confusion] : no confusion [Memory Loss] : no memory loss [Unsteady Walking] : no ataxia [Suicidal] : not suicidal [Insomnia] : no insomnia [Depression] : no depression [Easy Bleeding] : no easy bleeding [Easy Bruising] : no easy bruising [Swollen Glands] : no swollen glands [FreeTextEntry7] : mild right upper quadrant discomfort

## 2020-03-05 NOTE — ASSESSMENT
[FreeTextEntry1] : Physical exam shows a morbidly obese female in no acute distress temperature 97.9°F blood pressure 138/80 heart rate is 79 and respiratory rate of 15 HEENT was unremarkable chest was clear cardiovascular exam was regular with a 1/6 systolic murmur abdomen was soft with mild right upper quadrant discomfort with no guarding rebound or peritoneal signs the liver edge was not enlarged was questionably irregular extremities showed bilateral edema neurologic exam was nonfocal right upper quadrant pain and usually mild for gallbladder related discomfort might be secondary to her known liver metastasis from her neuroendocrine tumor aborted. Ultrasound and blood tests including lipase and amylase and liver functions she will make an appointment after she obtains these with her general surgeon . She also is overdue to return to her oncologist Dr. Blackmon. Patient will call immediately for worsening symptoms or fever is up to date with her ophthalmologist she is aware of the need to see a dermatologist for cancer screen she had a mammography in September of 2019 she also went through an MRI of the abdomen and pelvis with and without contrast stable disease noted of her malignancy.Her obesity was discussed she is aware of the medical ramifications methods of losing weight as well as programs to facilitate this was discussed

## 2020-03-09 ENCOUNTER — RX RENEWAL (OUTPATIENT)
Age: 60
End: 2020-03-09

## 2020-03-10 ENCOUNTER — OUTPATIENT (OUTPATIENT)
Dept: OUTPATIENT SERVICES | Facility: HOSPITAL | Age: 60
LOS: 1 days | Discharge: HOME | End: 2020-03-10

## 2020-03-10 DIAGNOSIS — Z98.891 HISTORY OF UTERINE SCAR FROM PREVIOUS SURGERY: Chronic | ICD-10-CM

## 2020-03-10 DIAGNOSIS — C7A.8 OTHER MALIGNANT NEUROENDOCRINE TUMORS: ICD-10-CM

## 2020-03-11 ENCOUNTER — FORM ENCOUNTER (OUTPATIENT)
Age: 60
End: 2020-03-11

## 2020-03-12 ENCOUNTER — APPOINTMENT (OUTPATIENT)
Dept: ULTRASOUND IMAGING | Facility: HOSPITAL | Age: 60
End: 2020-03-12
Payer: COMMERCIAL

## 2020-03-12 ENCOUNTER — OUTPATIENT (OUTPATIENT)
Dept: OUTPATIENT SERVICES | Facility: HOSPITAL | Age: 60
LOS: 1 days | End: 2020-03-12
Payer: COMMERCIAL

## 2020-03-12 DIAGNOSIS — Z98.891 HISTORY OF UTERINE SCAR FROM PREVIOUS SURGERY: Chronic | ICD-10-CM

## 2020-03-12 DIAGNOSIS — R10.9 UNSPECIFIED ABDOMINAL PAIN: ICD-10-CM

## 2020-03-12 PROCEDURE — 76700 US EXAM ABDOM COMPLETE: CPT | Mod: 26

## 2020-03-12 PROCEDURE — 76700 US EXAM ABDOM COMPLETE: CPT

## 2020-03-13 ENCOUNTER — APPOINTMENT (OUTPATIENT)
Age: 60
End: 2020-03-13

## 2020-03-13 NOTE — ED ADULT NURSE NOTE - NSFALLRSKUNASSIST_ED_ALL_ED
Physical Therapy    Facility/Department: [facility] ICU  Initial Assessment    NAME: Idalmis Lorenzo  : 1971  MRN: [id number]    Date of Service: 3/13/2020    Discharge Recommendations: Idalmis Lorenzo scored a 19/24 on the AM-PAC short mobility form. Current research shows that an AM-PAC score of 18 or greater is typically associated with a discharge to the patient's home setting. Based on the patients AM-PAC score and their current functional mobility deficits, it is recommended that the patient have 2-3 sessions per week of Physical Therapy at d/c to increase the patients independence. HOME HEALTH CARE: LEVEL 1 STANDARD    - Initial home health evaluation to occur within 24-48 hours, in patient home   - Therapy to evaluate with goal of regaining prior level of functioning   - Therapy to evaluate if patient has [id number] needs for personal care    If patient discharges prior to next session this note will serve as a discharge summary. Please see below for the latest assessment towards goals. PT Equipment Recommendations  Equipment Needed: No    Assessment   Body structures, Functions, Activity limitations: Decreased endurance;Decreased strength;Decreased balance;Decreased safe awareness  Assessment: Pt able to ambulate this date with CGA for 280'. Pt presents below baseline and will benefit from skilled PT to increase balance and safety to be able to return to PLOF. Treatment Diagnosis: impaired gait and balance  Prognosis: Good  Decision Making: Low Complexity  PT Education: Goals;PT Role;Plan of Care  Patient Education: d/c recommendations  Barriers to Learning: none  REQUIRES PT FOLLOW UP: Yes  Activity Tolerance  Activity Tolerance: Patient Tolerated treatment well       Patient Diagnosis(es): The primary encounter diagnosis was Syncope and collapse.  Diagnoses of Acute on chronic congestive heart failure, unspecified heart failure type ([address] Hypotension, unspecified hypotension type, Malignant neoplasm of colon, unspecified part of colon ([facility] [address] Hyperkalemia, and ALEN (acute kidney injury) ([facility] [address] were also pertinent to this visit. has a past medical history of Allergic, Anesthesia, Arthritis, CAD (coronary artery disease), Cancer ([address] Chemotherapy adverse reaction, CHF (congestive heart failure) ([address] Chronic knee pain, Clostridium difficile infection, Depression motion, Diverticulitis, Diverticulosis, History of alcohol abuse, Hyperlipidemia, Hypertension, Irregular heart beat, Pneumonia, Right knee pain, Shoulder injury, and Sleep difficulties. has a past surgical history that includes sinus surgery (4 surgeries); Inner ear surgery (tube right ear); knee surgery (08/24/2011); other surgical history (7/1/13); Knee arthroscopy (Right, [id number]); Knee arthroscopy (Right, 8/4/2014); Umbilical hernia repair (11/11/14); joint replacement; Shoulder arthroscopy (Right, 9/22/15); Colonoscopy (08/29/2016); Endoscopic ultrasonography, GI (09/14/2016); Cardiac catheterization (09/14/2016); Small intestine surgery (01/11/2017); Tunneled venous port placement (Left, 02/13/2017); colectomy; Abscess Drainage; Tunneled venous port placement; hernia repair; Upper gastrointestinal endoscopy (10/03/2017); hernia repair (12/01/2017); Small intestine surgery (12/01/2017); other surgical history (12/01/2017); Colonoscopy (03/19/2018); Revision Colostomy (04/18/2018); hernia repair (04/18/2018); sigmoidoscopy (N/A, 11/23/2018); Breast surgery (Right, 1/21/2019); Abdomen surgery; Endoscopy, colon, diagnostic; Cardiac defibrillator placement (10/2017); Colonoscopy (N/A, 5/14/2019); pacemaker placement; and Small intestine surgery (N/A, 8/21/2019).     Restrictions  Restrictions/Precautions  Restrictions/Precautions: Fall Risk, Modified Diet(high fall risk, cardiac diet)  Required Braces or Orthoses?: No  Position Activity Restriction  Other position/activity restrictions: Saturnino Huertas is a 51 y.o. male presents no

## 2020-03-21 NOTE — HISTORY OF PRESENT ILLNESS
[Disease: _____________________] : Disease: [unfilled] [AJCC Stage: ____] : AJCC Stage: [unfilled] [Therapy: ___] : Therapy: [unfilled] [de-identified] : 58-year-old F with h/o intermittent abdominal pain, n/v over the last several years (at least 9) thought to be 2/2 gallstones. She has been evaluated in the ER 1-2 per year with these attacks.  CT A/P going as far back as 2010 noted a calcified mass within the mesentery of the small bowel suspicious for carcinoid. In Nov 2017 it was found to be increasing in size from previous imaging. She saw a surgeon at the time however it is unclear if any work up was recommended. \par \par In Nov 2018 she was referred to Dr. Foy for evaluation of gallstones. He noted the mesenteric mass which prompted a PET/DOTATATE to be performed. This showed activity in the mesentery (SUV 59.9) as well as liver, ileal  mass though to be the primary, cystic adnexal mass unchanged since 11/17, possible uptake in the skin of left breast. An MRI Abdomen was performed to evaluate the liver mets and this revealed multiple metastatic lesions. Referred to med onc for further work up. \par \par 2/14/19: Enteroscopy/[facility]: no mass noted, erythema in the terminal ileum, s/p biopsy with negative path \par 4/22/19: CT CAP: multiple small b/l lung nodules, slight enlargement of calcified mesenteric mass. \par 4/23-4/26/19: admitted to [facility] with N/V post scan. 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 was hospitalized with cellulitis of LE in Spring. Now resolved. \par We reviewed results of recent scans, stable disease. Denies any n/v. Occasional RUQ abdominal pain. Not related to food or position. No diarrhea/constipation. \par Her  has been ill and she has been very stressed out going back and forth to rehab and hospital

## 2020-03-24 ENCOUNTER — APPOINTMENT (OUTPATIENT)
Dept: OBGYN | Facility: CLINIC | Age: 60
End: 2020-03-24
Payer: COMMERCIAL

## 2020-03-24 LAB
25(OH)D3 SERPL-MCNC: 25.6 NG/ML
ALBUMIN SERPL ELPH-MCNC: 4.1 G/DL
ALP BLD-CCNC: 97 U/L
ALT SERPL-CCNC: 12 U/L
AMYLASE/CREAT SERPL: 61 U/L
ANION GAP SERPL CALC-SCNC: 13 MMOL/L
APPEARANCE: CLEAR
AST SERPL-CCNC: 15 U/L
BASOPHILS # BLD AUTO: 0.05 K/UL
BASOPHILS NFR BLD AUTO: 0.8 %
BILIRUB SERPL-MCNC: 0.4 MG/DL
BILIRUBIN URINE: NEGATIVE
BLOOD URINE: NEGATIVE
BUN SERPL-MCNC: 12 MG/DL
CALCIUM SERPL-MCNC: 9.4 MG/DL
CHLORIDE SERPL-SCNC: 101 MMOL/L
CHOLEST SERPL-MCNC: 153 MG/DL
CHOLEST/HDLC SERPL: 2.8 RATIO
CO2 SERPL-SCNC: 26 MMOL/L
COLOR: YELLOW
CREAT SERPL-MCNC: 0.68 MG/DL
CRP SERPL HS-MCNC: 8.86 MG/L
EOSINOPHIL # BLD AUTO: 0.3 K/UL
EOSINOPHIL NFR BLD AUTO: 4.6 %
ESTIMATED AVERAGE GLUCOSE: 160 MG/DL
GLUCOSE BS SERPL-MCNC: 152 MG/DL
GLUCOSE QUALITATIVE U: NEGATIVE
GLUCOSE SERPL-MCNC: 156 MG/DL
HBA1C MFR BLD HPLC: 7.2 %
HCT VFR BLD CALC: 45.5 %
HDLC SERPL-MCNC: 54 MG/DL
HGB BLD-MCNC: 13.7 G/DL
IMM GRANULOCYTES NFR BLD AUTO: 0.5 %
KETONES URINE: NEGATIVE
LDLC SERPL CALC-MCNC: 82 MG/DL
LEUKOCYTE ESTERASE URINE: NEGATIVE
LPL SERPL-CCNC: 26 U/L
LYMPHOCYTES # BLD AUTO: 1.66 K/UL
LYMPHOCYTES NFR BLD AUTO: 25.7 %
MAN DIFF?: NORMAL
MCHC RBC-ENTMCNC: 27.2 PG
MCHC RBC-ENTMCNC: 30.1 GM/DL
MCV RBC AUTO: 90.5 FL
MONOCYTES # BLD AUTO: 0.53 K/UL
MONOCYTES NFR BLD AUTO: 8.2 %
NEUTROPHILS # BLD AUTO: 3.89 K/UL
NEUTROPHILS NFR BLD AUTO: 60.2 %
NITRITE URINE: NEGATIVE
PH URINE: 6
PLATELET # BLD AUTO: 267 K/UL
POTASSIUM SERPL-SCNC: 4.8 MMOL/L
PROT SERPL-MCNC: 7.4 G/DL
PROTEIN URINE: NORMAL
RBC # BLD: 5.03 M/UL
RBC # FLD: 14.5 %
SODIUM SERPL-SCNC: 140 MMOL/L
SPECIFIC GRAVITY URINE: 1.02
T4 FREE SERPL-MCNC: 1 NG/DL
TRIGL SERPL-MCNC: 83 MG/DL
TSH SERPL-ACNC: 2.16 UIU/ML
UROBILINOGEN URINE: NORMAL
VIT B12 SERPL-MCNC: 281 PG/ML
WBC # FLD AUTO: 6.46 K/UL

## 2020-03-24 PROCEDURE — G2012 BRIEF CHECK IN BY MD/QHP: CPT

## 2020-03-26 ENCOUNTER — RX RENEWAL (OUTPATIENT)
Age: 60
End: 2020-03-26

## 2020-04-03 ENCOUNTER — RX RENEWAL (OUTPATIENT)
Age: 60
End: 2020-04-03

## 2020-04-13 ENCOUNTER — OUTPATIENT (OUTPATIENT)
Dept: OUTPATIENT SERVICES | Facility: HOSPITAL | Age: 60
LOS: 1 days | Discharge: HOME | End: 2020-04-13

## 2020-04-13 DIAGNOSIS — Z98.891 HISTORY OF UTERINE SCAR FROM PREVIOUS SURGERY: Chronic | ICD-10-CM

## 2020-04-13 DIAGNOSIS — C7A.8 OTHER MALIGNANT NEUROENDOCRINE TUMORS: ICD-10-CM

## 2020-04-17 ENCOUNTER — APPOINTMENT (OUTPATIENT)
Age: 60
End: 2020-04-17

## 2020-04-17 ENCOUNTER — RX RENEWAL (OUTPATIENT)
Age: 60
End: 2020-04-17

## 2020-05-01 ENCOUNTER — RX RENEWAL (OUTPATIENT)
Age: 60
End: 2020-05-01

## 2020-05-04 ENCOUNTER — APPOINTMENT (OUTPATIENT)
Dept: MAMMOGRAPHY | Facility: CLINIC | Age: 60
End: 2020-05-04

## 2020-05-05 ENCOUNTER — APPOINTMENT (OUTPATIENT)
Dept: NEUROLOGY | Facility: CLINIC | Age: 60
End: 2020-05-05
Payer: COMMERCIAL

## 2020-05-05 DIAGNOSIS — R25.9 UNSPECIFIED ABNORMAL INVOLUNTARY MOVEMENTS: ICD-10-CM

## 2020-05-05 PROCEDURE — 99215 OFFICE O/P EST HI 40 MIN: CPT | Mod: 95

## 2020-05-05 NOTE — PHYSICAL EXAM
[Person] : oriented to person [Place] : oriented to place [Time] : oriented to time [Short Term Intact] : short term memory intact [Span Intact] : the attention span was normal [Naming Objects] : no difficulty naming common objects [Fluency] : fluency intact [Current Events] : adequate knowledge of current events [Cranial Nerves Oculomotor (III)] : extraocular motion intact [Cranial Nerves Facial (VII)] : face symmetrical [Cranial Nerves Vestibulocochlear (VIII)] : hearing was intact bilaterally [Cranial Nerves Hypoglossal (XII)] : there was no tongue deviation with protrusion [Cranial Nerves Accessory (XI - Cranial And Spinal)] : head turning and shoulder shrug symmetric [Involuntary Movements] : no involuntary movements were seen [Paresis Pronator Drift Right-Sided] : no pronator drift on the right [Paresis Pronator Drift Left-Sided] : no pronator drift on the left [Abnormal Walk] : normal gait [Coordination - Dysmetria Impaired Finger-to-Nose Bilateral] : not present

## 2020-05-05 NOTE — DISCUSSION/SUMMARY
[FreeTextEntry1] : 59 W who is here for followup of new symptom of right facial twitch. Only relevant history is hx of bell's palsy 10 years ago. No sequelae. GIven history of NET malignancy, will check mri of brain w and w/o gado. She has upcoming appt with her oncologist. Will try to coordinate the scans. \par \par physician location: home\par patient location: home\par \par I spent 40 minutes of face to face time, during which more than 50% of the time was spent on counseling. I explained the diagnosis, other possible diagnoses, workup, and management, as well as answered any questions.\par \par This is a telemedicine visit that was performed using real time 2-way audiovisual technology. Verbal consent to participate in video visit was obtained and patient was aware of their right to refuse to participate in services delivered via telemedicine and the alternative and potential limitations of participating in a telemedicine visit vs a face-to-face visit; I have also informed the patient of my current location in the [facility] and the names of all persons participating in the telemedicine service and their role in the encounter. The patient agrees to have this service via Telehealth.\par \par  This visit occurred during the Coronavirus (COVID-19) Public Health Emergency. I discussed with the patient the nature of our telemedicine visits, that: \par • I would evaluate the patient and recommend diagnostics and treatments based on my assessment \par • Our sessions are not being recorded and that personal health information is protected \par • Our team would provide follow up care in person if/when the patient needs it.\par

## 2020-05-05 NOTE — HISTORY OF PRESENT ILLNESS
[FreeTextEntry1] : verbal consent given on 05/05/2020 and 08:59  by UCHE RICHTER at [address] [place] [address]\[place] [address]\par \par 59 W who was last seen in 2018 for headaches. Since then, she has been dx with metastatic neuroendocrine tumor. She states about a few weeks ago, she started having right facial twitches. IT's daily and it lasts 15 minutes. There was only the start of metformin but this was happening even before she was started on metformin. \par She has no videos of this. She states family is able to see the facial contractions. It doesn't interfere with her ability to talk.\par \par \par CONSENT FOR VIDEO MEDICAL VISIT1. I understand that my physician wishes me to engage in a telemedicine consultation.2. My physician has explained to me how the video conferencing technology will be used to affect such a consultation will not be the same as a direct patient/health care provider visit due to the fact that I will not be in the same room as my physician 3. I understand there are potential risks to this technology, including interruptions, unauthorized access and technical difficulties. I understand that my health care provider or I can discontinue the telemedicine consult/visit if it is felt that the videoconferencing connections are not adequate for the situation.4. I understand that my healthcare information may be shared with other individuals for scheduling and billing purposes. Others may also be present during the consultation other than my physician and consulting health care provider in order to operate the video equipment. The above mentioned people will all maintain confidentiality of the information obtained. I further understand that I will be informed of their presence in the consultation and thus will have the right to request the following: (1) omit specific details of my medical history/physical examination that are personally sensitive to me; (2) ask non-medical personnel to leave the telemedicine examination room: and or (3) terminate the consultation at any time.5. I have had the alternatives to a telemedicine consultation explained to me, and in choosing to participate in a telemedicine consultation. I understand that some parts of the exam involving physical tests may be conducted by individuals at my location at the direction of the consulting health care provider.6. In an emergent consultation, I understand that the responsibility of the telemedicine consulting specialist is to advise my local practitioner and that the specialist’s responsibility will conclude upon the termination of the video conference connection.7. I understand that billing will occur from both my physician and as a facility fee from the site from which I am presented.8. I have had a direct conversation with my physician, during which I had the opportunity to ask questions in regard to this procedure. My questions have been answered and the risks, benefits and any practical alternatives have been discussed with me in a language in which I understand\par

## 2020-05-12 ENCOUNTER — OUTPATIENT (OUTPATIENT)
Dept: OUTPATIENT SERVICES | Facility: HOSPITAL | Age: 60
LOS: 1 days | Discharge: HOME | End: 2020-05-12

## 2020-05-12 DIAGNOSIS — Z98.891 HISTORY OF UTERINE SCAR FROM PREVIOUS SURGERY: Chronic | ICD-10-CM

## 2020-05-12 DIAGNOSIS — C7A.8 OTHER MALIGNANT NEUROENDOCRINE TUMORS: ICD-10-CM

## 2020-05-15 ENCOUNTER — APPOINTMENT (OUTPATIENT)
Age: 60
End: 2020-05-15

## 2020-05-15 ENCOUNTER — RX RENEWAL (OUTPATIENT)
Age: 60
End: 2020-05-15

## 2020-05-19 ENCOUNTER — APPOINTMENT (OUTPATIENT)
Dept: MRI IMAGING | Facility: CLINIC | Age: 60
End: 2020-05-19
Payer: COMMERCIAL

## 2020-05-19 ENCOUNTER — APPOINTMENT (OUTPATIENT)
Age: 60
End: 2020-05-19
Payer: COMMERCIAL

## 2020-05-19 ENCOUNTER — APPOINTMENT (OUTPATIENT)
Dept: CT IMAGING | Facility: CLINIC | Age: 60
End: 2020-05-19
Payer: COMMERCIAL

## 2020-05-19 PROCEDURE — 99441: CPT

## 2020-05-19 PROCEDURE — 71250 CT THORAX DX C-: CPT | Mod: 26

## 2020-05-22 ENCOUNTER — RX RENEWAL (OUTPATIENT)
Age: 60
End: 2020-05-22

## 2020-05-26 ENCOUNTER — RESULT REVIEW (OUTPATIENT)
Age: 60
End: 2020-05-26

## 2020-05-26 ENCOUNTER — APPOINTMENT (OUTPATIENT)
Dept: ULTRASOUND IMAGING | Facility: HOSPITAL | Age: 60
End: 2020-05-26

## 2020-05-26 ENCOUNTER — OUTPATIENT (OUTPATIENT)
Dept: OUTPATIENT SERVICES | Facility: HOSPITAL | Age: 60
LOS: 1 days | End: 2020-05-26
Payer: COMMERCIAL

## 2020-05-26 ENCOUNTER — APPOINTMENT (OUTPATIENT)
Dept: MAMMOGRAPHY | Facility: HOSPITAL | Age: 60
End: 2020-05-26
Payer: COMMERCIAL

## 2020-05-26 DIAGNOSIS — R92.8 OTHER ABNORMAL AND INCONCLUSIVE FINDINGS ON DIAGNOSTIC IMAGING OF BREAST: ICD-10-CM

## 2020-05-26 DIAGNOSIS — Z98.891 HISTORY OF UTERINE SCAR FROM PREVIOUS SURGERY: Chronic | ICD-10-CM

## 2020-05-26 PROCEDURE — 77066 DX MAMMO INCL CAD BI: CPT | Mod: 26

## 2020-05-26 PROCEDURE — 76641 ULTRASOUND BREAST COMPLETE: CPT | Mod: 26,50

## 2020-05-26 PROCEDURE — G0279: CPT

## 2020-05-26 PROCEDURE — 77066 DX MAMMO INCL CAD BI: CPT

## 2020-05-26 PROCEDURE — 76641 ULTRASOUND BREAST COMPLETE: CPT

## 2020-05-26 PROCEDURE — G0279: CPT | Mod: 26

## 2020-05-29 ENCOUNTER — RESULT REVIEW (OUTPATIENT)
Age: 60
End: 2020-05-29

## 2020-05-29 ENCOUNTER — APPOINTMENT (OUTPATIENT)
Dept: CT IMAGING | Facility: CLINIC | Age: 60
End: 2020-05-29
Payer: COMMERCIAL

## 2020-05-29 ENCOUNTER — OUTPATIENT (OUTPATIENT)
Dept: OUTPATIENT SERVICES | Facility: HOSPITAL | Age: 60
LOS: 1 days | End: 2020-05-29
Payer: COMMERCIAL

## 2020-05-29 DIAGNOSIS — Z98.891 HISTORY OF UTERINE SCAR FROM PREVIOUS SURGERY: Chronic | ICD-10-CM

## 2020-05-29 DIAGNOSIS — R25.9 UNSPECIFIED ABNORMAL INVOLUNTARY MOVEMENTS: ICD-10-CM

## 2020-05-29 PROCEDURE — 74177 CT ABD & PELVIS W/CONTRAST: CPT | Mod: 26

## 2020-05-29 PROCEDURE — 74177 CT ABD & PELVIS W/CONTRAST: CPT

## 2020-05-29 PROCEDURE — 70470 CT HEAD/BRAIN W/O & W/DYE: CPT | Mod: 26

## 2020-05-29 PROCEDURE — 82565 ASSAY OF CREATININE: CPT

## 2020-05-29 PROCEDURE — 70470 CT HEAD/BRAIN W/O & W/DYE: CPT

## 2020-06-08 ENCOUNTER — RX RENEWAL (OUTPATIENT)
Age: 60
End: 2020-06-08

## 2020-06-10 ENCOUNTER — APPOINTMENT (OUTPATIENT)
Age: 60
End: 2020-06-10
Payer: COMMERCIAL

## 2020-06-10 PROCEDURE — 99213 OFFICE O/P EST LOW 20 MIN: CPT | Mod: 95

## 2020-06-10 NOTE — ASSESSMENT
[Palliative Care Plan] : not applicable at this time [Palliative] : Goals of care discussed with patient: Palliative [FreeTextEntry1] : f/u with neurologist re facial spasm. Gave results of recent CT head to pt.

## 2020-06-10 NOTE — HISTORY OF PRESENT ILLNESS
[Medical Office: ([facility])___] : at the medical office located in  [Home] : at home, [unfilled] , at the time of the visit. [Disease: _____________________] : Disease: [unfilled] [Verbal consent obtained from patient] : the patient, [unfilled] [AJCC Stage: ____] : AJCC Stage: [unfilled] [Therapy: ___] : Therapy: [unfilled] [de-identified] : 58-year-old F with h/o intermittent abdominal pain, n/v over the last several years (at least 9) thought to be 2/2 gallstones. She has been evaluated in the ER 1-2 per year with these attacks.  CT A/P going as far back as 2010 noted a calcified mass within the mesentery of the small bowel suspicious for carcinoid. In Nov 2017 it was found to be increasing in size from previous imaging. She saw a surgeon at the time however it is unclear if any work up was recommended. \par \par In Nov 2018 she was referred to Dr. Foy for evaluation of gallstones. He noted the mesenteric mass which prompted a PET/DOTATATE to be performed. This showed activity in the mesentery (SUV 59.9) as well as liver, ileal  mass though to be the primary, cystic adnexal mass unchanged since 11/17, possible uptake in the skin of left breast. An MRI Abdomen was performed to evaluate the liver mets and this revealed multiple metastatic lesions. Referred to med onc for further work up. \par \par 2/14/19: Enteroscopy/[place]: no mass noted, erythema in the terminal ileum, s/p biopsy with negative path \par 4/22/19: CT CAP: multiple small b/l lung nodules, slight enlargement of calcified mesenteric mass. \par 4/23-4/26/19: admitted to [facility] with N/V post scan. 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 Denies any c/o. Is due to f/u with Dr. Grady.

## 2020-06-12 DIAGNOSIS — K63.89 OTHER SPECIFIED DISEASES OF INTESTINE: ICD-10-CM

## 2020-06-12 DIAGNOSIS — Z20.828 CONTACT WITH AND (SUSPECTED) EXPOSURE TO OTHER VIRAL COMMUNICABLE DISEASES: ICD-10-CM

## 2020-06-16 ENCOUNTER — EMERGENCY (EMERGENCY)
Facility: HOSPITAL | Age: 60
LOS: 1 days | Discharge: HOME | End: 2020-06-16
Attending: INTERNAL MEDICINE | Admitting: INTERNAL MEDICINE
Payer: COMMERCIAL

## 2020-06-16 VITALS
SYSTOLIC BLOOD PRESSURE: 140 MMHG | DIASTOLIC BLOOD PRESSURE: 70 MMHG | HEART RATE: 97 BPM | RESPIRATION RATE: 18 BRPM | OXYGEN SATURATION: 98 %

## 2020-06-16 VITALS
TEMPERATURE: 102 F | DIASTOLIC BLOOD PRESSURE: 85 MMHG | WEIGHT: 293 LBS | HEIGHT: 61 IN | HEART RATE: 102 BPM | RESPIRATION RATE: 8 BRPM | OXYGEN SATURATION: 97 % | SYSTOLIC BLOOD PRESSURE: 155 MMHG

## 2020-06-16 DIAGNOSIS — R50.9 FEVER, UNSPECIFIED: ICD-10-CM

## 2020-06-16 DIAGNOSIS — Z98.891 HISTORY OF UTERINE SCAR FROM PREVIOUS SURGERY: Chronic | ICD-10-CM

## 2020-06-16 LAB
ALBUMIN SERPL ELPH-MCNC: 3.3 G/DL — SIGNIFICANT CHANGE UP (ref 3.3–5)
ALP SERPL-CCNC: 109 U/L — SIGNIFICANT CHANGE UP (ref 40–120)
ALT FLD-CCNC: 30 U/L — SIGNIFICANT CHANGE UP (ref 10–45)
ANION GAP SERPL CALC-SCNC: 9 MMOL/L — SIGNIFICANT CHANGE UP (ref 5–17)
APPEARANCE UR: ABNORMAL
AST SERPL-CCNC: 32 U/L — SIGNIFICANT CHANGE UP (ref 10–40)
BACTERIA # UR AUTO: ABNORMAL /HPF
BASOPHILS # BLD AUTO: 0.04 K/UL — SIGNIFICANT CHANGE UP (ref 0–0.2)
BASOPHILS NFR BLD AUTO: 0.4 % — SIGNIFICANT CHANGE UP (ref 0–2)
BILIRUB SERPL-MCNC: 0.7 MG/DL — SIGNIFICANT CHANGE UP (ref 0.2–1.2)
BILIRUB UR-MCNC: NEGATIVE — SIGNIFICANT CHANGE UP
BUN SERPL-MCNC: 12 MG/DL — SIGNIFICANT CHANGE UP (ref 7–23)
CALCIUM SERPL-MCNC: 9.3 MG/DL — SIGNIFICANT CHANGE UP (ref 8.4–10.5)
CHLORIDE SERPL-SCNC: 102 MMOL/L — SIGNIFICANT CHANGE UP (ref 96–108)
CO2 SERPL-SCNC: 27 MMOL/L — SIGNIFICANT CHANGE UP (ref 22–31)
COLOR SPEC: YELLOW — SIGNIFICANT CHANGE UP
COMMENT - URINE: SIGNIFICANT CHANGE UP
CREAT SERPL-MCNC: 0.91 MG/DL — SIGNIFICANT CHANGE UP (ref 0.5–1.3)
D DIMER BLD IA.RAPID-MCNC: 570 NG/ML DDU — HIGH
DIFF PNL FLD: ABNORMAL
EOSINOPHIL # BLD AUTO: 0.04 K/UL — SIGNIFICANT CHANGE UP (ref 0–0.5)
EOSINOPHIL NFR BLD AUTO: 0.4 % — SIGNIFICANT CHANGE UP (ref 0–6)
EPI CELLS # UR: SIGNIFICANT CHANGE UP
FERRITIN SERPL-MCNC: 104 NG/ML — SIGNIFICANT CHANGE UP (ref 15–150)
GLUCOSE SERPL-MCNC: 210 MG/DL — HIGH (ref 70–99)
GLUCOSE UR QL: NEGATIVE — SIGNIFICANT CHANGE UP
HCT VFR BLD CALC: 41.2 % — SIGNIFICANT CHANGE UP (ref 34.5–45)
HGB BLD-MCNC: 13.2 G/DL — SIGNIFICANT CHANGE UP (ref 11.5–15.5)
IMM GRANULOCYTES NFR BLD AUTO: 0.5 % — SIGNIFICANT CHANGE UP (ref 0–1.5)
INR BLD: 1.21 RATIO — HIGH (ref 0.88–1.16)
KETONES UR-MCNC: NEGATIVE — SIGNIFICANT CHANGE UP
LACTATE SERPL-SCNC: 1.6 MMOL/L — SIGNIFICANT CHANGE UP (ref 0.7–2)
LEUKOCYTE ESTERASE UR-ACNC: ABNORMAL
LYMPHOCYTES # BLD AUTO: 0.59 K/UL — LOW (ref 1–3.3)
LYMPHOCYTES # BLD AUTO: 5.9 % — LOW (ref 13–44)
MCHC RBC-ENTMCNC: 28.3 PG — SIGNIFICANT CHANGE UP (ref 27–34)
MCHC RBC-ENTMCNC: 32 GM/DL — SIGNIFICANT CHANGE UP (ref 32–36)
MCV RBC AUTO: 88.4 FL — SIGNIFICANT CHANGE UP (ref 80–100)
MONOCYTES # BLD AUTO: 0.74 K/UL — SIGNIFICANT CHANGE UP (ref 0–0.9)
MONOCYTES NFR BLD AUTO: 7.4 % — SIGNIFICANT CHANGE UP (ref 2–14)
NEUTROPHILS # BLD AUTO: 8.59 K/UL — HIGH (ref 1.8–7.4)
NEUTROPHILS NFR BLD AUTO: 85.4 % — HIGH (ref 43–77)
NITRITE UR-MCNC: POSITIVE
NRBC # BLD: 0 /100 WBCS — SIGNIFICANT CHANGE UP (ref 0–0)
NT-PROBNP SERPL-SCNC: 171 PG/ML — SIGNIFICANT CHANGE UP (ref 0–300)
PH UR: 6 — SIGNIFICANT CHANGE UP (ref 5–8)
PLATELET # BLD AUTO: 217 K/UL — SIGNIFICANT CHANGE UP (ref 150–400)
POTASSIUM SERPL-MCNC: 3.9 MMOL/L — SIGNIFICANT CHANGE UP (ref 3.5–5.3)
POTASSIUM SERPL-SCNC: 3.9 MMOL/L — SIGNIFICANT CHANGE UP (ref 3.5–5.3)
PROCALCITONIN SERPL-MCNC: 0.42 NG/ML — HIGH
PROT SERPL-MCNC: 8 G/DL — SIGNIFICANT CHANGE UP (ref 6–8.3)
PROT UR-MCNC: 100
PROTHROM AB SERPL-ACNC: 13.7 SEC — HIGH (ref 10–12.9)
RBC # BLD: 4.66 M/UL — SIGNIFICANT CHANGE UP (ref 3.8–5.2)
RBC # FLD: 15 % — HIGH (ref 10.3–14.5)
RBC CASTS # UR COMP ASSIST: ABNORMAL /HPF (ref 0–4)
SARS-COV-2 RNA SPEC QL NAA+PROBE: SIGNIFICANT CHANGE UP
SODIUM SERPL-SCNC: 138 MMOL/L — SIGNIFICANT CHANGE UP (ref 135–145)
SP GR SPEC: 1.01 — SIGNIFICANT CHANGE UP (ref 1.01–1.02)
TROPONIN I SERPL-MCNC: <.017 NG/ML — LOW (ref 0.02–0.06)
UROBILINOGEN FLD QL: NEGATIVE — SIGNIFICANT CHANGE UP
WBC # BLD: 10.05 K/UL — SIGNIFICANT CHANGE UP (ref 3.8–10.5)
WBC # FLD AUTO: 10.05 K/UL — SIGNIFICANT CHANGE UP (ref 3.8–10.5)
WBC UR QL: >50 /HPF (ref 0–5)

## 2020-06-16 PROCEDURE — 99284 EMERGENCY DEPT VISIT MOD MDM: CPT | Mod: 25

## 2020-06-16 PROCEDURE — U0003: CPT

## 2020-06-16 PROCEDURE — 71250 CT THORAX DX C-: CPT

## 2020-06-16 PROCEDURE — 80053 COMPREHEN METABOLIC PANEL: CPT

## 2020-06-16 PROCEDURE — 93010 ELECTROCARDIOGRAM REPORT: CPT

## 2020-06-16 PROCEDURE — 99285 EMERGENCY DEPT VISIT HI MDM: CPT

## 2020-06-16 PROCEDURE — 85027 COMPLETE CBC AUTOMATED: CPT

## 2020-06-16 PROCEDURE — 87040 BLOOD CULTURE FOR BACTERIA: CPT

## 2020-06-16 PROCEDURE — 85379 FIBRIN DEGRADATION QUANT: CPT

## 2020-06-16 PROCEDURE — 84145 PROCALCITONIN (PCT): CPT

## 2020-06-16 PROCEDURE — 71250 CT THORAX DX C-: CPT | Mod: 26

## 2020-06-16 PROCEDURE — 82728 ASSAY OF FERRITIN: CPT

## 2020-06-16 PROCEDURE — 71045 X-RAY EXAM CHEST 1 VIEW: CPT | Mod: 26

## 2020-06-16 PROCEDURE — 83605 ASSAY OF LACTIC ACID: CPT

## 2020-06-16 PROCEDURE — 96368 THER/DIAG CONCURRENT INF: CPT

## 2020-06-16 PROCEDURE — 36415 COLL VENOUS BLD VENIPUNCTURE: CPT

## 2020-06-16 PROCEDURE — 96365 THER/PROPH/DIAG IV INF INIT: CPT

## 2020-06-16 PROCEDURE — 81001 URINALYSIS AUTO W/SCOPE: CPT

## 2020-06-16 PROCEDURE — 93005 ELECTROCARDIOGRAM TRACING: CPT

## 2020-06-16 PROCEDURE — 71045 X-RAY EXAM CHEST 1 VIEW: CPT

## 2020-06-16 PROCEDURE — 83880 ASSAY OF NATRIURETIC PEPTIDE: CPT

## 2020-06-16 PROCEDURE — 85610 PROTHROMBIN TIME: CPT

## 2020-06-16 PROCEDURE — 87186 SC STD MICRODIL/AGAR DIL: CPT

## 2020-06-16 PROCEDURE — 87086 URINE CULTURE/COLONY COUNT: CPT

## 2020-06-16 PROCEDURE — 84484 ASSAY OF TROPONIN QUANT: CPT

## 2020-06-16 RX ORDER — CEFDINIR 250 MG/5ML
1 POWDER, FOR SUSPENSION ORAL
Qty: 20 | Refills: 0
Start: 2020-06-16 | End: 2020-06-25

## 2020-06-16 RX ORDER — ACETAMINOPHEN 500 MG
975 TABLET ORAL ONCE
Refills: 0 | Status: COMPLETED | OUTPATIENT
Start: 2020-06-16 | End: 2020-06-16

## 2020-06-16 RX ORDER — CHOLECALCIFEROL (VITAMIN D3) 125 MCG
1 CAPSULE ORAL
Qty: 0 | Refills: 0 | DISCHARGE

## 2020-06-16 RX ORDER — CEFTRIAXONE 500 MG/1
1000 INJECTION, POWDER, FOR SOLUTION INTRAMUSCULAR; INTRAVENOUS ONCE
Refills: 0 | Status: COMPLETED | OUTPATIENT
Start: 2020-06-16 | End: 2020-06-16

## 2020-06-16 RX ORDER — ACETAMINOPHEN 500 MG
1 TABLET ORAL
Qty: 30 | Refills: 0
Start: 2020-06-16 | End: 2020-06-25

## 2020-06-16 RX ORDER — SODIUM CHLORIDE 9 MG/ML
1000 INJECTION INTRAMUSCULAR; INTRAVENOUS; SUBCUTANEOUS ONCE
Refills: 0 | Status: COMPLETED | OUTPATIENT
Start: 2020-06-16 | End: 2020-06-16

## 2020-06-16 RX ADMIN — Medication 100 MILLIGRAM(S): at 09:10

## 2020-06-16 RX ADMIN — Medication 975 MILLIGRAM(S): at 08:51

## 2020-06-16 RX ADMIN — Medication 110 MILLIGRAM(S): at 08:10

## 2020-06-16 RX ADMIN — CEFTRIAXONE 1000 MILLIGRAM(S): 500 INJECTION, POWDER, FOR SOLUTION INTRAMUSCULAR; INTRAVENOUS at 08:40

## 2020-06-16 RX ADMIN — CEFTRIAXONE 100 MILLIGRAM(S): 500 INJECTION, POWDER, FOR SOLUTION INTRAMUSCULAR; INTRAVENOUS at 08:10

## 2020-06-16 RX ADMIN — SODIUM CHLORIDE 1000 MILLILITER(S): 9 INJECTION INTRAMUSCULAR; INTRAVENOUS; SUBCUTANEOUS at 08:10

## 2020-06-16 RX ADMIN — SODIUM CHLORIDE 1000 MILLILITER(S): 9 INJECTION INTRAMUSCULAR; INTRAVENOUS; SUBCUTANEOUS at 09:09

## 2020-06-16 NOTE — ED ADULT NURSE NOTE - OBJECTIVE STATEMENT
To ER complaining of fever, as per patient her  has tested positive for covid and she had fever since monday

## 2020-06-16 NOTE — ED PROVIDER NOTE - CARE PROVIDER_API CALL
Delroy Bass  GASTROENTEROLOGY  [address] [place] [address] [address]  [place] [address]  Phone: [phone]  Fax: [phone]  Follow Up Time:

## 2020-06-16 NOTE — ED PROVIDER NOTE - PATIENT PORTAL LINK FT
You can access the FollowMyHealth Patient Portal offered by [facility] by registering at the following website: [email or web address]. By joining [facility]’s FollowMyHealth portal, you will also be able to view your health information using other applications (apps) compatible with our system.

## 2020-06-16 NOTE — ED PROVIDER NOTE - PMH
Anemia  Fe def  Benign hypertension    Cancer  abdominal  Gallstones    Lymphedema    Mesenteric mass    Morbid obesity    Type 2 diabetes mellitus with other specified complication, without long-term current use of insulin

## 2020-06-16 NOTE — ED PROVIDER NOTE - CLINICAL SUMMARY MEDICAL DECISION MAKING FREE TEXT BOX
60 y f + exposure to covid cc fever 2 days cough nasal congestion 60 y f + exposure to covid cc fever 2 days cough nasal congestion, bilateral cva tenderness, covid initial neg + uti d/w dr mckay

## 2020-06-16 NOTE — ED ADULT NURSE REASSESSMENT NOTE - NS ED NURSE REASSESS COMMENT FT1
Patient is a difficult stick I attemptec iv accessd times 2, MD Austin notified, ADNERSON Richards at bedside attempting IV access

## 2020-06-17 ENCOUNTER — TRANSCRIPTION ENCOUNTER (OUTPATIENT)
Age: 60
End: 2020-06-17

## 2020-06-18 ENCOUNTER — OUTPATIENT (OUTPATIENT)
Dept: OUTPATIENT SERVICES | Facility: HOSPITAL | Age: 60
LOS: 1 days | Discharge: HOME | End: 2020-06-18

## 2020-06-18 DIAGNOSIS — Z98.891 HISTORY OF UTERINE SCAR FROM PREVIOUS SURGERY: Chronic | ICD-10-CM

## 2020-06-18 DIAGNOSIS — C7A.8 OTHER MALIGNANT NEUROENDOCRINE TUMORS: ICD-10-CM

## 2020-06-19 ENCOUNTER — APPOINTMENT (OUTPATIENT)
Age: 60
End: 2020-06-19

## 2020-06-19 PROBLEM — I10 ESSENTIAL (PRIMARY) HYPERTENSION: Chronic | Status: ACTIVE | Noted: 2020-06-16

## 2020-06-27 ENCOUNTER — RX RENEWAL (OUTPATIENT)
Age: 60
End: 2020-06-27

## 2020-07-08 ENCOUNTER — RX RENEWAL (OUTPATIENT)
Age: 60
End: 2020-07-08

## 2020-07-13 ENCOUNTER — RX RENEWAL (OUTPATIENT)
Age: 60
End: 2020-07-13

## 2020-07-17 ENCOUNTER — APPOINTMENT (OUTPATIENT)
Age: 60
End: 2020-07-17

## 2020-08-10 ENCOUNTER — OUTPATIENT (OUTPATIENT)
Dept: OUTPATIENT SERVICES | Facility: HOSPITAL | Age: 60
LOS: 1 days | Discharge: HOME | End: 2020-08-10

## 2020-08-10 DIAGNOSIS — C7A.8 OTHER MALIGNANT NEUROENDOCRINE TUMORS: ICD-10-CM

## 2020-08-10 DIAGNOSIS — Z98.891 HISTORY OF UTERINE SCAR FROM PREVIOUS SURGERY: Chronic | ICD-10-CM

## 2020-08-14 ENCOUNTER — APPOINTMENT (OUTPATIENT)
Age: 60
End: 2020-08-14

## 2020-08-25 ENCOUNTER — RX RENEWAL (OUTPATIENT)
Age: 60
End: 2020-08-25

## 2020-09-01 ENCOUNTER — RX RENEWAL (OUTPATIENT)
Age: 60
End: 2020-09-01

## 2020-09-05 ENCOUNTER — OUTPATIENT (OUTPATIENT)
Dept: OUTPATIENT SERVICES | Facility: HOSPITAL | Age: 60
LOS: 1 days | Discharge: HOME | End: 2020-09-05

## 2020-09-05 DIAGNOSIS — C7A.8 OTHER MALIGNANT NEUROENDOCRINE TUMORS: ICD-10-CM

## 2020-09-05 DIAGNOSIS — Z98.891 HISTORY OF UTERINE SCAR FROM PREVIOUS SURGERY: Chronic | ICD-10-CM

## 2020-09-11 ENCOUNTER — APPOINTMENT (OUTPATIENT)
Age: 60
End: 2020-09-11

## 2020-09-28 ENCOUNTER — RX RENEWAL (OUTPATIENT)
Age: 60
End: 2020-09-28

## 2020-10-06 ENCOUNTER — OUTPATIENT (OUTPATIENT)
Dept: OUTPATIENT SERVICES | Facility: HOSPITAL | Age: 60
LOS: 1 days | Discharge: HOME | End: 2020-10-06

## 2020-10-06 DIAGNOSIS — Z98.891 HISTORY OF UTERINE SCAR FROM PREVIOUS SURGERY: Chronic | ICD-10-CM

## 2020-10-06 DIAGNOSIS — C7A.8 OTHER MALIGNANT NEUROENDOCRINE TUMORS: ICD-10-CM

## 2020-10-09 ENCOUNTER — APPOINTMENT (OUTPATIENT)
Age: 60
End: 2020-10-09

## 2020-10-30 ENCOUNTER — OUTPATIENT (OUTPATIENT)
Dept: OUTPATIENT SERVICES | Facility: HOSPITAL | Age: 60
LOS: 1 days | Discharge: HOME | End: 2020-10-30

## 2020-10-30 DIAGNOSIS — C7A.8 OTHER MALIGNANT NEUROENDOCRINE TUMORS: ICD-10-CM

## 2020-10-30 DIAGNOSIS — Z98.891 HISTORY OF UTERINE SCAR FROM PREVIOUS SURGERY: Chronic | ICD-10-CM

## 2020-11-06 ENCOUNTER — APPOINTMENT (OUTPATIENT)
Age: 60
End: 2020-11-06

## 2020-11-16 ENCOUNTER — RX RENEWAL (OUTPATIENT)
Age: 60
End: 2020-11-16

## 2020-11-27 ENCOUNTER — OUTPATIENT (OUTPATIENT)
Dept: OUTPATIENT SERVICES | Facility: HOSPITAL | Age: 60
LOS: 1 days | End: 2020-11-27
Payer: COMMERCIAL

## 2020-11-27 ENCOUNTER — APPOINTMENT (OUTPATIENT)
Dept: CT IMAGING | Facility: CLINIC | Age: 60
End: 2020-11-27
Payer: COMMERCIAL

## 2020-11-27 DIAGNOSIS — D3A.8 OTHER BENIGN NEUROENDOCRINE TUMORS: ICD-10-CM

## 2020-11-27 DIAGNOSIS — Z98.891 HISTORY OF UTERINE SCAR FROM PREVIOUS SURGERY: Chronic | ICD-10-CM

## 2020-11-27 PROCEDURE — 71260 CT THORAX DX C+: CPT | Mod: 26

## 2020-11-27 PROCEDURE — 82565 ASSAY OF CREATININE: CPT

## 2020-11-27 PROCEDURE — 74177 CT ABD & PELVIS W/CONTRAST: CPT

## 2020-11-27 PROCEDURE — 74177 CT ABD & PELVIS W/CONTRAST: CPT | Mod: 26

## 2020-11-27 PROCEDURE — 71260 CT THORAX DX C+: CPT

## 2020-11-30 ENCOUNTER — OUTPATIENT (OUTPATIENT)
Dept: OUTPATIENT SERVICES | Facility: HOSPITAL | Age: 60
LOS: 1 days | Discharge: HOME | End: 2020-11-30

## 2020-11-30 DIAGNOSIS — C7A.8 OTHER MALIGNANT NEUROENDOCRINE TUMORS: ICD-10-CM

## 2020-11-30 DIAGNOSIS — Z98.891 HISTORY OF UTERINE SCAR FROM PREVIOUS SURGERY: Chronic | ICD-10-CM

## 2020-12-04 ENCOUNTER — APPOINTMENT (OUTPATIENT)
Age: 60
End: 2020-12-04

## 2020-12-04 ENCOUNTER — APPOINTMENT (OUTPATIENT)
Age: 60
End: 2020-12-04
Payer: COMMERCIAL

## 2020-12-04 VITALS
RESPIRATION RATE: 16 BRPM | BODY MASS INDEX: 64.61 KG/M2 | WEIGHT: 293 LBS | HEART RATE: 74 BPM | OXYGEN SATURATION: 99 % | DIASTOLIC BLOOD PRESSURE: 81 MMHG | SYSTOLIC BLOOD PRESSURE: 132 MMHG | TEMPERATURE: 97.4 F

## 2020-12-04 PROCEDURE — 99072 ADDL SUPL MATRL&STAF TM PHE: CPT

## 2020-12-04 PROCEDURE — 99213 OFFICE O/P EST LOW 20 MIN: CPT

## 2020-12-05 NOTE — HISTORY OF PRESENT ILLNESS
[Disease: _____________________] : Disease: [unfilled] [AJCC Stage: ____] : AJCC Stage: [unfilled] [Therapy: ___] : Therapy: [unfilled] [de-identified] : 58-year-old F with h/o intermittent abdominal pain, n/v over the last several years (at least 9) thought to be 2/2 gallstones. She has been evaluated in the ER 1-2 per year with these attacks.  CT A/P going as far back as 2010 noted a calcified mass within the mesentery of the small bowel suspicious for carcinoid. In Nov 2017 it was found to be increasing in size from previous imaging. She saw a surgeon at the time however it is unclear if any work up was recommended. \par \par In Nov 2018 she was referred to Dr. Foy for evaluation of gallstones. He noted the mesenteric mass which prompted a PET/DOTATATE to be performed. This showed activity in the mesentery (SUV 59.9) as well as liver, ileal  mass though to be the primary, cystic adnexal mass unchanged since 11/17, possible uptake in the skin of left breast. An MRI Abdomen was performed to evaluate the liver mets and this revealed multiple metastatic lesions. Referred to med onc for further work up. \par \par 2/14/19: Enteroscopy/[place]: no mass noted, erythema in the terminal ileum, s/p biopsy with negative path \par 4/22/19: CT CAP: multiple small b/l lung nodules, slight enlargement of calcified mesenteric mass. \par 4/23-4/26/19: admitted to [facility] with N/V post scan. 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 Denies any c/o. Emotional has been under a lot of stress due to her  being sick, not working. Reviewed recent scans which show stable disease.

## 2020-12-07 ENCOUNTER — RX RENEWAL (OUTPATIENT)
Age: 60
End: 2020-12-07

## 2020-12-16 PROBLEM — Z87.09 HISTORY OF INFLUENZA: Status: RESOLVED | Noted: 2017-02-21 | Resolved: 2020-12-16

## 2020-12-22 ENCOUNTER — APPOINTMENT (OUTPATIENT)
Dept: CARDIOLOGY | Facility: CLINIC | Age: 60
End: 2020-12-22
Payer: COMMERCIAL

## 2020-12-22 ENCOUNTER — NON-APPOINTMENT (OUTPATIENT)
Age: 60
End: 2020-12-22

## 2020-12-22 VITALS
BODY MASS INDEX: 55.32 KG/M2 | WEIGHT: 293 LBS | HEART RATE: 97 BPM | RESPIRATION RATE: 19 BRPM | TEMPERATURE: 98.6 F | OXYGEN SATURATION: 99 % | SYSTOLIC BLOOD PRESSURE: 123 MMHG | DIASTOLIC BLOOD PRESSURE: 66 MMHG | HEIGHT: 61 IN

## 2020-12-22 PROCEDURE — 93000 ELECTROCARDIOGRAM COMPLETE: CPT

## 2020-12-22 PROCEDURE — 99072 ADDL SUPL MATRL&STAF TM PHE: CPT

## 2020-12-22 PROCEDURE — 99214 OFFICE O/P EST MOD 30 MIN: CPT

## 2020-12-23 ENCOUNTER — OUTPATIENT (OUTPATIENT)
Dept: OUTPATIENT SERVICES | Facility: HOSPITAL | Age: 60
LOS: 1 days | Discharge: HOME | End: 2020-12-23

## 2020-12-23 DIAGNOSIS — C7A.8 OTHER MALIGNANT NEUROENDOCRINE TUMORS: ICD-10-CM

## 2020-12-23 DIAGNOSIS — Z98.891 HISTORY OF UTERINE SCAR FROM PREVIOUS SURGERY: Chronic | ICD-10-CM

## 2020-12-31 ENCOUNTER — APPOINTMENT (OUTPATIENT)
Age: 60
End: 2020-12-31

## 2021-01-30 ENCOUNTER — OUTPATIENT (OUTPATIENT)
Dept: OUTPATIENT SERVICES | Facility: HOSPITAL | Age: 61
LOS: 1 days | Discharge: HOME | End: 2021-01-30

## 2021-01-30 DIAGNOSIS — Z98.891 HISTORY OF UTERINE SCAR FROM PREVIOUS SURGERY: Chronic | ICD-10-CM

## 2021-01-30 DIAGNOSIS — C7A.8 OTHER MALIGNANT NEUROENDOCRINE TUMORS: ICD-10-CM

## 2021-02-03 DIAGNOSIS — Z11.59 ENCOUNTER FOR SCREENING FOR OTHER VIRAL DISEASES: ICD-10-CM

## 2021-02-05 ENCOUNTER — APPOINTMENT (OUTPATIENT)
Dept: INTERNAL MEDICINE | Facility: CLINIC | Age: 61
End: 2021-02-05
Payer: COMMERCIAL

## 2021-02-05 LAB
SARS-COV-2 IGG SERPL IA-ACNC: 0.07 INDEX
SARS-COV-2 IGG SERPL QL IA: NEGATIVE
SARS-COV-2 N GENE NPH QL NAA+PROBE: DETECTED

## 2021-02-05 PROCEDURE — 99443: CPT

## 2021-02-07 NOTE — HISTORY OF PRESENT ILLNESS
[Home] : at home, [unfilled] , at the time of the visit. [Medical Office: ([facility])___] : at the medical office located in  [Verbal consent obtained from patient] : the patient, [unfilled] [Time Spent: ___ minutes] : I have spent [unfilled] minutes with the patient on the telephone [FreeTextEntry1] : Morbidly obese patient with malignancy hypertension and aortic stenosis with COVID-19 infection beginning on 4 February patient was appropriately directed to get monoclonal antibodies patient with minimal symptoms at present of mild cough decreased appetite and a mild headache patient is alerted in view of her comorbidities to notify me immediately for any change in her status and to go immediately to the emergency room for any symptoms of shortness of breath patient was encouraged to get a pulse oximeter and monitor her oxygen saturations patient was also told that she needs to quarantine for the minimum of 10 days which would take her to 14 February she was told the difference between social isolation and quarantine patient contact for this was her son who she lives with patient is up-to-date with her OB/GYN and follows with her hematologist oncologist Dr. Katalina Blackmon.Patient had complete blood test in March 2020 she had her last mammogram in March 2020 she is followed closely by her cardiologist who does periodic echocardiograms to follow her aortic stenosis patient's last colonoscopy was performed in February 2019 she is up-to-date with her ophthalmologist and dermatologist

## 2021-02-19 ENCOUNTER — APPOINTMENT (OUTPATIENT)
Age: 61
End: 2021-02-19

## 2021-03-08 ENCOUNTER — RX RENEWAL (OUTPATIENT)
Age: 61
End: 2021-03-08

## 2021-03-09 ENCOUNTER — OUTPATIENT (OUTPATIENT)
Dept: OUTPATIENT SERVICES | Facility: HOSPITAL | Age: 61
LOS: 1 days | Discharge: HOME | End: 2021-03-09

## 2021-03-09 DIAGNOSIS — Z98.891 HISTORY OF UTERINE SCAR FROM PREVIOUS SURGERY: Chronic | ICD-10-CM

## 2021-03-09 DIAGNOSIS — C7A.8 OTHER MALIGNANT NEUROENDOCRINE TUMORS: ICD-10-CM

## 2021-03-12 ENCOUNTER — APPOINTMENT (OUTPATIENT)
Age: 61
End: 2021-03-12

## 2021-03-19 ENCOUNTER — APPOINTMENT (OUTPATIENT)
Age: 61
End: 2021-03-19

## 2021-03-22 ENCOUNTER — RX RENEWAL (OUTPATIENT)
Age: 61
End: 2021-03-22

## 2021-04-08 ENCOUNTER — APPOINTMENT (OUTPATIENT)
Dept: ORTHOPEDIC SURGERY | Facility: CLINIC | Age: 61
End: 2021-04-08
Payer: COMMERCIAL

## 2021-04-08 VITALS — HEIGHT: 61 IN | BODY MASS INDEX: 55.32 KG/M2 | WEIGHT: 293 LBS

## 2021-04-08 DIAGNOSIS — M65.332 TRIGGER FINGER, LEFT MIDDLE FINGER: ICD-10-CM

## 2021-04-08 DIAGNOSIS — M79.642 PAIN IN LEFT HAND: ICD-10-CM

## 2021-04-08 PROCEDURE — 20550 NJX 1 TENDON SHEATH/LIGAMENT: CPT | Mod: F2

## 2021-04-08 PROCEDURE — 73130 X-RAY EXAM OF HAND: CPT | Mod: LT

## 2021-04-08 PROCEDURE — 99072 ADDL SUPL MATRL&STAF TM PHE: CPT

## 2021-04-08 PROCEDURE — 99214 OFFICE O/P EST MOD 30 MIN: CPT | Mod: 25

## 2021-04-12 PROBLEM — M65.332 ACQUIRED TRIGGER FINGER OF LEFT MIDDLE FINGER: Status: ACTIVE | Noted: 2021-04-12

## 2021-04-12 NOTE — PHYSICAL EXAM
[de-identified] : Patient is WDWN, alert, and in no acute distress. Breathing is unlabored. She is grossly oriented to person, place, and time. \par \par Left hand: \par Sensations intact\par Tender to palpation in joint of the third digit.\par  [de-identified] : AP, lateral and oblique views of the left hand were obtained today and revealed no abnormalities.

## 2021-04-12 NOTE — HISTORY OF PRESENT ILLNESS
[Right] : right hand dominant [FreeTextEntry1] : UCHE RICHTER is a 60 year female who presents for initial evaluation of left hand pain and swelling x 2 weeks.  Denies injury.  She cannot identify exacerbating factors but states that it is painful to "move in certain positions".  The pain is sharp.  She states that the swelling has spread from her PIP joints into the entire hand.  She is not currently taking anything for pain. Pain wakes her up at night.

## 2021-04-12 NOTE — END OF VISIT
CONSTITUTIONAL: Well-developed; well-nourished; in no acute distress. Sitting up and providing appropriate history and physical examination  SKIN: skin exam is warm and dry, no acute rash.  HEAD: Normocephalic; atraumatic.  EYES: PERRL, 3 mm bilateral, no nystagmus, EOM intact; conjunctiva and sclera clear.  ENT: No nasal discharge; airway clear.  NECK: Supple; non tender. + full passive ROM in all directions. No JVD  CARD: S1, S2 normal; no murmurs, gallops, or rubs. Regular rate and rhythm. + Symmetric Strong Pulses  RESP: No wheezes, rales or rhonchi. Good air movement bilaterally  ABD: soft; non-distended; non-tender. No Rebound, No Guarding, No signs of peritonitis, No CVA tenderness. No pulsatile abdominal mass. + Strong and Symmetric Pulses  EXT: Normal ROM. No clubbing, cyanosis or edema. Dp and Pt Pulses intact. Cap refill less than 3 seconds  NEURO: CN 2-12 intact, normal finger to nose, normal romberg, stable gait, no sensory or motor deficits, Alert, oriented, grossly unremarkable. No Focal deficits. GCS 15. NIH 0  PSYCH: Cooperative, appropriate.
[FreeTextEntry3] : I, Juan Farr MD, ordering physician, have read and attest that all the information, medical decision making and discharge instructions within are true and accurate on 04/08/2021.

## 2021-04-12 NOTE — DISCUSSION/SUMMARY
[FreeTextEntry1] : The underlying pathophysiology was reviewed with the patient. XR films were reviewed with the patient. Discussed at length the nature of the patient’s condition.\par \par The underlying pathophysiology was reviewed with the patient. Treatment options were discussed including; observation,  NSAIDS, analgesics, injection(s) and surgical intervention. \par \par   The patient wishes to proceed with a cortisone injection at this time. The skin was prepped with alcohol and sprayed with  Ethyl Chloride. An injection of 0.5 cc 1% Lidocaine without epinephrine, 0.25 cc Kenalog 40mg, and 0.25 cc  Dexamethasone was administered into the flexor tendon sheath of the third digit. The patient tolerated the procedure well.  Apply ice. 	\par \par Soak hand in warm water and Epson salt\par \par Patient can continue activities as tolerated. All questions answered, understanding verbalized. Patient in agreement with plan of care. Follow up as needed.

## 2021-04-12 NOTE — ADDENDUM
[FreeTextEntry1] : I, Abram Paula wrote this note acting as a scribe for Dr. Juan Farr MD on Apr 08, 2021.

## 2021-04-14 ENCOUNTER — OUTPATIENT (OUTPATIENT)
Dept: OUTPATIENT SERVICES | Facility: HOSPITAL | Age: 61
LOS: 1 days | Discharge: HOME | End: 2021-04-14

## 2021-04-14 DIAGNOSIS — Z98.891 HISTORY OF UTERINE SCAR FROM PREVIOUS SURGERY: Chronic | ICD-10-CM

## 2021-04-14 DIAGNOSIS — C7A.8 OTHER MALIGNANT NEUROENDOCRINE TUMORS: ICD-10-CM

## 2021-04-16 ENCOUNTER — APPOINTMENT (OUTPATIENT)
Age: 61
End: 2021-04-16

## 2021-04-19 NOTE — ED PROVIDER NOTE - CPE EDP CARDIAC NORM
From: Suzi Laughter  To: Susan Lema MD  Sent: 4/17/2021 11:02 AM EDT  Subject: Non-Urgent Medical Question    Afternoon Dr. Monse Zavala. Wolf Jinach Wolf Santacruz Well I have broken y right elbow. I am scheduled to have a cast put on it Tuesday at [facility]. My guess is that this cast he puts on will be covering the spot on my arm that you were to be looking at on Friday. What should I do? - - -

## 2021-05-05 ENCOUNTER — RX RENEWAL (OUTPATIENT)
Age: 61
End: 2021-05-05

## 2021-05-10 ENCOUNTER — APPOINTMENT (OUTPATIENT)
Dept: GYNECOLOGIC ONCOLOGY | Facility: CLINIC | Age: 61
End: 2021-05-10
Payer: COMMERCIAL

## 2021-05-10 VITALS
BODY MASS INDEX: 55.32 KG/M2 | WEIGHT: 293 LBS | HEIGHT: 61 IN | SYSTOLIC BLOOD PRESSURE: 170 MMHG | HEART RATE: 108 BPM | DIASTOLIC BLOOD PRESSURE: 73 MMHG

## 2021-05-10 PROCEDURE — 58100 BIOPSY OF UTERUS LINING: CPT

## 2021-05-10 PROCEDURE — 99214 OFFICE O/P EST MOD 30 MIN: CPT | Mod: 25

## 2021-05-10 PROCEDURE — 99072 ADDL SUPL MATRL&STAF TM PHE: CPT

## 2021-05-11 LAB — CANCER AG125 SERPL-ACNC: 27 U/ML

## 2021-05-14 ENCOUNTER — APPOINTMENT (OUTPATIENT)
Dept: INFUSION THERAPY | Facility: HOSPITAL | Age: 61
End: 2021-05-14

## 2021-05-14 ENCOUNTER — APPOINTMENT (OUTPATIENT)
Dept: HEMATOLOGY ONCOLOGY | Facility: CLINIC | Age: 61
End: 2021-05-14
Payer: COMMERCIAL

## 2021-05-14 PROCEDURE — 99213 OFFICE O/P EST LOW 20 MIN: CPT

## 2021-05-19 ENCOUNTER — NON-APPOINTMENT (OUTPATIENT)
Age: 61
End: 2021-05-19

## 2021-05-19 LAB — CORE LAB BIOPSY: NORMAL

## 2021-05-27 ENCOUNTER — RESULT REVIEW (OUTPATIENT)
Age: 61
End: 2021-05-27

## 2021-05-27 ENCOUNTER — APPOINTMENT (OUTPATIENT)
Dept: CT IMAGING | Facility: CLINIC | Age: 61
End: 2021-05-27
Payer: COMMERCIAL

## 2021-05-27 ENCOUNTER — OUTPATIENT (OUTPATIENT)
Dept: OUTPATIENT SERVICES | Facility: HOSPITAL | Age: 61
LOS: 1 days | End: 2021-05-27
Payer: COMMERCIAL

## 2021-05-27 ENCOUNTER — APPOINTMENT (OUTPATIENT)
Dept: MAMMOGRAPHY | Facility: CLINIC | Age: 61
End: 2021-05-27
Payer: COMMERCIAL

## 2021-05-27 DIAGNOSIS — Z00.8 ENCOUNTER FOR OTHER GENERAL EXAMINATION: ICD-10-CM

## 2021-05-27 DIAGNOSIS — Z98.891 HISTORY OF UTERINE SCAR FROM PREVIOUS SURGERY: Chronic | ICD-10-CM

## 2021-05-27 PROCEDURE — 77067 SCR MAMMO BI INCL CAD: CPT | Mod: 26

## 2021-05-27 PROCEDURE — 74177 CT ABD & PELVIS W/CONTRAST: CPT

## 2021-05-27 PROCEDURE — 74177 CT ABD & PELVIS W/CONTRAST: CPT | Mod: 26

## 2021-05-27 PROCEDURE — 77063 BREAST TOMOSYNTHESIS BI: CPT | Mod: 26

## 2021-05-27 PROCEDURE — 77067 SCR MAMMO BI INCL CAD: CPT

## 2021-05-27 PROCEDURE — 82565 ASSAY OF CREATININE: CPT

## 2021-05-27 PROCEDURE — 71260 CT THORAX DX C+: CPT | Mod: 26

## 2021-05-27 PROCEDURE — 77063 BREAST TOMOSYNTHESIS BI: CPT

## 2021-05-27 PROCEDURE — 71260 CT THORAX DX C+: CPT

## 2021-06-08 ENCOUNTER — OUTPATIENT (OUTPATIENT)
Dept: OUTPATIENT SERVICES | Facility: HOSPITAL | Age: 61
LOS: 1 days | Discharge: HOME | End: 2021-06-08

## 2021-06-08 DIAGNOSIS — C7A.8 OTHER MALIGNANT NEUROENDOCRINE TUMORS: ICD-10-CM

## 2021-06-08 DIAGNOSIS — Z98.891 HISTORY OF UTERINE SCAR FROM PREVIOUS SURGERY: Chronic | ICD-10-CM

## 2021-06-11 ENCOUNTER — APPOINTMENT (OUTPATIENT)
Dept: INFUSION THERAPY | Facility: HOSPITAL | Age: 61
End: 2021-06-11

## 2021-06-18 ENCOUNTER — APPOINTMENT (OUTPATIENT)
Dept: CARDIOLOGY | Facility: CLINIC | Age: 61
End: 2021-06-18
Payer: COMMERCIAL

## 2021-06-18 ENCOUNTER — NON-APPOINTMENT (OUTPATIENT)
Age: 61
End: 2021-06-18

## 2021-06-18 VITALS
HEIGHT: 60 IN | OXYGEN SATURATION: 99 % | RESPIRATION RATE: 19 BRPM | HEART RATE: 87 BPM | SYSTOLIC BLOOD PRESSURE: 165 MMHG | DIASTOLIC BLOOD PRESSURE: 85 MMHG | TEMPERATURE: 98.6 F

## 2021-06-18 DIAGNOSIS — I35.0 NONRHEUMATIC AORTIC (VALVE) STENOSIS: ICD-10-CM

## 2021-06-18 PROCEDURE — 93000 ELECTROCARDIOGRAM COMPLETE: CPT

## 2021-06-18 PROCEDURE — 99213 OFFICE O/P EST LOW 20 MIN: CPT

## 2021-06-18 PROCEDURE — 99072 ADDL SUPL MATRL&STAF TM PHE: CPT

## 2021-06-18 PROCEDURE — 93306 TTE W/DOPPLER COMPLETE: CPT

## 2021-06-28 ENCOUNTER — APPOINTMENT (OUTPATIENT)
Dept: OBGYN | Facility: CLINIC | Age: 61
End: 2021-06-28
Payer: COMMERCIAL

## 2021-06-28 VITALS
DIASTOLIC BLOOD PRESSURE: 82 MMHG | HEIGHT: 60 IN | SYSTOLIC BLOOD PRESSURE: 140 MMHG | BODY MASS INDEX: 57.52 KG/M2 | RESPIRATION RATE: 16 BRPM | WEIGHT: 293 LBS | HEART RATE: 88 BPM | TEMPERATURE: 97.5 F | OXYGEN SATURATION: 99 %

## 2021-06-28 PROCEDURE — 99072 ADDL SUPL MATRL&STAF TM PHE: CPT

## 2021-06-28 PROCEDURE — 99396 PREV VISIT EST AGE 40-64: CPT

## 2021-06-28 NOTE — HISTORY OF PRESENT ILLNESS
[N] : Patient is not sexually active [FreeTextEntry1] : Check up  WIthout complaint S/P endo Bx with Dr Grady Path benign\par \par Persistent 6 cm Rt ovarian cyst, unchanged on recent CT\par \par S/P COVID infection 2/2021 + monoclonal infusion Mild Sx  No hospitalization  S/P COVID vaccine [LMPDate] : age 52 [PGHxTotal] : 0

## 2021-06-28 NOTE — PHYSICAL EXAM
[Appropriately responsive] : appropriately responsive [Alert] : alert [No Acute Distress] : no acute distress [No Lymphadenopathy] : no lymphadenopathy [Non-tender] : non-tender [No Lesions] : no lesions [No Mass] : no mass [Oriented x3] : oriented x3 [FreeTextEntry7] : Morbid obesity [Examination Of The Breasts] : a normal appearance [No Discharge] : no discharge [No Masses] : no breast masses were palpable [Labia Majora] : normal [Labia Minora] : normal [Discharge] : a  ~M vaginal discharge was present [Foul Smelling] : foul smelling [No Bleeding] : There was no active vaginal bleeding [Soft] : soft [Normal] : normal [Normal Position] : in a normal position [Tenderness] : nontender [Enlarged ___ wks] : not enlarged [Mass ___ cm] : no uterine mass was palpated [Uterine Adnexae] : normal

## 2021-07-01 ENCOUNTER — APPOINTMENT (OUTPATIENT)
Dept: NEUROLOGY | Facility: CLINIC | Age: 61
End: 2021-07-01

## 2021-07-05 LAB
CANDIDA VAG CYTO: NOT DETECTED
G VAGINALIS+PREV SP MTYP VAG QL MICRO: NOT DETECTED
T VAGINALIS VAG QL WET PREP: NOT DETECTED

## 2021-07-06 ENCOUNTER — APPOINTMENT (OUTPATIENT)
Dept: NEUROLOGY | Facility: CLINIC | Age: 61
End: 2021-07-06
Payer: COMMERCIAL

## 2021-07-06 VITALS
HEIGHT: 60 IN | WEIGHT: 293 LBS | DIASTOLIC BLOOD PRESSURE: 74 MMHG | BODY MASS INDEX: 57.52 KG/M2 | HEART RATE: 80 BPM | SYSTOLIC BLOOD PRESSURE: 133 MMHG

## 2021-07-06 PROCEDURE — 99072 ADDL SUPL MATRL&STAF TM PHE: CPT

## 2021-07-06 PROCEDURE — 99214 OFFICE O/P EST MOD 30 MIN: CPT

## 2021-07-06 NOTE — DISCUSSION/SUMMARY
[FreeTextEntry1] : 61-year-old woman who has a neuroendocrine tumor who presents for follow-up of her right hemifacial spasm with negative CT head with and without contrast.  Will have her see my movement colleagues for Botox injections as treatment of the hemifacial spasms.\par \par I spent the time noted on the day of this patient encounter preparing for, providing and documenting the above E/M service and counseling and educate patient on differential, workup, disease course, and treatment/management. Education was provided to the patient during this encounter. All questions and concerns were answered and addressed in detail. The patient verbalized understanding and agreed to plan. Patient was advised to continue to monitor for neurologic symptoms and to notify my office or go to the nearest emergency room if there are any changes. Any orders/referrals and communications were provided as well. \par Side effects of the above medications were discussed in detail including but not limited to applicable black box warning and teratogenicity as appropriate. \par Patient was advised to bring previous records to my office. \par \par \par

## 2021-07-06 NOTE — HISTORY OF PRESENT ILLNESS
[FreeTextEntry1] : \par \par 61 W who was last seen in 2018 for headaches. Since then, she has been dx with metastatic neuroendocrine tumor. She states about a few weeks ago, she started having right facial twitches. IT's daily and it lasts 15 minutes. There was only the start of metformin but this was happening even before she was started on metformin. \par She has no videos of this. She states family is able to see the facial contractions. It doesn't interfere with her ability to talk.\par \par interval history: She has a diagnosis of neuroendocrine neoplasm of the GI tract currently on Lanreotide.  patient states that since her last visit her right facial twitches is more frequent.  Previously and lasted 15 minutes however now it is constant.  Patient has no new medication or diagnoses.  Patient had a CAT scan of the head with and without contrast in May of 2020 which was unremarkable.

## 2021-07-06 NOTE — PHYSICAL EXAM
[General Appearance - Alert] : alert [Oriented To Time, Place, And Person] : oriented to person, place, and time [Person] : oriented to person [Place] : oriented to place [Time] : oriented to time [Short Term Intact] : short term memory intact [Span Intact] : the attention span was normal [Naming Objects] : no difficulty naming common objects [Fluency] : fluency intact [Current Events] : adequate knowledge of current events [Cranial Nerves Optic (II)] : visual acuity intact bilaterally,  visual fields full to confrontation, pupils equal round and reactive to light [Cranial Nerves Oculomotor (III)] : extraocular motion intact [Cranial Nerves Facial (VII)] : face symmetrical [Cranial Nerves Vestibulocochlear (VIII)] : hearing was intact bilaterally [Cranial Nerves Accessory (XI - Cranial And Spinal)] : head turning and shoulder shrug symmetric [Cranial Nerves Hypoglossal (XII)] : there was no tongue deviation with protrusion [Motor Tone] : muscle tone was normal in all four extremities [Motor Strength] : muscle strength was normal in all four extremities [Involuntary Movements] : no involuntary movements were seen [Paresis Pronator Drift Right-Sided] : no pronator drift on the right [Paresis Pronator Drift Left-Sided] : no pronator drift on the left [Sensation Tactile Decrease] : light touch was intact [Abnormal Walk] : normal gait [Coordination - Dysmetria Impaired Finger-to-Nose Bilateral] : not present [1+] : Biceps left 1+ [0] : Patella left 0 [FreeTextEntry5] : constant right sided facial spasms that involve partial closure of right eye and right nl fold.

## 2021-07-07 ENCOUNTER — OUTPATIENT (OUTPATIENT)
Dept: OUTPATIENT SERVICES | Facility: HOSPITAL | Age: 61
LOS: 1 days | Discharge: HOME | End: 2021-07-07

## 2021-07-07 DIAGNOSIS — Z98.891 HISTORY OF UTERINE SCAR FROM PREVIOUS SURGERY: Chronic | ICD-10-CM

## 2021-07-07 DIAGNOSIS — C7A.8 OTHER MALIGNANT NEUROENDOCRINE TUMORS: ICD-10-CM

## 2021-07-09 ENCOUNTER — APPOINTMENT (OUTPATIENT)
Dept: INFUSION THERAPY | Facility: HOSPITAL | Age: 61
End: 2021-07-09

## 2021-07-26 ENCOUNTER — APPOINTMENT (OUTPATIENT)
Dept: NEUROLOGY | Facility: CLINIC | Age: 61
End: 2021-07-26

## 2021-07-26 ENCOUNTER — APPOINTMENT (OUTPATIENT)
Dept: NEUROLOGY | Facility: CLINIC | Age: 61
End: 2021-07-26
Payer: COMMERCIAL

## 2021-07-26 VITALS
HEART RATE: 84 BPM | HEIGHT: 60 IN | DIASTOLIC BLOOD PRESSURE: 82 MMHG | WEIGHT: 293 LBS | SYSTOLIC BLOOD PRESSURE: 132 MMHG | BODY MASS INDEX: 57.52 KG/M2

## 2021-07-26 PROCEDURE — 99214 OFFICE O/P EST MOD 30 MIN: CPT | Mod: 25

## 2021-07-26 PROCEDURE — 64612 DESTROY NERVE FACE MUSCLE: CPT

## 2021-07-26 RX ORDER — FERROUS SULFATE 325(65) MG
TABLET ORAL
Refills: 0 | Status: DISCONTINUED | COMMUNITY
End: 2021-07-26

## 2021-07-26 RX ORDER — METHOCARBAMOL 500 MG/1
500 TABLET, FILM COATED ORAL 4 TIMES DAILY
Qty: 45 | Refills: 0 | Status: DISCONTINUED | COMMUNITY
Start: 2017-10-02 | End: 2021-07-26

## 2021-07-26 RX ORDER — FAMOTIDINE 20 MG/1
20 TABLET, FILM COATED ORAL
Qty: 10 | Refills: 0 | Status: DISCONTINUED | COMMUNITY
Start: 2020-01-30 | End: 2021-07-26

## 2021-07-26 RX ORDER — LOSARTAN POTASSIUM 100 MG/1
TABLET, FILM COATED ORAL
Refills: 0 | Status: DISCONTINUED | COMMUNITY
End: 2021-07-26

## 2021-07-26 RX ORDER — ONDANSETRON 4 MG/1
4 TABLET ORAL
Qty: 20 | Refills: 1 | Status: DISCONTINUED | COMMUNITY
Start: 2019-04-12 | End: 2021-07-26

## 2021-07-26 RX ORDER — CLOTRIMAZOLE AND BETAMETHASONE DIPROPIONATE 10; .5 MG/G; MG/G
1-0.05 CREAM TOPICAL TWICE DAILY
Qty: 45 | Refills: 2 | Status: DISCONTINUED | COMMUNITY
Start: 2019-08-14 | End: 2021-07-26

## 2021-07-26 NOTE — DISCUSSION/SUMMARY
[FreeTextEntry1] : Oxana Ratliff is a 61 year old F with a PMHx COVID-19 in 2/21, Mesentery Cancer, Obesity, Type II DM, Peripheral Edema, and HTN. She presents for initial visit in the Movement Disorders Clinic at [facility]. She was referred by Dr. Mccurdy for hemifacial spasms. \par \par The patient underwent initial injections for R hemifacial and belpharospasms. She tolerated the procedure well. Please see procedure section of this note for further details. Risks and benefits of the procedure were reviewed prior to and after the procedure. The patient gave her verbal consent to proceed prior to the procedure.\par \par I discussed that as part of the work up we obtain brain and vessel imaging of the IAC to determine if there is vascular compression of the seventh cranial nerve. The patient is not interested in further neuroimaging at this time.\par CT of the head was done with and without contrast on 5/29/2020 that was unremarkable.\par \par She will follow up for repeat botox injections in 3 months.\par \par All questions were answered to her satisfaction. I spent 45 minutes with this patient.

## 2021-07-26 NOTE — HISTORY OF PRESENT ILLNESS
[FreeTextEntry1] : Oxana Ratliff is a 61 year old F with a PMHx COVID-19 in 2/21, Mesentery Cancer, Obesity, Type II DM, Peripheral Edema, and HTN. She presents for initial visit in the Movement Disorders Clinic at [facility]. She was referred by Dr. Mccurdy for hemifacial spasms. \par \par These right facial spasms start around her under eye and right side of her mouth/cheek. This started over a year ago but has increased in frequency. She has a history of Bell's palsy years ago but she cannot recall which side. She cannot tell what induces the movement.\par \par No dizziness/lightheadedness. No headaches. No nausea/vomiting. No recent illness, but undergoing cancer treatments, she has mesentery cancer. She had COVID-19 in 2/21 and had the infusion, and is now vaccinated. She did not have much in the way of symptoms, and was not hospitalized. No cough, shortness of breath, chest pain, or flu like symptoms in the last 2 weeks. No numbness/tingling. \par \par Family history: unremarkable

## 2021-07-26 NOTE — PROCEDURE
[FreeTextEntry1] : The patient was injected with Onabotulinum Toxin A in the muscles outlined below after the botox was diluted to 5 units/0.1cc using normal saline. Anatomic locations were used. The patient tolerated the procedure well with no complications. \par \par R outer canthus - 2.5 units\par R Upper eye lid - inner and outer - 2.5 units each\par R Lower eye lid, inner and outer - 2.5 units each\par R Nasalis - 2.5 units\par R upper orbicularis oris - 2.5 units\par R lower orbicularis oris -  2.5 units\par R Risorius - 2.5 units\par \par Total units used: 22.5 units\par Unavoidable waste: 77.5 units\par Total units: 100 units\par \par Lot: [id number]\par Expiry: 04/2023

## 2021-08-06 ENCOUNTER — APPOINTMENT (OUTPATIENT)
Dept: INFUSION THERAPY | Facility: HOSPITAL | Age: 61
End: 2021-08-06

## 2021-08-19 ENCOUNTER — APPOINTMENT (OUTPATIENT)
Dept: INTERNAL MEDICINE | Facility: CLINIC | Age: 61
End: 2021-08-19
Payer: COMMERCIAL

## 2021-08-19 VITALS
TEMPERATURE: 97.2 F | BODY MASS INDEX: 57.52 KG/M2 | DIASTOLIC BLOOD PRESSURE: 80 MMHG | RESPIRATION RATE: 16 BRPM | HEIGHT: 60 IN | HEART RATE: 87 BPM | WEIGHT: 293 LBS | SYSTOLIC BLOOD PRESSURE: 130 MMHG | OXYGEN SATURATION: 98 %

## 2021-08-19 DIAGNOSIS — M62.838 OTHER MUSCLE SPASM: ICD-10-CM

## 2021-08-19 PROCEDURE — 99215 OFFICE O/P EST HI 40 MIN: CPT

## 2021-08-22 NOTE — ASSESSMENT
[FreeTextEntry1] : Physical examination shows a well-developed female in no acute distress blood pressure 130/80 height 5 feet weight 335 pounds BMI 65.43 temperature 97.2 °F heart rate of 87 respirations at 16 oxygen saturation on room air 98% HEENT was unremarkable chest was clear cardiovascular exam was regular with no extra heart sounds or murmurs abdomen was soft extremities showed bilateral edema neurologic exam was nonfocal patient will continue to receive Botox injection for her facial twitching there was a response to the first session although she said it was uncomfortable patient's gynecologist is Dr. Lyons her oncologist for her neuroendocrine tumor is Dr. Katalina Parekh last imaging showed stability she continues on her medication for this tumor has received the Covid vaccines she is up-to-date with her influenza vaccine and has received both doses of the Shingrix vaccine blood pressure was well controlled at the present visit long discussion with the patient concerning her obesity and the medical ramifications of such patient was strongly advised to consider bariatric surgery or to engage in a formal weight loss program such as weight watchers she needs to motivate herself to restrict carbohydrates significantly limit portion size she does have periodic echocardiograms to follow her aortic stenosis these have been stable showing moderate aortic stenosis complete blood tests were ordered including COVID-19 nuclear capsid antibodies

## 2021-08-22 NOTE — PHYSICAL EXAM
[Well Nourished] : well nourished [No Acute Distress] : no acute distress [Well Developed] : well developed [Well-Appearing] : well-appearing [Normal Voice/Communication] : normal voice/communication [Normal Sclera/Conjunctiva] : normal sclera/conjunctiva [PERRL] : pupils equal round and reactive to light [EOMI] : extraocular movements intact [Normal Outer Ear/Nose] : the outer ears and nose were normal in appearance [Normal Oropharynx] : the oropharynx was normal [Normal TMs] : both tympanic membranes were normal [Normal Nasal Mucosa] : the nasal mucosa was normal [No JVD] : no jugular venous distention [No Lymphadenopathy] : no lymphadenopathy [Supple] : supple [Thyroid Normal, No Nodules] : the thyroid was normal and there were no nodules present [No Respiratory Distress] : no respiratory distress  [No Accessory Muscle Use] : no accessory muscle use [Normal Percussion] : the chest was normal to percussion [Clear to Auscultation] : lungs were clear to auscultation bilaterally [Normal Rate] : normal rate  [Regular Rhythm] : with a regular rhythm [Normal S1, S2] : normal S1 and S2 [No Murmur] : no murmur heard [No Carotid Bruits] : no carotid bruits [No Abdominal Bruit] : a ~M bruit was not heard ~T in the abdomen [No Varicosities] : no varicosities [Pedal Pulses Present] : the pedal pulses are present [No Edema] : there was no peripheral edema [No Extremity Clubbing/Cyanosis] : no extremity clubbing/cyanosis [No Palpable Aorta] : no palpable aorta [Declined Breast Exam] : declined breast exam  [Soft] : abdomen soft [Non-distended] : non-distended [No Masses] : no abdominal mass palpated [No HSM] : no HSM [Normal Bowel Sounds] : normal bowel sounds [No Hernias] : no hernias [Declined Rectal Exam] : declined rectal exam [Normal Supraclavicular Nodes] : no supraclavicular lymphadenopathy [Normal Axillary Nodes] : no axillary lymphadenopathy [Normal Posterior Cervical Nodes] : no posterior cervical lymphadenopathy [Normal Inguinal Nodes] : no inguinal lymphadenopathy [Normal Anterior Cervical Nodes] : no anterior cervical lymphadenopathy [Normal Femoral Nodes] : no femoral lymphadenopathy [No CVA Tenderness] : no CVA  tenderness [No Spinal Tenderness] : no spinal tenderness [No Joint Swelling] : no joint swelling [Grossly Normal Strength/Tone] : grossly normal strength/tone [No Rash] : no rash [No Skin Lesions] : no skin lesions [Coordination Grossly Intact] : coordination grossly intact [No Focal Deficits] : no focal deficits [Normal Gait] : normal gait [Deep Tendon Reflexes (DTR)] : deep tendon reflexes were 2+ and symmetric [Speech Grossly Normal] : speech grossly normal [Memory Grossly Normal] : memory grossly normal [Normal Affect] : the affect was normal [Alert and Oriented x3] : oriented to person, place, and time [Normal Mood] : the mood was normal [Normal Insight/Judgement] : insight and judgment were intact [Acne] : no acne [de-identified] : mild right upper qudrant tenderness

## 2021-08-22 NOTE — HISTORY OF PRESENT ILLNESS
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 neck spasms hypertension and ovarian cyst she denies temperature chills sweats or myalgias she has had no headache sinus congestion sore throat cough wheezing pleurisy chest pain shortness of breath mild shortness of breath on exertion denies lightheadedness palpitations dizziness vertigo or syncope she does have bilateral lower extremity edema she has occasional mild right upper quadrant discomfort she has had no nausea vomiting diarrhea constipation bright red blood per rectum or black stools her appetite has been good her weight has been stable she has joint stiffness she denies skin rashes she does get up at night to urinate but denies dysuria or gross hematuria she has had bouts of anxiety claims to usually sleep well

## 2021-08-22 NOTE — COUNSELING
[Potential consequences of obesity discussed] : Potential consequences of obesity discussed [Benefits of weight loss discussed] : Benefits of weight loss discussed [Structured Weight Management Program suggested:] : Structured weight management program suggested [Encouraged to maintain food diary] : Encouraged to maintain food diary [Encouraged to increase physical activity] : Encouraged to increase physical activity [Encouraged to use exercise tracking device] : Encouraged to use exercise tracking device [Weigh Self Weekly] : weigh self weekly [Decrease Portions] : decrease portions [____ min/wk Activity] : [unfilled] min/wk activity [Keep Food Diary] : keep food diary [Good understanding] : Patient has a good understanding of disease, goals and obesity follow-up plan [FreeTextEntry4] : 16

## 2021-08-27 ENCOUNTER — APPOINTMENT (OUTPATIENT)
Dept: HEMATOLOGY ONCOLOGY | Facility: CLINIC | Age: 61
End: 2021-08-27
Payer: COMMERCIAL

## 2021-08-27 ENCOUNTER — OUTPATIENT (OUTPATIENT)
Dept: OUTPATIENT SERVICES | Facility: HOSPITAL | Age: 61
LOS: 1 days | Discharge: HOME | End: 2021-08-27

## 2021-08-27 DIAGNOSIS — C7A.8 OTHER MALIGNANT NEUROENDOCRINE TUMORS: ICD-10-CM

## 2021-08-27 DIAGNOSIS — T78.40XA ALLERGY, UNSPECIFIED, INITIAL ENCOUNTER: ICD-10-CM

## 2021-08-27 DIAGNOSIS — Z98.891 HISTORY OF UTERINE SCAR FROM PREVIOUS SURGERY: Chronic | ICD-10-CM

## 2021-08-27 PROCEDURE — 99213 OFFICE O/P EST LOW 20 MIN: CPT | Mod: 95

## 2021-08-27 NOTE — HISTORY OF PRESENT ILLNESS
[Disease: _____________________] : Disease: [unfilled] [AJCC Stage: ____] : AJCC Stage: [unfilled] [Therapy: ___] : Therapy: [unfilled] [Home] : at home, [unfilled] , at the time of the visit. [Medical Office: ([facility])___] : at the medical office located in  [Verbal consent obtained from patient] : the patient, [unfilled] [de-identified] : 61 F with h/o intermittent abdominal pain, n/v over the last several years (at least 9) thought to be 2/2 gallstones. She has been evaluated in the ER 1-2 per year with these attacks.  CT A/P going as far back as 2010 noted a calcified mass within the mesentery of the small bowel suspicious for carcinoid. In Nov 2017 it was found to be increasing in size from previous imaging. She saw a surgeon at the time however it is unclear if any work up was recommended. \par \par In Nov 2018 she was referred to Dr. Foy for evaluation of gallstones. He noted the mesenteric mass which prompted a PET/DOTATATE to be performed. This showed activity in the mesentery (SUV 59.9) as well as liver, ileal  mass though to be the primary, cystic adnexal mass unchanged since 11/17, possible uptake in the skin of left breast. An MRI Abdomen was performed to evaluate the liver mets and this revealed multiple metastatic lesions. Referred to med onc for further work up. \par \par 2/14/19: Enteroscopy/[place]: no mass noted, erythema in the terminal ileum, s/p biopsy with negative path \par 4/22/19: CT CAP: multiple small b/l lung nodules, slight enlargement of calcified mesenteric mass. \par 4/23-4/26/19: admitted to [facility] with N/V post scan. 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  [de-identified] : well differentiated NET [FreeTextEntry1] : Lanreotide 120 mg IM  [de-identified] : Patient has continued her monthly injection as directed. She admitted to fatigue but continues to work full-time at this time due insurance benefits. She continued to report good appetite, stable weight and consistent bowel movement (at least 1 BM per day).

## 2021-08-27 NOTE — PHYSICAL EXAM
[Obese] : obese [Restricted in physically strenuous activity but ambulatory and able to carry out work of a light or sedentary nature] : Status 1- Restricted in physically strenuous activity but ambulatory and able to carry out work of a light or sedentary nature, e.g., light house work, office work [Normal] : well developed, well nourished, in no acute distress

## 2021-08-28 ENCOUNTER — LABORATORY RESULT (OUTPATIENT)
Age: 61
End: 2021-08-28

## 2021-08-28 NOTE — ED ADULT NURSE NOTE - NSFALLRSKASSESASSIST_ED_ALL_ED
Patient : Skye Taveras Age: 29 year old Sex: female   MRN: [id number] Encounter Date: 2021      History     Chief Complaint   Patient presents with   • Breast Problem     HPI:  Meka Ledezma RN  2021 23:59       Pt comes in with complaint of bilateral breast pain.  Pt states that she had a breast reduction about one year ago and over the last 3 months she has had pain, and lumps to both breast. PT goes on to say that the pain is deep inside and that she doesn't like anything to touch them.   Pt denies recent injury to breast and other complaint.          This is a 29-year-old female who reportedly had breast reduction surgery performed by Dr. Rodriges  here at [facility] approximately 1 year ago.  She reports over the last 3 months she has been experiencing some lumps to the upper portion of the right breast as well as below a scar on the lower breast that is painful to touch.  She has not had any redness or drainage.  She reports it has been very uncomfortable to wear her brought because of the discomfort.  She is also concerned that there is a significant family history of breast cancer but in her grandmother who did have a mastectomy but she was elderly at that time.    ALLERGIES:   Allergen Reactions   • Fentanyl SHORTNESS OF BREATH     Per patient during last surgery   Per patient during last surgery      • Adhesive   (Environmental) PRURITUS     REDNESS AND ITCHING   • Doxycycline SWELLING and PRURITUS     EDEMA  EDEMA  Head and neck  Head and neck   • Sulfamethoxazole W/Trimethoprim Nausea & Vomiting       Current Discharge Medication List      Prior to Admission Medications    Details   amphetamine-dextroamphetamine (ADDERALL) 5 MG tablet Take 5 mg by mouth.      cyclobenzaprine (FLEXERIL) 5 MG tablet Take 5 mg by mouth at bedtime as needed.      amitriptyline (ELAVIL) 25 MG tablet Take 25 mg by mouth daily.      Digestive Enzymes Cap Take 1 capsule by mouth.      HYDROcodone-acetaminophen  7.5-325 MG/15ML solution TAKE 10 ML BY MOUTH EVERY 4 HOURS AS NEEDED FOR MODERATE PAIN      ondansetron (ZOFRAN ODT) 4 MG disintegrating tablet       topiramate (TOPAMAX) 25 MG tablet       clobetasol (TEMOVATE) 0.05 % topical solution Apply twice a day to areas on the scalp as needed for scaliness  Qty: 50 mL, Refills: 5      clindamycin-benzoyl peroxide (Duac) 1.2-5 % gel Apply each night to the acne areas on the face chest and back  Qty: 45 g, Refills: 11      predniSONE (DELTASONE) 20 MG tablet Take 2 tablets by mouth daily. May stop in 3 days if asthma attacks are completely controlled  Qty: 10 tablet, Refills: 0      omeprazole (PriLOSEC) 40 MG capsule TAKE 1 CAPSULE BY MOUTH DAILY  Qty: 90 capsule, Refills: 3      cyanocobalamin 1000 MCG/ML injection 1000 mcg every 6 months  Qty: 2 vial, Refills: 0      COLLAGEN PO Take 1 tablet by mouth daily.      lisdexamfetamine (VYVANSE) 30 MG capsule Take 30 mg by mouth daily.      modafinil (PROVIGIL) 100 MG tablet Take 100 mg by mouth daily.      traZODone (DESYREL) 50 MG tablet Take  mg by mouth as needed.      hydrOXYzine (ATARAX) 25 MG tablet Take 1 tablet by mouth 3 times daily as needed for Anxiety.  Qty: 60 tablet, Refills: 3      albuterol 108 (90 Base) MCG/ACT inhaler Inhale 2 puffs into the lungs every 4 hours as needed for Shortness of Breath or Wheezing.  Qty: 1 Inhaler, Refills: 11      gabapentin (NEURONTIN) 300 MG capsule Take 1 capsule by mouth 3 times daily for 5 days.  Qty: 15 capsule, Refills: 0      montelukast (SINGULAIR) 10 MG tablet Take 10 mg by mouth nightly.      cetirizine (ZyrTEC) 10 MG tablet Take 20 mg by mouth daily.       Ferrous Sulfate (Iron) 325 (65 Fe) MG Tab Take 325 mg by mouth daily.      Fish Oil-Cholecalciferol (GNP FISH OIL +D3 PO) Take 1 capsule by mouth daily.      DISPENSE USES CREAM ON TOES, DID NOT KNOW THE NAME      bimatoprost (LATISSE) 0.03 % topical solution Apply topically daily. Only apply at base of upper  lashes once daily. DO NOT APPLY to lower lid.  Qty: 3 mL, Refills: 1      spironolactone (ALDACTONE) 50 MG tablet Take one tablet by mouth twice a day.  Qty: 60 tablet, Refills: 11      norgestimate-ethinyl estradiol (ORTHO-CYCLEN) 0.25-35 MG-MCG per tablet Take 1 tablet by mouth daily.      Cholecalciferol (VITAMIN D3) 2000 units Chew Tab Chew 2,000 Units by mouth daily.              Past Medical History:   Diagnosis Date   • Acne    • Anemia    • Anxiety    • Attention deficit disorder    • Chickenpox    • Chronic pain    • Depression    • Fibromyalgia    • Fibromyalgia     CHRONIC FATIGUE   • Migraines    • Panic attack    • PMS (premenstrual syndrome)    • PONV (postoperative nausea and vomiting)    • RAD (reactive airway disease)    • Thyroid condition    • Vitamin B 12 deficiency        Past Surgical History:   Procedure Laterality Date   • Breast reduction Bilateral 10/14/2020   • Breast surgery      Breast Reduction   • Mole removal     • Pap smear,routine  05/15/2009   • [place] tooth extraction         Family History   Problem Relation Age of Onset   • Anxiety disorder Mother    • Osteoarthritis Mother    • Depression Mother    • Hypertension Mother    • Dementia/Alzheimers Mother    • Cancer Father         kidney   • Depression Father    • Hypertension Father    • Stroke Father    • Depression Sister    • Hypertension Sister        Social History     Tobacco Use   • Smoking status: Never Smoker   • Smokeless tobacco: Never Used   Substance Use Topics   • Alcohol use: No     Alcohol/week: 0.0 standard drinks   • Drug use: Never       Review of Systems   Constitutional: Negative for chills and fever.   HENT: Negative for congestion, ear pain and sore throat.    Eyes: Negative for pain and redness.   Respiratory: Negative for cough and shortness of breath.    Cardiovascular: Negative for chest pain, palpitations and leg swelling.   Gastrointestinal: Negative for abdominal pain, constipation, diarrhea, nausea  and vomiting.   Genitourinary: Negative for difficulty urinating, dysuria, flank pain, frequency, hematuria and urgency.   Musculoskeletal: Negative for back pain and neck pain.   Skin: Negative for color change, pallor and rash.   Neurological: Negative for dizziness, weakness, light-headedness, numbness and headaches.   Hematological: Negative for adenopathy. Does not bruise/bleed easily.   Psychiatric/Behavioral: Negative for confusion and decreased concentration. The patient is not nervous/anxious.        Physical Exam     ED Triage Vitals [08/28/21 0000]   ED Triage Vitals Group      Temp 99.3 °F (37.4 °C)      Heart Rate 72      Resp 16      BP (!) 134/92      SpO2 97 %      EtCO2 mmHg       Height 5' 7\" (1.702 m)      Weight 175 lb (79.4 kg)      Weight Scale Used ED Stated      BMI (Calculated) 27.41      IBW/kg (Calculated) 61.6       Physical Exam  Vitals and nursing note reviewed.   Constitutional:       General: She is not in acute distress.     Appearance: Normal appearance. She is not ill-appearing, toxic-appearing or diaphoretic.   HENT:      Head: Normocephalic.      Right Ear: External ear normal.      Left Ear: External ear normal.   Eyes:      Extraocular Movements: Extraocular movements intact.   Cardiovascular:      Rate and Rhythm: Normal rate.   Pulmonary:      Effort: Pulmonary effort is normal. No respiratory distress.      Comments: Right breast was examined with an ED nurse chaperone present.  There is no drainage from the areola.  There is no overlying evidence of redness to suggest a mastitis.  There are tender “lumps” at the 12 and 6 o'clock position.  They are movable.  I suspect this is scar tissue.  Musculoskeletal:         General: Normal range of motion.      Cervical back: Normal range of motion.   Skin:     General: Skin is warm and dry.      Findings: No erythema or rash.   Neurological:      General: No focal deficit present.      Mental Status: She is alert. Mental status is  at baseline.   Psychiatric:         Mood and Affect: Mood normal.         Behavior: Behavior normal.         Thought Content: Thought content normal.         Judgment: Judgment normal.           ED Course     Procedures    MDM:  This is a patient who presented for evaluation of right breast pain and “lumps” that have been present for 3 months with a history of breast reduction surgery in the last year.  She has not had any imaging studies since the breast reduction.  At this time I do not find any evidence to suggest an infection and I am recommending that she follow up with her primary care doctor and have them likely order an ultrasound to further evaluate this on a routine basis.  Patient presents at 11:45 p.m. and I do not believe that the ultrasonographer needs to be called in in the middle the night for something like this which can be done on a routine basis as an outpatient.    Labs:   No results found for this visit on 08/27/21.     Radiology:   No orders to display      Images reviewed by Emergency Physician      Medications/Fluids ordered in ED  Medications - No data to display    Diagnosis:  1. Painful lumpy right breast        Follow-up Information     Follow up With Specialties Details Why Contact Info    Aiyana Taveras, DO Internal Medicine Call on 8/30/2021 Discuss having an ultrasound of your breast obtained as soon as possible to further evaluate [address] [facility] [id number] [phone]               Summary of your Discharge Medications      You have not been prescribed any medications.          Aram Pop MD  08/28/21 0136     no

## 2021-08-29 ENCOUNTER — RX RENEWAL (OUTPATIENT)
Age: 61
End: 2021-08-29

## 2021-08-31 ENCOUNTER — RX RENEWAL (OUTPATIENT)
Age: 61
End: 2021-08-31

## 2021-08-31 LAB — CGA SERPL-MCNC: 35.7 NG/ML

## 2021-09-01 ENCOUNTER — OUTPATIENT (OUTPATIENT)
Dept: OUTPATIENT SERVICES | Facility: HOSPITAL | Age: 61
LOS: 1 days | End: 2021-09-01
Payer: COMMERCIAL

## 2021-09-01 ENCOUNTER — APPOINTMENT (OUTPATIENT)
Dept: CT IMAGING | Facility: CLINIC | Age: 61
End: 2021-09-01
Payer: COMMERCIAL

## 2021-09-01 DIAGNOSIS — Z98.891 HISTORY OF UTERINE SCAR FROM PREVIOUS SURGERY: Chronic | ICD-10-CM

## 2021-09-01 DIAGNOSIS — Z00.8 ENCOUNTER FOR OTHER GENERAL EXAMINATION: ICD-10-CM

## 2021-09-01 DIAGNOSIS — D3A.8 OTHER BENIGN NEUROENDOCRINE TUMORS: ICD-10-CM

## 2021-09-01 PROCEDURE — 71260 CT THORAX DX C+: CPT | Mod: 26

## 2021-09-01 PROCEDURE — 74177 CT ABD & PELVIS W/CONTRAST: CPT

## 2021-09-01 PROCEDURE — 71260 CT THORAX DX C+: CPT

## 2021-09-01 PROCEDURE — 74177 CT ABD & PELVIS W/CONTRAST: CPT | Mod: 26

## 2021-09-03 ENCOUNTER — APPOINTMENT (OUTPATIENT)
Dept: INFUSION THERAPY | Facility: HOSPITAL | Age: 61
End: 2021-09-03

## 2021-09-23 LAB
25(OH)D3 SERPL-MCNC: 40.7 NG/ML
ALBUMIN SERPL ELPH-MCNC: 4.2 G/DL
ALP BLD-CCNC: 99 U/L
ALT SERPL-CCNC: 22 U/L
ANION GAP SERPL CALC-SCNC: 12 MMOL/L
APPEARANCE: ABNORMAL
AST SERPL-CCNC: 19 U/L
BASOPHILS # BLD AUTO: 0.05 K/UL
BASOPHILS NFR BLD AUTO: 0.8 %
BILIRUB SERPL-MCNC: 0.5 MG/DL
BILIRUBIN URINE: NEGATIVE
BLOOD URINE: ABNORMAL
BUN SERPL-MCNC: 12 MG/DL
CALCIUM SERPL-MCNC: 9.8 MG/DL
CHLORIDE SERPL-SCNC: 100 MMOL/L
CHOLEST SERPL-MCNC: 160 MG/DL
CO2 SERPL-SCNC: 27 MMOL/L
COLOR: YELLOW
COVID-19 NUCLEOCAPSID  GAM ANTIBODY INTERPRETATION: POSITIVE
CREAT SERPL-MCNC: 0.73 MG/DL
CRP SERPL HS-MCNC: 10.3 MG/L
EOSINOPHIL # BLD AUTO: 0.3 K/UL
EOSINOPHIL NFR BLD AUTO: 4.8 %
ESTIMATED AVERAGE GLUCOSE: 200 MG/DL
GLUCOSE BS SERPL-MCNC: 171 MG/DL
GLUCOSE QUALITATIVE U: NEGATIVE
GLUCOSE SERPL-MCNC: 172 MG/DL
HBA1C MFR BLD HPLC: 8.6 %
HCT VFR BLD CALC: 44.6 %
HDLC SERPL-MCNC: 53 MG/DL
HGB BLD-MCNC: 13.9 G/DL
IMM GRANULOCYTES NFR BLD AUTO: 0.3 %
KETONES URINE: NEGATIVE
LDLC SERPL CALC-MCNC: 85 MG/DL
LEUKOCYTE ESTERASE URINE: ABNORMAL
LYMPHOCYTES # BLD AUTO: 1.67 K/UL
LYMPHOCYTES NFR BLD AUTO: 26.8 %
MAN DIFF?: NORMAL
MCHC RBC-ENTMCNC: 27.6 PG
MCHC RBC-ENTMCNC: 31.2 GM/DL
MCV RBC AUTO: 88.5 FL
MONOCYTES # BLD AUTO: 0.44 K/UL
MONOCYTES NFR BLD AUTO: 7.1 %
NEUTROPHILS # BLD AUTO: 3.76 K/UL
NEUTROPHILS NFR BLD AUTO: 60.2 %
NITRITE URINE: POSITIVE
NONHDLC SERPL-MCNC: 106 MG/DL
PH URINE: 6.5
PLATELET # BLD AUTO: 252 K/UL
POTASSIUM SERPL-SCNC: 4.8 MMOL/L
PROT SERPL-MCNC: 7.4 G/DL
PROTEIN URINE: ABNORMAL
RBC # BLD: 5.04 M/UL
RBC # FLD: 14.9 %
SARS-COV-2 AB SERPL QL IA: 24.7 INDEX
SODIUM SERPL-SCNC: 139 MMOL/L
SPECIFIC GRAVITY URINE: 1.02
T4 FREE SERPL-MCNC: 0.9 NG/DL
TRIGL SERPL-MCNC: 108 MG/DL
TSH SERPL-ACNC: 2.03 UIU/ML
UROBILINOGEN URINE: NORMAL
VIT B12 SERPL-MCNC: 341 PG/ML
WBC # FLD AUTO: 6.24 K/UL

## 2021-09-27 ENCOUNTER — RX RENEWAL (OUTPATIENT)
Age: 61
End: 2021-09-27

## 2021-09-29 ENCOUNTER — APPOINTMENT (OUTPATIENT)
Dept: NEUROLOGY | Facility: CLINIC | Age: 61
End: 2021-09-29

## 2021-09-29 ENCOUNTER — OUTPATIENT (OUTPATIENT)
Dept: OUTPATIENT SERVICES | Facility: HOSPITAL | Age: 61
LOS: 1 days | Discharge: HOME | End: 2021-09-29

## 2021-09-29 DIAGNOSIS — C7A.8 OTHER MALIGNANT NEUROENDOCRINE TUMORS: ICD-10-CM

## 2021-09-29 DIAGNOSIS — Z98.891 HISTORY OF UTERINE SCAR FROM PREVIOUS SURGERY: Chronic | ICD-10-CM

## 2021-10-01 ENCOUNTER — APPOINTMENT (OUTPATIENT)
Dept: HEMATOLOGY ONCOLOGY | Facility: CLINIC | Age: 61
End: 2021-10-01
Payer: COMMERCIAL

## 2021-10-01 ENCOUNTER — APPOINTMENT (OUTPATIENT)
Dept: INFUSION THERAPY | Facility: HOSPITAL | Age: 61
End: 2021-10-01

## 2021-10-01 PROCEDURE — 99213 OFFICE O/P EST LOW 20 MIN: CPT

## 2021-10-05 NOTE — HISTORY OF PRESENT ILLNESS
[Disease: _____________________] : Disease: [unfilled] [AJCC Stage: ____] : AJCC Stage: [unfilled] [Therapy: ___] : Therapy: [unfilled] [de-identified] : well differentiated NET [de-identified] : 61 F with h/o intermittent abdominal pain, n/v over the last several years (at least 9) thought to be 2/2 gallstones. She has been evaluated in the ER 1-2 per year with these attacks.  CT A/P going as far back as 2010 noted a calcified mass within the mesentery of the small bowel suspicious for carcinoid. In Nov 2017 it was found to be increasing in size from previous imaging. She saw a surgeon at the time however it is unclear if any work up was recommended. \par \par In Nov 2018 she was referred to Dr. Foy for evaluation of gallstones. He noted the mesenteric mass which prompted a PET/DOTATATE to be performed. This showed activity in the mesentery (SUV 59.9) as well as liver, ileal  mass though to be the primary, cystic adnexal mass unchanged since 11/17, possible uptake in the skin of left breast. An MRI Abdomen was performed to evaluate the liver mets and this revealed multiple metastatic lesions. Referred to med onc for further work up. \par \par 2/14/19: Enteroscopy/[place]: no mass noted, erythema in the terminal ileum, s/p biopsy with negative path \par 4/22/19: CT CAP: multiple small b/l lung nodules, slight enlargement of calcified mesenteric mass. \par 4/23-4/26/19: admitted to [facility] with N/V post scan. 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 \par 9/1/21: CT CAP revealed stable disease.  [FreeTextEntry1] : Lanreotide 120 mg IM  [de-identified] : Patient was seen at the infusion room while receiving treatment. She has continued her monthly injection as directed. Her fatigue remains unchanged but she continues to work full-time and continues to complete her ADLs at this time.She reported good appetite, stable weight and consistent bowel movement (at least 1 BM per day).

## 2021-10-21 ENCOUNTER — APPOINTMENT (OUTPATIENT)
Dept: ORTHOPEDIC SURGERY | Facility: CLINIC | Age: 61
End: 2021-10-21
Payer: COMMERCIAL

## 2021-10-21 VITALS — HEIGHT: 60 IN | WEIGHT: 293 LBS | BODY MASS INDEX: 57.52 KG/M2

## 2021-10-21 PROCEDURE — 20610 DRAIN/INJ JOINT/BURSA W/O US: CPT | Mod: RT

## 2021-10-21 PROCEDURE — 99214 OFFICE O/P EST MOD 30 MIN: CPT | Mod: 25

## 2021-10-22 NOTE — HISTORY OF PRESENT ILLNESS
[de-identified] : Patient presents to office complaining of left shoulder pain x 1 week, indicated to anterior and posterior aspect of shoulder, 7/10, intermittent, achy and sharp ar times. \par Worsening with shoulder movement. some relief with rest.  knees have been feeling better. \par Patient is an obese female, PMHx mesenteric cancer (stage 4), HTN, edema, right knee arthroscopy 2006, Dr. whitaker, presents to the office walking with a cane (x few years ) complaining of b/l knee pain x1 week , insidious onset while getting up from the toilet x 1 week ago, progressively worsening. Reports locking, instability, weakness and limping due to symptoms. Pain indicated to anterior aspect of knee, 4/10, L>R, intermittent, achy and sharp at times. Worsening with walking or knee activity. Some relief with rest. Nsaids don’t help. \par Patient denies any fever, chills, trauma, swelling, erythema, hematomas, numbness or tingling sensation, buckling.\par  [2] : a minimum pain level of 2/10 [8] : a maximum pain level of 8/10 [Acetaminophen] : not relieved by acetaminophen [Exercise Regimen] : not relieved by exercise regimen

## 2021-10-22 NOTE — DISCUSSION/SUMMARY
[de-identified] : Advanced right knee bone-on-bone arthritis patient is concerned right knee injection\par \par Conservative treatment NSAIDs therapy strengthening\par Right knee injection the risks and benefits of injection was discussed with the patient. Verbal consent was obtained from the patient. The area was prepped with Betadine. A lateral suprapatellar approach was used. The knee was injected with 2 cc of Depo-Medrol(4omg/cc) 3 cc of lidocaine.  The procedure well. Band-Aid was applied.

## 2021-10-22 NOTE — PHYSICAL EXAM
[Antalgic] : antalgic [Cane] : ambulates with cane [Normal RUE] : Right Upper Extremity: No scars, rashes, lesions, ulcers, skin intact [Normal Finger/nose] : finger to nose coordination [Normal Touch] : sensation intact for touch [Poor Appearance] : well-appearing [Acute Distress] : not in acute distress [Obese] : obese [Normal] : no peripheral adenopathy appreciated [de-identified] : Patient appears stated age in no acute respiratory distress. Patient is alert oriented x3. Patient has normal mood and affect. \par Bilateral knee exam\par Range of motion of the knee is 0-120°. \par Skin is normal.  No rash.\par There is no effusion. No medial or lateral joint line tenderness. No swelling, no pitting edema.\par Overall alignment of the knee is then slight varus. Good anterior posterior stability. Firm endpoints on anterior and posterior drawer. \par Medial lateral stability is intact. Firm endpoint on medial and lateral stress testing .\par Colin test is negative.\par Quadriceps strength 5/ 5. There is no loss of muscle volume in the thigh. \par Good anterior posterior and mediolateral stability.\par Sensation in the extremities intact. \par Discrimination is intact. Good DP and PT pulses.\par 		\par \par Bilateral hip exam\par On inspection of the hip shows skin is normal. No evidence of rash. \par No loss of muscle.  Abductor strength is 5 out of 5. Hip flexor strength is 5.\par Range of motion of the hip at 90° flexion internal rotation is 15° external rotation is 30° pain-free. \par Hip has good stability in anterior and posterior direction. \par On lateral decubitus  examination there is no tenderness in the greater trochanter. \par Lower Extremity Examination \par Bilateral lower extremity skin is normal. There is no rash. There is no edema and lymphadenopathy.  DP and PT pulses intact. Sensation is intact.\par \par Left shoulder exam\par \par \par \par Skin is normal. Active range of motion active abduction from 0-60° is painful.\par 4 flexion zero 60s painful. Passive range of motion 0-100 is not painful. Internal rotation is the L5. External rotation 0-60 painful. Passive external rotation is pain-free.\par Drop arm test is positive. Belly press test is negative.\par Distal neurovascular exam is normal. [de-identified] : X-ray of the right knee bilateral knee from previous visit shows advanced arthritis.  3 views each

## 2021-10-26 ENCOUNTER — APPOINTMENT (OUTPATIENT)
Dept: NEUROLOGY | Facility: CLINIC | Age: 61
End: 2021-10-26
Payer: COMMERCIAL

## 2021-10-26 ENCOUNTER — NON-APPOINTMENT (OUTPATIENT)
Age: 61
End: 2021-10-26

## 2021-10-26 VITALS
BODY MASS INDEX: 57.52 KG/M2 | DIASTOLIC BLOOD PRESSURE: 80 MMHG | HEIGHT: 60 IN | HEART RATE: 90 BPM | WEIGHT: 293 LBS | SYSTOLIC BLOOD PRESSURE: 128 MMHG

## 2021-10-26 PROCEDURE — 99214 OFFICE O/P EST MOD 30 MIN: CPT | Mod: 25

## 2021-10-26 PROCEDURE — 64612 DESTROY NERVE FACE MUSCLE: CPT

## 2021-10-26 NOTE — DISCUSSION/SUMMARY
[FreeTextEntry1] : Oxana Ratliff is a 61 year old F with a PMHx COVID-19 in 2/21, Neuroendocrine cancer, Mesentery Cancer, Obesity, Type II DM, Peripheral Edema, and HTN. She presents for follow up in the Movement Disorders Clinic at [facility], [place]. She was initially referred by Dr. Mccurdy for hemifacial spasms. \par \par The patient underwent repeat injections for R hemifacial and blepharospasm. She tolerated the procedure well. Please see procedure section of this note for further details. Risks and benefits of the procedure were reviewed prior to and after the procedure. The patient gave her verbal consent to proceed prior to the procedure.\par \par I discussed that as part of the work up we obtain brain and vessel imaging of the IAC to determine if there is vascular compression of the seventh cranial nerve. The patient is not interested in further neuroimaging at this time.\par CT of the head was done with and without contrast on 5/29/2020 that was unremarkable, previously reviewed with patient.\par \par She will follow up for repeat botox injections in 3 months.\par \par All questions were answered to her satisfaction. I spent 30 minutes with this patient.

## 2021-10-26 NOTE — HISTORY OF PRESENT ILLNESS
[FreeTextEntry1] : Oxana Ratliff is a 61 year old F with a PMHx COVID-19 in 2/21, Neuroendocrine cancer, Mesentery Cancer, Obesity, Type II DM, Peripheral Edema, and HTN. She presents for follow up in the Movement Disorders Clinic at [facility]. She was initially referred by Dr. Mccurdy for hemifacial spasms. \par \par Interval history:\par SInce the last visit, the patient reports that it took about 1 week or so for the botox to start working, and then she had return of her spasms at the 2 month susie. She denies any side effects from the last injections. She has agreed to undergo repeat injections with modification after a discussion of the risks and benefits. She does note that she feels the spasms coming back mostly around the right cheek area.\par \par Initial history:\par These right facial spasms start around her under eye and right side of her mouth/cheek. This started over a year ago but has increased in frequency. She has a history of Bell's palsy years ago but she cannot recall which side. She cannot tell what induces the movement.\par \par No dizziness/lightheadedness. No headaches. No nausea/vomiting. No recent illness, but undergoing cancer treatments, she has mesentery cancer. She had COVID-19 in 2/21 and had the infusion, and is now vaccinated. She did not have much in the way of symptoms, and was not hospitalized. No cough, shortness of breath, chest pain, or flu like symptoms in the last 2 weeks. No numbness/tingling. \par \par Family history: unremarkable

## 2021-10-26 NOTE — PROCEDURE
[FreeTextEntry1] : The patient was injected with Onabotulinum Toxin A in the muscles outlined below after the botox was diluted to 5 units/0.1cc using normal saline. Anatomic locations were used. The patient tolerated the procedure well with no complications. \par \par R outer canthus - 2.5 units\par R Upper eye lid - inner and outer - 2.5 units each\par R Lower eye lid, inner and outer - 2.5 units each\par R Nasalis - 2.5 units\par R upper orbicularis oris - 2.5 units\par R lower orbicularis oris -  2.5 units\par R Risorius - 5 units\par R zygomaticus - 5 units\par \par Total units used: 30 units\par Unavoidable waste: 70 units\par Total units mixed: 100 units\par \par Lot: [id number]\par Expiry: 11/2023

## 2021-10-26 NOTE — PHYSICAL EXAM
[Person] : oriented to person [Place] : oriented to place [Time] : oriented to time [Concentration Intact] : normal concentrating ability [Comprehension] : comprehension intact [Cranial Nerves Optic (II)] : visual acuity intact bilaterally,  visual fields full to confrontation, pupils equal round and reactive to light [Cranial Nerves Oculomotor (III)] : extraocular motion intact [Cranial Nerves Trigeminal (V)] : facial sensation intact symmetrically [Cranial Nerves Facial (VII)] : face symmetrical [Cranial Nerves Vestibulocochlear (VIII)] : hearing was intact bilaterally [Cranial Nerves Glossopharyngeal (IX)] : tongue and palate midline [Cranial Nerves Accessory (XI - Cranial And Spinal)] : head turning and shoulder shrug symmetric [Cranial Nerves Hypoglossal (XII)] : there was no tongue deviation with protrusion [Motor Strength] : muscle strength was normal in all four extremities [Involuntary Movements] : no involuntary movements were seen [Sensation Tactile Decrease] : light touch was intact [Paresis Pronator Drift Right-Sided] : no pronator drift on the right [Paresis Pronator Drift Left-Sided] : no pronator drift on the left [Coordination - Dysmetria Impaired Finger-to-Nose Bilateral] : not present [FreeTextEntry1] : Mild right orbicularis oris spasm seen after injection. No other spasms seen today.\par \par Patient provided a video with prominent right blepharospasms and spasms at the corner of the right mouth, in addition to nasalis. [FreeTextEntry8] : Patient ambulates with cane, wide based.

## 2021-10-28 ENCOUNTER — APPOINTMENT (OUTPATIENT)
Dept: INTERNAL MEDICINE | Facility: CLINIC | Age: 61
End: 2021-10-28
Payer: COMMERCIAL

## 2021-10-28 VITALS
BODY MASS INDEX: 57.52 KG/M2 | RESPIRATION RATE: 17 BRPM | HEART RATE: 94 BPM | HEIGHT: 60 IN | DIASTOLIC BLOOD PRESSURE: 62 MMHG | WEIGHT: 293 LBS | TEMPERATURE: 98.1 F | SYSTOLIC BLOOD PRESSURE: 134 MMHG | OXYGEN SATURATION: 98 %

## 2021-10-28 PROCEDURE — 99215 OFFICE O/P EST HI 40 MIN: CPT

## 2021-10-29 ENCOUNTER — APPOINTMENT (OUTPATIENT)
Dept: INFUSION THERAPY | Facility: HOSPITAL | Age: 61
End: 2021-10-29

## 2021-11-01 VITALS
HEART RATE: 94 BPM | DIASTOLIC BLOOD PRESSURE: 62 MMHG | WEIGHT: 293 LBS | BODY MASS INDEX: 57.52 KG/M2 | OXYGEN SATURATION: 98 % | HEIGHT: 60 IN | TEMPERATURE: 98.1 F | SYSTOLIC BLOOD PRESSURE: 134 MMHG | RESPIRATION RATE: 17 BRPM

## 2021-11-01 NOTE — REVIEW OF SYSTEMS
[Lower Ext Edema] : lower extremity edema [Dyspnea on Exertion] : dyspnea on exertion [Nocturia] : nocturia [Joint Stiffness] : joint stiffness [Back Pain] : back pain [Skin Rash] : skin rash [Anxiety] : anxiety [Fever] : no fever [Chills] : no chills [Fatigue] : no fatigue [Hot Flashes] : no hot flashes [Night Sweats] : no night sweats [Recent Change In Weight] : ~T no recent weight change [Discharge] : no discharge [Pain] : no pain [Redness] : no redness [Dryness] : no dryness  [Vision Problems] : no vision problems [Itching] : no itching [Earache] : no earache [Hearing Loss] : no hearing loss [Nosebleed] : no nosebleeds [Hoarseness] : no hoarseness [Nasal Discharge] : no nasal discharge [Sore Throat] : no sore throat [Postnasal Drip] : no postnasal drip [Chest Pain] : no chest pain [Palpitations] : no palpitations [Leg Claudication] : no leg claudication [Orthopnea] : no orthopnea [Paroxysmal Nocturnal Dyspnea] : no paroxysmal nocturnal dyspnea [Shortness Of Breath] : no shortness of breath [Wheezing] : no wheezing [Cough] : no cough [Abdominal Pain] : no abdominal pain [Nausea] : no nausea [Constipation] : no constipation [Diarrhea] : diarrhea [Vomiting] : no vomiting [Heartburn] : no heartburn [Melena] : no melena [Dysuria] : no dysuria [Incontinence] : no incontinence [Poor Libido] : libido not poor [Hematuria] : no hematuria [Frequency] : no frequency [Vaginal Discharge] : no vaginal discharge [Dysmenorrhea] : no dysmenorrhea [Joint Pain] : no joint pain [Joint Swelling] : no joint swelling [Muscle Weakness] : no muscle weakness [Muscle Pain] : no muscle pain [Itching] : no itching [Mole Changes] : no mole changes [Nail Changes] : no nail changes [Hair Changes] : no hair changes [Headache] : no headache [Dizziness] : no dizziness [Fainting] : no fainting [Confusion] : no confusion [Memory Loss] : no memory loss [Unsteady Walking] : no ataxia [Suicidal] : not suicidal [Insomnia] : no insomnia [Depression] : no depression [Easy Bleeding] : no easy bleeding [Easy Bruising] : no easy bruising [Swollen Glands] : no swollen glands [FreeTextEntry9] : lower [de-identified] : boil breast

## 2021-11-01 NOTE — HISTORY OF PRESENT ILLNESS
[FreeTextEntry1] : 61-year-old woman comes to the office for follow-up to review her medications and discuss her overall health recently noticing a tender area over her right breast [de-identified] : Comes to the office for follow-up with a history of aortic stenosis diabetes morbid obesity hypertension neuroendocrine neoplasm of the gastrointestinal tract and lower back pain patient has noted a small nickel sized area of discomfort and redness of her right breast she denies temperature chills sweats or myalgias she has had no headache sinus congestion sore throat cough wheezing pleurisy chest pain she has had no shortness of breath at rest however she is mildly short of breath on moderate exertion she denies lightheadedness palpitations dizziness vertigo or syncope she has had no constipation diarrhea abdominal pain nausea or vomiting bright red blood per rectum or black stools her appetite has been good her weight has been stable she denies significant musculoskeletal pain although she has occasional pain in her lower back she has chronic intermittent lower extremity edema she does have periods of anxiety but she has been sleeping well recently

## 2021-11-01 NOTE — HEALTH RISK ASSESSMENT
[Yes] : Yes [No falls in past year] : Patient reported no falls in the past year [0] : 2) Feeling down, depressed, or hopeless: Not at all (0) [PHQ-2 Negative - No further assessment needed] : PHQ-2 Negative - No further assessment needed [] : No [[id number]] : 0

## 2021-11-01 NOTE — PHYSICAL EXAM
[No Acute Distress] : no acute distress [Well Nourished] : well nourished [Well Developed] : well developed [Well-Appearing] : well-appearing [Normal Voice/Communication] : normal voice/communication [Normal Sclera/Conjunctiva] : normal sclera/conjunctiva [PERRL] : pupils equal round and reactive to light [EOMI] : extraocular movements intact [Normal Outer Ear/Nose] : the outer ears and nose were normal in appearance [Normal Oropharynx] : the oropharynx was normal [Normal TMs] : both tympanic membranes were normal [Normal Nasal Mucosa] : the nasal mucosa was normal [No JVD] : no jugular venous distention [No Lymphadenopathy] : no lymphadenopathy [Supple] : supple [Thyroid Normal, No Nodules] : the thyroid was normal and there were no nodules present [No Respiratory Distress] : no respiratory distress  [No Accessory Muscle Use] : no accessory muscle use [Clear to Auscultation] : lungs were clear to auscultation bilaterally [Normal Percussion] : the chest was normal to percussion [Normal Rate] : normal rate  [Regular Rhythm] : with a regular rhythm [Normal S1, S2] : normal S1 and S2 [No Murmur] : no murmur heard [No Carotid Bruits] : no carotid bruits [No Abdominal Bruit] : a ~M bruit was not heard ~T in the abdomen [No Varicosities] : no varicosities [Pedal Pulses Present] : the pedal pulses are present [No Edema] : there was no peripheral edema [No Palpable Aorta] : no palpable aorta [No Extremity Clubbing/Cyanosis] : no extremity clubbing/cyanosis [Declined Breast Exam] : declined breast exam  [Soft] : abdomen soft [Non-distended] : non-distended [No Masses] : no abdominal mass palpated [No HSM] : no HSM [Normal Bowel Sounds] : normal bowel sounds [No Hernias] : no hernias [Declined Rectal Exam] : declined rectal exam [Normal Supraclavicular Nodes] : no supraclavicular lymphadenopathy [Normal Axillary Nodes] : no axillary lymphadenopathy [Normal Posterior Cervical Nodes] : no posterior cervical lymphadenopathy [Normal Anterior Cervical Nodes] : no anterior cervical lymphadenopathy [Normal Inguinal Nodes] : no inguinal lymphadenopathy [Normal Femoral Nodes] : no femoral lymphadenopathy [No CVA Tenderness] : no CVA  tenderness [No Spinal Tenderness] : no spinal tenderness [No Joint Swelling] : no joint swelling [Grossly Normal Strength/Tone] : grossly normal strength/tone [No Rash] : no rash [No Skin Lesions] : no skin lesions [Coordination Grossly Intact] : coordination grossly intact [No Focal Deficits] : no focal deficits [Normal Gait] : normal gait [Deep Tendon Reflexes (DTR)] : deep tendon reflexes were 2+ and symmetric [Speech Grossly Normal] : speech grossly normal [Memory Grossly Normal] : memory grossly normal [Normal Affect] : the affect was normal [Alert and Oriented x3] : oriented to person, place, and time [Normal Mood] : the mood was normal [Normal Insight/Judgement] : insight and judgment were intact [Acne] : no acne [de-identified] : mild right upper qudrant tenderness

## 2021-11-01 NOTE — ASSESSMENT
[FreeTextEntry1] : Physical examination reveals a well-developed woman in no acute distress blood pressure 134/62 height 5 feet weight 348 pounds BMI of 67.96 temperature of 98.1 °F heart rate of 94 respirations at 17 oxygen saturation on room air was 98% HEENT was unremarkable chest was clear cardiovascular exam was regular with no extra heart sounds or murmurs abdomen was soft and nontender extremities showed no clubbing cyanosis there was trace bilateral edema neurologic exam was nonfocal there was a 1 x 1 cm area of redness on the right breast laterally compatible with a breast boil warm compresses were advised patient was given a dose of doxycycline 100 mg twice a day to take for a week if her symptoms worsen she was given the number of a general surgeon for possible drainage she was reinforced that soaking repeatedly was an important step in making this resolve patient's endocrine tumor is being treated by hematology oncology which has been 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 patient had complete blood test in August 2021 she has received both doses of the COVID-19 vaccine she will be receiving the influenza vaccine she has received both doses of the Shingrix vaccine patient had a mammogram and breast ultrasound in May 2021 patient had an upper endoscopy and colonoscopy in February 2019 she is up-to-date with her ophthalmologist and dermatologist patient's blood pressure was well controlled at the present meeting patient had an echocardiogram in June 2021 showing stable aortic stenosis patient's obesity is a difficult problem which may eventually require based on her overall health consideration of bariatric surgery

## 2021-11-22 ENCOUNTER — OUTPATIENT (OUTPATIENT)
Dept: OUTPATIENT SERVICES | Facility: HOSPITAL | Age: 61
LOS: 1 days | Discharge: HOME | End: 2021-11-22

## 2021-11-22 DIAGNOSIS — Z98.891 HISTORY OF UTERINE SCAR FROM PREVIOUS SURGERY: Chronic | ICD-10-CM

## 2021-11-22 DIAGNOSIS — C7A.8 OTHER MALIGNANT NEUROENDOCRINE TUMORS: ICD-10-CM

## 2021-11-26 ENCOUNTER — APPOINTMENT (OUTPATIENT)
Dept: INFUSION THERAPY | Facility: HOSPITAL | Age: 61
End: 2021-11-26

## 2021-12-21 ENCOUNTER — APPOINTMENT (OUTPATIENT)
Dept: CARDIOLOGY | Facility: CLINIC | Age: 61
End: 2021-12-21
Payer: COMMERCIAL

## 2021-12-21 ENCOUNTER — NON-APPOINTMENT (OUTPATIENT)
Age: 61
End: 2021-12-21

## 2021-12-21 VITALS
OXYGEN SATURATION: 98 % | SYSTOLIC BLOOD PRESSURE: 168 MMHG | HEART RATE: 100 BPM | TEMPERATURE: 98.3 F | BODY MASS INDEX: 57.52 KG/M2 | HEIGHT: 60 IN | DIASTOLIC BLOOD PRESSURE: 94 MMHG | WEIGHT: 293 LBS | RESPIRATION RATE: 18 BRPM

## 2021-12-21 PROCEDURE — 93000 ELECTROCARDIOGRAM COMPLETE: CPT

## 2021-12-21 PROCEDURE — 99215 OFFICE O/P EST HI 40 MIN: CPT

## 2021-12-22 ENCOUNTER — OUTPATIENT (OUTPATIENT)
Dept: OUTPATIENT SERVICES | Facility: HOSPITAL | Age: 61
LOS: 1 days | Discharge: HOME | End: 2021-12-22

## 2021-12-22 DIAGNOSIS — Z98.891 HISTORY OF UTERINE SCAR FROM PREVIOUS SURGERY: Chronic | ICD-10-CM

## 2021-12-22 DIAGNOSIS — C7A.8 OTHER MALIGNANT NEUROENDOCRINE TUMORS: ICD-10-CM

## 2021-12-23 ENCOUNTER — APPOINTMENT (OUTPATIENT)
Dept: INFUSION THERAPY | Facility: HOSPITAL | Age: 61
End: 2021-12-23

## 2022-01-02 ENCOUNTER — RX RENEWAL (OUTPATIENT)
Age: 62
End: 2022-01-02

## 2022-01-12 ENCOUNTER — APPOINTMENT (OUTPATIENT)
Dept: ORTHOPEDIC SURGERY | Facility: CLINIC | Age: 62
End: 2022-01-12
Payer: COMMERCIAL

## 2022-01-12 ENCOUNTER — FORM ENCOUNTER (OUTPATIENT)
Age: 62
End: 2022-01-12

## 2022-01-12 VITALS — BODY MASS INDEX: 55.32 KG/M2 | HEIGHT: 61 IN | WEIGHT: 293 LBS

## 2022-01-12 PROCEDURE — 99214 OFFICE O/P EST MOD 30 MIN: CPT | Mod: 25

## 2022-01-12 PROCEDURE — 20610 DRAIN/INJ JOINT/BURSA W/O US: CPT | Mod: RT

## 2022-01-18 ENCOUNTER — RX RENEWAL (OUTPATIENT)
Age: 62
End: 2022-01-18

## 2022-01-21 ENCOUNTER — APPOINTMENT (OUTPATIENT)
Dept: INFUSION THERAPY | Facility: HOSPITAL | Age: 62
End: 2022-01-21

## 2022-01-26 ENCOUNTER — APPOINTMENT (OUTPATIENT)
Dept: ORTHOPEDIC SURGERY | Facility: CLINIC | Age: 62
End: 2022-01-26
Payer: COMMERCIAL

## 2022-01-26 PROCEDURE — 20610 DRAIN/INJ JOINT/BURSA W/O US: CPT | Mod: 50

## 2022-01-26 NOTE — HISTORY OF PRESENT ILLNESS
[de-identified] : Patient presents to office walking with cane complaining of b/l knee pain, R>L. last visit received injections to right knee with 4 months relief. Here for another injection and enquiring about gel injection. \par Patient is an obese female, PMHx mesenteric cancer (stage 4), HTN, edema, right knee arthroscopy 2006, Dr. whitaker, presents to the office walking with a cane (x few years ) complaining of b/l knee pain x1 week , insidious onset while getting up from the toilet x 1 week ago, progressively worsening. Reports locking, instability, weakness and limping due to symptoms. Pain indicated to anterior aspect of knee, 4/10, L>R, intermittent, achy and sharp at times. Worsening with walking or knee activity. Some relief with rest. Nsaids don’t help. \par Patient denies any fever, chills, trauma, swelling, erythema, hematomas, numbness or tingling sensation, buckling.\par  [Worsening] : worsening [___ yrs] : [unfilled] year(s) ago [6] : a current pain level of 6/10 [4] : a minimum pain level of 4/10 [8] : a maximum pain level of 8/10 [Standing] : standing [Intermit.] : ~He/She~ states the symptoms seem to be intermittent [Bending] : worsened by bending [Lifting] : worsened by lifting [Sitting] : worsened by sitting [Walking] : worsened by walking [Knee Flexion] : worsened with knee flexion [Knee Extension] : worsened with knee extension [Acetaminophen] : not relieved by acetaminophen [Exercise Regimen] : not relieved by exercise regimen [NSAIDs] : not relieved by nonsteroidal anti-inflammatory drugs [Recumbency] : relieved by recumbency [Rest] : relieved by rest

## 2022-01-26 NOTE — DISCUSSION/SUMMARY
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  At this time treatment options discussed with the patient.  Bilateral knee injection\par Bilateral knee injection\par The risks and benefits of injection was discussed with the patient. Verbal consent was obtained from the patient. The area was prepped with Betadine. A lateral suprapatellar approach was used. The knee was injected with monovisc.  The procedure well. Band-Aid was applied.\par Follow-up in 3 months

## 2022-01-26 NOTE — PHYSICAL EXAM
[Antalgic] : antalgic [Normal RUE] : Right Upper Extremity: No scars, rashes, lesions, ulcers, skin intact [Normal Finger/nose] : finger to nose coordination [Poor Appearance] : well-appearing [Acute Distress] : not in acute distress [Obese] : obese [Normal] : no peripheral adenopathy appreciated [de-identified] : Patient appears stated age in no acute respiratory distress. Patient is alert oriented x3. Patient has normal mood and affect. \par Bilateral knee exam\par There is minimal to moderate effusion. Range of motion is 0-120°. Painful at the extremes of range of motion. \par There is medial and lateral joint line tenderness. \par Quadriceps strength is 4/5. There is some muscle loss in the quadriceps. \par Anteroposterior and mediolateral stability is intact Colin test is negative. Alignment of the knee is in 5° of varus.\par \par Bilateral hip exam\par On inspection of the hip shows skin is normal. No evidence of rash. \par No loss of muscle.  Abductor strength is 5 out of 5. Hip flexor strength is 5.\par Range of motion of the hip at 90° flexion internal rotation is 15° external rotation is 30° pain-free. \par Hip has good stability in anterior and posterior direction. \par On lateral decubitus  examination there is no tenderness in the greater trochanter. \par Lower Extremity Examination \par Bilateral lower extremity skin is normal. There is no rash. There is no edema and lymphadenopathy.  DP and PT pulses intact. Sensation is intact. [de-identified] : X-rays of the bilateral knee 3 view shows advanced degenerative bone-on-bone arthritis

## 2022-01-26 NOTE — REASON FOR VISIT
[Follow-Up Visit] : a follow-up visit for [Knee Pain] : knee pain [FreeTextEntry2] : right knee pain

## 2022-01-28 ENCOUNTER — APPOINTMENT (OUTPATIENT)
Dept: NEUROLOGY | Facility: CLINIC | Age: 62
End: 2022-01-28
Payer: COMMERCIAL

## 2022-01-28 VITALS
WEIGHT: 293 LBS | HEIGHT: 61 IN | SYSTOLIC BLOOD PRESSURE: 138 MMHG | DIASTOLIC BLOOD PRESSURE: 84 MMHG | HEART RATE: 98 BPM | BODY MASS INDEX: 55.32 KG/M2

## 2022-01-28 PROCEDURE — 99214 OFFICE O/P EST MOD 30 MIN: CPT | Mod: 25

## 2022-01-28 PROCEDURE — 64612 DESTROY NERVE FACE MUSCLE: CPT

## 2022-01-28 NOTE — DISCUSSION/SUMMARY
[FreeTextEntry1] : Oxana Ratliff is a 61 year old F with a PMHx COVID-19 in 2/21, Neuroendocrine cancer, Mesentery Cancer, Obesity, Type II DM, Peripheral Edema, Arthritis of the knees, and HTN. She presents for follow up in the Movement Disorders Clinic at [facility]. She was initially referred by Dr. Mccurdy for hemifacial spasms. \par \par The patient underwent repeat injections for R hemifacial and blepharospasm. She tolerated the procedure well. Please see procedure section of this note for further details. Risks and benefits of the procedure were reviewed prior to and after the procedure. The patient gave her verbal consent to proceed prior to the procedure and increasing her dosage today for better relief.\par \par I discussed that as part of the work up we obtain brain and vessel imaging of the IAC to determine if there is vascular compression of the seventh cranial nerve. The patient is not interested in further neuroimaging at this time.\par CT of the head was done with and without contrast on 5/29/2020 that was unremarkable, previously reviewed with patient.\par \par She will follow up for repeat botox injections in 3 months.\par \par All questions were answered to her satisfaction. I spent 30 minutes with this patient.

## 2022-01-28 NOTE — HISTORY OF PRESENT ILLNESS
[FreeTextEntry1] : Oxana Ratliff is a 61 year old F with a PMHx COVID-19 in 2/21, Neuroendocrine cancer, Mesentery Cancer, Obesity, Type II DM, Peripheral Edema, Arthritis of the knees, and HTN. She presents for follow up in the Movement Disorders Clinic at [facility]. She was initially referred by Dr. Mccurdy for hemifacial spasms. \par \par Interval history:\par Since the last visit, she reports the botox lasted about 6 weeks. The eye is better, but the right face is where it really bothers her. No side effects. She provided a video showing prominent spasms in the lower face.\par \par Initial history:\par These right facial spasms start around her under eye and right side of her mouth/cheek. This started over a year ago but has increased in frequency. She has a history of Bell's palsy years ago but she cannot recall which side. She cannot tell what induces the movement.\par \par No dizziness/lightheadedness. No headaches. No nausea/vomiting. No recent illness, but undergoing cancer treatments, she has mesentery cancer. She had COVID-19 in 2/21 and had the infusion, and is now vaccinated. She did not have much in the way of symptoms, and was not hospitalized. No cough, shortness of breath, chest pain, or flu like symptoms in the last 2 weeks. No numbness/tingling. \par \par Family history: unremarkable

## 2022-01-28 NOTE — PHYSICAL EXAM
[Person] : oriented to person [Place] : oriented to place [Time] : oriented to time [Concentration Intact] : normal concentrating ability [Comprehension] : comprehension intact [Cranial Nerves Optic (II)] : visual acuity intact bilaterally,  visual fields full to confrontation, pupils equal round and reactive to light [Cranial Nerves Oculomotor (III)] : extraocular motion intact [Cranial Nerves Trigeminal (V)] : facial sensation intact symmetrically [Cranial Nerves Facial (VII)] : face symmetrical [Cranial Nerves Vestibulocochlear (VIII)] : hearing was intact bilaterally [Cranial Nerves Glossopharyngeal (IX)] : tongue and palate midline [Cranial Nerves Accessory (XI - Cranial And Spinal)] : head turning and shoulder shrug symmetric [Cranial Nerves Hypoglossal (XII)] : there was no tongue deviation with protrusion [Motor Strength] : muscle strength was normal in all four extremities [Involuntary Movements] : no involuntary movements were seen [Sensation Tactile Decrease] : light touch was intact [Paresis Pronator Drift Right-Sided] : no pronator drift on the right [Paresis Pronator Drift Left-Sided] : no pronator drift on the left [Coordination - Dysmetria Impaired Finger-to-Nose Bilateral] : not present [FreeTextEntry1] : No spasms in the face during the visit today.\par \par Video showed right lower facial spasms. [FreeTextEntry8] : Patient ambulates with cane, wide based.

## 2022-01-28 NOTE — PROCEDURE
[FreeTextEntry1] : The patient was injected with Onabotulinum Toxin A in the muscles outlined below after the botox was diluted to 5 units/0.1cc using normal saline. Anatomic locations were used. The patient tolerated the procedure well with no complications. \par \par R outer canthus - 2.5 units\par R Upper eye lid - inner and outer - 2.5 units each\par R Lower eye lid, inner and outer - 2.5 units each\par R Nasalis - 2.5 units increased to 10 units/2 sites\par R upper orbicularis oris - 2.5 units\par R lower orbicularis oris -  2.5 units\par R Risorius - 5 units increased to 10 units\par R zygomaticus - 5 units increased to 10 units\par \par Total units used: 47.5 units\par Unavoidable waste: 52.5 units\par Total units mixed: 100 units\par \par Lot: [id number]\par Expiry: 02/2024

## 2022-02-07 ENCOUNTER — NON-APPOINTMENT (OUTPATIENT)
Age: 62
End: 2022-02-07

## 2022-02-07 ENCOUNTER — APPOINTMENT (OUTPATIENT)
Dept: NEUROLOGY | Facility: CLINIC | Age: 62
End: 2022-02-07
Payer: COMMERCIAL

## 2022-02-07 PROCEDURE — 99024 POSTOP FOLLOW-UP VISIT: CPT

## 2022-02-09 ENCOUNTER — APPOINTMENT (OUTPATIENT)
Dept: INTERNAL MEDICINE | Facility: CLINIC | Age: 62
End: 2022-02-09
Payer: COMMERCIAL

## 2022-02-09 PROCEDURE — 99215 OFFICE O/P EST HI 40 MIN: CPT | Mod: 95

## 2022-02-11 RX ORDER — DOXYCYCLINE HYCLATE 100 MG/1
100 CAPSULE ORAL TWICE DAILY
Qty: 20 | Refills: 0 | Status: DISCONTINUED | COMMUNITY
Start: 2021-10-28 | End: 2022-02-11

## 2022-02-14 ENCOUNTER — APPOINTMENT (OUTPATIENT)
Dept: OBGYN | Facility: CLINIC | Age: 62
End: 2022-02-14
Payer: COMMERCIAL

## 2022-02-14 ENCOUNTER — EMERGENCY (EMERGENCY)
Facility: HOSPITAL | Age: 62
LOS: 1 days | Discharge: HOME | End: 2022-02-14
Attending: EMERGENCY MEDICINE | Admitting: EMERGENCY MEDICINE
Payer: COMMERCIAL

## 2022-02-14 VITALS
OXYGEN SATURATION: 97 % | SYSTOLIC BLOOD PRESSURE: 140 MMHG | DIASTOLIC BLOOD PRESSURE: 70 MMHG | HEIGHT: 61 IN | WEIGHT: 293 LBS | HEART RATE: 57 BPM | BODY MASS INDEX: 55.32 KG/M2

## 2022-02-14 VITALS
WEIGHT: 293 LBS | OXYGEN SATURATION: 97 % | SYSTOLIC BLOOD PRESSURE: 151 MMHG | HEIGHT: 61 IN | DIASTOLIC BLOOD PRESSURE: 89 MMHG | HEART RATE: 119 BPM | TEMPERATURE: 98 F | RESPIRATION RATE: 28 BRPM

## 2022-02-14 VITALS
RESPIRATION RATE: 21 BRPM | DIASTOLIC BLOOD PRESSURE: 80 MMHG | TEMPERATURE: 98 F | OXYGEN SATURATION: 95 % | SYSTOLIC BLOOD PRESSURE: 147 MMHG | HEART RATE: 90 BPM

## 2022-02-14 DIAGNOSIS — J80 ACUTE RESPIRATORY DISTRESS SYNDROME: ICD-10-CM

## 2022-02-14 DIAGNOSIS — N89.8 OTHER SPECIFIED NONINFLAMMATORY DISORDERS OF VAGINA: ICD-10-CM

## 2022-02-14 DIAGNOSIS — Z87.42 PERSONAL HISTORY OF OTHER DISEASES OF THE FEMALE GENITAL TRACT: ICD-10-CM

## 2022-02-14 DIAGNOSIS — Z98.891 HISTORY OF UTERINE SCAR FROM PREVIOUS SURGERY: Chronic | ICD-10-CM

## 2022-02-14 PROCEDURE — 71045 X-RAY EXAM CHEST 1 VIEW: CPT | Mod: 26

## 2022-02-14 PROCEDURE — 99283 EMERGENCY DEPT VISIT LOW MDM: CPT | Mod: 25

## 2022-02-14 PROCEDURE — 99213 OFFICE O/P EST LOW 20 MIN: CPT

## 2022-02-14 PROCEDURE — 99285 EMERGENCY DEPT VISIT HI MDM: CPT

## 2022-02-14 PROCEDURE — 94640 AIRWAY INHALATION TREATMENT: CPT

## 2022-02-14 PROCEDURE — 71045 X-RAY EXAM CHEST 1 VIEW: CPT

## 2022-02-14 RX ORDER — IPRATROPIUM/ALBUTEROL SULFATE 18-103MCG
3 AEROSOL WITH ADAPTER (GRAM) INHALATION ONCE
Refills: 0 | Status: COMPLETED | OUTPATIENT
Start: 2022-02-14 | End: 2022-02-14

## 2022-02-14 RX ORDER — ALBUTEROL 90 UG/1
2 AEROSOL, METERED ORAL
Qty: 1 | Refills: 0
Start: 2022-02-14 | End: 2022-02-23

## 2022-02-14 RX ORDER — AZITHROMYCIN 250 MG/1
250 TABLET, FILM COATED ORAL
Qty: 1 | Refills: 0 | Status: DISCONTINUED | COMMUNITY
Start: 2022-02-09 | End: 2022-02-14

## 2022-02-14 RX ORDER — ALBUTEROL 90 UG/1
1 AEROSOL, METERED ORAL ONCE
Refills: 0 | Status: DISCONTINUED | OUTPATIENT
Start: 2022-02-14 | End: 2022-02-18

## 2022-02-14 RX ADMIN — Medication 3 MILLILITER(S): at 18:24

## 2022-02-14 RX ADMIN — Medication 3 MILLILITER(S): at 19:40

## 2022-02-14 RX ADMIN — Medication 3 MILLILITER(S): at 21:03

## 2022-02-14 NOTE — ED PROVIDER NOTE - ATTENDING CONTRIBUTION TO CARE
Yung with NATALI Chavez. 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  1 week of intermittent wheezing, cough and SOB, had a negative COVID PCR on Wednesday 2/11/2022, was treated with Azithromax, Tessalon perles and Prednisone by PMD, today with worsening SOB and wheezing, reports finishing abx, continuing prednisone. Denies any fevers, chest pain, nausea/vomiting/diarrhea, +COVID Vaccine and Booster, no known sick contacts. Non-smoker.  B/L exhalatory wheezing on exam, afebrile, SpO2 97 % on room air. Will order duo-neb and CXR, will re-assess.  Patient signed out to incoming physician.  All decisions regarding the progression of care will be made at their discretion.   I performed a face to face bedside interview with patient regarding history of present illness, review of symptoms and past medical history. I completed an independent physical exam.  I have discussed the patient's plan of care with Physician Assistant (PA). I agree with note as stated above, having amended the EMR as needed to reflect my findings.   This includes History of Present Illness, HIV, Past Medical/Surgical/Family/Social History, Allergies and Home Medications, Review of Systems, Physical Exam, and any Progress Notes during the time I functioned as the attending physician for this patient.

## 2022-02-14 NOTE — PHYSICAL EXAM
[Chaperone Present] : A chaperone was present in the examining room during all aspects of the physical examination [Normal] : external genitalia [Labia Majora] : labia major [Labia Minora] : labia minora [Moderate] : there was moderate vaginal bleeding [FreeTextEntry1] : STANTON Mc [Labia Majora Erythema] : no erythema of the labia majora [Labia Minora Erythema] : no erythema of the labia minora

## 2022-02-14 NOTE — ED ADULT NURSE NOTE - OBJECTIVE STATEMENT
patient presents to ED complaining of intermittent wheezing cough, and SOB x1wk. patient speaking in full sentences, no use of accessory muscles noted, in no acute distress at this time, will continue to monitor.

## 2022-02-14 NOTE — ED ADULT NURSE NOTE - CHIEF COMPLAINT QUOTE
Pt states "I developed a cough last Monday and now I am short of breath. I went to my gynecologist. He told me I don't sound good and I have to come here."

## 2022-02-14 NOTE — ED PROVIDER NOTE - NSFOLLOWUPINSTRUCTIONS_ED_ALL_ED_FT
Rest, drink plenty of fluids  Advance activity as tolerated  Use inhaler as directed- every 4-6 hours as needed  Continue all previously prescribed medications as tolerated  Follow up with your PMD 2-3 days  Return to the ER for worsening or concerning symptoms.

## 2022-02-14 NOTE — ED PROVIDER NOTE - PATIENT PORTAL LINK FT
You can access the FollowMyHealth Patient Portal offered by [facility] by registering at the following website: [email or web address]. By joining [employer]’s FollowMyHealth portal, you will also be able to view your health information using other applications (apps) compatible with our system.

## 2022-02-14 NOTE — ED ADULT TRIAGE NOTE - CHIEF COMPLAINT QUOTE
Pt states "I developed a cough and now I am short of breath. I went to my gynecologist. He told me I don't sound good and I have to come here." Pt states "I developed a cough last Monday and now I am short of breath. I went to my gynecologist. He told me I don't sound good and I have to come here."

## 2022-02-14 NOTE — CHIEF COMPLAINT
[Urgent Visit] : Urgent Visit [FreeTextEntry1] : Vaginal bleeding for past 3 weeks  S/P D&C IUD with Dr Grady on 5 2021 Being Tx'd by Dr Alves for UTI Presents to my office inacute respiratory distress with wheezing and tachypnea

## 2022-02-14 NOTE — ED PROVIDER NOTE - NSICDXPASTMEDICALHX_GEN_ALL_CORE_FT
PAST MEDICAL HISTORY:  Anemia Fe def    Benign hypertension     Cancer abdominal    Gallstones     Lymphedema     Mesenteric mass     Morbid obesity     Type 2 diabetes mellitus with other specified complication, without long-term current use of insulin

## 2022-02-14 NOTE — ED PROVIDER NOTE - CLINICAL SUMMARY MEDICAL DECISION MAKING FREE TEXT BOX
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  1 week of intermittent wheezing, cough and SOB, had a negative COVID PCR on Wednesday 2/11/2022, was treated with Azithromax, tessalon perles and Prednisone by PMD, today with worsening SOB and wheezing, reports finishing abx, continuing prednisone. Denies any fevers, chest pain, nausea/vomiting/diarrhea, +COVID Vaccine and Booster, no known sick contacts. Non-smoker.    B/L exhalatory wheezing on exam, afebrile, SpO2 97 % on room air. Will order duo-neb and CXR, will re-assess. 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  1 week of intermittent wheezing, cough and SOB, had a negative COVID PCR on Wednesday 2/11/2022, was treated with Azithromax, Tessalon perles and Prednisone by PMD, today with worsening SOB and wheezing, reports finishing abx, continuing prednisone. Denies any fevers, chest pain, nausea/vomiting/diarrhea, +COVID Vaccine and Booster, no known sick contacts. Non-smoker.    B/L exhalatory wheezing on exam, afebrile, SpO2 97 % on room air. Will order duo-neb and CXR, will re-assess. 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  1 week of intermittent wheezing, cough and SOB, had a negative COVID PCR on Wednesday 2/11/2022, was treated with Azithromax, Tessalon perles and Prednisone by PMD, today with worsening SOB and wheezing, reports finishing abx, continuing prednisone. Denies any fevers, chest pain, nausea/vomiting/diarrhea, +COVID Vaccine and Booster, no known sick contacts. Non-smoker.    B/L exhalatory wheezing on exam, afebrile, SpO2 97 % on room air. Will order duo-neb and CXR, will re-assess.    dr jay 2130: pt states breathing better. well appearing. nad. normal respirations, nonlabored. SpO2 100% ra. minimal scattered expir. wheeze. otherwise good breath sounds. f/u pmd.

## 2022-02-14 NOTE — ED PROVIDER NOTE - OBJECTIVE STATEMENT
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  1 week of intermittent wheezing, cough and SOB, had a negative COVID PCR on Wednesday 2/11/2022, was treated with Azithromax and Prednisone by PMD. 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  1 week of intermittent wheezing, cough and SOB, had a negative COVID PCR on Wednesday 2/11/2022, was treated with Azithromax, tessalon perles and Prednisone by PMD, today with worsening SOB and wheezing, reports finishing abx, continuing prednisone. Denies any fevers, chest pain, nausea/vomiting/diarrhea, +COVID Vaccine and Booster, no known sick contacts. Non-smoker.

## 2022-02-15 ENCOUNTER — OUTPATIENT (OUTPATIENT)
Dept: OUTPATIENT SERVICES | Facility: HOSPITAL | Age: 62
LOS: 1 days | Discharge: HOME | End: 2022-02-15

## 2022-02-15 DIAGNOSIS — C7A.8 OTHER MALIGNANT NEUROENDOCRINE TUMORS: ICD-10-CM

## 2022-02-15 DIAGNOSIS — Z98.891 HISTORY OF UTERINE SCAR FROM PREVIOUS SURGERY: Chronic | ICD-10-CM

## 2022-02-16 NOTE — HISTORY OF PRESENT ILLNESS
[Home] : at home, [unfilled] , at the time of the visit. [Medical Office: ([facility])___] : at the medical office located in  [Verbal consent obtained from patient] : the patient, [unfilled] [FreeTextEntry1] : Telehealth video visit follow-up 61-year-old morbidly obese woman refuses her medications and discusses her overall health recent episode of COVID-19 rapid swab negative upper respiratory infection/PCR is pending [de-identified] : Telehealth video follow-up visit 61-year-old woman with a history of aortic stenosis diabetes hypertension morbid obesity neuroendocrine neoplasm and lower back pain comes to the office complaining since February 7 of a scratchy throat runny nose cough neck aches occasional headaches and body aches she claims that when she coughs it is usually dry and produces back pain she denies temperature chills sweats she has had no pleurisy wheezing chest pain shortness of breath no worsening of her chronic dyspnea on exertion denies lightheadedness palpitation dizziness vertigo or syncope she has chronic lower extremity edema she has had no abdominal pain nausea vomiting diarrhea constipation bright red blood per rectum or black stools her appetite has been good her weight has been stable does get up at night to urinate but denies dysuria or gross hematuria she has chronic lower back pain denies skin rashes has bouts of anxiety but recently has been sleeping adequately

## 2022-02-16 NOTE — HEALTH RISK ASSESSMENT
[Never] : Never [Yes] : Yes [No falls in past year] : Patient reported no falls in the past year [0] : 2) Feeling down, depressed, or hopeless: Not at all (0) [PHQ-2 Negative - No further assessment needed] : PHQ-2 Negative - No further assessment needed [de-identified] : rarely [[id number]] : 0

## 2022-02-16 NOTE — REVIEW OF SYSTEMS
[Nasal Discharge] : nasal discharge [Sore Throat] : sore throat [Lower Ext Edema] : lower extremity edema [Cough] : cough [Dyspnea on Exertion] : dyspnea on exertion [Nocturia] : nocturia [Joint Stiffness] : joint stiffness [Muscle Pain] : muscle pain [Back Pain] : back pain [Anxiety] : anxiety [Fever] : no fever [Chills] : no chills [Fatigue] : no fatigue [Hot Flashes] : no hot flashes [Night Sweats] : no night sweats [Recent Change In Weight] : ~T no recent weight change [Discharge] : no discharge [Pain] : no pain [Redness] : no redness [Dryness] : no dryness  [Vision Problems] : no vision problems [Itching] : no itching [Earache] : no earache [Hearing Loss] : no hearing loss [Nosebleed] : no nosebleeds [Hoarseness] : no hoarseness [Postnasal Drip] : no postnasal drip [Chest Pain] : no chest pain [Palpitations] : no palpitations [Leg Claudication] : no leg claudication [Orthopnea] : no orthopnea [Paroxysmal Nocturnal Dyspnea] : no paroxysmal nocturnal dyspnea [Shortness Of Breath] : no shortness of breath [Wheezing] : no wheezing [Abdominal Pain] : no abdominal pain [Nausea] : no nausea [Constipation] : no constipation [Diarrhea] : diarrhea [Vomiting] : no vomiting [Heartburn] : no heartburn [Melena] : no melena [Dysuria] : no dysuria [Incontinence] : no incontinence [Poor Libido] : libido not poor [Hematuria] : no hematuria [Frequency] : no frequency [Vaginal Discharge] : no vaginal discharge [Dysmenorrhea] : no dysmenorrhea [Joint Pain] : no joint pain [Joint Swelling] : no joint swelling [Muscle Weakness] : no muscle weakness [Itching] : no itching [Mole Changes] : no mole changes [Nail Changes] : no nail changes [Hair Changes] : no hair changes [Skin Rash] : no skin rash [Headache] : no headache [Dizziness] : no dizziness [Fainting] : no fainting [Confusion] : no confusion [Memory Loss] : no memory loss [Unsteady Walking] : no ataxia [Suicidal] : not suicidal [Insomnia] : no insomnia [Depression] : no depression [Easy Bleeding] : no easy bleeding [Easy Bruising] : no easy bruising [Swollen Glands] : no swollen glands [FreeTextEntry9] : lower

## 2022-02-16 NOTE — ASSESSMENT
[FreeTextEntry1] : Patient has received 3 COVID-19 vaccines her current illness which is of an upper respiratory tract infection could still be COVID-19 despite a rapid test that was negative we are awaiting the final PCR test if positive due to her medical history and morbid obesity and see COVID-19 therapeutics will be considered for now she will take a Z-Nii use Symbicort inhaler and Mucinex DM as well as steroid nasal sprays patient recently treated with Botox injection for hemifacial spasm of the right side of her face by Dr. Santillan.  Patient is followed by an oncologist for the evaluation and treatment of her stage IV neuroendocrine tumor.  Patient had complete blood test in August 2021 at which time she had levels of antibody of COVID-19 nuclear capsid suggesting previous infection.Patient had a mammography in May 2021 has intermittent CAT scans of the abdomen and pelvis by oncology patient will forward to was copies of the PCR report and will notify us of any worsening symptoms

## 2022-02-17 DIAGNOSIS — N39.0 URINARY TRACT INFECTION, SITE NOT SPECIFIED: ICD-10-CM

## 2022-02-17 LAB — BACTERIA GENITAL AEROBE CULT: NORMAL

## 2022-02-17 RX ORDER — CIPROFLOXACIN HYDROCHLORIDE 500 MG/1
500 TABLET, FILM COATED ORAL TWICE DAILY
Qty: 14 | Refills: 1 | Status: COMPLETED | COMMUNITY
Start: 2022-02-17 | End: 2022-03-03

## 2022-02-18 ENCOUNTER — APPOINTMENT (OUTPATIENT)
Dept: INFUSION THERAPY | Facility: HOSPITAL | Age: 62
End: 2022-02-18

## 2022-02-19 ENCOUNTER — RX RENEWAL (OUTPATIENT)
Age: 62
End: 2022-02-19

## 2022-02-22 LAB — BACTERIA UR CULT: ABNORMAL

## 2022-02-23 ENCOUNTER — EMERGENCY (EMERGENCY)
Facility: HOSPITAL | Age: 62
LOS: 1 days | Discharge: HOME | End: 2022-02-23
Attending: EMERGENCY MEDICINE | Admitting: EMERGENCY MEDICINE
Payer: COMMERCIAL

## 2022-02-23 VITALS — HEART RATE: 87 BPM | DIASTOLIC BLOOD PRESSURE: 78 MMHG | SYSTOLIC BLOOD PRESSURE: 122 MMHG

## 2022-02-23 VITALS
DIASTOLIC BLOOD PRESSURE: 93 MMHG | RESPIRATION RATE: 22 BRPM | HEIGHT: 61 IN | OXYGEN SATURATION: 97 % | TEMPERATURE: 98 F | HEART RATE: 96 BPM | WEIGHT: 293 LBS | SYSTOLIC BLOOD PRESSURE: 171 MMHG

## 2022-02-23 DIAGNOSIS — Z98.891 HISTORY OF UTERINE SCAR FROM PREVIOUS SURGERY: Chronic | ICD-10-CM

## 2022-02-23 PROCEDURE — 73552 X-RAY EXAM OF FEMUR 2/>: CPT

## 2022-02-23 PROCEDURE — 93971 EXTREMITY STUDY: CPT | Mod: 26,LT

## 2022-02-23 PROCEDURE — 99284 EMERGENCY DEPT VISIT MOD MDM: CPT | Mod: 25

## 2022-02-23 PROCEDURE — 99284 EMERGENCY DEPT VISIT MOD MDM: CPT

## 2022-02-23 PROCEDURE — 73552 X-RAY EXAM OF FEMUR 2/>: CPT | Mod: 26,LT

## 2022-02-23 PROCEDURE — 93971 EXTREMITY STUDY: CPT

## 2022-02-23 RX ORDER — LIDOCAINE 4 G/100G
1 CREAM TOPICAL
Qty: 20 | Refills: 0
Start: 2022-02-23

## 2022-02-23 RX ORDER — LIDOCAINE 4 G/100G
1 CREAM TOPICAL ONCE
Refills: 0 | Status: COMPLETED | OUTPATIENT
Start: 2022-02-23 | End: 2022-02-23

## 2022-02-23 RX ORDER — OXYCODONE AND ACETAMINOPHEN 5; 325 MG/1; MG/1
2 TABLET ORAL ONCE
Refills: 0 | Status: DISCONTINUED | OUTPATIENT
Start: 2022-02-23 | End: 2022-02-23

## 2022-02-23 RX ORDER — IBUPROFEN 200 MG
1 TABLET ORAL
Qty: 30 | Refills: 0
Start: 2022-02-23

## 2022-02-23 RX ORDER — IBUPROFEN 200 MG
400 TABLET ORAL ONCE
Refills: 0 | Status: COMPLETED | OUTPATIENT
Start: 2022-02-23 | End: 2022-02-23

## 2022-02-23 RX ADMIN — LIDOCAINE 1 PATCH: 4 CREAM TOPICAL at 21:31

## 2022-02-23 RX ADMIN — Medication 400 MILLIGRAM(S): at 19:31

## 2022-02-23 NOTE — ED PROVIDER NOTE - OBJECTIVE STATEMENT
62 yo F h/o obesity, LE edema , c/o L lateral thigh pain since this afternoon, sharp, shooting, intermittent. no radiation to lower leg or back. no back pain. no numbness/weakness. no trauma.  took alleve without relief. no increased leg swelling or immobilization

## 2022-02-23 NOTE — ED ADULT NURSE NOTE - OBJECTIVE STATEMENT
61 yr old female for c/o left leg pain. Pt ambulatory to ED complaining of intermittent, stabbing left thigh pain since this afternoon. Pt denies shortness of breath, chest pain.

## 2022-02-23 NOTE — ED PROVIDER NOTE - NSFOLLOWUPINSTRUCTIONS_ED_ALL_ED_FT
- take ibuprofen and tylenol as needed for pain  - use lidocaine patch as directed for pain  Follow Up in 1-3 Days with your own doctor or with  [address] [address]  Phone: [phone]      Leg Pain    WHAT YOU NEED TO KNOW:    Leg pain may be caused by a variety of health conditions. Your tests did not show any broken bones or blood clots.    DISCHARGE INSTRUCTIONS:    Return to the emergency department if:   •You have a fever.      •Your leg starts to swell.      •Your leg pain gets worse.      •You have numbness or tingling in your leg or toes.      •You cannot put any weight on or move your leg.      Contact your healthcare provider if:   •Your pain does not decrease, even after treatment.      •You have questions or concerns about your condition or care.      Medicines:   •NSAIDs, such as ibuprofen, help decrease swelling, pain, and fever. This medicine is available with or without a doctor's order. NSAIDs can cause stomach bleeding or kidney problems in certain people. If you take blood thinner medicine, always ask your healthcare provider if NSAIDs are safe for you. Always read the medicine label and follow directions.      •Take your medicine as directed. Contact your healthcare provider if you think your medicine is not helping or if you have side effects. Tell him of her if you are allergic to any medicine. Keep a list of the medicines, vitamins, and herbs you take. Include the amounts, and when and why you take them. Bring the list or the pill bottles to follow-up visits. Carry your medicine list with you in case of an emergency.      Follow up with your healthcare provider as directed: You may need more tests to find the cause of your leg pain. You may need to see an orthopedic specialist or a physical therapist. Write down your questions so you remember to ask them during your visits.    Manage your leg pain:   •Rest your injured leg so that it can heal. You may need an immobilizer, brace, or splint to limit the movement of your leg. You may need to avoid putting any weight on your leg for at least 48 hours. Return to normal activities as directed.      •Ice the injury for 20 minutes every 4 hours for up to 24 hours, or as directed. Use an ice pack, or put crushed ice in a plastic bag. Cover it with a towel to protect your skin. Ice helps prevent tissue damage and decreases swelling and pain.      •Elevate your injured leg above the level of your heart as often as you can. This will help decrease swelling and pain. If possible, prop your leg on pillows or blankets to keep the area elevated comfortably.       •Use assistive devices as directed. You may need to use a cane or crutches. Assistive devices help decrease pain and pressure on your leg when you walk. Ask your healthcare provider for more information about assistive devices and how to use them correctly.      •Maintain a healthy weight. Extra body weight can cause pressure and pain in your hip, knee, and ankle joints. Ask your healthcare provider how much you should weigh. Ask him to help you create a weight loss plan if you are overweight.

## 2022-02-23 NOTE — ED ADULT NURSE NOTE - NSIMPLEMENTINTERV_GEN_ALL_ED
Implemented All Fall with Harm Risk Interventions:  [place] to call system. Call bell, personal items and telephone within reach. Instruct patient to call for assistance. Room bathroom lighting operational. Non-slip footwear when patient is off stretcher. Physically safe environment: no spills, clutter or unnecessary equipment. Stretcher in lowest position, wheels locked, appropriate side rails in place. Provide visual cue, wrist band, yellow gown, etc. Monitor gait and stability. Monitor for mental status changes and reorient to person, place, and time. Review medications for side effects contributing to fall risk. Reinforce activity limits and safety measures with patient and family. Provide visual clues: red socks.

## 2022-02-23 NOTE — ED PROVIDER NOTE - PATIENT PORTAL LINK FT
You can access the FollowMyHealth Patient Portal offered by [facility] by registering at the following website: [email or web address]. By joining [employer]’s FollowMyHealth portal, you will also be able to view your health information using other applications (apps) compatible with our system.

## 2022-02-23 NOTE — ED PROVIDER NOTE - NS ED MD EM SELECTION
SUBJ:No chest pain, shortness of breath improved.      MEDICATIONS  (STANDING):  ALPRAZolam 0.5 milliGRAM(s) Oral two times a day  amLODIPine   Tablet 5 milliGRAM(s) Oral daily  aspirin  chewable 81 milliGRAM(s) Oral daily  buDESOnide 160 MICROgram(s)/formoterol 4.5 MICROgram(s) Inhaler 2 Puff(s) Inhalation two times a day  dextrose 5%. 1000 milliLiter(s) (50 mL/Hr) IV Continuous <Continuous>  dextrose 50% Injectable 12.5 Gram(s) IV Push once  dextrose 50% Injectable 25 Gram(s) IV Push once  dextrose 50% Injectable 25 Gram(s) IV Push once  enoxaparin Injectable 100 milliGRAM(s) SubCutaneous two times a day  fenofibrate Tablet 145 milliGRAM(s) Oral daily  finasteride 5 milliGRAM(s) Oral daily  furosemide    Tablet 40 milliGRAM(s) Oral daily  furosemide    Tablet 20 milliGRAM(s) Oral at bedtime  influenza   Vaccine 0.5 milliLiter(s) IntraMuscular once  insulin lispro (HumaLOG) corrective regimen sliding scale   SubCutaneous three times a day before meals  isosorbide   mononitrate ER Tablet (IMDUR) 30 milliGRAM(s) Oral daily  metoprolol succinate ER 75 milliGRAM(s) Oral daily  silver sulfADIAZINE 1% Cream 1 Application(s) Topical two times a day  simvastatin 40 milliGRAM(s) Oral at bedtime  tamsulosin 0.4 milliGRAM(s) Oral at bedtime    MEDICATIONS  (PRN):  dextrose 40% Gel 15 Gram(s) Oral once PRN Blood Glucose LESS THAN 70 milliGRAM(s)/deciliter  glucagon  Injectable 1 milliGRAM(s) IntraMuscular once PRN Glucose LESS THAN 70 milligrams/deciliter            Vital Signs Last 24 Hrs  T(C): 36.1 (13 Oct 2019 11:00), Max: 36.1 (13 Oct 2019 11:00)  T(F): 97 (13 Oct 2019 11:00), Max: 97 (13 Oct 2019 11:00)  HR: 66 (13 Oct 2019 07:01) (65 - 67)  BP: 125/59 (13 Oct 2019 07:01) (93/45 - 132/60)  BP(mean): 85 (13 Oct 2019 07:01) (65 - 88)  RR: 18 (13 Oct 2019 11:00) (15 - 36)  SpO2: 94% (13 Oct 2019 07:01) (94% - 100%)      ECG:NML    TTE:    LABS:                        13.0   8.34  )-----------( 241      ( 13 Oct 2019 06:36 )             42.4     10-13    142  |  96<L>  |  48<H>  ----------------------------<  170<H>  4.2   |  35<H>  |  1.3    Ca    9.5      13 Oct 2019 06:36  Mg     1.9     10-13    TPro  7.0  /  Alb  3.6  /  TBili  0.4  /  DBili  x   /  AST  13  /  ALT  12  /  AlkPhos  55  10-13            I&O's Summary    12 Oct 2019 07:01  -  13 Oct 2019 07:00  --------------------------------------------------------  IN: 330 mL / OUT: 2670 mL / NET: -2340 mL    13 Oct 2019 07:01  -  13 Oct 2019 11:35  --------------------------------------------------------  IN: 240 mL / OUT: 550 mL / NET: -310 mL      BNPSerum Pro-Brain Natriuretic Peptide: 2567 pg/mL (10-12 @ 11:39) [id number] Detailed

## 2022-02-23 NOTE — ED PROVIDER NOTE - MUSCULOSKELETAL, MLM
no c/t/L spine ttp. no low back ttp. FROM L hip/knee s pain. L thigh minimal lateral ttp. no discoloration. no swelling. 2+ Dp pulses. L foot normal strength/sensation

## 2022-02-23 NOTE — ED ADULT TRIAGE NOTE - CHIEF COMPLAINT QUOTE
patient ambulatory to ED complaining of intermittent, stabbing L. thigh pain since this afternoon. Took Alleve 30min PTA, without relief

## 2022-02-23 NOTE — ED PROVIDER NOTE - WR ORDER NAME 1
no difficulty urinating/no hematuria/no pelvic pain/no dysmenorrhea/no STD exposure Xray Femur 2 Views, Left

## 2022-03-15 ENCOUNTER — RX RENEWAL (OUTPATIENT)
Age: 62
End: 2022-03-15

## 2022-03-16 ENCOUNTER — OUTPATIENT (OUTPATIENT)
Dept: OUTPATIENT SERVICES | Facility: HOSPITAL | Age: 62
LOS: 1 days | Discharge: HOME | End: 2022-03-16

## 2022-03-16 DIAGNOSIS — C7A.8 OTHER MALIGNANT NEUROENDOCRINE TUMORS: ICD-10-CM

## 2022-03-16 DIAGNOSIS — Z98.891 HISTORY OF UTERINE SCAR FROM PREVIOUS SURGERY: Chronic | ICD-10-CM

## 2022-03-18 ENCOUNTER — APPOINTMENT (OUTPATIENT)
Dept: HEMATOLOGY ONCOLOGY | Facility: CLINIC | Age: 62
End: 2022-03-18
Payer: COMMERCIAL

## 2022-03-18 ENCOUNTER — APPOINTMENT (OUTPATIENT)
Dept: INFUSION THERAPY | Facility: HOSPITAL | Age: 62
End: 2022-03-18

## 2022-03-18 PROCEDURE — 99214 OFFICE O/P EST MOD 30 MIN: CPT

## 2022-03-20 NOTE — HISTORY OF PRESENT ILLNESS
[Disease: _____________________] : Disease: [unfilled] [AJCC Stage: ____] : AJCC Stage: [unfilled] [Therapy: ___] : Therapy: [unfilled] [de-identified] : 61 F with h/o intermittent abdominal pain, n/v over the last several years (at least 9) thought to be 2/2 gallstones. She has been evaluated in the ER 1-2 per year with these attacks.  CT A/P going as far back as 2010 noted a calcified mass within the mesentery of the small bowel suspicious for carcinoid. In Nov 2017 it was found to be increasing in size from previous imaging. She saw a surgeon at the time however it is unclear if any work up was recommended. \par \par In Nov 2018 she was referred to Dr. Foy for evaluation of gallstones. He noted the mesenteric mass which prompted a PET/DOTATATE to be performed. This showed activity in the mesentery (SUV 59.9) as well as liver, ileal  mass though to be the primary, cystic adnexal mass unchanged since 11/17, possible uptake in the skin of left breast. An MRI Abdomen was performed to evaluate the liver mets and this revealed multiple metastatic lesions. Referred to med onc for further work up. \par \par 2/14/19: Enteroscopy/[place]: no mass noted, erythema in the terminal ileum, s/p biopsy with negative path \par 4/22/19: CT CAP: multiple small b/l lung nodules, slight enlargement of calcified mesenteric mass. \par 4/23-4/26/19: admitted to [facility] with N/V post scan. 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 \par 9/1/21: CT CAP revealed stable disease.  [de-identified] : well differentiated NET [FreeTextEntry1] : Lanreotide 120 mg IM  [de-identified] : Patient was seen at the infusion room while receiving treatment. She has continued her monthly injection as directed. Her fatigue remains unchanged but she continues to work full-time and continues to complete her ADLs at this time.She reported good appetite, stable weight and consistent bowel movement (at least 1 BM per day, reported normal in color and consistency).

## 2022-03-24 ENCOUNTER — APPOINTMENT (OUTPATIENT)
Dept: CT IMAGING | Facility: CLINIC | Age: 62
End: 2022-03-24
Payer: COMMERCIAL

## 2022-03-24 ENCOUNTER — APPOINTMENT (OUTPATIENT)
Dept: ULTRASOUND IMAGING | Facility: CLINIC | Age: 62
End: 2022-03-24
Payer: COMMERCIAL

## 2022-03-24 ENCOUNTER — OUTPATIENT (OUTPATIENT)
Dept: OUTPATIENT SERVICES | Facility: HOSPITAL | Age: 62
LOS: 1 days | End: 2022-03-24
Payer: COMMERCIAL

## 2022-03-24 DIAGNOSIS — Z00.8 ENCOUNTER FOR OTHER GENERAL EXAMINATION: ICD-10-CM

## 2022-03-24 DIAGNOSIS — Z98.891 HISTORY OF UTERINE SCAR FROM PREVIOUS SURGERY: Chronic | ICD-10-CM

## 2022-03-24 DIAGNOSIS — C79.9 SECONDARY MALIGNANT NEOPLASM OF UNSPECIFIED SITE: ICD-10-CM

## 2022-03-24 PROCEDURE — 71260 CT THORAX DX C+: CPT | Mod: 26

## 2022-03-24 PROCEDURE — 76830 TRANSVAGINAL US NON-OB: CPT | Mod: 26

## 2022-03-24 PROCEDURE — 71260 CT THORAX DX C+: CPT

## 2022-03-24 PROCEDURE — 82565 ASSAY OF CREATININE: CPT

## 2022-03-24 PROCEDURE — 76856 US EXAM PELVIC COMPLETE: CPT

## 2022-03-24 PROCEDURE — 76856 US EXAM PELVIC COMPLETE: CPT | Mod: 26,59

## 2022-03-24 PROCEDURE — 74177 CT ABD & PELVIS W/CONTRAST: CPT

## 2022-03-24 PROCEDURE — 76830 TRANSVAGINAL US NON-OB: CPT

## 2022-03-24 PROCEDURE — 74177 CT ABD & PELVIS W/CONTRAST: CPT | Mod: 26

## 2022-03-30 ENCOUNTER — NON-APPOINTMENT (OUTPATIENT)
Age: 62
End: 2022-03-30

## 2022-04-03 ENCOUNTER — NON-APPOINTMENT (OUTPATIENT)
Age: 62
End: 2022-04-03

## 2022-04-04 ENCOUNTER — TRANSCRIPTION ENCOUNTER (OUTPATIENT)
Age: 62
End: 2022-04-04

## 2022-04-04 ENCOUNTER — NON-APPOINTMENT (OUTPATIENT)
Age: 62
End: 2022-04-04

## 2022-04-12 ENCOUNTER — TRANSCRIPTION ENCOUNTER (OUTPATIENT)
Age: 62
End: 2022-04-12

## 2022-04-13 ENCOUNTER — TRANSCRIPTION ENCOUNTER (OUTPATIENT)
Age: 62
End: 2022-04-13

## 2022-04-15 ENCOUNTER — APPOINTMENT (OUTPATIENT)
Dept: INFUSION THERAPY | Facility: HOSPITAL | Age: 62
End: 2022-04-15

## 2022-04-19 ENCOUNTER — RX RENEWAL (OUTPATIENT)
Age: 62
End: 2022-04-19

## 2022-04-20 ENCOUNTER — TRANSCRIPTION ENCOUNTER (OUTPATIENT)
Age: 62
End: 2022-04-20

## 2022-04-23 ENCOUNTER — RX RENEWAL (OUTPATIENT)
Age: 62
End: 2022-04-23

## 2022-04-25 ENCOUNTER — APPOINTMENT (OUTPATIENT)
Dept: GYNECOLOGIC ONCOLOGY | Facility: CLINIC | Age: 62
End: 2022-04-25
Payer: COMMERCIAL

## 2022-04-25 VITALS — DIASTOLIC BLOOD PRESSURE: 60 MMHG | SYSTOLIC BLOOD PRESSURE: 99 MMHG | HEIGHT: 61 IN | HEART RATE: 104 BPM

## 2022-04-25 DIAGNOSIS — R97.1 ELEVATED CANCER ANTIGEN 125 [CA 125]: ICD-10-CM

## 2022-04-25 PROCEDURE — 99214 OFFICE O/P EST MOD 30 MIN: CPT | Mod: 25

## 2022-04-25 PROCEDURE — 58100 BIOPSY OF UTERUS LINING: CPT

## 2022-04-29 ENCOUNTER — APPOINTMENT (OUTPATIENT)
Dept: NEUROLOGY | Facility: CLINIC | Age: 62
End: 2022-04-29
Payer: COMMERCIAL

## 2022-04-29 VITALS
BODY MASS INDEX: 55.32 KG/M2 | SYSTOLIC BLOOD PRESSURE: 100 MMHG | DIASTOLIC BLOOD PRESSURE: 68 MMHG | HEART RATE: 94 BPM | HEIGHT: 61 IN | WEIGHT: 293 LBS

## 2022-04-29 PROCEDURE — 64612 DESTROY NERVE FACE MUSCLE: CPT

## 2022-04-29 PROCEDURE — 99214 OFFICE O/P EST MOD 30 MIN: CPT | Mod: 25

## 2022-04-29 NOTE — PROCEDURE
[FreeTextEntry1] : The patient was injected with Onabotulinum Toxin A in the muscles outlined below after the botox was diluted to 5 units/0.1cc using normal saline. Anatomic locations were used. The patient tolerated the procedure well with no complications. \par \par R outer canthus - 2.5 units\par R Upper eye lid - inner and outer - 2.5 units each\par R Lower eye lid, inner and outer - 2.5 units each\par R Nasalis - 2.5 units \par R upper orbicularis oris - 2.5 units\par R lower orbicularis oris -  2.5 units\par R Risorius - 5 units \par R zygomaticus - 5 units  \par \par Total units used: 30 units\par Unavoidable waste: 70 units\par Total units mixed: 100 units\par \par Lot: [id number]\par Expiry: 02/2024

## 2022-04-29 NOTE — PHYSICAL EXAM
[Person] : oriented to person [Place] : oriented to place [Time] : oriented to time [Comprehension] : comprehension intact [Concentration Intact] : normal concentrating ability [Cranial Nerves Optic (II)] : visual acuity intact bilaterally,  visual fields full to confrontation, pupils equal round and reactive to light [Cranial Nerves Oculomotor (III)] : extraocular motion intact [Cranial Nerves Trigeminal (V)] : facial sensation intact symmetrically [Cranial Nerves Facial (VII)] : face symmetrical [Cranial Nerves Vestibulocochlear (VIII)] : hearing was intact bilaterally [Cranial Nerves Glossopharyngeal (IX)] : tongue and palate midline [Cranial Nerves Accessory (XI - Cranial And Spinal)] : head turning and shoulder shrug symmetric [Cranial Nerves Hypoglossal (XII)] : there was no tongue deviation with protrusion [Motor Strength] : muscle strength was normal in all four extremities [Involuntary Movements] : no involuntary movements were seen [Sensation Tactile Decrease] : light touch was intact [Paresis Pronator Drift Right-Sided] : no pronator drift on the right [Paresis Pronator Drift Left-Sided] : no pronator drift on the left [Coordination - Dysmetria Impaired Finger-to-Nose Bilateral] : not present [FreeTextEntry1] : No spasms in the face during the visit today.\par \par Video showed right lower facial spasms. [FreeTextEntry8] : Patient ambulates with cane, wide based.

## 2022-04-29 NOTE — HISTORY OF PRESENT ILLNESS
[FreeTextEntry1] : Oxana Ratliff is a 61 year old F with a PMHx COVID-19 in 2/21, Neuroendocrine cancer, Mesentery Cancer, Obesity, Type II DM, Peripheral Edema, Arthritis of the knees, and HTN. She presents for follow up in the Movement Disorders Clinic at [facility]. She was initially referred by Dr. Mccurdy for hemifacial spasms. \par \par Interval history:\par Since the last visit, she reported facial weakness with her last botox injections. She was prescribed Clonazepam for her spasms PRN.\par \par Initial history:\par These right facial spasms start around her under eye and right side of her mouth/cheek. This started over a year ago but has increased in frequency. She has a history of Bell's palsy years ago but she cannot recall which side. She cannot tell what induces the movement.\par \par No dizziness/lightheadedness. No headaches. No nausea/vomiting. No recent illness, but undergoing cancer treatments, she has mesentery cancer. She had COVID-19 in 2/21 and had the infusion, and is now vaccinated. She did not have much in the way of symptoms, and was not hospitalized. No cough, shortness of breath, chest pain, or flu like symptoms in the last 2 weeks. No numbness/tingling. \par \par Family history: unremarkable

## 2022-05-03 LAB — CORE LAB BIOPSY: NORMAL

## 2022-05-04 ENCOUNTER — NON-APPOINTMENT (OUTPATIENT)
Age: 62
End: 2022-05-04

## 2022-05-10 ENCOUNTER — OUTPATIENT (OUTPATIENT)
Dept: OUTPATIENT SERVICES | Facility: HOSPITAL | Age: 62
LOS: 1 days | Discharge: HOME | End: 2022-05-10

## 2022-05-10 DIAGNOSIS — Z98.891 HISTORY OF UTERINE SCAR FROM PREVIOUS SURGERY: Chronic | ICD-10-CM

## 2022-05-10 DIAGNOSIS — C7A.8 OTHER MALIGNANT NEUROENDOCRINE TUMORS: ICD-10-CM

## 2022-05-11 ENCOUNTER — RX RENEWAL (OUTPATIENT)
Age: 62
End: 2022-05-11

## 2022-05-13 ENCOUNTER — APPOINTMENT (OUTPATIENT)
Dept: INFUSION THERAPY | Facility: HOSPITAL | Age: 62
End: 2022-05-13

## 2022-05-16 ENCOUNTER — NON-APPOINTMENT (OUTPATIENT)
Age: 62
End: 2022-05-16

## 2022-05-16 ENCOUNTER — APPOINTMENT (OUTPATIENT)
Dept: INTERNAL MEDICINE | Facility: CLINIC | Age: 62
End: 2022-05-16
Payer: COMMERCIAL

## 2022-05-16 VITALS
OXYGEN SATURATION: 97 % | BODY MASS INDEX: 55.32 KG/M2 | DIASTOLIC BLOOD PRESSURE: 82 MMHG | HEART RATE: 87 BPM | SYSTOLIC BLOOD PRESSURE: 124 MMHG | RESPIRATION RATE: 16 BRPM | WEIGHT: 293 LBS | TEMPERATURE: 98.4 F | HEIGHT: 61 IN

## 2022-05-16 DIAGNOSIS — Z00.00 ENCOUNTER FOR GENERAL ADULT MEDICAL EXAMINATION W/OUT ABNORMAL FINDINGS: ICD-10-CM

## 2022-05-16 PROCEDURE — 99396 PREV VISIT EST AGE 40-64: CPT | Mod: 25

## 2022-05-16 PROCEDURE — G0447 BEHAVIOR COUNSEL OBESITY 15M: CPT

## 2022-05-22 NOTE — ASSESSMENT
[FreeTextEntry1] : Physical examination shows a well-developed woman in no acute distress blood pressure 124/82 height 5 foot 1 inches weight 340 pounds BMI 64.24 rate of 87 respirations 16 oxygen saturation on room air was 97% HEENT was unremarkable chest was clear cardiovascular exam was regular abdomen was soft extremities showed no clubbing cyanosis and there was trace bilateral leg edema neurologic exam was nonfocal Long discussion with the patient concerning her obesity and the inevitable serious consequences she is aware of these risk bariatric surgery was discussed and advised her to go to seminar on bariatric surgery she will continue to reduce portions restrict carbohydrates walk limit snacking make better food selections weight watchers at a minimum or dietitian should be considered patient is followed regularly by her cardiologist and had an EKG in December 2021 he follows with grossly with echocardiograms in view of her aortic stenosis she has received 3 COVID-19 vaccines and the Shingrix vaccines follows regularly with her gynecologist ophthalmologist and will consider a dermatology visit for cancer screening had a colonoscopy in 2019 complete blood test were performed in August 2021 she was given a slip for complete blood test including CBC full chemistries lipid profile thyroid profile urinalysis CRP vitamins D3 and B12 hemoglobin A1c COVID-19 nuclear capsid and spike domain antibodies blood pressure is well controlled at the present visit waiting patient's repeat hemoglobin A1c after medicines adjustments previous 1 had been 8.6%

## 2022-05-22 NOTE — HISTORY OF PRESENT ILLNESS
[FreeTextEntry1] : 61-year-old obese woman comes to the office for a comprehensive physical examination to review her medication and discuss her overall health recent issues with obesity and lower back pain [de-identified] : Comes to the office for a comprehensive physical examination with a history of morbid obesity previous COVID-19 viral infection lower back pain ovarian cyst neuroendocrine neoplasm diabetes hypertension and aortic stenosis denies temperature chills sweats or myalgias she has had no headache sinus congestion sore throat cough wheezing pleurisy chest pain shortness of breath at rest she does have exertional dyspnea with moderate exertion she has chronic lower extremity edema she denies lightheadedness palpitations dizziness vertigo or syncope she does get up at night to urinate but denies dysuria or gross hematuria has no specific bowel complaints has pains in her lower back and occasional knees has bouts of anxiety but has been sleeping adequately recently

## 2022-05-22 NOTE — PHYSICAL EXAM
[No Acute Distress] : no acute distress [Well Nourished] : well nourished [Well Developed] : well developed [Well-Appearing] : well-appearing [Normal Voice/Communication] : normal voice/communication [Normal Sclera/Conjunctiva] : normal sclera/conjunctiva [PERRL] : pupils equal round and reactive to light [EOMI] : extraocular movements intact [Normal Outer Ear/Nose] : the outer ears and nose were normal in appearance [Normal Oropharynx] : the oropharynx was normal [No JVD] : no jugular venous distention [No Lymphadenopathy] : no lymphadenopathy [Supple] : supple [Thyroid Normal, No Nodules] : the thyroid was normal and there were no nodules present [No Respiratory Distress] : no respiratory distress  [No Accessory Muscle Use] : no accessory muscle use [Clear to Auscultation] : lungs were clear to auscultation bilaterally [Normal Rate] : normal rate  [Regular Rhythm] : with a regular rhythm [Normal S1, S2] : normal S1 and S2 [No Murmur] : no murmur heard [No Carotid Bruits] : no carotid bruits [No Abdominal Bruit] : a ~M bruit was not heard ~T in the abdomen [No Varicosities] : no varicosities [Pedal Pulses Present] : the pedal pulses are present [No Edema] : there was no peripheral edema [No Palpable Aorta] : no palpable aorta [No Extremity Clubbing/Cyanosis] : no extremity clubbing/cyanosis [Declined Breast Exam] : declined breast exam  [Soft] : abdomen soft [Non Tender] : non-tender [Non-distended] : non-distended [No Masses] : no abdominal mass palpated [No HSM] : no HSM [Normal Bowel Sounds] : normal bowel sounds [No Hernias] : no hernias [Declined Rectal Exam] : declined rectal exam [Normal Supraclavicular Nodes] : no supraclavicular lymphadenopathy [Normal Axillary Nodes] : no axillary lymphadenopathy [Normal Posterior Cervical Nodes] : no posterior cervical lymphadenopathy [Normal Anterior Cervical Nodes] : no anterior cervical lymphadenopathy [Normal Inguinal Nodes] : no inguinal lymphadenopathy [Normal Femoral Nodes] : no femoral lymphadenopathy [No CVA Tenderness] : no CVA  tenderness [No Spinal Tenderness] : no spinal tenderness [No Joint Swelling] : no joint swelling [Grossly Normal Strength/Tone] : grossly normal strength/tone [No Rash] : no rash [No Skin Lesions] : no skin lesions [Coordination Grossly Intact] : coordination grossly intact [No Focal Deficits] : no focal deficits [Normal Gait] : normal gait [Deep Tendon Reflexes (DTR)] : deep tendon reflexes were 2+ and symmetric [Normal Affect] : the affect was normal [Normal Insight/Judgement] : insight and judgment were intact [Kyphosis] : no kyphosis [Scoliosis] : no scoliosis [Acne] : no acne

## 2022-05-22 NOTE — REVIEW OF SYSTEMS
[Lower Ext Edema] : lower extremity edema [Dyspnea on Exertion] : dyspnea on exertion [Nocturia] : nocturia [Joint Stiffness] : joint stiffness [Muscle Pain] : muscle pain [Back Pain] : back pain [Anxiety] : anxiety [Fever] : no fever [Chills] : no chills [Fatigue] : no fatigue [Hot Flashes] : no hot flashes [Night Sweats] : no night sweats [Recent Change In Weight] : ~T no recent weight change [Discharge] : no discharge [Pain] : no pain [Redness] : no redness [Dryness] : no dryness  [Vision Problems] : no vision problems [Itching] : no itching [Earache] : no earache [Hearing Loss] : no hearing loss [Nosebleed] : no nosebleeds [Hoarseness] : no hoarseness [Nasal Discharge] : no nasal discharge [Sore Throat] : no sore throat [Postnasal Drip] : no postnasal drip [Chest Pain] : no chest pain [Palpitations] : no palpitations [Leg Claudication] : no leg claudication [Orthopnea] : no orthopnea [Paroxysmal Nocturnal Dyspnea] : no paroxysmal nocturnal dyspnea [Shortness Of Breath] : no shortness of breath [Wheezing] : no wheezing [Cough] : no cough [Abdominal Pain] : no abdominal pain [Nausea] : no nausea [Constipation] : no constipation [Diarrhea] : diarrhea [Vomiting] : no vomiting [Heartburn] : no heartburn [Melena] : no melena [Dysuria] : no dysuria [Incontinence] : no incontinence [Poor Libido] : libido not poor [Hematuria] : no hematuria [Frequency] : no frequency [Vaginal Discharge] : no vaginal discharge [Dysmenorrhea] : no dysmenorrhea [Joint Pain] : no joint pain [Joint Swelling] : no joint swelling [Muscle Weakness] : no muscle weakness [Mole Changes] : no mole changes [Nail Changes] : no nail changes [Hair Changes] : no hair changes [Skin Rash] : no skin rash [Headache] : no headache [Dizziness] : no dizziness [Fainting] : no fainting [Confusion] : no confusion [Memory Loss] : no memory loss [Unsteady Walking] : no ataxia [Suicidal] : not suicidal [Insomnia] : no insomnia [Depression] : no depression [Easy Bleeding] : no easy bleeding [Easy Bruising] : no easy bruising [Swollen Glands] : no swollen glands [FreeTextEntry9] : lower

## 2022-05-22 NOTE — COUNSELING
[Potential consequences of obesity discussed] : Potential consequences of obesity discussed [Benefits of weight loss discussed] : Benefits of weight loss discussed [Structured Weight Management Program suggested:] : Structured weight management program suggested [Encouraged to maintain food diary] : Encouraged to maintain food diary [Encouraged to increase physical activity] : Encouraged to increase physical activity [Encouraged to use exercise tracking device] : Encouraged to use exercise tracking device [Weigh Self Weekly] : weigh self weekly [Decrease Portions] : decrease portions [____ min/wk Activity] : [unfilled] min/wk activity [Keep Food Diary] : keep food diary [Good understanding] : Patient has a good understanding of disease, goals and obesity follow-up plan [FreeTextEntry1] : bariatric and weight watchers [FreeTextEntry4] : 15

## 2022-05-22 NOTE — HEALTH RISK ASSESSMENT
[Never] : Never [Yes] : Yes [No falls in past year] : Patient reported no falls in the past year [0] : 2) Feeling down, depressed, or hopeless: Not at all (0) [PHQ-2 Negative - No further assessment needed] : PHQ-2 Negative - No further assessment needed [HIV test declined] : HIV test declined [Hepatitis C test declined] : Hepatitis C test declined [de-identified] : rarely [[id number]] : 0 [MammogramDate] : 05/21 [ColonoscopyDate] : 02/19

## 2022-05-25 ENCOUNTER — APPOINTMENT (OUTPATIENT)
Dept: HEMATOLOGY ONCOLOGY | Facility: CLINIC | Age: 62
End: 2022-05-25
Payer: COMMERCIAL

## 2022-05-25 VITALS
OXYGEN SATURATION: 98 % | DIASTOLIC BLOOD PRESSURE: 82 MMHG | SYSTOLIC BLOOD PRESSURE: 138 MMHG | HEART RATE: 83 BPM | BODY MASS INDEX: 64.24 KG/M2 | WEIGHT: 293 LBS | RESPIRATION RATE: 16 BRPM | TEMPERATURE: 97 F

## 2022-05-25 PROCEDURE — 99213 OFFICE O/P EST LOW 20 MIN: CPT

## 2022-05-31 ENCOUNTER — APPOINTMENT (OUTPATIENT)
Dept: MAMMOGRAPHY | Facility: HOSPITAL | Age: 62
End: 2022-05-31

## 2022-06-03 ENCOUNTER — OUTPATIENT (OUTPATIENT)
Dept: OUTPATIENT SERVICES | Facility: HOSPITAL | Age: 62
LOS: 1 days | Discharge: HOME | End: 2022-06-03

## 2022-06-03 DIAGNOSIS — Z98.891 HISTORY OF UTERINE SCAR FROM PREVIOUS SURGERY: Chronic | ICD-10-CM

## 2022-06-03 DIAGNOSIS — C7A.8 OTHER MALIGNANT NEUROENDOCRINE TUMORS: ICD-10-CM

## 2022-06-08 ENCOUNTER — APPOINTMENT (OUTPATIENT)
Dept: ORTHOPEDIC SURGERY | Facility: CLINIC | Age: 62
End: 2022-06-08
Payer: COMMERCIAL

## 2022-06-08 VITALS — BODY MASS INDEX: 55.32 KG/M2 | HEIGHT: 61 IN | WEIGHT: 293 LBS

## 2022-06-08 PROCEDURE — 20610 DRAIN/INJ JOINT/BURSA W/O US: CPT | Mod: 50

## 2022-06-08 PROCEDURE — 99214 OFFICE O/P EST MOD 30 MIN: CPT | Mod: 25

## 2022-06-08 NOTE — HISTORY OF PRESENT ILLNESS
[Worsening] : worsening [___ yrs] : [unfilled] year(s) ago [6] : a current pain level of 6/10 [4] : a minimum pain level of 4/10 [8] : a maximum pain level of 8/10 [Standing] : standing [Intermit.] : ~He/She~ states the symptoms seem to be intermittent [Bending] : worsened by bending [Lifting] : worsened by lifting [Sitting] : worsened by sitting [Walking] : worsened by walking [Knee Flexion] : worsened with knee flexion [Knee Extension] : worsened with knee extension [Recumbency] : relieved by recumbency [Rest] : relieved by rest [de-identified] : Patient presents to office walking with cane complaining of b/l knee pain, R>L. last visit received Monovisc injections to right knee with 4 months relief. Here for another injection and enquiring about gel injection. \par Patient is an obese female, PMHx mesenteric cancer (stage 4), HTN, edema, right knee arthroscopy 2006, Dr. Ragland, presents to the office walking with a cane (x few years ) complaining of b/l knee pain x1 week , insidious onset while getting up from the toilet x 1 week ago, progressively worsening. Reports locking, instability, weakness and limping due to symptoms. Pain indicated to anterior aspect of knee, 4/10, L>R, intermittent, achy and sharp at times. Worsening with walking or knee activity. Some relief with rest. Nsaids don’t help. \par Patient denies any fever, chills, trauma, swelling, erythema, hematomas, numbness or tingling sensation, buckling.\par  [Acetaminophen] : not relieved by acetaminophen [Exercise Regimen] : not relieved by exercise regimen [NSAIDs] : not relieved by nonsteroidal anti-inflammatory drugs

## 2022-06-08 NOTE — ADDENDUM
[FreeTextEntry1] : I, Abram Paula, acted solely as a scribe for Dr. Rommel Sidhu MD on this date 06/08/2022  .\par  \par All medical record entries made by the Scribe were at my, Dr. Rommel Sidhu MD , direction and personally dictated by me on 06/08/2022 . I have reviewed the chart and agree that the record accurately reflects my personal performance of the history, physical exam, assessment and plan. I have also personally directed, reviewed, and agreed with the chart.

## 2022-06-08 NOTE — PHYSICAL EXAM
[Antalgic] : antalgic [Normal RUE] : Right Upper Extremity: No scars, rashes, lesions, ulcers, skin intact [Obese] : obese [Normal] : no peripheral adenopathy appreciated [Normal Touch] : sensation intact for touch [Poor Appearance] : well-appearing [Acute Distress] : not in acute distress [de-identified] : Patient appears stated age in no acute respiratory distress. Patient is alert oriented x3. Patient has normal mood and affect. \par \par Bilateral knee exam\par There is minimal to moderate effusion. Range of motion is 0-120°. Painful at the extremes of range of motion. \par There is medial and lateral joint line tenderness. \par Quadriceps strength is 4/5. There is some muscle loss in the quadriceps. \par Anteroposterior and mediolateral stability is intact Colin test is negative. Alignment of the knee is in 5° of varus.\par \par Bilateral hip exam\par On inspection of the hip shows skin is normal. No evidence of rash. \par No loss of muscle.  Abductor strength is 5 out of 5. Hip flexor strength is 5.\par Range of motion of the hip at 90° flexion internal rotation is 15° external rotation is 30° pain-free. \par Hip has good stability in anterior and posterior direction. \par On lateral decubitus  examination there is no tenderness in the greater trochanter. \par Lower Extremity Examination \par Bilateral lower extremity skin is normal. There is no rash. There is no edema and lymphadenopathy.  DP and PT pulses intact. Sensation is intact.

## 2022-06-08 NOTE — DISCUSSION/SUMMARY
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 Patient will start with conservative treatment and treatment options were discussed including NSAIDs, ice, physical therapy, corticosteroid injections. A bilateral knee cortisone injection was provided today in office for symptomatic relief. Patient tolerated procedure well. Given patient's history of diabetes, risks were discussed prior to the injection. Patient was advised to monitor his diet and glucose levels for the next few days. FU in 6 weeks. \par \par Bilateral Knee Injection\par The risks and benefits of injection was discussed with the patient. Verbal consent was obtained from the patient. The area was prepped with Betadine. A lateral suprapatellar approach was used. The knee was injected with 2 cc of Depo-Medrol(4omg/cc) 3 cc of lidocaine. The patient tolerated the procedure well. Band-Aid was applied.

## 2022-06-10 ENCOUNTER — APPOINTMENT (OUTPATIENT)
Dept: INFUSION THERAPY | Facility: HOSPITAL | Age: 62
End: 2022-06-10

## 2022-06-10 NOTE — ED PROVIDER NOTE - EKG ADDITIONAL INFORMATION FREE TEXT
76 yo F with history of bladder cancer. Recently underwent TURBT and L PCN placement and right ureteral stent placement. Transferred from OSH due to DVT, anemia to 6.2, significant leukocytosis to 30, and CT showing worsening right hydronephrosis.    - Patient s/p right neph tube after failed drainage with right ureteral stent.   - She recently developed DVT and was started on Eliquis. The Eliquis is being held (last dose was 10mg Eliquis given at 10am on 6/8) and she is being started on Heparin gtt.  - Soto pulled this am. She does not need to to undergo voiding trial due to b/l neph tubes draining appropriately.  - Urine culture NG  - Agree with empiric antibiotics.  - F/u labs from this am   - Recommend to transfuse with prbc if necessary  - Rest of care per primary.  - She needs to f/u with her multi-disciplinary team and I will message Sandra Salgado to reschedule appointments   sinus at 98

## 2022-06-20 ENCOUNTER — NON-APPOINTMENT (OUTPATIENT)
Age: 62
End: 2022-06-20

## 2022-06-21 ENCOUNTER — NON-APPOINTMENT (OUTPATIENT)
Age: 62
End: 2022-06-21

## 2022-06-21 ENCOUNTER — APPOINTMENT (OUTPATIENT)
Dept: CARDIOLOGY | Facility: CLINIC | Age: 62
End: 2022-06-21
Payer: COMMERCIAL

## 2022-06-21 VITALS
DIASTOLIC BLOOD PRESSURE: 78 MMHG | BODY MASS INDEX: 55.32 KG/M2 | TEMPERATURE: 97.2 F | RESPIRATION RATE: 18 BRPM | SYSTOLIC BLOOD PRESSURE: 156 MMHG | HEART RATE: 82 BPM | OXYGEN SATURATION: 99 % | HEIGHT: 61 IN | WEIGHT: 293 LBS

## 2022-06-21 DIAGNOSIS — R60.0 LOCALIZED EDEMA: ICD-10-CM

## 2022-06-21 PROCEDURE — 99215 OFFICE O/P EST HI 40 MIN: CPT | Mod: 25

## 2022-06-21 PROCEDURE — 93000 ELECTROCARDIOGRAM COMPLETE: CPT

## 2022-06-21 PROCEDURE — 93306 TTE W/DOPPLER COMPLETE: CPT

## 2022-06-21 PROCEDURE — 99215 OFFICE O/P EST HI 40 MIN: CPT

## 2022-06-26 NOTE — HISTORY OF PRESENT ILLNESS
[Disease: _____________________] : Disease: [unfilled] [AJCC Stage: ____] : AJCC Stage: [unfilled] [Therapy: ___] : Therapy: [unfilled] [de-identified] : 58-year-old F with h/o intermittent abdominal pain, n/v over the last several years (at least 9) thought to be 2/2 gallstones. She has been evaluated in the ER 1-2 per year with these attacks.  CT A/P going as far back as 2010 noted a calcified mass within the mesentery of the small bowel suspicious for carcinoid. In Nov 2017 it was found to be increasing in size from previous imaging. She saw a surgeon at the time however it is unclear if any work up was recommended. \par \par In Nov 2018 she was referred to Dr. Foy for evaluation of gallstones. He noted the mesenteric mass which prompted a PET/DOTATATE to be performed. This showed activity in the mesentery (SUV 59.9) as well as liver, ileal  mass though to be the primary, cystic adnexal mass unchanged since 11/17, possible uptake in the skin of left breast. An MRI Abdomen was performed to evaluate the liver mets and this revealed multiple metastatic lesions. Referred to med onc for further work up. \par \par 2/14/19: Enteroscopy/[facility]: no mass noted, erythema in the terminal ileum, s/p biopsy with negative path \par 4/22/19: CT CAP: multiple small b/l lung nodules, slight enlargement of calcified mesenteric mass. \par 4/23-4/26/19: admitted to [facility] with N/V post scan. 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 Her company was bought out and she will be out of a job at the end of Aug. otherwise medically unchanged. was in the ER in Feb 2022 with SOB, d/c

## 2022-07-08 ENCOUNTER — APPOINTMENT (OUTPATIENT)
Dept: INFUSION THERAPY | Facility: HOSPITAL | Age: 62
End: 2022-07-08

## 2022-07-11 ENCOUNTER — OUTPATIENT (OUTPATIENT)
Dept: OUTPATIENT SERVICES | Facility: HOSPITAL | Age: 62
LOS: 1 days | End: 2022-07-11
Payer: COMMERCIAL

## 2022-07-11 ENCOUNTER — APPOINTMENT (OUTPATIENT)
Dept: MAMMOGRAPHY | Facility: IMAGING CENTER | Age: 62
End: 2022-07-11

## 2022-07-11 ENCOUNTER — APPOINTMENT (OUTPATIENT)
Dept: ULTRASOUND IMAGING | Facility: IMAGING CENTER | Age: 62
End: 2022-07-11

## 2022-07-11 ENCOUNTER — RESULT REVIEW (OUTPATIENT)
Age: 62
End: 2022-07-11

## 2022-07-11 DIAGNOSIS — Z00.8 ENCOUNTER FOR OTHER GENERAL EXAMINATION: ICD-10-CM

## 2022-07-11 DIAGNOSIS — Z98.891 HISTORY OF UTERINE SCAR FROM PREVIOUS SURGERY: Chronic | ICD-10-CM

## 2022-07-11 DIAGNOSIS — Z00.00 ENCOUNTER FOR GENERAL ADULT MEDICAL EXAMINATION WITHOUT ABNORMAL FINDINGS: ICD-10-CM

## 2022-07-11 PROCEDURE — 77067 SCR MAMMO BI INCL CAD: CPT | Mod: 26

## 2022-07-11 PROCEDURE — 77067 SCR MAMMO BI INCL CAD: CPT

## 2022-07-11 PROCEDURE — 77063 BREAST TOMOSYNTHESIS BI: CPT | Mod: 26

## 2022-07-11 PROCEDURE — 76641 ULTRASOUND BREAST COMPLETE: CPT | Mod: 26,50

## 2022-07-11 PROCEDURE — 76641 ULTRASOUND BREAST COMPLETE: CPT

## 2022-07-11 PROCEDURE — 77063 BREAST TOMOSYNTHESIS BI: CPT

## 2022-07-12 ENCOUNTER — RX RENEWAL (OUTPATIENT)
Age: 62
End: 2022-07-12

## 2022-07-27 ENCOUNTER — OUTPATIENT (OUTPATIENT)
Dept: OUTPATIENT SERVICES | Facility: HOSPITAL | Age: 62
LOS: 1 days | Discharge: HOME | End: 2022-07-27

## 2022-07-27 DIAGNOSIS — Z98.891 HISTORY OF UTERINE SCAR FROM PREVIOUS SURGERY: Chronic | ICD-10-CM

## 2022-07-27 DIAGNOSIS — C7A.8 OTHER MALIGNANT NEUROENDOCRINE TUMORS: ICD-10-CM

## 2022-07-29 ENCOUNTER — APPOINTMENT (OUTPATIENT)
Dept: NEUROLOGY | Facility: CLINIC | Age: 62
End: 2022-07-29

## 2022-07-29 ENCOUNTER — LABORATORY RESULT (OUTPATIENT)
Age: 62
End: 2022-07-29

## 2022-07-29 VITALS
DIASTOLIC BLOOD PRESSURE: 84 MMHG | WEIGHT: 293 LBS | SYSTOLIC BLOOD PRESSURE: 138 MMHG | BODY MASS INDEX: 55.32 KG/M2 | HEIGHT: 61 IN | HEART RATE: 84 BPM

## 2022-07-29 LAB
25(OH)D3 SERPL-MCNC: 44.4 NG/ML
ALBUMIN SERPL ELPH-MCNC: 4 G/DL
ALP BLD-CCNC: 97 U/L
ALT SERPL-CCNC: 14 U/L
ANION GAP SERPL CALC-SCNC: 8 MMOL/L
AST SERPL-CCNC: 16 U/L
BASOPHILS # BLD AUTO: 0.05 K/UL
BASOPHILS NFR BLD AUTO: 0.7 %
BILIRUB SERPL-MCNC: 0.4 MG/DL
BUN SERPL-MCNC: 12 MG/DL
CALCIUM SERPL-MCNC: 9.7 MG/DL
CHLORIDE SERPL-SCNC: 101 MMOL/L
CHOLEST SERPL-MCNC: 164 MG/DL
CO2 SERPL-SCNC: 30 MMOL/L
COVID-19 NUCLEOCAPSID  GAM ANTIBODY INTERPRETATION: POSITIVE
COVID-19 SPIKE DOMAIN ANTIBODY INTERPRETATION: POSITIVE
CREAT SERPL-MCNC: 0.75 MG/DL
CRP SERPL HS-MCNC: 9.06 MG/L
EGFR: 89 ML/MIN/1.73M2
EOSINOPHIL # BLD AUTO: 0.17 K/UL
EOSINOPHIL NFR BLD AUTO: 2.4 %
GLUCOSE BS SERPL-MCNC: 210 MG/DL
GLUCOSE SERPL-MCNC: 220 MG/DL
HCT VFR BLD CALC: 40.9 %
HDLC SERPL-MCNC: 67 MG/DL
HGB BLD-MCNC: 12.4 G/DL
IMM GRANULOCYTES NFR BLD AUTO: 0.7 %
LDLC SERPL CALC-MCNC: 82 MG/DL
LYMPHOCYTES # BLD AUTO: 1.78 K/UL
LYMPHOCYTES NFR BLD AUTO: 24.8 %
MAN DIFF?: NORMAL
MCHC RBC-ENTMCNC: 25.6 PG
MCHC RBC-ENTMCNC: 30.3 GM/DL
MCV RBC AUTO: 84.5 FL
MONOCYTES # BLD AUTO: 0.51 K/UL
MONOCYTES NFR BLD AUTO: 7.1 %
NEUTROPHILS # BLD AUTO: 4.63 K/UL
NEUTROPHILS NFR BLD AUTO: 64.3 %
NONHDLC SERPL-MCNC: 98 MG/DL
PLATELET # BLD AUTO: 302 K/UL
POTASSIUM SERPL-SCNC: 5 MMOL/L
PROT SERPL-MCNC: 7.2 G/DL
RBC # BLD: 4.84 M/UL
RBC # FLD: 15.3 %
SARS-COV-2 AB SERPL IA-ACNC: >250 U/ML
SARS-COV-2 AB SERPL QL IA: 1.05 INDEX
SODIUM SERPL-SCNC: 140 MMOL/L
T4 FREE SERPL-MCNC: 1.1 NG/DL
TRIGL SERPL-MCNC: 81 MG/DL
TSH SERPL-ACNC: 1.76 UIU/ML
VIT B12 SERPL-MCNC: 259 PG/ML
WBC # FLD AUTO: 7.19 K/UL

## 2022-07-29 PROCEDURE — 99214 OFFICE O/P EST MOD 30 MIN: CPT | Mod: 25

## 2022-07-29 PROCEDURE — 64612 DESTROY NERVE FACE MUSCLE: CPT

## 2022-07-29 NOTE — DISCUSSION/SUMMARY
[FreeTextEntry1] : Oxana Ratliff is a 62 year old F with a PMHx COVID-19 in 2/21, Neuroendocrine cancer, Mesentery Cancer, Obesity, Type II DM, Peripheral Edema, Arthritis of the knees, and HTN. She presents for follow up in the Movement Disorders Clinic at [facility]. She was initially referred by Dr. Mccurdy for hemifacial spasms. \par \par The patient underwent repeat injections for R hemifacial and blepharospasm. She tolerated the procedure well. Please see procedure section of this note for further details. Risks and benefits of the procedure were reviewed prior to and after the procedure. The patient gave her verbal consent to proceed prior to the procedure. No changes were made to her regimen as she had increased facial weakness with a higher dose.\par Advised application of lidocaine cream before next injection. \par Clonazepam 0.25-0.5mg PRN severe spasms.\par \par I discussed that as part of the work up we obtain brain and vessel imaging of the IAC to determine if there is vascular compression of the seventh cranial nerve. The patient is not interested in further neuroimaging at this time.\par CT of the head was done with and without contrast on 5/29/2020 that was unremarkable, previously reviewed with patient.\par \par She will follow up for repeat botox injections in 3 months.\par \par All questions were answered to her satisfaction. I spent 30 minutes with this patient.

## 2022-07-29 NOTE — PROCEDURE
[FreeTextEntry1] : The patient was injected with Onabotulinum Toxin A in the muscles outlined below after the botox was diluted to 5 units/0.1cc using normal saline. Anatomic locations were used. The patient tolerated the procedure well with no complications. \par \par R outer canthus - 2.5 units\par R Upper eye lid - inner and outer - 2.5 units each\par R Lower eye lid, inner and outer - 2.5 units each\par R Nasalis - 2.5 units \par R upper orbicularis oris - 2.5 units\par R lower orbicularis oris -  2.5 units\par R Risorius - 2.5 units \par R zygomaticus - 2.5 units  \par \par Total units used: 20 units\par Unavoidable waste: 80 units\par Total units mixed: 100 units\par \par Lot: [id number]\par Expiry: 05/2024

## 2022-07-29 NOTE — HISTORY OF PRESENT ILLNESS
[FreeTextEntry1] : Oxana Ratliff is a 62 year old F with a PMHx COVID-19 in 2/21, Neuroendocrine cancer, Mesentery Cancer, Obesity, Type II DM, Peripheral Edema, Arthritis of the knees, and HTN. She presents for follow up in the Movement Disorders Clinic at [facility]. She was initially referred by Dr. Mccurdy for hemifacial spasms. \par \par Interval history:\par Since the last visit, she reports that the botox is not lasting as long as it was previously. She got about 6 weeks of benefit before the spasms returned.\par \par Initial history:\par These right facial spasms start around her under eye and right side of her mouth/cheek. This started over a year ago but has increased in frequency. She has a history of Bell's palsy years ago but she cannot recall which side. She cannot tell what induces the movement.\par \par No dizziness/lightheadedness. No headaches. No nausea/vomiting. No recent illness, but undergoing cancer treatments, she has mesentery cancer. She had COVID-19 in 2/21 and had the infusion, and is now vaccinated. She did not have much in the way of symptoms, and was not hospitalized. No cough, shortness of breath, chest pain, or flu like symptoms in the last 2 weeks. No numbness/tingling. \par \par Family history: unremarkable

## 2022-08-04 ENCOUNTER — APPOINTMENT (OUTPATIENT)
Dept: ORTHOPEDIC SURGERY | Facility: CLINIC | Age: 62
End: 2022-08-04

## 2022-08-04 VITALS
BODY MASS INDEX: 55.32 KG/M2 | HEIGHT: 61 IN | DIASTOLIC BLOOD PRESSURE: 83 MMHG | SYSTOLIC BLOOD PRESSURE: 144 MMHG | HEART RATE: 91 BPM | WEIGHT: 293 LBS

## 2022-08-04 PROCEDURE — 99214 OFFICE O/P EST MOD 30 MIN: CPT | Mod: 25

## 2022-08-04 PROCEDURE — 73562 X-RAY EXAM OF KNEE 3: CPT | Mod: 50

## 2022-08-04 PROCEDURE — 20610 DRAIN/INJ JOINT/BURSA W/O US: CPT | Mod: RT

## 2022-08-05 ENCOUNTER — APPOINTMENT (OUTPATIENT)
Dept: INFUSION THERAPY | Facility: HOSPITAL | Age: 62
End: 2022-08-05

## 2022-08-08 ENCOUNTER — APPOINTMENT (OUTPATIENT)
Dept: OBGYN | Facility: CLINIC | Age: 62
End: 2022-08-08

## 2022-08-10 NOTE — PATIENT PROFILE ADULT - LAST ORAL INTAKE
Patient has not showed up for virtual intake. I called and patient did not answer. The vmail has not been set up.   06-Jan-2020

## 2022-08-15 ENCOUNTER — APPOINTMENT (OUTPATIENT)
Dept: OBGYN | Facility: CLINIC | Age: 62
End: 2022-08-15

## 2022-08-15 VITALS
HEIGHT: 61 IN | HEART RATE: 91 BPM | OXYGEN SATURATION: 98 % | WEIGHT: 293 LBS | BODY MASS INDEX: 55.32 KG/M2 | SYSTOLIC BLOOD PRESSURE: 124 MMHG | DIASTOLIC BLOOD PRESSURE: 74 MMHG | TEMPERATURE: 97.9 F

## 2022-08-15 DIAGNOSIS — N95.0 POSTMENOPAUSAL BLEEDING: ICD-10-CM

## 2022-08-15 PROCEDURE — 99396 PREV VISIT EST AGE 40-64: CPT

## 2022-08-15 NOTE — HISTORY OF PRESENT ILLNESS
[FreeTextEntry1] : Check up  Without complaint  Well since last visit  + Mirena IUD 3/2022 S/P endo Bx Path benign Persistent staining few days/month  Recent mammo/ breast sono BIRAD 3 Left breast  + persistent 7 cm Rt ovary cyst

## 2022-08-15 NOTE — ED ADULT NURSE NOTE - IN THE PAST 12 MONTHS HAVE YOU USED DRUGS OTHER THAN THOSE REQUIRED FOR MEDICAL REASON?
Spiritual Plan of Care    Pt Name: Jarred Gutierrez   Pt : 1956  Date: 2022    Visit Type: In person    Referral Source:      Reason for Visit: Ritual Support    Visited With: Patient    Length of Visit:  10    Requires Follow-up:  No    Taxonomy:    · Intended Effects: Demonstrate caring and concern  · Methods: Offer support, Offer emotional support  · Interventions: Share words of hope and inspiration, [place]a, Prayer for healing, Perform a Christianity rite or ritual, Provide a Christianity item(s)      Patient Affect at Time of Visit: Alert, Cooperative, Open to  Visit, Talkative  Patient Assessment: At peace, Confident, Hopeful, Optimistic, Relies on balbir  Patient  Intervention: Emotional Support, Empathic Listening, Prayer     was contacted by ER  to provide support to the pt by bringing the pt a Bible.      provided the pt with a New Testament Bible with the Book of Psalms included. Pt was appreciative and asked the  to join the pt in prayer after space was provided for the pt to talk about pt balbir beliefs and relationship with God.      provided additional words of encouragement and assurance.     Vannesa Velasco MA, MDiv.  Staff   [phone]  
Spiritual Plan of Care    Pt Name: Jarred Mourayamilka Monique  Pt : 1956  Date: August 15, Cathy Mims MDiv, [facility]  Senior Staff   [facility]  [phone]    
No

## 2022-08-15 NOTE — PHYSICAL EXAM
[Chaperone Present] : A chaperone was present in the examining room during all aspects of the physical examination [Appropriately responsive] : appropriately responsive [Alert] : alert [No Acute Distress] : no acute distress [No Lymphadenopathy] : no lymphadenopathy [Soft] : soft [Non-tender] : non-tender [Non-distended] : non-distended [No HSM] : No HSM [No Lesions] : no lesions [No Mass] : no mass [Oriented x3] : oriented x3 [Depressed Mood] : depressed mood [Flat Affect] : flat affect [Examination Of The Breasts] : a normal appearance [No Discharge] : no discharge [No Masses] : no breast masses were palpable [Labia Majora] : normal [Labia Minora] : normal [Moderate] : There was moderate vaginal bleeding [Normal] : normal [Normal Position] : in a normal position [Uterine Adnexae] : normal [FreeTextEntry1] : STANTON Pugh [FreeTextEntry7] : Morbid obesity [Tenderness] : nontender [Enlarged ___ wks] : not enlarged [Mass ___ cm] : no uterine mass was palpated

## 2022-08-16 ENCOUNTER — RX RENEWAL (OUTPATIENT)
Age: 62
End: 2022-08-16

## 2022-08-28 LAB
CYTOLOGY CVX/VAG DOC THIN PREP: NORMAL
HPV HIGH+LOW RISK DNA PNL CVX: NOT DETECTED

## 2022-08-29 ENCOUNTER — APPOINTMENT (OUTPATIENT)
Dept: INTERNAL MEDICINE | Facility: CLINIC | Age: 62
End: 2022-08-29

## 2022-08-29 VITALS
WEIGHT: 293 LBS | OXYGEN SATURATION: 97 % | HEIGHT: 61 IN | SYSTOLIC BLOOD PRESSURE: 130 MMHG | DIASTOLIC BLOOD PRESSURE: 72 MMHG | BODY MASS INDEX: 55.32 KG/M2 | TEMPERATURE: 98 F | RESPIRATION RATE: 18 BRPM | HEART RATE: 94 BPM

## 2022-08-29 PROCEDURE — 99215 OFFICE O/P EST HI 40 MIN: CPT

## 2022-09-02 ENCOUNTER — APPOINTMENT (OUTPATIENT)
Dept: INFUSION THERAPY | Facility: HOSPITAL | Age: 62
End: 2022-09-02

## 2022-09-06 ENCOUNTER — RX RENEWAL (OUTPATIENT)
Age: 62
End: 2022-09-06

## 2022-09-06 ENCOUNTER — APPOINTMENT (OUTPATIENT)
Dept: ENDOCRINOLOGY | Facility: CLINIC | Age: 62
End: 2022-09-06

## 2022-09-06 VITALS
SYSTOLIC BLOOD PRESSURE: 110 MMHG | DIASTOLIC BLOOD PRESSURE: 78 MMHG | HEIGHT: 61 IN | TEMPERATURE: 98.4 F | HEART RATE: 103 BPM | OXYGEN SATURATION: 97 % | BODY MASS INDEX: 55.32 KG/M2 | WEIGHT: 293 LBS

## 2022-09-06 PROCEDURE — 99203 OFFICE O/P NEW LOW 30 MIN: CPT

## 2022-09-06 RX ORDER — BUDESONIDE AND FORMOTEROL FUMARATE DIHYDRATE 80; 4.5 UG/1; UG/1
80-4.5 AEROSOL RESPIRATORY (INHALATION) TWICE DAILY
Qty: 1 | Refills: 2 | Status: DISCONTINUED | COMMUNITY
Start: 2022-02-09 | End: 2022-09-06

## 2022-09-06 RX ORDER — DIAZEPAM 5 MG/1
5 TABLET ORAL
Qty: 2 | Refills: 0 | Status: DISCONTINUED | COMMUNITY
Start: 2019-06-25 | End: 2022-09-06

## 2022-09-06 NOTE — HISTORY OF PRESENT ILLNESS
[FreeTextEntry1] : Problems:\par 1. DM type 2\par 2. 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 Diagnosed in 2019\par 2. Meds:\par Metformin 1000 mg po bid (dispensed as 500 mg pills - no side effects). \par 3. Fingersticks NOT DONE, no hypoglycemic unawareness\par 4. Not on Aspirin, not on statin, on losartan 100 mg po daily\par 5. Complications:\par No DM nephropathy (normal creatinine)\par No DM retinopathy (last eye exam was in August 2022, patient advised on the need for annual DM eye exam)\par No ASCVD\par No foot ulcers/amputation\par 6. Patient never smoked cigarettes

## 2022-09-06 NOTE — PHYSICAL EXAM
[de-identified] : General: No distress, well nourished\par Eyes: Normal Sclera, EOMI, PERRL\par ENT: Normal appearance of the nose, normal oropharynx\par Neck/Thyroid: No cervical lymphadenopathy, thyroid gland 20 g in size, no thyroid nodules, non-tender\par Respiratory: No use of accessory muscles of respiration, vesicular breath sounds heard bilaterally, no crepitations or ronchi\par Cardiovascular: S1 and S2 heard and normal, no S3 or S4, no murmurs, radial pulse normal bilaterally\par Abdomen: soft, non-tender, no masses, normal bowel sounds\par Musculoskeletal: No swelling or deformities of joints of hands, has pedal edema\par Neurological: Normal range of motion in the hands, Normal brachioradialis reflexes bilaterally\par Psychiatry: Patient converses normally, good judgement and insight\par Skin: No rashes in hands, no nodules palpated in hands  [de-identified] : DM foot exam done on 09/06/2022:\par No ulcers seen in feet\par Dorsalis pedis pulses - unable to palpate due to pedal edema\par Sensation to 10 g monofilament normal in feet bilaterally

## 2022-09-06 NOTE — ASSESSMENT
[FreeTextEntry1] : Target: HbA1c < 7%, BP < 140/90\par \par The patient's HbA1c is elevated and her PCO increased the dose of metformin from 500 mg po daily to 1000 mg po bid one week ago. Patient drinks juice and regular soda - I advised her to discontinue this. I did not adjust her DM regimen.\par BP is at goal.\par \par No indication for Aspirin, patient is on ARB - she does not want to use statin.\par \par Plan:\par 1. Continue metformin 1000 mg po bid\par 2. SMBG to be done via freestyle alex - patient does not want to do fingersticks\par 3. No labs ordered\par 4. Follow up in 2 weeks to review meter.

## 2022-09-07 RX ORDER — BLOOD SUGAR DIAGNOSTIC
STRIP MISCELLANEOUS
Qty: 100 | Refills: 3 | Status: ACTIVE | COMMUNITY
Start: 2022-09-07 | End: 1900-01-01

## 2022-09-07 RX ORDER — LANCETS 33 GAUGE
EACH MISCELLANEOUS
Qty: 100 | Refills: 0 | Status: ACTIVE | COMMUNITY
Start: 2022-09-07 | End: 1900-01-01

## 2022-09-07 RX ORDER — BLOOD-GLUCOSE METER
W/DEVICE EACH MISCELLANEOUS
Qty: 1 | Refills: 0 | Status: ACTIVE | COMMUNITY
Start: 2022-09-07 | End: 1900-01-01

## 2022-09-07 RX ORDER — ISOPROPYL ALCOHOL 70 ML/100ML
SWAB TOPICAL
Qty: 100 | Refills: 3 | Status: ACTIVE | COMMUNITY
Start: 2022-09-07 | End: 1900-01-01

## 2022-09-08 NOTE — HEALTH RISK ASSESSMENT
[Never] : Never [Yes] : Yes [No falls in past year] : Patient reported no falls in the past year [0] : 2) Feeling down, depressed, or hopeless: Not at all (0) [PHQ-2 Negative - No further assessment needed] : PHQ-2 Negative - No further assessment needed [de-identified] : rarely [[id number]] : 0

## 2022-09-08 NOTE — ASSESSMENT
[FreeTextEntry1] : physical examination reveals a well-developed woman in no acute distress blood pressure 130/72 height 5 foot 5 inches weight 320 pounds BMI 60.46 temperature 98 °F heart rate 94 respirations 18 oxygen sat on room air 97% HEENT was unremarkable chest was clear cardiovascular exam was regular abdomen was soft and nontender extremities showed trace edema bilaterally neurologic exam was nonfocal patient has an appointment scheduled with a new endocrinologist on September 6, 2022 she is up-to-date with her Gynecologist Doctor Lyons, Long discussion with the patient concerning her obesity and the medical ramifications patient follows with her neurologist Dr. 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

## 2022-09-08 NOTE — PHYSICAL EXAM
[No Acute Distress] : no acute distress [Well Nourished] : well nourished [Well Developed] : well developed [Well-Appearing] : well-appearing [Normal Voice/Communication] : normal voice/communication [Normal Sclera/Conjunctiva] : normal sclera/conjunctiva [PERRL] : pupils equal round and reactive to light [EOMI] : extraocular movements intact [Normal Outer Ear/Nose] : the outer ears and nose were normal in appearance [Normal Oropharynx] : the oropharynx was normal [No JVD] : no jugular venous distention [No Lymphadenopathy] : no lymphadenopathy [Supple] : supple [Thyroid Normal, No Nodules] : the thyroid was normal and there were no nodules present [No Respiratory Distress] : no respiratory distress  [No Accessory Muscle Use] : no accessory muscle use [Clear to Auscultation] : lungs were clear to auscultation bilaterally [Normal Rate] : normal rate  [Regular Rhythm] : with a regular rhythm [Normal S1, S2] : normal S1 and S2 [No Murmur] : no murmur heard [No Carotid Bruits] : no carotid bruits [No Abdominal Bruit] : a ~M bruit was not heard ~T in the abdomen [No Varicosities] : no varicosities [Pedal Pulses Present] : the pedal pulses are present [No Edema] : there was no peripheral edema [No Palpable Aorta] : no palpable aorta [No Extremity Clubbing/Cyanosis] : no extremity clubbing/cyanosis [Declined Breast Exam] : declined breast exam  [Soft] : abdomen soft [Non Tender] : non-tender [Non-distended] : non-distended [No Masses] : no abdominal mass palpated [No HSM] : no HSM [Normal Bowel Sounds] : normal bowel sounds [No Hernias] : no hernias [Declined Rectal Exam] : declined rectal exam [Normal Supraclavicular Nodes] : no supraclavicular lymphadenopathy [Normal Axillary Nodes] : no axillary lymphadenopathy [Normal Posterior Cervical Nodes] : no posterior cervical lymphadenopathy [Normal Anterior Cervical Nodes] : no anterior cervical lymphadenopathy [Normal Inguinal Nodes] : no inguinal lymphadenopathy [Normal Femoral Nodes] : no femoral lymphadenopathy [No CVA Tenderness] : no CVA  tenderness [No Spinal Tenderness] : no spinal tenderness [Kyphosis] : no kyphosis [Scoliosis] : no scoliosis [No Joint Swelling] : no joint swelling [Grossly Normal Strength/Tone] : grossly normal strength/tone [No Rash] : no rash [No Skin Lesions] : no skin lesions [Acne] : no acne [Coordination Grossly Intact] : coordination grossly intact [No Focal Deficits] : no focal deficits [Normal Gait] : normal gait [Deep Tendon Reflexes (DTR)] : deep tendon reflexes were 2+ and symmetric [Normal Affect] : the affect was normal [Normal Insight/Judgement] : insight and judgment were intact

## 2022-09-08 NOTE — HEALTH RISK ASSESSMENT
[Never] : Never [Yes] : Yes [No falls in past year] : Patient reported no falls in the past year [0] : 2) Feeling down, depressed, or hopeless: Not at all (0) [PHQ-2 Negative - No further assessment needed] : PHQ-2 Negative - No further assessment needed [de-identified] : rarely [[id number]] : 0

## 2022-09-08 NOTE — REVIEW OF SYSTEMS
[Lower Ext Edema] : lower extremity edema [Dyspnea on Exertion] : dyspnea on exertion [Nocturia] : nocturia [Joint Stiffness] : joint stiffness [Muscle Pain] : muscle pain [Back Pain] : back pain [Anxiety] : anxiety [Fever] : no fever [Chills] : no chills [Fatigue] : no fatigue [Hot Flashes] : no hot flashes [Night Sweats] : no night sweats [Recent Change In Weight] : ~T no recent weight change [Discharge] : no discharge [Pain] : no pain [Redness] : no redness [Dryness] : no dryness  [Vision Problems] : no vision problems [Itching] : no itching [Earache] : no earache [Hearing Loss] : no hearing loss [Nosebleed] : no nosebleeds [Hoarseness] : no hoarseness [Nasal Discharge] : no nasal discharge [Sore Throat] : no sore throat [Postnasal Drip] : no postnasal drip [Chest Pain] : no chest pain [Palpitations] : no palpitations [Leg Claudication] : no leg claudication [Orthopnea] : no orthopnea [Paroxysmal Nocturnal Dyspnea] : no paroxysmal nocturnal dyspnea [Shortness Of Breath] : no shortness of breath [Wheezing] : no wheezing [Cough] : no cough [Abdominal Pain] : no abdominal pain [Nausea] : no nausea [Constipation] : no constipation [Diarrhea] : diarrhea [Vomiting] : no vomiting [Heartburn] : no heartburn [Melena] : no melena [Dysuria] : no dysuria [Incontinence] : no incontinence [Poor Libido] : libido not poor [Hematuria] : no hematuria [Frequency] : no frequency [Vaginal Discharge] : no vaginal discharge [Dysmenorrhea] : no dysmenorrhea [Joint Pain] : no joint pain [Joint Swelling] : no joint swelling [Muscle Weakness] : no muscle weakness [Itching] : no itching [Mole Changes] : no mole changes [Nail Changes] : no nail changes [Hair Changes] : no hair changes [Skin Rash] : no skin rash [Headache] : no headache [Dizziness] : no dizziness [Fainting] : no fainting [Confusion] : no confusion [Memory Loss] : no memory loss [Unsteady Walking] : no ataxia [Suicidal] : not suicidal [Insomnia] : no insomnia [Depression] : no depression [Easy Bleeding] : no easy bleeding [Easy Bruising] : no easy bruising [Swollen Glands] : no swollen glands [FreeTextEntry9] : lower

## 2022-09-08 NOTE — HISTORY OF PRESENT ILLNESS
[FreeTextEntry1] : 62-year-old woman comes to the office for follow-up to review her medications and discuss her overall health main issues deal with her obesity and musculoskeletal complaints [de-identified] : Comes to the office for follow-up with a history of morbid obesity hypertension aortic stenosis COVID-19 infection lower back pain neuroendocrine neoplasm diabetes and right ovarian cyst review of systems is significant for lower extremity edema mild dyspnea on exertion nocturia lower back pain joint stiffness and occasional muscle pain she also has occasional anxiety remaining review of systems is noncontributory

## 2022-09-08 NOTE — HISTORY OF PRESENT ILLNESS
[FreeTextEntry1] : 62-year-old woman comes to the office for follow-up to review her medications and discuss her overall health main issues deal with her obesity and musculoskeletal complaints [de-identified] : Comes to the office for follow-up with a history of morbid obesity hypertension aortic stenosis COVID-19 infection lower back pain neuroendocrine neoplasm diabetes and right ovarian cyst review of systems is significant for lower extremity edema mild dyspnea on exertion nocturia lower back pain joint stiffness and occasional muscle pain she also has occasional anxiety remaining review of systems is noncontributory

## 2022-09-10 ENCOUNTER — OUTPATIENT (OUTPATIENT)
Dept: OUTPATIENT SERVICES | Facility: HOSPITAL | Age: 62
LOS: 1 days | End: 2022-09-10
Payer: COMMERCIAL

## 2022-09-10 ENCOUNTER — APPOINTMENT (OUTPATIENT)
Dept: ULTRASOUND IMAGING | Facility: CLINIC | Age: 62
End: 2022-09-10

## 2022-09-10 ENCOUNTER — APPOINTMENT (OUTPATIENT)
Dept: CT IMAGING | Facility: CLINIC | Age: 62
End: 2022-09-10

## 2022-09-10 DIAGNOSIS — N95.0 POSTMENOPAUSAL BLEEDING: ICD-10-CM

## 2022-09-10 DIAGNOSIS — Z98.891 HISTORY OF UTERINE SCAR FROM PREVIOUS SURGERY: Chronic | ICD-10-CM

## 2022-09-10 DIAGNOSIS — D3A.8 OTHER BENIGN NEUROENDOCRINE TUMORS: ICD-10-CM

## 2022-09-10 PROCEDURE — 76830 TRANSVAGINAL US NON-OB: CPT | Mod: 26

## 2022-09-10 PROCEDURE — 74177 CT ABD & PELVIS W/CONTRAST: CPT

## 2022-09-10 PROCEDURE — 76830 TRANSVAGINAL US NON-OB: CPT

## 2022-09-10 PROCEDURE — 74177 CT ABD & PELVIS W/CONTRAST: CPT | Mod: 26

## 2022-09-10 PROCEDURE — 71260 CT THORAX DX C+: CPT

## 2022-09-10 PROCEDURE — 71260 CT THORAX DX C+: CPT | Mod: 26

## 2022-09-10 PROCEDURE — 76856 US EXAM PELVIC COMPLETE: CPT

## 2022-09-10 PROCEDURE — 76856 US EXAM PELVIC COMPLETE: CPT | Mod: 26,59

## 2022-09-12 ENCOUNTER — NON-APPOINTMENT (OUTPATIENT)
Age: 62
End: 2022-09-12

## 2022-09-14 ENCOUNTER — NON-APPOINTMENT (OUTPATIENT)
Age: 62
End: 2022-09-14

## 2022-09-19 ENCOUNTER — APPOINTMENT (OUTPATIENT)
Dept: HEMATOLOGY ONCOLOGY | Facility: CLINIC | Age: 62
End: 2022-09-19

## 2022-09-20 ENCOUNTER — APPOINTMENT (OUTPATIENT)
Dept: ENDOCRINOLOGY | Facility: CLINIC | Age: 62
End: 2022-09-20

## 2022-09-20 VITALS
HEIGHT: 61 IN | HEART RATE: 98 BPM | SYSTOLIC BLOOD PRESSURE: 112 MMHG | OXYGEN SATURATION: 98 % | DIASTOLIC BLOOD PRESSURE: 80 MMHG | WEIGHT: 293 LBS | TEMPERATURE: 98 F | BODY MASS INDEX: 55.32 KG/M2

## 2022-09-20 PROCEDURE — 99214 OFFICE O/P EST MOD 30 MIN: CPT

## 2022-09-20 RX ORDER — FLASH GLUCOSE SENSOR
KIT MISCELLANEOUS
Qty: 7 | Refills: 3 | Status: DISCONTINUED | COMMUNITY
Start: 2022-09-06 | End: 2022-09-20

## 2022-09-20 NOTE — HISTORY OF PRESENT ILLNESS
[FreeTextEntry1] : Problems:\par 1. DM type 2\par 2. 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 Diagnosed in 2019\par 2. Meds:\par Metformin 1000 mg po bid (dispensed as 500 mg pills - no side effects)\par Repaglinide 1 mg po tid with meals\par No family history of medullary thyroid cancer, no pancreatitis. \par 3. Fingersticks done once daily - 150s to 200s, no hypoglycemic unawareness\par 4. Not on Aspirin, not on statin, on losartan 100 mg po daily\par 5. Complications:\par No DM nephropathy (normal creatinine)\par No DM retinopathy (last eye exam was in August 2022, patient advised on the need for annual DM eye exam)\par No ASCVD\par No foot ulcers/amputation\par 6. Patient never smoked cigarettes

## 2022-09-20 NOTE — ASSESSMENT
[FreeTextEntry1] : Target: HbA1c < 7%, BP < 140/90\par \par Fingersticks are improving but still above goal - the patient wishes to start Ozempic so I prescribed this. \par BP is at goal.\par \par No indication for Aspirin, patient is on ARB - she does not want to use statin.\par \par Plan:\par 1. Start Ozempic 0.25 mg sc once weekly\par 2. Fingersticks to be done once daily\par 3. No labs ordered\par 4. Follow up in 4 weeks to review meter.

## 2022-09-20 NOTE — PHYSICAL EXAM
[de-identified] : General: No distress, well nourished\par Eyes: Normal Sclera, EOMI, PERRL\par ENT: Normal appearance of the nose, normal oropharynx\par Neck/Thyroid: No cervical lymphadenopathy, thyroid gland 20 g in size, no thyroid nodules, non-tender\par Respiratory: No use of accessory muscles of respiration, vesicular breath sounds heard bilaterally, no crepitations or ronchi\par Cardiovascular: S1 and S2 heard and normal, no S3 or S4, no murmurs, radial pulse normal bilaterally\par Abdomen: soft, non-tender, no masses, normal bowel sounds\par Musculoskeletal: No swelling or deformities of joints of hands, has pedal edema\par Neurological: Normal range of motion in the hands, Normal brachioradialis reflexes bilaterally\par Psychiatry: Patient converses normally, good judgement and insight\par Skin: No rashes in hands, no nodules palpated in hands  [de-identified] : DM foot exam done on 09/06/2022:\par No ulcers seen in feet\par Dorsalis pedis pulses - unable to palpate due to pedal edema\par Sensation to 10 g monofilament normal in feet bilaterally

## 2022-09-21 NOTE — ED ADULT NURSE NOTE - BRAND OF FIRST COVID-19 BOOSTER
· POA with Na 130  · Received 1 5L bolus while in the ED  · Obtain repeat BMP in AM    · Gentle IV fluids overnight  Moderna

## 2022-09-26 ENCOUNTER — OUTPATIENT (OUTPATIENT)
Dept: OUTPATIENT SERVICES | Facility: HOSPITAL | Age: 62
LOS: 1 days | Discharge: HOME | End: 2022-09-26

## 2022-09-26 DIAGNOSIS — Z98.891 HISTORY OF UTERINE SCAR FROM PREVIOUS SURGERY: Chronic | ICD-10-CM

## 2022-09-26 DIAGNOSIS — C7A.8 OTHER MALIGNANT NEUROENDOCRINE TUMORS: ICD-10-CM

## 2022-09-30 ENCOUNTER — APPOINTMENT (OUTPATIENT)
Dept: INFUSION THERAPY | Facility: HOSPITAL | Age: 62
End: 2022-09-30

## 2022-10-07 DIAGNOSIS — R31.29 OTHER MICROSCOPIC HEMATURIA: ICD-10-CM

## 2022-10-10 ENCOUNTER — RX RENEWAL (OUTPATIENT)
Age: 62
End: 2022-10-10

## 2022-10-10 ENCOUNTER — APPOINTMENT (OUTPATIENT)
Dept: HEMATOLOGY ONCOLOGY | Facility: CLINIC | Age: 62
End: 2022-10-10

## 2022-10-10 LAB
APPEARANCE: ABNORMAL
BILIRUBIN URINE: NEGATIVE
BLOOD URINE: ABNORMAL
COLOR: YELLOW
ESTIMATED AVERAGE GLUCOSE: 280 MG/DL
GLUCOSE QUALITATIVE U: NEGATIVE
HBA1C MFR BLD HPLC: 11.4 %
KETONES URINE: NEGATIVE
LEUKOCYTE ESTERASE URINE: NEGATIVE
NITRITE URINE: POSITIVE
PH URINE: 7
PROTEIN URINE: ABNORMAL
SPECIFIC GRAVITY URINE: 1.02
UROBILINOGEN URINE: NORMAL

## 2022-10-11 ENCOUNTER — NON-APPOINTMENT (OUTPATIENT)
Age: 62
End: 2022-10-11

## 2022-10-11 ENCOUNTER — RX RENEWAL (OUTPATIENT)
Age: 62
End: 2022-10-11

## 2022-10-11 RX ORDER — REPAGLINIDE 1 MG/1
1 TABLET ORAL 3 TIMES DAILY
Qty: 270 | Refills: 3 | Status: DISCONTINUED | COMMUNITY
Start: 2022-09-12 | End: 2022-10-11

## 2022-10-18 ENCOUNTER — APPOINTMENT (OUTPATIENT)
Dept: ENDOCRINOLOGY | Facility: CLINIC | Age: 62
End: 2022-10-18

## 2022-10-18 VITALS
HEART RATE: 87 BPM | DIASTOLIC BLOOD PRESSURE: 82 MMHG | WEIGHT: 293 LBS | SYSTOLIC BLOOD PRESSURE: 114 MMHG | OXYGEN SATURATION: 98 % | HEIGHT: 61 IN | TEMPERATURE: 97.6 F | BODY MASS INDEX: 55.32 KG/M2

## 2022-10-18 PROCEDURE — 99214 OFFICE O/P EST MOD 30 MIN: CPT

## 2022-10-18 NOTE — PHYSICAL EXAM
[de-identified] : General: No distress, well nourished\par Eyes: Normal Sclera, EOMI, PERRL\par ENT: Normal appearance of the nose, normal oropharynx\par Neck/Thyroid: No cervical lymphadenopathy, thyroid gland 20 g in size, no thyroid nodules, non-tender\par Respiratory: No use of accessory muscles of respiration, vesicular breath sounds heard bilaterally, no crepitations or ronchi\par Cardiovascular: S1 and S2 heard and normal, no S3 or S4, no murmurs, radial pulse normal bilaterally\par Abdomen: soft, non-tender, no masses, normal bowel sounds\par Musculoskeletal: No swelling or deformities of joints of hands, has pedal edema\par Neurological: Normal range of motion in the hands, Normal brachioradialis reflexes bilaterally\par Psychiatry: Patient converses normally, good judgement and insight\par Skin: No rashes in hands, no nodules palpated in hands  [de-identified] : DM foot exam done on 09/06/2022:\par No ulcers seen in feet\par Dorsalis pedis pulses - unable to palpate due to pedal edema\par Sensation to 10 g monofilament normal in feet bilaterally

## 2022-10-18 NOTE — ASSESSMENT
[FreeTextEntry1] : Target: HbA1c < 7%, BP < 140/90\par \par Fingersticks are intermittently above goal so I increased the dose of Ozempic. \par BP is at goal.\par \par No indication for Aspirin, patient is on ARB - she does not want to use statin.\par \par Plan:\par 1. Increase Ozempic to 0.5 mg sc once weekly - if patient having persistent hypoglycemia, she is to discontinue repaglinide\par 2. Fingersticks to be done once daily\par 3. Labs to be done in 6 weeks - see below \par 4. Follow up in 6 weeks to review meter and results

## 2022-10-18 NOTE — HISTORY OF PRESENT ILLNESS
[FreeTextEntry1] : Problems:\par 1. DM type 2\par 2. 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 Diagnosed in 2019\par 2. Meds:\par Metformin 1000 mg po bid (dispensed as 500 mg pills - no side effects)\par Repaglinide 0.5 mg po tid with meals\par Ozempic 0.25 mg sc once weekly (No family history of medullary thyroid cancer, no pancreatitis)\par 3. Fingersticks done once daily - 112 to 200s, no hypoglycemic unawareness\par 4. Not on Aspirin, not on statin, on losartan 100 mg po daily\par 5. Complications:\par No DM nephropathy (normal creatinine)\par No DM retinopathy (last eye exam was in August 2022, patient advised on the need for annual DM eye exam)\par No ASCVD\par No foot ulcers/amputation\par 6. Patient never smoked cigarettes

## 2022-10-28 ENCOUNTER — APPOINTMENT (OUTPATIENT)
Dept: INFUSION THERAPY | Facility: HOSPITAL | Age: 62
End: 2022-10-28

## 2022-11-09 DIAGNOSIS — D3A.8 OTHER BENIGN NEUROENDOCRINE TUMORS: ICD-10-CM

## 2022-11-21 ENCOUNTER — APPOINTMENT (OUTPATIENT)
Dept: HEMATOLOGY ONCOLOGY | Facility: CLINIC | Age: 62
End: 2022-11-21

## 2022-11-25 ENCOUNTER — APPOINTMENT (OUTPATIENT)
Dept: INFUSION THERAPY | Facility: HOSPITAL | Age: 62
End: 2022-11-25

## 2022-11-25 ENCOUNTER — APPOINTMENT (OUTPATIENT)
Dept: HEMATOLOGY ONCOLOGY | Facility: CLINIC | Age: 62
End: 2022-11-25

## 2022-11-25 VITALS
TEMPERATURE: 98.1 F | BODY MASS INDEX: 55.32 KG/M2 | DIASTOLIC BLOOD PRESSURE: 69 MMHG | HEART RATE: 78 BPM | HEIGHT: 61.02 IN | RESPIRATION RATE: 18 BRPM | WEIGHT: 293 LBS | SYSTOLIC BLOOD PRESSURE: 131 MMHG | OXYGEN SATURATION: 98 %

## 2022-11-25 PROCEDURE — 99213 OFFICE O/P EST LOW 20 MIN: CPT

## 2022-11-25 RX ORDER — BENZONATATE 100 MG/1
100 CAPSULE ORAL 3 TIMES DAILY
Qty: 30 | Refills: 0 | Status: COMPLETED | COMMUNITY
Start: 2022-02-11 | End: 2022-11-25

## 2022-11-25 NOTE — HISTORY OF PRESENT ILLNESS
[Disease: _____________________] : Disease: [unfilled] [AJCC Stage: ____] : AJCC Stage: [unfilled] [Therapy: ___] : Therapy: [unfilled] [de-identified] : 62 F with h/o intermittent abdominal pain, n/v over the last several years (at least 9) thought to be 2/2 gallstones. She has been evaluated in the ER 1-2 per year with these attacks.  CT A/P going as far back as 2010 noted a calcified mass within the mesentery of the small bowel suspicious for carcinoid. In Nov 2017 it was found to be increasing in size from previous imaging. She saw a surgeon at the time however it is unclear if any work up was recommended. \par \par In Nov 2018 she was referred to Dr. Foy for evaluation of gallstones. He noted the mesenteric mass which prompted a PET/DOTATATE to be performed. This showed activity in the mesentery (SUV 59.9) as well as liver, ileal  mass though to be the primary, cystic adnexal mass unchanged since 11/17, possible uptake in the skin of left breast. An MRI Abdomen was performed to evaluate the liver mets and this revealed multiple metastatic lesions. Referred to med onc for further work up. \par \par 2/14/19: Enteroscopy/[place]: no mass noted, erythema in the terminal ileum, s/p biopsy with negative path \par 4/22/19: CT CAP: multiple small b/l lung nodules, slight enlargement of calcified mesenteric mass. \par 4/23-4/26/19: admitted to [facility] with N/V post scan. 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 \par 9/1/21: CT CAP revealed stable disease. \par 3/24/22: CT CAP: stable, no change in disease \par 9/10/22: CT C/A/P: stable disease [de-identified] : well differentiated NET [FreeTextEntry1] : Lanreotide 120 mg IM  [de-identified] : Patient was seen at the infusion room while receiving treatment. She has continued her monthly injection as directed. Her fatigue remains unchanged but she continues to complete her ADLs at this time. Recent adjustments were made with her diabetes medication (started on Ozempic). She reported good appetite, stable weight and consistent bowel movement (at least 1 BM per day, reported normal in color and consistency).

## 2022-11-28 ENCOUNTER — APPOINTMENT (OUTPATIENT)
Dept: NEUROLOGY | Facility: CLINIC | Age: 62
End: 2022-11-28

## 2022-11-28 PROCEDURE — 99213 OFFICE O/P EST LOW 20 MIN: CPT | Mod: 25

## 2022-11-28 PROCEDURE — 64612 DESTROY NERVE FACE MUSCLE: CPT

## 2022-11-28 NOTE — DISCUSSION/SUMMARY
[FreeTextEntry1] : Oxana Ratliff is a 62 year old F with a PMHx COVID-19 in 2/21, Neuroendocrine cancer, Mesentery Cancer, Obesity, Type II DM, Peripheral Edema, Arthritis of the knees, and HTN. She presents for follow up in the Movement Disorders Clinic at [facility]. She was initially referred by Dr. Mccurdy for hemifacial spasms. \par \par The patient underwent repeat injections for R hemifacial and blepharospasm. She tolerated the procedure well. Please see procedure section of this note for further details. Risks and benefits of the procedure were reviewed prior to and after the procedure. The patient gave her verbal consent to proceed prior to the procedure. No changes were made to her regimen as she had increased facial weakness with a higher dose.\par \par Advised application of lidocaine cream before next injection. \par Clonazepam 0.25-0.5mg PRN severe spasms.\par \par I discussed that as part of the work up we obtain brain and vessel imaging of the IAC to determine if there is vascular compression of the seventh cranial nerve. The patient is not interested in further neuroimaging at this time.\par CT of the head was done with and without contrast on 5/29/2020 that was unremarkable, previously reviewed with patient.\par \par She will follow up for repeat botox injections in 3 months.\par \par All questions were answered to her satisfaction. I spent 20 minutes with this patient.

## 2022-11-28 NOTE — HISTORY OF PRESENT ILLNESS
[FreeTextEntry1] : Oxana Ratliff is a 62 year old F with a PMHx COVID-19 in 2/21, R Meredith's Palsy, Neuroendocrine cancer, Mesentery Cancer, Obesity, Type II DM, Peripheral Edema, Arthritis of the knees, and HTN. She presents for follow up in the Movement Disorders Clinic at [facility]. She was initially referred by Dr. Mccurdy for hemifacial spasms. \par \par Interval history:\par Since the last visit, she reports that the botox is not lasting as long as it was previously. She got about 6 weeks of benefit before the spasms returned.\par \par Initial history:\par These right facial spasms start around her under eye and right side of her mouth/cheek. This started over a year ago but has increased in frequency. She has a history of Bell's palsy years ago but she cannot recall which side. She cannot tell what induces the movement.\par \par No dizziness/lightheadedness. No headaches. No nausea/vomiting. No recent illness, but undergoing cancer treatments, she has mesentery cancer. She had COVID-19 in 2/21 and had the infusion, and is now vaccinated. She did not have much in the way of symptoms, and was not hospitalized. No cough, shortness of breath, chest pain, or flu like symptoms in the last 2 weeks. No numbness/tingling. \par \par Family history: unremarkable

## 2022-11-28 NOTE — REASON FOR VISIT
Called regarding referral to the pulmonary hypertension clinic. Provided a return phone number  
[Follow-Up: _____] : a [unfilled] follow-up visit

## 2022-11-28 NOTE — PROCEDURE
[FreeTextEntry1] : The patient was injected with Onabotulinum Toxin A in the muscles outlined below after the botox was diluted to 5 units/0.1cc using normal saline. Anatomic locations were used. The patient tolerated the procedure well with no complications. \par \par R outer canthus - 2.5 units\par R Upper eye lid - inner and outer - 2.5 units each\par R Lower eye lid, inner and outer - 2.5 units each\par R Nasalis - 2.5 units \par R upper orbicularis oris - 2.5 units\par R lower orbicularis oris -  2.5 units\par R Risorius - 2.5 units \par R zygomaticus - 2.5 units  \par \par Total units used: 20 units\par Unavoidable waste: 80 units\par Total units mixed: 100 units\par \par Lot: [id number]\par Expiry: 02/2025

## 2022-11-28 NOTE — PHYSICAL EXAM
[Person] : oriented to person [Place] : oriented to place [Time] : oriented to time [Concentration Intact] : normal concentrating ability [Comprehension] : comprehension intact [Cranial Nerves Optic (II)] : visual acuity intact bilaterally,  visual fields full to confrontation, pupils equal round and reactive to light [Cranial Nerves Oculomotor (III)] : extraocular motion intact [Cranial Nerves Trigeminal (V)] : facial sensation intact symmetrically [Cranial Nerves Facial (VII)] : face symmetrical [Cranial Nerves Vestibulocochlear (VIII)] : hearing was intact bilaterally [Cranial Nerves Glossopharyngeal (IX)] : tongue and palate midline [Cranial Nerves Accessory (XI - Cranial And Spinal)] : head turning and shoulder shrug symmetric [Cranial Nerves Hypoglossal (XII)] : there was no tongue deviation with protrusion [Motor Strength] : muscle strength was normal in all four extremities [Involuntary Movements] : no involuntary movements were seen [Sensation Tactile Decrease] : light touch was intact [Paresis Pronator Drift Right-Sided] : no pronator drift on the right [Paresis Pronator Drift Left-Sided] : no pronator drift on the left [Coordination - Dysmetria Impaired Finger-to-Nose Bilateral] : not present [FreeTextEntry1] : Video showed right lower facial spasms.\par \par R blepharospasms and facial spasms intermittently [FreeTextEntry8] : Patient ambulates with cane, wide based.

## 2022-11-29 ENCOUNTER — NON-APPOINTMENT (OUTPATIENT)
Age: 62
End: 2022-11-29

## 2022-11-29 ENCOUNTER — EMERGENCY (EMERGENCY)
Facility: HOSPITAL | Age: 62
LOS: 1 days | Discharge: HOME | End: 2022-11-29
Attending: EMERGENCY MEDICINE | Admitting: EMERGENCY MEDICINE
Payer: COMMERCIAL

## 2022-11-29 VITALS
HEIGHT: 60 IN | SYSTOLIC BLOOD PRESSURE: 134 MMHG | WEIGHT: 293 LBS | RESPIRATION RATE: 17 BRPM | TEMPERATURE: 98 F | DIASTOLIC BLOOD PRESSURE: 72 MMHG | OXYGEN SATURATION: 99 % | HEART RATE: 110 BPM

## 2022-11-29 DIAGNOSIS — Z98.891 HISTORY OF UTERINE SCAR FROM PREVIOUS SURGERY: Chronic | ICD-10-CM

## 2022-11-29 LAB
ALBUMIN SERPL ELPH-MCNC: 3.4 G/DL — SIGNIFICANT CHANGE UP (ref 3.3–5)
ALP SERPL-CCNC: 83 U/L — SIGNIFICANT CHANGE UP (ref 40–120)
ALT FLD-CCNC: 14 U/L — SIGNIFICANT CHANGE UP (ref 10–45)
ANION GAP SERPL CALC-SCNC: 7 MMOL/L — SIGNIFICANT CHANGE UP (ref 5–17)
APTT BLD: 31.8 SEC — SIGNIFICANT CHANGE UP (ref 27.5–35.5)
AST SERPL-CCNC: 18 U/L — SIGNIFICANT CHANGE UP (ref 10–40)
BASOPHILS # BLD AUTO: 0.05 K/UL — SIGNIFICANT CHANGE UP (ref 0–0.2)
BASOPHILS NFR BLD AUTO: 0.6 % — SIGNIFICANT CHANGE UP (ref 0–2)
BILIRUB SERPL-MCNC: 0.3 MG/DL — SIGNIFICANT CHANGE UP (ref 0.2–1.2)
BUN SERPL-MCNC: 13 MG/DL — SIGNIFICANT CHANGE UP (ref 7–23)
CALCIUM SERPL-MCNC: 9.3 MG/DL — SIGNIFICANT CHANGE UP (ref 8.4–10.5)
CHLORIDE SERPL-SCNC: 105 MMOL/L — SIGNIFICANT CHANGE UP (ref 96–108)
CO2 SERPL-SCNC: 29 MMOL/L — SIGNIFICANT CHANGE UP (ref 22–31)
CREAT SERPL-MCNC: 0.95 MG/DL — SIGNIFICANT CHANGE UP (ref 0.5–1.3)
D DIMER BLD IA.RAPID-MCNC: 555 NG/ML DDU — HIGH
EGFR: 67 ML/MIN/1.73M2 — SIGNIFICANT CHANGE UP
EOSINOPHIL # BLD AUTO: 0.23 K/UL — SIGNIFICANT CHANGE UP (ref 0–0.5)
EOSINOPHIL NFR BLD AUTO: 3 % — SIGNIFICANT CHANGE UP (ref 0–6)
FLUAV AG NPH QL: SIGNIFICANT CHANGE UP
FLUBV AG NPH QL: SIGNIFICANT CHANGE UP
GLUCOSE SERPL-MCNC: 116 MG/DL — HIGH (ref 70–99)
HCT VFR BLD CALC: 38.5 % — SIGNIFICANT CHANGE UP (ref 34.5–45)
HGB BLD-MCNC: 11.9 G/DL — SIGNIFICANT CHANGE UP (ref 11.5–15.5)
IMM GRANULOCYTES NFR BLD AUTO: 0.8 % — SIGNIFICANT CHANGE UP (ref 0–0.9)
INR BLD: 1.11 RATIO — SIGNIFICANT CHANGE UP (ref 0.88–1.16)
LYMPHOCYTES # BLD AUTO: 1.86 K/UL — SIGNIFICANT CHANGE UP (ref 1–3.3)
LYMPHOCYTES # BLD AUTO: 23.9 % — SIGNIFICANT CHANGE UP (ref 13–44)
MAGNESIUM SERPL-MCNC: 1.8 MG/DL — SIGNIFICANT CHANGE UP (ref 1.6–2.6)
MCHC RBC-ENTMCNC: 25 PG — LOW (ref 27–34)
MCHC RBC-ENTMCNC: 30.9 GM/DL — LOW (ref 32–36)
MCV RBC AUTO: 80.9 FL — SIGNIFICANT CHANGE UP (ref 80–100)
MONOCYTES # BLD AUTO: 0.69 K/UL — SIGNIFICANT CHANGE UP (ref 0–0.9)
MONOCYTES NFR BLD AUTO: 8.9 % — SIGNIFICANT CHANGE UP (ref 2–14)
NEUTROPHILS # BLD AUTO: 4.88 K/UL — SIGNIFICANT CHANGE UP (ref 1.8–7.4)
NEUTROPHILS NFR BLD AUTO: 62.8 % — SIGNIFICANT CHANGE UP (ref 43–77)
NRBC # BLD: 0 /100 WBCS — SIGNIFICANT CHANGE UP (ref 0–0)
PLATELET # BLD AUTO: 296 K/UL — SIGNIFICANT CHANGE UP (ref 150–400)
POTASSIUM SERPL-MCNC: 4.2 MMOL/L — SIGNIFICANT CHANGE UP (ref 3.5–5.3)
POTASSIUM SERPL-SCNC: 4.2 MMOL/L — SIGNIFICANT CHANGE UP (ref 3.5–5.3)
PROT SERPL-MCNC: 7.8 G/DL — SIGNIFICANT CHANGE UP (ref 6–8.3)
PROTHROM AB SERPL-ACNC: 12.9 SEC — SIGNIFICANT CHANGE UP (ref 10.5–13.4)
RBC # BLD: 4.76 M/UL — SIGNIFICANT CHANGE UP (ref 3.8–5.2)
RBC # FLD: 17.5 % — HIGH (ref 10.3–14.5)
RSV RNA NPH QL NAA+NON-PROBE: SIGNIFICANT CHANGE UP
SARS-COV-2 RNA SPEC QL NAA+PROBE: SIGNIFICANT CHANGE UP
SODIUM SERPL-SCNC: 141 MMOL/L — SIGNIFICANT CHANGE UP (ref 135–145)
TROPONIN I, HIGH SENSITIVITY RESULT: 10.1 NG/L — SIGNIFICANT CHANGE UP
TROPONIN I, HIGH SENSITIVITY RESULT: 9.9 NG/L — SIGNIFICANT CHANGE UP
TSH SERPL-MCNC: 1.48 UIU/ML — SIGNIFICANT CHANGE UP (ref 0.36–3.74)
WBC # BLD: 7.77 K/UL — SIGNIFICANT CHANGE UP (ref 3.8–10.5)
WBC # FLD AUTO: 7.77 K/UL — SIGNIFICANT CHANGE UP (ref 3.8–10.5)

## 2022-11-29 PROCEDURE — 99285 EMERGENCY DEPT VISIT HI MDM: CPT

## 2022-11-29 PROCEDURE — 71045 X-RAY EXAM CHEST 1 VIEW: CPT | Mod: 26

## 2022-11-29 PROCEDURE — 93010 ELECTROCARDIOGRAM REPORT: CPT

## 2022-11-29 RX ORDER — SODIUM CHLORIDE 9 MG/ML
1000 INJECTION INTRAMUSCULAR; INTRAVENOUS; SUBCUTANEOUS ONCE
Refills: 0 | Status: COMPLETED | OUTPATIENT
Start: 2022-11-29 | End: 2022-11-29

## 2022-11-29 RX ORDER — DIPHENHYDRAMINE HCL 50 MG
50 CAPSULE ORAL ONCE
Refills: 0 | Status: COMPLETED | OUTPATIENT
Start: 2022-11-29 | End: 2022-11-30

## 2022-11-29 RX ADMIN — SODIUM CHLORIDE 1000 MILLILITER(S): 9 INJECTION INTRAMUSCULAR; INTRAVENOUS; SUBCUTANEOUS at 20:18

## 2022-11-29 RX ADMIN — Medication 125 MILLIGRAM(S): at 21:10

## 2022-11-29 RX ADMIN — SODIUM CHLORIDE 1000 MILLILITER(S): 9 INJECTION INTRAMUSCULAR; INTRAVENOUS; SUBCUTANEOUS at 21:30

## 2022-11-29 NOTE — ED PROVIDER NOTE - OBJECTIVE STATEMENT
62-year-old female with history of morbid obesity, hypertension, diabetes presents with complaint of "fluttering in my chest" over the course of the entire day today.  Patient also notes mild discomfort in midsternal region but states that it is not a pressure or sharp pain.  Patient denies any shortness of breath or dyspnea on exertion.  Patient notes that since she has been taking Ozempic she has had decreased appetite and she has not been eating and drinking as much as she normally would.  Patient denies any recent fever but states that she always has chills.  Patient denies any leg pain and has chronic lymphedema.  She called her cardiologist Dr. Murray who told her to come to the emergency department to get evaluated with labs and an EKG.  Patient is currently in no apparent distress.

## 2022-11-29 NOTE — ED ADULT NURSE NOTE - TEMPLATE LIST FOR HEAD TO TOE ASSESSMENT
[facility]     [address]    [facility] [address]    Phone:  [phone]    Fax:  [phone]       Thank You for choosing us for your health care visit. We are glad to serve you and happy to provide you with this summary of your visit. Please help us to ensure we have accurate records. If you find anything that needs to be changed, please let our staff know as soon as possible.          Your Demographic Information     Patient Name Sex Sascha Gavin Male 1969       Ethnic Group Patient Race    Not of  or  Origin White      Your Visit Details     Date & Time Provider Department    2017 3:20 PM Alexandro Dumont MD [facility]      Your Vitals Were     Height Weight BMI Smoking Status          5' 9\" (1.753 m) 202 lb 3.2 oz (91.7 kg) 29.86 kg/m2 Never Smoker        Medications Prescribed or Re-Ordered Today     None      Your Current Medications Are        Disp Refills Start End    amLODIPine (NORVASC) 5 MG tablet 30 tablet 11 2017     Sig - Route: Take 1 tablet by mouth daily. - Oral    Class: Eprescribe    aspirin 81 MG tablet   2014     Sig - Route: Take 162 mg by mouth daily.  - Oral    Class: Historical Med    oxyCODONE, IMM REL, (ROXICODONE) 5 MG immediate release tablet 60 tablet 0 2017     Sig: One to two tabs po q4-6 hours prn pain      Allergies     No Known Allergies      Immunizations History as of 2017     Name Date    Tdap 2017  2:22 PM      Problem List as of 2017     Restless leg syndrome    Cervical disc disorder    Essential hypertension            Patient Instructions     None      
Cardiac

## 2022-11-29 NOTE — ED ADULT TRIAGE NOTE - PRO INTERPRETER NEED 2
Introduction Text (Please End With A Colon): The following procedure was deferred:
Detail Level: Detailed
Procedure To Be Performed At Next Visit: Liquid nitrogen
English
Procedure To Be Performed At Next Visit: Cautery
Scheduling Instructions (Optional): Pt interested in removal; Informed pt procedure is cosmetic and removal costs $150 to remove up to 15. Removal of seborrheic keratoses to be included in cost.
Scheduling Instructions (Optional): Pt interested in removal; Informed pt procedure is cosmetic and removal costs $150 to remove up to 15. Removal of acrochordons to be included in cost.

## 2022-11-29 NOTE — ED PROVIDER NOTE - NSFOLLOWUPINSTRUCTIONS_ED_ALL_ED_FT
- take levaquin for 4 more days in the morning starting 12/1/22.   Follow Up in 1-3 Days with your own doctor [place] [address]  Phone: [phone]    Heart Palpitations    WHAT YOU NEED TO KNOW:    Heart palpitations are feelings that your heart races, jumps, throbs, or flutters. You may feel extra beats, no beats for a short time, or skipped beats. You may have these feelings in your chest, throat, or neck. They may happen when you are sitting, standing, or lying. Heart palpitations may be frightening, but are usually not caused by a serious problem.     DISCHARGE INSTRUCTIONS:    Call 911 or have someone else call for any of the following:     You have any of the following signs of a heart attack:   Squeezing, pressure, or pain in your chest    You may also have any of the following:   Discomfort or pain in your back, neck, jaw, stomach, or arm    Shortness of breath    Nausea or vomiting    Lightheadedness or a sudden cold sweat      You have any of the following signs of a stroke:   Numbness or drooping on one side of your face     Weakness in an arm or leg    Confusion or difficulty speaking    Dizziness, a severe headache, or vision loss    You faint or lose consciousness.     Return to the emergency department if:     Your palpitations happen more often or get more intense.         Contact your healthcare provider if:     You have new or worsening swelling in your feet or ankles.    You have questions or concerns about your condition or care.    Follow up with your healthcare provider as directed: You may need to follow up with a cardiologist. You may need tests to check for heart problems that cause palpitations. Write down your questions so you remember to ask them during your visits.     Keep a record: Write down when your palpitations start and stop, what you were doing when they started, and your symptoms. Keep track of what you ate or drank within a few hours of your palpitations. Include anything that seemed to help your symptoms, such as lying down or holding your breath. This record will help you and your healthcare provider learn what triggers your palpitations. Bring this record with you to your follow up visits.    Help prevent heart palpitations:     Manage stress and anxiety. Find ways to relax such as listening to music, meditating, or doing yoga. Exercise can also help decrease stress and anxiety. Talk to someone you trust about your stress or anxiety. You can also talk to a therapist.     Get plenty of sleep every night. Ask your healthcare provider how much sleep you need each night.     Do not drink caffeine or alcohol. Caffeine and alcohol can make your palpitations worse. Caffeine is found in soda, coffee, tea, chocolate, and drinks that increase your energy.     Do not smoke. Nicotine and other chemicals in cigarettes and cigars may damage your heart and blood vessels. Ask your healthcare provider for information if you currently smoke and need help to quit. E-cigarettes or smokeless tobacco still contain nicotine. Talk to your healthcare provider before you use these products.     Do not use illegal drugs. Talk to your healthcare provider if you use illegal drugs and want help to quit.

## 2022-11-29 NOTE — ED PROVIDER NOTE - PROGRESS NOTE DETAILS
The patient's presentation, labs and EKG were discussed with her cardiologist.  Patient's cardiologist states that if the second troponin is negative and the patient does not show any signs of atrial fibrillation that she could be discharged to follow-up in the office.  Patient was noted to have an elevated D-dimer and a CTA of the chest was ordered to rule out PE.  It is noted that the patient has an allergy to IV contrast but was premedicated for IV contrast CT in the past.  Was informed that the protocol is to give corticosteroids and then the patient will also receive Benadryl 1 hour prior to the scan the overall protocol will take about 4 hours to complete.

## 2022-11-29 NOTE — ED PROVIDER NOTE - CLINICAL SUMMARY MEDICAL DECISION MAKING FREE TEXT BOX
0230 received signout from DR House. f/u cta chest. cta limited by quality but no seen PE. pt with no resp distress, comfortable breathing. SpO2 97-99 RA. ?R infiltrate on ct. will cover for pna with levaquin. f/u pmd. Dr Vazquez The patient's presentation, labs and EKG were discussed with her cardiologist.  Patient's cardiologist states that if the second troponin is negative and the patient does not show any signs of atrial fibrillation that she could be discharged to follow-up in the office.  Patient was noted to have an elevated D-dimer and a CTA of the chest was ordered to rule out PE.  It is noted that the patient has an allergy to IV contrast but was premedicated for IV contrast CT in the past.  Was informed that the protocol is to give corticosteroids and then the patient will also receive Benadryl 1 hour prior to the scan the overall protocol will take about 4 hours to complete.    0230 received signout from DR House. f/u cta chest. cta limited by quality but no seen PE. pt with no resp distress, comfortable breathing. SpO2 97-99 RA. ?R infiltrate on ct. will cover for pna with levaquin. f/u pmd. Dr Vazquez

## 2022-11-29 NOTE — ED PROVIDER NOTE - PATIENT PORTAL LINK FT
You can access the FollowMyHealth Patient Portal offered by [facility] by registering at the following website: [email or web address]. By joining [employer]’s FollowMyHealth portal, you will also be able to view your health information using other applications (apps) compatible with our system.

## 2022-11-29 NOTE — ED PROVIDER NOTE - PHYSICAL EXAMINATION
CONSTITUTIONAL: Well appearing, awake, alert, oriented to person, place, time/situation and in no apparent distress.  · ENMT: Airway patent, Nasal mucosa clear. Mouth with normal mucosa. Throat has no vesicles, no oropharyngeal exudates and uvula is midline.  · EYES: Clear bilaterally, pupils equal, round and reactive to light.  · CARDIAC: tachycardia, Irregular.  Heart sounds S1, S2.  No murmurs, rubs or gallops.  · RESPIRATORY: Breath sounds clear and equal bilaterally.  · GASTROINTESTINAL: Abdomen soft, non-tender, no guarding.  · MUSCULOSKELETAL: Spine appears normal, range of motion is not limited, no muscle or joint tenderness  · NEUROLOGICAL: Alert and oriented, no focal deficits, no motor or sensory deficits.  · SKIN: Skin normal color for race, warm, dry and intact. No evidence of rash

## 2022-11-30 VITALS
DIASTOLIC BLOOD PRESSURE: 88 MMHG | OXYGEN SATURATION: 98 % | HEART RATE: 90 BPM | TEMPERATURE: 98 F | RESPIRATION RATE: 16 BRPM | SYSTOLIC BLOOD PRESSURE: 148 MMHG

## 2022-11-30 LAB — T4 AB SER-ACNC: 8.6 UG/DL — SIGNIFICANT CHANGE UP (ref 4.6–12)

## 2022-11-30 PROCEDURE — 84443 ASSAY THYROID STIM HORMONE: CPT

## 2022-11-30 PROCEDURE — 93005 ELECTROCARDIOGRAM TRACING: CPT

## 2022-11-30 PROCEDURE — 87637 SARSCOV2&INF A&B&RSV AMP PRB: CPT

## 2022-11-30 PROCEDURE — 71275 CT ANGIOGRAPHY CHEST: CPT | Mod: MA

## 2022-11-30 PROCEDURE — 85379 FIBRIN DEGRADATION QUANT: CPT

## 2022-11-30 PROCEDURE — 71045 X-RAY EXAM CHEST 1 VIEW: CPT

## 2022-11-30 PROCEDURE — 80053 COMPREHEN METABOLIC PANEL: CPT

## 2022-11-30 PROCEDURE — 99285 EMERGENCY DEPT VISIT HI MDM: CPT | Mod: 25

## 2022-11-30 PROCEDURE — 84436 ASSAY OF TOTAL THYROXINE: CPT

## 2022-11-30 PROCEDURE — 84484 ASSAY OF TROPONIN QUANT: CPT

## 2022-11-30 PROCEDURE — 85610 PROTHROMBIN TIME: CPT

## 2022-11-30 PROCEDURE — 71275 CT ANGIOGRAPHY CHEST: CPT | Mod: 26,MA

## 2022-11-30 PROCEDURE — 36415 COLL VENOUS BLD VENIPUNCTURE: CPT

## 2022-11-30 PROCEDURE — 96361 HYDRATE IV INFUSION ADD-ON: CPT

## 2022-11-30 PROCEDURE — 96374 THER/PROPH/DIAG INJ IV PUSH: CPT

## 2022-11-30 PROCEDURE — 85025 COMPLETE CBC W/AUTO DIFF WBC: CPT

## 2022-11-30 PROCEDURE — 83735 ASSAY OF MAGNESIUM: CPT

## 2022-11-30 PROCEDURE — 85730 THROMBOPLASTIN TIME PARTIAL: CPT

## 2022-11-30 RX ORDER — LEVOFLOXACIN 5 MG/ML
1 INJECTION, SOLUTION INTRAVENOUS
Qty: 4 | Refills: 0
Start: 2022-11-30 | End: 2022-12-03

## 2022-12-02 ENCOUNTER — APPOINTMENT (OUTPATIENT)
Dept: INTERNAL MEDICINE | Facility: CLINIC | Age: 62
End: 2022-12-02
Payer: COMMERCIAL

## 2022-12-02 VITALS
DIASTOLIC BLOOD PRESSURE: 70 MMHG | BODY MASS INDEX: 55.32 KG/M2 | HEIGHT: 61 IN | TEMPERATURE: 97.1 F | RESPIRATION RATE: 18 BRPM | WEIGHT: 293 LBS | OXYGEN SATURATION: 99 % | SYSTOLIC BLOOD PRESSURE: 130 MMHG | HEART RATE: 99 BPM

## 2022-12-02 DIAGNOSIS — U07.1 COVID-19: ICD-10-CM

## 2022-12-02 DIAGNOSIS — M54.50 LOW BACK PAIN, UNSPECIFIED: ICD-10-CM

## 2022-12-02 PROCEDURE — 99215 OFFICE O/P EST HI 40 MIN: CPT

## 2022-12-02 RX ORDER — LEVOFLOXACIN 500 MG/1
500 TABLET, FILM COATED ORAL
Qty: 4 | Refills: 0 | Status: DISCONTINUED | COMMUNITY
Start: 2022-11-30 | End: 2022-12-02

## 2022-12-04 ENCOUNTER — NON-APPOINTMENT (OUTPATIENT)
Age: 62
End: 2022-12-04

## 2022-12-05 ENCOUNTER — APPOINTMENT (OUTPATIENT)
Dept: ENDOCRINOLOGY | Facility: CLINIC | Age: 62
End: 2022-12-05

## 2022-12-15 ENCOUNTER — RX RENEWAL (OUTPATIENT)
Age: 62
End: 2022-12-15

## 2022-12-16 ENCOUNTER — OUTPATIENT (OUTPATIENT)
Dept: OUTPATIENT SERVICES | Facility: HOSPITAL | Age: 62
LOS: 1 days | Discharge: HOME | End: 2022-12-16

## 2022-12-16 DIAGNOSIS — D3A.8 OTHER BENIGN NEUROENDOCRINE TUMORS: ICD-10-CM

## 2022-12-16 DIAGNOSIS — Z98.891 HISTORY OF UTERINE SCAR FROM PREVIOUS SURGERY: Chronic | ICD-10-CM

## 2022-12-19 ENCOUNTER — APPOINTMENT (OUTPATIENT)
Dept: ENDOCRINOLOGY | Facility: CLINIC | Age: 62
End: 2022-12-19

## 2022-12-19 VITALS
DIASTOLIC BLOOD PRESSURE: 78 MMHG | HEIGHT: 61 IN | HEART RATE: 98 BPM | WEIGHT: 293 LBS | BODY MASS INDEX: 55.32 KG/M2 | TEMPERATURE: 97.6 F | OXYGEN SATURATION: 99 % | SYSTOLIC BLOOD PRESSURE: 128 MMHG

## 2022-12-19 PROCEDURE — 83037 HB GLYCOSYLATED A1C HOME DEV: CPT | Mod: QW

## 2022-12-19 PROCEDURE — 99214 OFFICE O/P EST MOD 30 MIN: CPT | Mod: 25

## 2022-12-19 NOTE — ASSESSMENT
[FreeTextEntry1] : Target: HbA1c < 7%, BP < 140/90\par \par HbA1c is at goal but patient has hypoglycemia so I discontinued repaglinide - I increased the dose of Ozempic to maximize the patient's weight loss. Patient has been losing weight on Ozempic. \par BP is at goal.\par \par No indication for Aspirin, patient is on ARB - she does not want to use statin.\par \par Last lipid panel - July 2022 - Trig 81, LDL 82\par Last HbA1c - 12/19/2022 - 6.1%\par Last Vitamin B12 - July 2022 - normal \par Last urine albumin panel - none seen\par \par Plan:\par 1. Increase Ozempic to 1 mg sc once weekly \par 2. Discontinue repaglinide\par 3. Fingersticks to be done once daily\par 4. Labs to be done in 6 weeks - see below \par 5. Follow up in 6 weeks to review meter and results

## 2022-12-19 NOTE — PHYSICAL EXAM
[de-identified] : General: No distress, well nourished\par Eyes: Normal Sclera, EOMI, PERRL\par ENT: Normal appearance of the nose, normal oropharynx\par Neck/Thyroid: No cervical lymphadenopathy, thyroid gland 20 g in size, no thyroid nodules, non-tender\par Respiratory: No use of accessory muscles of respiration, vesicular breath sounds heard bilaterally, no crepitations or ronchi\par Cardiovascular: S1 and S2 heard and normal, no S3 or S4, no murmurs, radial pulse normal bilaterally\par Abdomen: soft, non-tender, no masses, normal bowel sounds\par Musculoskeletal: No swelling or deformities of joints of hands, has pedal edema\par Neurological: Normal range of motion in the hands, Normal brachioradialis reflexes bilaterally\par Psychiatry: Patient converses normally, good judgement and insight\par Skin: No rashes in hands, no nodules palpated in hands  [de-identified] : DM foot exam done on 09/06/2022:\par No ulcers seen in feet\par Dorsalis pedis pulses - unable to palpate due to pedal edema\par Sensation to 10 g monofilament normal in feet bilaterally

## 2022-12-19 NOTE — HISTORY OF PRESENT ILLNESS
[FreeTextEntry1] : Problems:\par 1. DM type 2\par 2. 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 Diagnosed in 2019\par 2. Meds:\par Metformin 1000 mg po bid (dispensed as 500 mg pills - no side effects)\par Repaglinide 0.5 mg po tid with meals\par Ozempic 0.5 mg sc once weekly (No family history of medullary thyroid cancer, no pancreatitis)\par 3. Fingersticks done once daily - 68 to 198, no hypoglycemic unawareness\par 4. Not on Aspirin, not on statin, on losartan 100 mg po daily\par 5. Complications:\par No DM nephropathy (normal creatinine)\par No DM retinopathy (last eye exam was in August 2022, patient advised on the need for annual DM eye exam)\par No ASCVD\par No foot ulcers/amputation\par 6. Patient never smoked cigarettes

## 2022-12-20 ENCOUNTER — APPOINTMENT (OUTPATIENT)
Dept: CARDIOLOGY | Facility: CLINIC | Age: 62
End: 2022-12-20
Payer: COMMERCIAL

## 2022-12-20 ENCOUNTER — NON-APPOINTMENT (OUTPATIENT)
Age: 62
End: 2022-12-20

## 2022-12-20 ENCOUNTER — APPOINTMENT (OUTPATIENT)
Dept: CARDIOLOGY | Facility: CLINIC | Age: 62
End: 2022-12-20

## 2022-12-20 VITALS
HEART RATE: 85 BPM | DIASTOLIC BLOOD PRESSURE: 78 MMHG | WEIGHT: 293 LBS | TEMPERATURE: 96.3 F | HEIGHT: 61 IN | BODY MASS INDEX: 55.32 KG/M2 | OXYGEN SATURATION: 97 % | SYSTOLIC BLOOD PRESSURE: 125 MMHG | RESPIRATION RATE: 17 BRPM

## 2022-12-20 DIAGNOSIS — R00.2 PALPITATIONS: ICD-10-CM

## 2022-12-20 LAB — HBA1C MFR BLD HPLC: 6.1

## 2022-12-20 PROCEDURE — 93000 ELECTROCARDIOGRAM COMPLETE: CPT | Mod: 59

## 2022-12-20 PROCEDURE — 99215 OFFICE O/P EST HI 40 MIN: CPT | Mod: 25

## 2022-12-20 PROCEDURE — 93270 REMOTE 30 DAY ECG REV/REPORT: CPT

## 2022-12-20 NOTE — PHYSICAL EXAM
[Obese] : obese [Normal S1, S2] : normal S1, S2 [No Rub] : no rub [No Gallop] : no gallop [Murmur] : murmur [Normal] : alert and oriented, normal memory [de-identified] : ll/Vl ssytolic  [de-identified] : 1+ bilateral edema

## 2022-12-20 NOTE — REVIEW OF SYSTEMS
[Weight Gain (___ Lbs)] : [unfilled] ~Ulb weight gain [Feeling Fatigued] : feeling fatigued [Dyspnea on exertion] : dyspnea during exertion [Palpitations] : palpitations [Joint Pain] : joint pain [Joint Stiffness] : joint stiffness [Under Stress] : under stress [Negative] : Heme/Lymph

## 2022-12-20 NOTE — DISCUSSION/SUMMARY
[FreeTextEntry1] : 62-year-old woman with recent complaints of palpitations.  She has hypertension, moderate aortic stenosis and abnormal EKG.\par Status post recent ER evaluation that was unrevealing except for presence of pneumonia that was treated with oral antibiotics.  Subsequently she had COVID infection that was treated with Paxil of it.\par She has had 2 episodes of palpitations after resolution of COVID symptoms.\par Blood pressure stable on today's examination.  There is no JVD.  Lung fields are clear.\par EKG sinus rhythm, nonspecific ST changes.\par \par Plan\par 1.  Extended Holter monitor placed today to evaluate flutter/palpitations, rule out arrhythmia.\par 2.  Current medication list is reviewed, no changes.\par 3.  She is also considering purchasing a [employer] device for self monitoring of rhythm \par 4.  Next visit with me in the office in 4 months.\par 5.  The above was reviewed with the patient, all of her questions have been answered to her satisfaction.\par

## 2022-12-20 NOTE — CARDIOLOGY SUMMARY
[de-identified] : Dec 20, 2022. Sinus  Rhythm \par - non specific ST changes  ABNORMAL  [de-identified] : June 21, 2022. Normal LV function, moderate AS   [de-identified] : May 2019.. normal coronary arteries

## 2022-12-20 NOTE — HISTORY OF PRESENT ILLNESS
[FreeTextEntry1] : History is from patient and review of chart\par In late November 2023, she called the office complaining of palpitations and mild dyspnea.\par She was referred to the ED at [facility].\par Telemetry monitoring demonstrated sinus rhythm, blood work negative.  CT of the chest was done which suggested pneumonia and she was discharged home on levofloxacin.\par Soon afterwards, she tested positive for COVID infection.  She had mild to moderate symptoms, took Paxlovid.\par Palpitations did slowly redd, though she still continues to have them.  She thinks that it has occurred maybe 2 times.  She is aware of a sensation of "fluttering" in her chest.  Symptoms last for about 1 hour and resolve on their own.\par No current symptoms of dyspnea.  No chest pain or chest pressure.  No dizziness.  Chronic edema is unchanged from prior.\par \par

## 2022-12-22 ENCOUNTER — APPOINTMENT (OUTPATIENT)
Dept: ORTHOPEDIC SURGERY | Facility: CLINIC | Age: 62
End: 2022-12-22

## 2022-12-22 PROCEDURE — 99214 OFFICE O/P EST MOD 30 MIN: CPT | Mod: 25

## 2022-12-22 PROCEDURE — 20610 DRAIN/INJ JOINT/BURSA W/O US: CPT | Mod: RT

## 2022-12-23 ENCOUNTER — APPOINTMENT (OUTPATIENT)
Dept: INFUSION THERAPY | Facility: HOSPITAL | Age: 62
End: 2022-12-23

## 2022-12-31 PROBLEM — U07.1 INFECTION DUE TO 2019 NOVEL CORONAVIRUS: Status: ACTIVE | Noted: 2021-02-07

## 2022-12-31 PROBLEM — M54.50 LOWER BACK PAIN: Status: ACTIVE | Noted: 2018-07-13

## 2022-12-31 NOTE — HEALTH RISK ASSESSMENT
[Never] : Never [Yes] : Yes [No falls in past year] : Patient reported no falls in the past year [0] : 2) Feeling down, depressed, or hopeless: Not at all (0) [PHQ-2 Negative - No further assessment needed] : PHQ-2 Negative - No further assessment needed [de-identified] : rarely [[id number]] : 0

## 2022-12-31 NOTE — REVIEW OF SYSTEMS
[Fever] : no fever [Chills] : no chills [Fatigue] : no fatigue [Hot Flashes] : no hot flashes [Night Sweats] : no night sweats [Recent Change In Weight] : ~T no recent weight change [Discharge] : no discharge [Pain] : no pain [Redness] : no redness [Dryness] : no dryness  [Vision Problems] : no vision problems [Itching] : no itching [Earache] : no earache [Hearing Loss] : no hearing loss [Nosebleed] : no nosebleeds [Hoarseness] : no hoarseness [Nasal Discharge] : no nasal discharge [Sore Throat] : no sore throat [Postnasal Drip] : no postnasal drip [Chest Pain] : no chest pain [Palpitations] : no palpitations [Leg Claudication] : no leg claudication [Lower Ext Edema] : lower extremity edema [Orthopnea] : no orthopnea [Paroxysmal Nocturnal Dyspnea] : no paroxysmal nocturnal dyspnea [Shortness Of Breath] : no shortness of breath [Wheezing] : no wheezing [Cough] : no cough [Dyspnea on Exertion] : dyspnea on exertion [Abdominal Pain] : no abdominal pain [Nausea] : no nausea [Constipation] : no constipation [Diarrhea] : diarrhea [Vomiting] : no vomiting [Heartburn] : no heartburn [Melena] : no melena [Dysuria] : no dysuria [Incontinence] : no incontinence [Nocturia] : nocturia [Poor Libido] : libido not poor [Hematuria] : no hematuria [Frequency] : no frequency [Vaginal Discharge] : no vaginal discharge [Dysmenorrhea] : no dysmenorrhea [Joint Pain] : no joint pain [Joint Stiffness] : joint stiffness [Joint Swelling] : no joint swelling [Muscle Weakness] : no muscle weakness [Muscle Pain] : muscle pain [Back Pain] : back pain [Itching] : no itching [Mole Changes] : no mole changes [Nail Changes] : no nail changes [Hair Changes] : no hair changes [Skin Rash] : no skin rash [Headache] : headache [Dizziness] : no dizziness [Fainting] : no fainting [Confusion] : no confusion [Memory Loss] : no memory loss [Unsteady Walking] : no ataxia [Suicidal] : not suicidal [Insomnia] : no insomnia [Anxiety] : anxiety [Depression] : no depression [Easy Bleeding] : no easy bleeding [Easy Bruising] : no easy bruising [Swollen Glands] : no swollen glands [FreeTextEntry9] : lower

## 2022-12-31 NOTE — HISTORY OF PRESENT ILLNESS
[FreeTextEntry1] : 62-year-old woman comes to the office for follow-up to review her medications and discuss her overall health recent visit to the emergency room on November 29 for fluttering in her chest and mild chest discomfort [de-identified] : Comes to the office for follow-up with a history of aortic stenosis hypertension morbid obesity COVID-19 infection neuroendocrine neoplasm lower back pain diabetes and migraines patient recently on November 29, 2022 in the emergency room for fluttering in her chest work-up including cardiac enzymes EKG CTA of the chest all unremarkable review of systems is significant for lower extremity edema mild dyspnea on exertion which is chronic nocturia lower back pain occasional headaches and anxiety remaining review of systems is noncontributory

## 2022-12-31 NOTE — ASSESSMENT
[FreeTextEntry1] : Physical exam shows a well-developed woman in no acute distress blood pressure 130/70 height 5 foot 1 inch weight 315 pounds BMI 59.52 temperature 97.1 °F heart rate 99 respirations 18 oxygen saturation on room air is 99% HEENT was unremarkable chest was clear cardiovascular exam was regular with a 2 out of 6 systolic murmur abdomen was soft extremities show trace bilateral edema neurologic exam was nonfocal patient will follow-up with her cardiologist concerning her episode in the emergency room which has resolved CTA showed no pulmonary embolus cardiac enzymes were negative patient's blood pressure was adequately controlled at today's visit patient weight is a constant raya and she continues to try to reduce portions carbohydrates keep active and avoid snacking weight watchers was strongly advised patient does see a gynecologist she has had 3 COVID vaccines and 2 Shingrix vaccine she will keep up with her influenza and her Prevnar 20 she follows with an endocrinologist last A1c has come down significantly to 6.1 on December 19, 2022 patient had a recent Holter monitor and a transthoracic echocardiogram patient had her last colonoscopy in 2019

## 2023-01-04 NOTE — REVIEW OF SYSTEMS
yes yes [Back Pain] : back pain [Lower Ext Edema] : lower extremity edema [Muscle Pain] : muscle pain [Fever] : no fever [Chills] : no chills [Fatigue] : no fatigue [Hot Flashes] : no hot flashes [Night Sweats] : no night sweats [Recent Change In Weight] : ~T no recent weight change [Discharge] : no discharge [Pain] : no pain [Redness] : no redness [Dryness] : no dryness  [Vision Problems] : no vision problems [Itching] : no itching [Earache] : no earache [Hearing Loss] : no hearing loss [Nosebleed] : no nosebleeds [Hoarseness] : no hoarseness [Nasal Discharge] : no nasal discharge [Sore Throat] : no sore throat [Postnasal Drip] : no postnasal drip [Chest Pain] : no chest pain [Palpitations] : no palpitations [Leg Claudication] : no leg claudication [Paroysmal Nocturnal Dyspnea] : no paroysmal nocturnal dyspnea [Shortness Of Breath] : no shortness of breath [Wheezing] : no wheezing [Cough] : no cough [Dyspnea on Exertion] : no dyspnea on exertion [Abdominal Pain] : no abdominal pain [Nausea] : no nausea [Constipation] : no constipation [Diarrhea] : diarrhea [Vomiting] : no vomiting [Heartburn] : no heartburn [Melena] : no melena [Dysuria] : no dysuria [Incontinence] : no incontinence [Nocturia] : no nocturia [Poor Libido] : libido not poor [Hematuria] : no hematuria [Frequency] : no frequency [Vaginal Discharge] : no vaginal discharge [Dysmenorrhea] : no dysmenorrhea [Joint Pain] : no joint pain [Joint Stiffness] : no joint stiffness [Joint Swelling] : no joint swelling [Muscle Weakness] : no muscle weakness [Mole Changes] : no mole changes [Nail Changes] : no nail changes [Hair Changes] : no hair changes [Skin Rash] : no skin rash [Headache] : no headache [Dizziness] : no dizziness [Fainting] : no fainting [Confusion] : no confusion [Memory Loss] : no memory loss [Unsteady Walking] : no ataxia [Suicidal] : not suicidal [Insomnia] : no insomnia [Anxiety] : no anxiety [Depression] : no depression [Easy Bleeding] : no easy bleeding [Easy Bruising] : no easy bruising [Swollen Glands] : no swollen glands [FreeTextEntry9] : lower and muscle spasms

## 2023-01-14 ENCOUNTER — RX RENEWAL (OUTPATIENT)
Age: 63
End: 2023-01-14

## 2023-01-16 PROCEDURE — 93272 ECG/REVIEW INTERPRET ONLY: CPT

## 2023-01-20 ENCOUNTER — OUTPATIENT (OUTPATIENT)
Dept: OUTPATIENT SERVICES | Facility: HOSPITAL | Age: 63
LOS: 1 days | Discharge: HOME | End: 2023-01-20

## 2023-01-20 ENCOUNTER — APPOINTMENT (OUTPATIENT)
Dept: INFUSION THERAPY | Facility: HOSPITAL | Age: 63
End: 2023-01-20

## 2023-01-20 DIAGNOSIS — Z98.891 HISTORY OF UTERINE SCAR FROM PREVIOUS SURGERY: Chronic | ICD-10-CM

## 2023-01-20 DIAGNOSIS — D3A.8 OTHER BENIGN NEUROENDOCRINE TUMORS: ICD-10-CM

## 2023-02-03 NOTE — ED ADULT TRIAGE NOTE - PAIN RATING/NUMBER SCALE (0-10): REST
Met with pt at bedside to discuss her etoh use and the problems her drinking causes her. Pt said she plans of being sober after discharge. She said she is going to [place] to visit family and she will not start AA or a treatment program until she returns. She was encourage to go to AA meetings while in [place]. She was hesitant to accept that idea.  
7

## 2023-02-07 ENCOUNTER — RX RENEWAL (OUTPATIENT)
Age: 63
End: 2023-02-07

## 2023-02-15 NOTE — ED ADULT NURSE NOTE - TEMPLATE
Cardiac Helical Rim Advancement Flap Text: The defect edges were debeveled with a #15 blade scalpel.  Given the location of the defect and the proximity to free margins (helical rim) a double helical rim advancement flap was deemed most appropriate. Using a sterile surgical marker, the appropriate advancement flaps were drawn incorporating the defect and placing the expected incisions between the helical rim and antihelix where possible.  The area thus outlined was incised through and through with a #15 scalpel blade.  With a skin hook and iris scissors, the flaps were gently and sharply undermined and freed up. Folllowing this, the designed flaps were placed into the primary defect and sutured into place.

## 2023-02-17 ENCOUNTER — RESULT REVIEW (OUTPATIENT)
Age: 63
End: 2023-02-17

## 2023-02-17 ENCOUNTER — APPOINTMENT (OUTPATIENT)
Dept: INFUSION THERAPY | Facility: HOSPITAL | Age: 63
End: 2023-02-17

## 2023-02-18 ENCOUNTER — RX RENEWAL (OUTPATIENT)
Age: 63
End: 2023-02-18

## 2023-02-21 ENCOUNTER — APPOINTMENT (OUTPATIENT)
Dept: HEMATOLOGY ONCOLOGY | Facility: CLINIC | Age: 63
End: 2023-02-21

## 2023-02-21 ENCOUNTER — APPOINTMENT (OUTPATIENT)
Dept: HEMATOLOGY ONCOLOGY | Facility: CLINIC | Age: 63
End: 2023-02-21
Payer: COMMERCIAL

## 2023-02-21 ENCOUNTER — RESULT REVIEW (OUTPATIENT)
Age: 63
End: 2023-02-21

## 2023-02-21 ENCOUNTER — APPOINTMENT (OUTPATIENT)
Dept: OBGYN | Facility: CLINIC | Age: 63
End: 2023-02-21
Payer: COMMERCIAL

## 2023-02-21 ENCOUNTER — RX RENEWAL (OUTPATIENT)
Age: 63
End: 2023-02-21

## 2023-02-21 VITALS
BODY MASS INDEX: 55.32 KG/M2 | OXYGEN SATURATION: 98 % | SYSTOLIC BLOOD PRESSURE: 124 MMHG | HEART RATE: 85 BPM | TEMPERATURE: 98.1 F | WEIGHT: 293 LBS | HEIGHT: 61 IN | DIASTOLIC BLOOD PRESSURE: 72 MMHG

## 2023-02-21 VITALS
HEART RATE: 88 BPM | RESPIRATION RATE: 16 BRPM | DIASTOLIC BLOOD PRESSURE: 76 MMHG | WEIGHT: 293 LBS | BODY MASS INDEX: 57.98 KG/M2 | OXYGEN SATURATION: 98 % | SYSTOLIC BLOOD PRESSURE: 116 MMHG | TEMPERATURE: 97.7 F

## 2023-02-21 DIAGNOSIS — R92.8 OTHER ABNORMAL AND INCONCLUSIVE FINDINGS ON DIAGNOSTIC IMAGING OF BREAST: ICD-10-CM

## 2023-02-21 DIAGNOSIS — N64.4 MASTODYNIA: ICD-10-CM

## 2023-02-21 LAB
BASOPHILS # BLD AUTO: 0.06 K/UL — SIGNIFICANT CHANGE UP (ref 0–0.2)
BASOPHILS NFR BLD AUTO: 0.8 % — SIGNIFICANT CHANGE UP (ref 0–2)
EOSINOPHIL # BLD AUTO: 0.22 K/UL — SIGNIFICANT CHANGE UP (ref 0–0.5)
EOSINOPHIL NFR BLD AUTO: 3 % — SIGNIFICANT CHANGE UP (ref 0–6)
HCT VFR BLD CALC: 40.2 % — SIGNIFICANT CHANGE UP (ref 34.5–45)
HGB BLD-MCNC: 12.3 G/DL — SIGNIFICANT CHANGE UP (ref 11.5–15.5)
IMM GRANULOCYTES NFR BLD AUTO: 0.5 % — SIGNIFICANT CHANGE UP (ref 0–0.9)
LYMPHOCYTES # BLD AUTO: 1.79 K/UL — SIGNIFICANT CHANGE UP (ref 1–3.3)
LYMPHOCYTES # BLD AUTO: 24.4 % — SIGNIFICANT CHANGE UP (ref 13–44)
MCHC RBC-ENTMCNC: 25.3 PG — LOW (ref 27–34)
MCHC RBC-ENTMCNC: 30.6 G/DL — LOW (ref 32–36)
MCV RBC AUTO: 82.7 FL — SIGNIFICANT CHANGE UP (ref 80–100)
MONOCYTES # BLD AUTO: 0.76 K/UL — SIGNIFICANT CHANGE UP (ref 0–0.9)
MONOCYTES NFR BLD AUTO: 10.4 % — SIGNIFICANT CHANGE UP (ref 2–14)
NEUTROPHILS # BLD AUTO: 4.47 K/UL — SIGNIFICANT CHANGE UP (ref 1.8–7.4)
NEUTROPHILS NFR BLD AUTO: 60.9 % — SIGNIFICANT CHANGE UP (ref 43–77)
NRBC # BLD: 0 /100 WBCS — SIGNIFICANT CHANGE UP (ref 0–0)
PLATELET # BLD AUTO: 323 K/UL — SIGNIFICANT CHANGE UP (ref 150–400)
RBC # BLD: 4.86 M/UL — SIGNIFICANT CHANGE UP (ref 3.8–5.2)
RBC # FLD: 17.3 % — HIGH (ref 10.3–14.5)
WBC # BLD: 7.34 K/UL — SIGNIFICANT CHANGE UP (ref 3.8–10.5)
WBC # FLD AUTO: 7.34 K/UL — SIGNIFICANT CHANGE UP (ref 3.8–10.5)

## 2023-02-21 PROCEDURE — 99213 OFFICE O/P EST LOW 20 MIN: CPT

## 2023-02-21 PROCEDURE — 99214 OFFICE O/P EST MOD 30 MIN: CPT

## 2023-02-21 RX ORDER — LANCETS 33 GAUGE
EACH MISCELLANEOUS
Qty: 100 | Refills: 3 | Status: ACTIVE | COMMUNITY
Start: 2022-10-11 | End: 1900-01-01

## 2023-02-21 NOTE — HISTORY OF PRESENT ILLNESS
[FreeTextEntry1] : F/U BIRAD 3 Lt breast Persistent Rt ovarian cyst, unchanged since 2017\par \par Minimal vaginal bleeding Denies pelvic pain  S/P Mirena IUD 3/2022 Pap test 8/2022 was wnl\par \par Mammo, breast sono, CT abdomen and pelvis pending

## 2023-02-22 LAB
ALBUMIN SERPL ELPH-MCNC: 4.1 G/DL
ALP BLD-CCNC: 74 U/L
ALT SERPL-CCNC: 11 U/L
ANION GAP SERPL CALC-SCNC: 11 MMOL/L
AST SERPL-CCNC: 15 U/L
BILIRUB SERPL-MCNC: 0.4 MG/DL
BUN SERPL-MCNC: 12 MG/DL
CALCIUM SERPL-MCNC: 9.9 MG/DL
CHLORIDE SERPL-SCNC: 103 MMOL/L
CO2 SERPL-SCNC: 28 MMOL/L
CREAT SERPL-MCNC: 0.82 MG/DL
EGFR: 80 ML/MIN/1.73M2
GLUCOSE SERPL-MCNC: 115 MG/DL
POTASSIUM SERPL-SCNC: 4.7 MMOL/L
PROT SERPL-MCNC: 7.5 G/DL
SODIUM SERPL-SCNC: 142 MMOL/L

## 2023-02-24 ENCOUNTER — APPOINTMENT (OUTPATIENT)
Dept: MAMMOGRAPHY | Facility: IMAGING CENTER | Age: 63
End: 2023-02-24
Payer: COMMERCIAL

## 2023-02-24 ENCOUNTER — NON-APPOINTMENT (OUTPATIENT)
Age: 63
End: 2023-02-24

## 2023-02-24 ENCOUNTER — APPOINTMENT (OUTPATIENT)
Dept: ULTRASOUND IMAGING | Facility: IMAGING CENTER | Age: 63
End: 2023-02-24
Payer: COMMERCIAL

## 2023-02-24 ENCOUNTER — OUTPATIENT (OUTPATIENT)
Dept: OUTPATIENT SERVICES | Facility: HOSPITAL | Age: 63
LOS: 1 days | End: 2023-02-24
Payer: COMMERCIAL

## 2023-02-24 ENCOUNTER — RESULT REVIEW (OUTPATIENT)
Age: 63
End: 2023-02-24

## 2023-02-24 DIAGNOSIS — N64.4 MASTODYNIA: ICD-10-CM

## 2023-02-24 PROCEDURE — 76642 ULTRASOUND BREAST LIMITED: CPT | Mod: 26,LT

## 2023-02-24 PROCEDURE — 76642 ULTRASOUND BREAST LIMITED: CPT

## 2023-02-27 PROBLEM — N64.4 BREAST PAIN: Status: ACTIVE | Noted: 2023-02-17

## 2023-02-27 NOTE — HISTORY OF PRESENT ILLNESS
[Disease: _____________________] : Disease: [unfilled] [AJCC Stage: ____] : AJCC Stage: [unfilled] [Therapy: ___] : Therapy: [unfilled] [de-identified] : 58-year-old F with h/o intermittent abdominal pain, n/v over the last several years (at least 9) thought to be 2/2 gallstones. She has been evaluated in the ER 1-2 per year with these attacks.  CT A/P going as far back as 2010 noted a calcified mass within the mesentery of the small bowel suspicious for carcinoid. In Nov 2017 it was found to be increasing in size from previous imaging. She saw a surgeon at the time however it is unclear if any work up was recommended. \par \par In Nov 2018 she was referred to Dr. Foy for evaluation of gallstones. He noted the mesenteric mass which prompted a PET/DOTATATE to be performed. This showed activity in the mesentery (SUV 59.9) as well as liver, ileal  mass though to be the primary, cystic adnexal mass unchanged since 11/17, possible uptake in the skin of left breast. An MRI Abdomen was performed to evaluate the liver mets and this revealed multiple metastatic lesions. Referred to med onc for further work up. \par \par 2/14/19: Enteroscopy/[place]: no mass noted, erythema in the terminal ileum, s/p biopsy with negative path \par 4/22/19: CT CAP: multiple small b/l lung nodules, slight enlargement of calcified mesenteric mass. \par 4/23-4/26/19: admitted to [facility] with N/V post scan. 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 Started last week. No alleviating factors. Has not tried anything. has Mammo and US breast scheduled for this coming friday. No changes in pain with breathing. \par otherwise denies any abdominal pain, change in BMs. due for her 6 mos restaging scans.

## 2023-02-28 ENCOUNTER — APPOINTMENT (OUTPATIENT)
Dept: NEUROLOGY | Facility: CLINIC | Age: 63
End: 2023-02-28
Payer: COMMERCIAL

## 2023-02-28 ENCOUNTER — APPOINTMENT (OUTPATIENT)
Dept: NEUROLOGY | Facility: CLINIC | Age: 63
End: 2023-02-28

## 2023-02-28 PROCEDURE — 64612 DESTROY NERVE FACE MUSCLE: CPT | Mod: 1L

## 2023-02-28 PROCEDURE — XXXXX: CPT | Mod: 1L

## 2023-02-28 PROCEDURE — 99214 OFFICE O/P EST MOD 30 MIN: CPT | Mod: 1L,25

## 2023-03-02 ENCOUNTER — OUTPATIENT (OUTPATIENT)
Dept: OUTPATIENT SERVICES | Facility: HOSPITAL | Age: 63
LOS: 1 days | End: 2023-03-02
Payer: COMMERCIAL

## 2023-03-02 ENCOUNTER — APPOINTMENT (OUTPATIENT)
Dept: CT IMAGING | Facility: CLINIC | Age: 63
End: 2023-03-02
Payer: COMMERCIAL

## 2023-03-02 DIAGNOSIS — Z00.8 ENCOUNTER FOR OTHER GENERAL EXAMINATION: ICD-10-CM

## 2023-03-02 DIAGNOSIS — C79.9 SECONDARY MALIGNANT NEOPLASM OF UNSPECIFIED SITE: ICD-10-CM

## 2023-03-02 DIAGNOSIS — Z98.891 HISTORY OF UTERINE SCAR FROM PREVIOUS SURGERY: Chronic | ICD-10-CM

## 2023-03-02 DIAGNOSIS — D3A.8 OTHER BENIGN NEUROENDOCRINE TUMORS: ICD-10-CM

## 2023-03-02 PROCEDURE — 74177 CT ABD & PELVIS W/CONTRAST: CPT | Mod: 26

## 2023-03-02 PROCEDURE — 74177 CT ABD & PELVIS W/CONTRAST: CPT

## 2023-03-02 PROCEDURE — 71260 CT THORAX DX C+: CPT

## 2023-03-02 PROCEDURE — 71260 CT THORAX DX C+: CPT | Mod: 26

## 2023-03-08 ENCOUNTER — APPOINTMENT (OUTPATIENT)
Dept: MAMMOGRAPHY | Facility: HOSPITAL | Age: 63
End: 2023-03-08

## 2023-03-08 ENCOUNTER — APPOINTMENT (OUTPATIENT)
Dept: ULTRASOUND IMAGING | Facility: HOSPITAL | Age: 63
End: 2023-03-08

## 2023-03-17 ENCOUNTER — APPOINTMENT (OUTPATIENT)
Dept: INFUSION THERAPY | Facility: HOSPITAL | Age: 63
End: 2023-03-17

## 2023-04-04 ENCOUNTER — RX RENEWAL (OUTPATIENT)
Age: 63
End: 2023-04-04

## 2023-04-04 RX ORDER — SEMAGLUTIDE 1.34 MG/ML
2 INJECTION, SOLUTION SUBCUTANEOUS
Qty: 3 | Refills: 3 | Status: DISCONTINUED | COMMUNITY
Start: 2022-09-20 | End: 2023-04-04

## 2023-04-11 ENCOUNTER — OUTPATIENT (OUTPATIENT)
Dept: OUTPATIENT SERVICES | Facility: HOSPITAL | Age: 63
LOS: 1 days | Discharge: HOME | End: 2023-04-11

## 2023-04-11 DIAGNOSIS — Z98.891 HISTORY OF UTERINE SCAR FROM PREVIOUS SURGERY: Chronic | ICD-10-CM

## 2023-04-11 DIAGNOSIS — D3A.8 OTHER BENIGN NEUROENDOCRINE TUMORS: ICD-10-CM

## 2023-04-11 RX ORDER — IBUPROFEN 600 MG/1
600 TABLET, FILM COATED ORAL
Qty: 90 | Refills: 1 | Status: ACTIVE | COMMUNITY
Start: 2022-10-10 | End: 1900-01-01

## 2023-04-13 ENCOUNTER — APPOINTMENT (OUTPATIENT)
Dept: INTERNAL MEDICINE | Facility: CLINIC | Age: 63
End: 2023-04-13
Payer: COMMERCIAL

## 2023-04-13 ENCOUNTER — LABORATORY RESULT (OUTPATIENT)
Age: 63
End: 2023-04-13

## 2023-04-13 VITALS
DIASTOLIC BLOOD PRESSURE: 82 MMHG | SYSTOLIC BLOOD PRESSURE: 120 MMHG | OXYGEN SATURATION: 99 % | RESPIRATION RATE: 17 BRPM | HEART RATE: 94 BPM | BODY MASS INDEX: 55.32 KG/M2 | HEIGHT: 61 IN | WEIGHT: 293 LBS | TEMPERATURE: 98.3 F

## 2023-04-13 DIAGNOSIS — G43.709 CHRONIC MIGRAINE W/OUT AURA, NOT INTRACTABLE, W/OUT STATUS MIGRAINOSUS: ICD-10-CM

## 2023-04-13 LAB
25(OH)D3 SERPL-MCNC: 46.2 NG/ML
ALBUMIN SERPL ELPH-MCNC: 4.2 G/DL
ALP BLD-CCNC: 79 U/L
ALT SERPL-CCNC: 11 U/L
ANION GAP SERPL CALC-SCNC: 15 MMOL/L
APPEARANCE: CLEAR
AST SERPL-CCNC: 16 U/L
BASOPHILS # BLD AUTO: 0.06 K/UL
BASOPHILS NFR BLD AUTO: 0.8 %
BILIRUB SERPL-MCNC: 0.4 MG/DL
BILIRUBIN URINE: NEGATIVE
BLOOD URINE: NEGATIVE
BUN SERPL-MCNC: 14 MG/DL
CALCIUM SERPL-MCNC: 10 MG/DL
CHLORIDE SERPL-SCNC: 102 MMOL/L
CHOLEST SERPL-MCNC: 163 MG/DL
CO2 SERPL-SCNC: 24 MMOL/L
COLOR: YELLOW
CREAT SERPL-MCNC: 0.77 MG/DL
CRP SERPL HS-MCNC: 8.32 MG/L
EGFR: 87 ML/MIN/1.73M2
EOSINOPHIL # BLD AUTO: 0.3 K/UL
EOSINOPHIL NFR BLD AUTO: 4.2 %
ESTIMATED AVERAGE GLUCOSE: 128 MG/DL
GLUCOSE BS SERPL-MCNC: 127 MG/DL
GLUCOSE QUALITATIVE U: NEGATIVE
GLUCOSE SERPL-MCNC: 127 MG/DL
HBA1C MFR BLD HPLC: 6.1 %
HCT VFR BLD CALC: 40.5 %
HDLC SERPL-MCNC: 53 MG/DL
HGB BLD-MCNC: 12.1 G/DL
IMM GRANULOCYTES NFR BLD AUTO: 0.6 %
KETONES URINE: NEGATIVE
LDLC SERPL CALC-MCNC: 91 MG/DL
LEUKOCYTE ESTERASE URINE: NEGATIVE
LYMPHOCYTES # BLD AUTO: 1.69 K/UL
LYMPHOCYTES NFR BLD AUTO: 23.9 %
MAN DIFF?: NORMAL
MCHC RBC-ENTMCNC: 25.4 PG
MCHC RBC-ENTMCNC: 29.9 GM/DL
MCV RBC AUTO: 84.9 FL
MONOCYTES # BLD AUTO: 0.56 K/UL
MONOCYTES NFR BLD AUTO: 7.9 %
NEUTROPHILS # BLD AUTO: 4.43 K/UL
NEUTROPHILS NFR BLD AUTO: 62.6 %
NITRITE URINE: NEGATIVE
NONHDLC SERPL-MCNC: 109 MG/DL
PH URINE: 6
PLATELET # BLD AUTO: 319 K/UL
POTASSIUM SERPL-SCNC: 4.8 MMOL/L
PROT SERPL-MCNC: 6.9 G/DL
PROTEIN URINE: ABNORMAL
RBC # BLD: 4.77 M/UL
RBC # FLD: 17 %
SODIUM SERPL-SCNC: 141 MMOL/L
SPECIFIC GRAVITY URINE: 1.02
T4 FREE SERPL-MCNC: 1.2 NG/DL
TRIGL SERPL-MCNC: 93 MG/DL
TSH SERPL-ACNC: 1.47 UIU/ML
UROBILINOGEN URINE: NORMAL
VIT B12 SERPL-MCNC: >2000 PG/ML
WBC # FLD AUTO: 7.08 K/UL

## 2023-04-13 PROCEDURE — 99213 OFFICE O/P EST LOW 20 MIN: CPT

## 2023-04-14 ENCOUNTER — APPOINTMENT (OUTPATIENT)
Dept: INFUSION THERAPY | Facility: HOSPITAL | Age: 63
End: 2023-04-14

## 2023-04-16 ENCOUNTER — RX RENEWAL (OUTPATIENT)
Age: 63
End: 2023-04-16

## 2023-04-19 ENCOUNTER — NON-APPOINTMENT (OUTPATIENT)
Age: 63
End: 2023-04-19

## 2023-04-19 ENCOUNTER — APPOINTMENT (OUTPATIENT)
Dept: CARDIOLOGY | Facility: CLINIC | Age: 63
End: 2023-04-19
Payer: COMMERCIAL

## 2023-04-19 VITALS
HEIGHT: 61 IN | DIASTOLIC BLOOD PRESSURE: 77 MMHG | TEMPERATURE: 95.3 F | RESPIRATION RATE: 19 BRPM | BODY MASS INDEX: 55.32 KG/M2 | HEART RATE: 79 BPM | SYSTOLIC BLOOD PRESSURE: 153 MMHG | OXYGEN SATURATION: 97 % | WEIGHT: 293 LBS

## 2023-04-19 PROCEDURE — 99214 OFFICE O/P EST MOD 30 MIN: CPT | Mod: 25

## 2023-04-19 PROCEDURE — 93000 ELECTROCARDIOGRAM COMPLETE: CPT

## 2023-04-19 NOTE — PHYSICAL EXAM
[Obese] : obese [Normal S1, S2] : normal S1, S2 [No Rub] : no rub [No Gallop] : no gallop [Murmur] : murmur [Normal] : alert and oriented, normal memory [de-identified] : ll/Vl ssytolic  [de-identified] : 1+ bilateral edema

## 2023-04-19 NOTE — HISTORY OF PRESENT ILLNESS
[FreeTextEntry1] : Since last visit with me, she has returned from a trip to [place].\par Her main concern is fatigue, she did see primary care physician regarding this, blood work was done.  She was given a B12 injection.\par She feels as though palpitations have resolved.  She had no further recurrences.\par Reports 1 episode of feeling dizzy and nausea when she was in [place], and has not recurred.\par No chest pain or chest pressure.  No shortness of breath.  No syncope or near syncope.  No edema, no orthopnea.  No PND.\par \par

## 2023-04-19 NOTE — CARDIOLOGY SUMMARY
[de-identified] : April 19 2023. Sinus  Rhythm PVCs \par -Nonspecific ST depression  \par ABNORMAL  [de-identified] : June 21, 2022. Normal LV function, moderate AS   [de-identified] : May 2019.. normal coronary arteries

## 2023-04-19 NOTE — REASON FOR VISIT
[FreeTextEntry1] : Cardiology follow-up visit for evaluation management of hypertension, aortic stenosis, abnormal EKG.\par \par

## 2023-04-19 NOTE — REVIEW OF SYSTEMS
[Weight Gain (___ Lbs)] : [unfilled] ~Ulb weight gain [Feeling Fatigued] : feeling fatigued [Dyspnea on exertion] : dyspnea during exertion [Joint Pain] : joint pain [Joint Stiffness] : joint stiffness [Negative] : Heme/Lymph

## 2023-04-19 NOTE — DISCUSSION/SUMMARY
[FreeTextEntry1] : 62-year-old woman with hypertension, moderate aortic stenosis, abnormal EKG.\par Medical issues include obesity, sedentary lifestyle, neuroendocrine tumor.\par Blood pressure stable.  There is no JVD.  Lung fields are clear.  No edema.  Cardiac murmur is unchanged.\par EKG sinus rhythm, nonspecific ST changes.\par Current cardiac status appears to be stable.\par \par Plan\par 1.  Current medication list is reviewed, no changes.  She will continue with current antihypertensive regimen.\par 2.  She will follow-up with me in the office in 4 months, echocardiogram.\par 3.  Advised to monitor for cardiac symptoms, notify me for changes or if she has any other concerns.  Cardiac issues discussed, all questions answered.\par

## 2023-04-29 NOTE — ASSESSMENT
[FreeTextEntry1] : Physical examination reveals a well-developed woman in no acute distress blood pressure 120/82 height 5 foot 1 weight 300 pounds heart rate of 94 respirations 17 oxygen saturation on room air 99% HEENT was unremarkable chest was clear cardiovascular exam was regular abdomen was soft extremities show edema neurologic exam was nonfocal patient follows regularly with her cardiologist and endocrinologist to take with her ophthalmologist patient had a slip given for complete blood test including hemoglobin A1c and lipid profile we will review this with her endocrinologist she goes to review her gynecologist and is probably due she does follow with her oncologist and alleviate concerning her stage IV neuroendocrine tumor he has frequent scans and follow-ups patient weight is a difficult issue which has been addressed many different ways without success we will continue to attempt to reduce portions

## 2023-04-29 NOTE — PHYSICAL EXAM
[No Acute Distress] : no acute distress [Well Nourished] : well nourished [Well Developed] : well developed [Well-Appearing] : well-appearing [Normal Voice/Communication] : normal voice/communication [Normal Sclera/Conjunctiva] : normal sclera/conjunctiva [PERRL] : pupils equal round and reactive to light [Normal Outer Ear/Nose] : the outer ears and nose were normal in appearance [EOMI] : extraocular movements intact [Normal Oropharynx] : the oropharynx was normal [No JVD] : no jugular venous distention [No Lymphadenopathy] : no lymphadenopathy [Supple] : supple [Thyroid Normal, No Nodules] : the thyroid was normal and there were no nodules present [No Respiratory Distress] : no respiratory distress  [No Accessory Muscle Use] : no accessory muscle use [Clear to Auscultation] : lungs were clear to auscultation bilaterally [Normal Rate] : normal rate  [Regular Rhythm] : with a regular rhythm [Normal S1, S2] : normal S1 and S2 [No Murmur] : no murmur heard [No Carotid Bruits] : no carotid bruits [No Abdominal Bruit] : a ~M bruit was not heard ~T in the abdomen [No Varicosities] : no varicosities [Pedal Pulses Present] : the pedal pulses are present [No Edema] : there was no peripheral edema [No Palpable Aorta] : no palpable aorta [No Extremity Clubbing/Cyanosis] : no extremity clubbing/cyanosis [Declined Breast Exam] : declined breast exam  [Soft] : abdomen soft [Non Tender] : non-tender [Non-distended] : non-distended [No Masses] : no abdominal mass palpated [No HSM] : no HSM [Normal Bowel Sounds] : normal bowel sounds [No Hernias] : no hernias [Declined Rectal Exam] : declined rectal exam [No CVA Tenderness] : no CVA  tenderness [No Spinal Tenderness] : no spinal tenderness [Kyphosis] : no kyphosis [Scoliosis] : no scoliosis [No Joint Swelling] : no joint swelling [Grossly Normal Strength/Tone] : grossly normal strength/tone [No Rash] : no rash [No Skin Lesions] : no skin lesions [Acne] : no acne [Coordination Grossly Intact] : coordination grossly intact [No Focal Deficits] : no focal deficits [Normal Gait] : normal gait [Deep Tendon Reflexes (DTR)] : deep tendon reflexes were 2+ and symmetric [Normal Affect] : the affect was normal [Normal Insight/Judgement] : insight and judgment were intact

## 2023-04-29 NOTE — REVIEW OF SYSTEMS
[Lower Ext Edema] : lower extremity edema [Dyspnea on Exertion] : dyspnea on exertion [Nocturia] : nocturia [Joint Stiffness] : joint stiffness [Muscle Pain] : muscle pain [Back Pain] : back pain [Headache] : headache [Anxiety] : anxiety [Fever] : no fever [Chills] : no chills [Fatigue] : no fatigue [Hot Flashes] : no hot flashes [Night Sweats] : no night sweats [Recent Change In Weight] : ~T no recent weight change [Discharge] : no discharge [Pain] : no pain [Redness] : no redness [Dryness] : no dryness  [Vision Problems] : no vision problems [Itching] : no itching [Earache] : no earache [Hearing Loss] : no hearing loss [Nosebleed] : no nosebleeds [Hoarseness] : no hoarseness [Nasal Discharge] : no nasal discharge [Sore Throat] : no sore throat [Postnasal Drip] : no postnasal drip [Chest Pain] : no chest pain [Palpitations] : no palpitations [Leg Claudication] : no leg claudication [Orthopnea] : no orthopnea [Paroxysmal Nocturnal Dyspnea] : no paroxysmal nocturnal dyspnea [Shortness Of Breath] : no shortness of breath [Wheezing] : no wheezing [Cough] : no cough [Abdominal Pain] : no abdominal pain [Nausea] : no nausea [Diarrhea] : diarrhea [Constipation] : no constipation [Vomiting] : no vomiting [Heartburn] : no heartburn [Melena] : no melena [Dysuria] : no dysuria [Incontinence] : no incontinence [Poor Libido] : libido not poor [Hematuria] : no hematuria [Frequency] : no frequency [Vaginal Discharge] : no vaginal discharge [Dysmenorrhea] : no dysmenorrhea [Joint Pain] : no joint pain [Joint Swelling] : no joint swelling [Muscle Weakness] : no muscle weakness [Mole Changes] : no mole changes [Nail Changes] : no nail changes [Hair Changes] : no hair changes [Skin Rash] : no skin rash [Dizziness] : no dizziness [Fainting] : no fainting [Confusion] : no confusion [Memory Loss] : no memory loss [Unsteady Walking] : no ataxia [Suicidal] : not suicidal [Insomnia] : no insomnia [Depression] : no depression [Easy Bleeding] : no easy bleeding [Easy Bruising] : no easy bruising [Swollen Glands] : no swollen glands [FreeTextEntry9] : lower

## 2023-04-29 NOTE — HISTORY OF PRESENT ILLNESS
[FreeTextEntry1] : 62-year-old woman comes to the office for follow-up chief complaint of fatigue [de-identified] : Comes to the office for follow-up with a history of obesity osteoarthritis aortic stenosis chronic migraines type 2 diabetes hypertension review of systems is significant for nocturia back joint stiffness intermittent headaches anxiety and depression remaining review of systems is noncontributory

## 2023-05-12 ENCOUNTER — APPOINTMENT (OUTPATIENT)
Dept: INFUSION THERAPY | Facility: HOSPITAL | Age: 63
End: 2023-05-12

## 2023-05-19 ENCOUNTER — APPOINTMENT (OUTPATIENT)
Dept: NEUROLOGY | Facility: CLINIC | Age: 63
End: 2023-05-19
Payer: COMMERCIAL

## 2023-05-19 VITALS
OXYGEN SATURATION: 97 % | HEIGHT: 72 IN | BODY MASS INDEX: 39.68 KG/M2 | WEIGHT: 293 LBS | TEMPERATURE: 97.5 F | DIASTOLIC BLOOD PRESSURE: 75 MMHG | RESPIRATION RATE: 20 BRPM | HEART RATE: 83 BPM | SYSTOLIC BLOOD PRESSURE: 120 MMHG

## 2023-05-19 PROCEDURE — 64612 DESTROY NERVE FACE MUSCLE: CPT

## 2023-05-19 PROCEDURE — 99214 OFFICE O/P EST MOD 30 MIN: CPT | Mod: 25

## 2023-05-22 NOTE — DISCUSSION/SUMMARY
[FreeTextEntry1] : Oxana Ratliff is a 62 year old F with a PMHx COVID-19 in 2/21, R Meredith's Palsy, Neuroendocrine cancer, Mesentery Cancer, Obesity, Type II DM, Peripheral Edema, Arthritis of the knees, and HTN. She presents for follow up in the Movement Disorders Clinic at [facility]. She was initially referred by Dr. Mccurdy for hemifacial spasms. \par \par The patient underwent repeat injections for R hemifacial and blepharospasm. She tolerated the procedure well. See procedure section of this note for further details. Risks and benefits of the procedure were reviewed prior to and after the procedure. The patient gave her verbal consent to proceed prior to the procedure. No changes were made to her regimen as she had increased facial weakness with a higher dose.\par \par Advised application of lidocaine cream before next injection. \par Clonazepam 0.25-0.5mg PRN severe spasms.\par \par I discussed that as part of the work up we obtain brain and vessel imaging of the IAC to determine if there is vascular compression of the seventh cranial nerve. The patient is not interested in further neuroimaging at this time.\par CT of the head was done with and without contrast on 5/29/2020 that was unremarkable, previously reviewed with patient.\par \par She will follow up for repeat botox injections in 3 months.\par \par All questions were answered to her satisfaction. I spent 30 minutes with this patient.

## 2023-05-22 NOTE — HISTORY OF PRESENT ILLNESS
[FreeTextEntry1] : Oxana Ratliff is a 62 year old F with a PMHx COVID-19 in 2/21, R Meredith's Palsy, Neuroendocrine cancer, Mesentery Cancer, Obesity, Type II DM, Peripheral Edema, Arthritis of the knees, and HTN. She presents for follow up in the Movement Disorders Clinic at [facility]. She was initially referred by Dr. Mccurdy for hemifacial spasms. \par \par Interval history:\par Since the last visit, the patient reports that she continues to have wearing off of her botox a few weeks before her next injection, and at night she tends to have breakthrough spasms as well. In the past, her facial weakness was too much with increased doses of botox.\par \par Initial history:\par These right facial spasms start around her under eye and right side of her mouth/cheek. This started over a year ago but has increased in frequency. She has a history of Bell's palsy years ago but she cannot recall which side. She cannot tell what induces the movement.\par \par No dizziness/lightheadedness. No headaches. No nausea/vomiting. No recent illness, but undergoing cancer treatments, she has mesentery cancer. She had COVID-19 in 2/21 and had the infusion, and is now vaccinated. She did not have much in the way of symptoms, and was not hospitalized. No cough, shortness of breath, chest pain, or flu like symptoms in the last 2 weeks. No numbness/tingling. \par \par Family history: unremarkable

## 2023-05-22 NOTE — PROCEDURE
[FreeTextEntry1] : The patient was injected with Onabotulinum Toxin A in the muscles outlined below after the botox was diluted to 5 units/0.1cc using normal saline. Anatomic locations were used. The patient tolerated the procedure well with no complications. \par \par R outer canthus - 2.5 units\par R Upper eye lid - inner and outer - 2.5 units each\par R Lower eye lid, inner and outer - 2.5 units each\par R Nasalis - 2.5 units \par R upper orbicularis oris - 2.5 units\par R lower orbicularis oris -  2.5 units\par R Risorius - 2.5 units \par R zygomaticus - 2.5 units  \par \par Total units used: 25 units\par Unavoidable waste: 75 units\par Total units mixed: 100 units\par \par Lot: [id number]\par Expiry: 2025/10

## 2023-05-22 NOTE — PHYSICAL EXAM
[Person] : oriented to person [Place] : oriented to place [Time] : oriented to time [Concentration Intact] : normal concentrating ability [Comprehension] : comprehension intact [Cranial Nerves Optic (II)] : visual acuity intact bilaterally,  visual fields full to confrontation, pupils equal round and reactive to light [Cranial Nerves Oculomotor (III)] : extraocular motion intact [Cranial Nerves Trigeminal (V)] : facial sensation intact symmetrically [Cranial Nerves Facial (VII)] : face symmetrical [Cranial Nerves Vestibulocochlear (VIII)] : hearing was intact bilaterally [Cranial Nerves Glossopharyngeal (IX)] : tongue and palate midline [Cranial Nerves Accessory (XI - Cranial And Spinal)] : head turning and shoulder shrug symmetric [Cranial Nerves Hypoglossal (XII)] : there was no tongue deviation with protrusion [Motor Strength] : muscle strength was normal in all four extremities [Involuntary Movements] : no involuntary movements were seen [Sensation Tactile Decrease] : light touch was intact [Paresis Pronator Drift Right-Sided] : no pronator drift on the right [Paresis Pronator Drift Left-Sided] : no pronator drift on the left [Coordination - Dysmetria Impaired Finger-to-Nose Bilateral] : not present [FreeTextEntry1] : No facial spasms noted today [FreeTextEntry8] : Patient ambulates with cane, wide based.

## 2023-05-24 ENCOUNTER — APPOINTMENT (OUTPATIENT)
Dept: ORTHOPEDIC SURGERY | Facility: CLINIC | Age: 63
End: 2023-05-24
Payer: COMMERCIAL

## 2023-05-24 PROCEDURE — 99213 OFFICE O/P EST LOW 20 MIN: CPT | Mod: 25

## 2023-05-24 PROCEDURE — 20610 DRAIN/INJ JOINT/BURSA W/O US: CPT | Mod: 50

## 2023-06-09 ENCOUNTER — APPOINTMENT (OUTPATIENT)
Dept: INFUSION THERAPY | Facility: HOSPITAL | Age: 63
End: 2023-06-09

## 2023-06-16 NOTE — HISTORY OF PRESENT ILLNESS
[FreeTextEntry1] : Oxana Ratliff is a 62 year old F with a PMHx COVID-19 in 2/21, R Meredith's Palsy, Neuroendocrine cancer, Mesentery Cancer, Obesity, Type II DM, Peripheral Edema, Arthritis of the knees, and HTN. She presents for follow up in the Movement Disorders Clinic at [facility]. She was initially referred by Dr. Mccurdy for hemifacial spasms. \par \par Interval history:\par Since the last visit, she reports that the botox is wearing off before the next dose. She has not tried Clonazepam due to fear of side effects, but now she is not working and will consider trying for her severe episodes.\par \par Initial history:\par These right facial spasms start around her under eye and right side of her mouth/cheek. This started over a year ago but has increased in frequency. She has a history of Bell's palsy years ago but she cannot recall which side. She cannot tell what induces the movement.\par \par No dizziness/lightheadedness. No headaches. No nausea/vomiting. No recent illness, but undergoing cancer treatments, she has mesentery cancer. She had COVID-19 in 2/21 and had the infusion, and is now vaccinated. She did not have much in the way of symptoms, and was not hospitalized. No cough, shortness of breath, chest pain, or flu like symptoms in the last 2 weeks. No numbness/tingling. \par \par Family history: unremarkable

## 2023-06-16 NOTE — DISCUSSION/SUMMARY
[FreeTextEntry1] : Oxana Ratliff is a 62 year old F with a PMHx COVID-19 in 2/21, Neuroendocrine cancer, Mesentery Cancer, Obesity, Type II DM, Peripheral Edema, Arthritis of the knees, and HTN. She presents for follow up in the Movement Disorders Clinic at [facility]. She was initially referred by Dr. Mccurdy for hemifacial spasms. \par \par The patient underwent repeat injections for R hemifacial and blepharospasm. She tolerated the procedure well. Please see procedure section of this note for further details. Risks and benefits of the procedure were reviewed prior to and after the procedure. The patient gave her verbal consent to proceed prior to the procedure. No changes were made to her regimen as she had increased facial weakness with a higher dose.\par \par Advised application of lidocaine cream before next injection. \par Clonazepam 0.25-0.5mg PRN severe spasms.\par \par I discussed that as part of the work up we obtain brain and vessel imaging of the IAC to determine if there is vascular compression of the seventh cranial nerve. The patient is not interested in further neuroimaging at this time.\par CT of the head was done with and without contrast on 5/29/2020 that was unremarkable, previously reviewed with patient.\par \par She will follow up for repeat botox injections in 3 months.\par \par All questions were answered to her satisfaction. I spent 20 minutes with this patient.

## 2023-06-16 NOTE — PHYSICAL EXAM
[Paresis Pronator Drift Right-Sided] : no pronator drift on the right [Paresis Pronator Drift Left-Sided] : no pronator drift on the left [Coordination - Dysmetria Impaired Finger-to-Nose Bilateral] : not present [FreeTextEntry1] : No facial spasms noted today [FreeTextEntry8] : Patient ambulates with cane, wide based.

## 2023-06-16 NOTE — PROCEDURE
[FreeTextEntry1] : The patient was injected with Onabotulinum Toxin A in the muscles outlined below after the botox was diluted to 5 units/0.1cc using normal saline. Anatomic locations were used. The patient tolerated the procedure well with no complications. \par \par R outer canthus - 2.5 units\par R Upper eye lid - inner and outer - 2.5 units each\par R Lower eye lid, inner and outer - 2.5 units each\par R Nasalis - 2.5 units \par R upper orbicularis oris - 2.5 units\par R lower orbicularis oris -  2.5 units\par R Risorius - 2.5 units \par R zygomaticus - 2.5 units  \par \par Total units used: 20 units\par Unavoidable waste: 80 units\par Total units mixed: 100 units\par \par Lot: [id number]\par Expiry: 2025/03

## 2023-06-19 ENCOUNTER — APPOINTMENT (OUTPATIENT)
Dept: ENDOCRINOLOGY | Facility: CLINIC | Age: 63
End: 2023-06-19
Payer: COMMERCIAL

## 2023-06-19 VITALS
WEIGHT: 292 LBS | TEMPERATURE: 98.2 F | SYSTOLIC BLOOD PRESSURE: 128 MMHG | BODY MASS INDEX: 39.55 KG/M2 | HEIGHT: 72 IN | HEART RATE: 82 BPM | OXYGEN SATURATION: 98 % | DIASTOLIC BLOOD PRESSURE: 82 MMHG

## 2023-06-19 PROCEDURE — 99214 OFFICE O/P EST MOD 30 MIN: CPT

## 2023-06-19 RX ORDER — REPAGLINIDE 0.5 MG/1
0.5 TABLET ORAL 3 TIMES DAILY
Qty: 180 | Refills: 3 | Status: DISCONTINUED | COMMUNITY
Start: 2022-10-11 | End: 2023-06-19

## 2023-06-19 RX ORDER — SEMAGLUTIDE 1.34 MG/ML
4 INJECTION, SOLUTION SUBCUTANEOUS
Qty: 3 | Refills: 3 | Status: DISCONTINUED | COMMUNITY
Start: 2022-12-19 | End: 2023-06-19

## 2023-06-19 NOTE — ASSESSMENT
[FreeTextEntry1] : Target: HbA1c < 7%, BP < 130/80\par \par HbA1c is at goal but patient wishes to lose weight so I increased the dose of Ozempic. \par BP is above goal but I will monitor this for now. \par \par No indication for Aspirin, patient is on ARB - she does not want to use statin.\par \par Last lipid panel - April 2023 - Trig 93, LDL 91\par Last HbA1c - 04/13/2023 - 6.1%\par Last Vitamin B12 - April 2023 - elevated\par Last urine albumin panel - none seen\par Last BMP/CMP - April 2023 - Cr, K, AST, ALT N\par \par Plan:\par 1. Increase Ozempic to 2 mg sc once weekly \par 2. Labs to be done in 3 months - see below\par 3. Fingersticks to be done in 3 months - see below \par 4. Follow up in 3 months to review meter and results

## 2023-06-19 NOTE — HISTORY OF PRESENT ILLNESS
[FreeTextEntry1] : Problems:\par 1. DM type 2\par 2. 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 Diagnosed in 2019\par 2. Meds:\par Metformin 1000 mg po bid (dispensed as 500 mg pills - no side effects)\par Ozempic 1 mg sc once weekly (No family history of medullary thyroid cancer, no pancreatitis)\par 3. Fingersticks done once daily - 111 to 230, no hypoglycemic unawareness\par 4. Not on Aspirin, not on statin, on losartan 100 mg po daily\par 5. Complications:\par No DM nephropathy (normal creatinine)\par No DM retinopathy (last eye exam was in August 2022, patient advised on the need for annual DM eye exam)\par No ASCVD\par No foot ulcers/amputation\par 6. Patient never smoked cigarettes

## 2023-06-19 NOTE — PHYSICAL EXAM
[de-identified] : General: No distress, well nourished\par Eyes: Normal Sclera, EOMI, PERRL\par ENT: Normal appearance of the nose, normal oropharynx\par Neck/Thyroid: No cervical lymphadenopathy, thyroid gland 20 g in size, no thyroid nodules, non-tender\par Respiratory: No use of accessory muscles of respiration, vesicular breath sounds heard bilaterally, no crepitations or ronchi\par Cardiovascular: S1 and S2 heard and normal, no S3 or S4, no murmurs, radial pulse normal bilaterally\par Abdomen: soft, non-tender, no masses, normal bowel sounds\par Musculoskeletal: No swelling or deformities of joints of hands, has pedal edema\par Neurological: Normal range of motion in the hands, Normal brachioradialis reflexes bilaterally\par Psychiatry: Patient converses normally, good judgement and insight\par Skin: No rashes in hands, no nodules palpated in hands  [de-identified] : I defer DM foot exam to podiatry

## 2023-06-27 ENCOUNTER — OUTPATIENT (OUTPATIENT)
Dept: OUTPATIENT SERVICES | Facility: HOSPITAL | Age: 63
LOS: 1 days | Discharge: HOME | End: 2023-06-27

## 2023-06-27 DIAGNOSIS — Z98.891 HISTORY OF UTERINE SCAR FROM PREVIOUS SURGERY: Chronic | ICD-10-CM

## 2023-06-27 DIAGNOSIS — D3A.8 OTHER BENIGN NEUROENDOCRINE TUMORS: ICD-10-CM

## 2023-06-28 NOTE — ED ADULT NURSE NOTE - NSICDXFAMILYHX_GEN_ALL_CORE_FT
FAMILY HISTORY:  FH: HTN (hypertension)     Infliximab Counseling:  I discussed with the patient the risks of infliximab including but not limited to myelosuppression, immunosuppression, autoimmune hepatitis, demyelinating diseases, lymphoma, and serious infections.  The patient understands that monitoring is required including a PPD at baseline and must alert us or the primary physician if symptoms of infection or other concerning signs are noted.

## 2023-07-07 ENCOUNTER — APPOINTMENT (OUTPATIENT)
Dept: INFUSION THERAPY | Facility: HOSPITAL | Age: 63
End: 2023-07-07

## 2023-07-10 NOTE — PROGRESS NOTE ADULT - ASSESSMENT
Patient is a 47y.o. year old female presenting for a complete physical today. Last colonoscopy : 1/22- Dr. Emir Mccain- repeat in 1/32; 12/18 - [facility]; 7/21; 7/19; 7/18;   PAP: 7/22- Dr. Laird Fails; 7/21;6/20   DEXA : 7/18 - nl   History of abnormal PAP: never;still  with cervix hysterectomy in 2009 due to bleeding /anemia. Last eye exam: 2/23  Current smoker: no  Self breast exam: yes , but sporadic  Exercise: walks - 2 miles in the am.  Caffeine: 1 coffee/day   Alcohol: rarely 0-1/ month    She is noticing more vaginal discharge clear and breast tenderness with taking Estrogen full applicator twice per week with estrogen patch. She uses bandage of Decadron 2 times per week for foot pain . Orthotics are being made . She is seeing Dr. Gonzalez Baltazar. Patient's allergies and medications were reviewed. Patient's past medical, surgical, social , and family history were reviewed.      Past Medical History:   Diagnosis Date    Acid reflux     Allergic rhinitis     Anxiety     Atrophic vaginitis     DDD (degenerative disc disease), cervical     Dysmenorrhea     Dyspareunia in female     Esophagitis 05/2019    Fibrocystic breast     Gastritis 05/2019    HPV (human papilloma virus) infection 1992    Exposed back in college    Hyperlipidemia     Internal hemorrhoids 01/2022    per colonoscopy - Dr. Emir Mccain    Interstitial cystitis 10/23/2017    Iron deficiency anemia 2017    Menopause ovarian failure     Menorrhagia     PONV (postoperative nausea and vomiting)     Trigeminal neuralgia     Vitamin D deficiency 07/2019    Vocal cord nodule 2016        Review of patient's past surgical history indicates:     Past Surgical History:   Procedure Laterality Date    [address] [facility]  2013    C4-6    COLONOSCOPY N/A 01/11/2022    COLONOSCOPY, POSSIBLE POLYPECTOMY performed by Lisa Alejo MD at [address]    bilateral, right admitted for cellulitis.....wbc normal......ID to evaluate and comment on antibiotics ...venous dopplers no DVT

## 2023-07-14 NOTE — END OF VISIT
lvm to reschedule her appointment [Time Spent: ___ minutes] : I have spent [unfilled] minutes of time on the encounter.

## 2023-07-24 NOTE — PATIENT PROFILE ADULT - NSPROPTRIGHTNOTIFY_GEN_A_NUR
declines Enbrel Counseling:  I discussed with the patient the risks of etanercept including but not limited to myelosuppression, immunosuppression, autoimmune hepatitis, demyelinating diseases, lymphoma, and infections.  The patient understands that monitoring is required including a PPD at baseline and must alert us or the primary physician if symptoms of infection or other concerning signs are noted.

## 2023-08-04 ENCOUNTER — APPOINTMENT (OUTPATIENT)
Dept: INFUSION THERAPY | Facility: HOSPITAL | Age: 63
End: 2023-08-04

## 2023-08-14 ENCOUNTER — RX RENEWAL (OUTPATIENT)
Age: 63
End: 2023-08-14

## 2023-08-24 ENCOUNTER — OUTPATIENT (OUTPATIENT)
Dept: OUTPATIENT SERVICES | Facility: HOSPITAL | Age: 63
LOS: 1 days | Discharge: HOME | End: 2023-08-24

## 2023-08-24 DIAGNOSIS — Z98.891 HISTORY OF UTERINE SCAR FROM PREVIOUS SURGERY: Chronic | ICD-10-CM

## 2023-08-24 DIAGNOSIS — D3A.8 OTHER BENIGN NEUROENDOCRINE TUMORS: ICD-10-CM

## 2023-08-29 ENCOUNTER — APPOINTMENT (OUTPATIENT)
Dept: ULTRASOUND IMAGING | Facility: IMAGING CENTER | Age: 63
End: 2023-08-29

## 2023-08-29 ENCOUNTER — NON-APPOINTMENT (OUTPATIENT)
Age: 63
End: 2023-08-29

## 2023-08-29 ENCOUNTER — APPOINTMENT (OUTPATIENT)
Dept: MAMMOGRAPHY | Facility: IMAGING CENTER | Age: 63
End: 2023-08-29

## 2023-08-30 ENCOUNTER — RESULT REVIEW (OUTPATIENT)
Age: 63
End: 2023-08-30

## 2023-09-01 ENCOUNTER — APPOINTMENT (OUTPATIENT)
Dept: INFUSION THERAPY | Facility: HOSPITAL | Age: 63
End: 2023-09-01

## 2023-09-02 ENCOUNTER — RX RENEWAL (OUTPATIENT)
Age: 63
End: 2023-09-02

## 2023-09-05 ENCOUNTER — NON-APPOINTMENT (OUTPATIENT)
Age: 63
End: 2023-09-05

## 2023-09-05 ENCOUNTER — APPOINTMENT (OUTPATIENT)
Dept: CARDIOLOGY | Facility: CLINIC | Age: 63
End: 2023-09-05
Payer: COMMERCIAL

## 2023-09-05 VITALS
BODY MASS INDEX: 53.05 KG/M2 | RESPIRATION RATE: 17 BRPM | HEART RATE: 75 BPM | SYSTOLIC BLOOD PRESSURE: 117 MMHG | DIASTOLIC BLOOD PRESSURE: 73 MMHG | OXYGEN SATURATION: 99 % | WEIGHT: 281 LBS | HEIGHT: 61 IN

## 2023-09-05 PROCEDURE — 99214 OFFICE O/P EST MOD 30 MIN: CPT | Mod: 25

## 2023-09-05 PROCEDURE — 93000 ELECTROCARDIOGRAM COMPLETE: CPT

## 2023-09-05 PROCEDURE — 93306 TTE W/DOPPLER COMPLETE: CPT

## 2023-09-05 NOTE — CARDIOLOGY SUMMARY
[de-identified] : April 19 2023. Sinus  Rhythm PVCs \par  -Nonspecific ST depression  \par  ABNORMAL  [de-identified] : June 21, 2022. Normal LV function, moderate AS   [de-identified] : May 2019.. normal coronary arteries

## 2023-09-05 NOTE — PHYSICAL EXAM
[Obese] : obese [Normal S1, S2] : normal S1, S2 [No Rub] : no rub [No Gallop] : no gallop [Murmur] : murmur [Normal] : alert and oriented, normal memory [de-identified] : ll/Vl ssytolic  [de-identified] : 1+ bilateral edema  [de-identified] : lymphedema

## 2023-09-05 NOTE — HISTORY OF PRESENT ILLNESS
[FreeTextEntry1] : Since last visit with me, her main concern has been fatigue.  She feels very tired during the course of the day.  She often takes a nap in the afternoon as well. Sedentary lifestyle. She has lost about 60 pounds over the past few months.  Blood sugars have been labile. No complaints of chest pain or chest pressure.  No shortness of breath.  No palpitations.  No dizziness.  No syncope.  Chronic lower extremity edema related to lymphedema.  No PND.  No orthopnea.

## 2023-09-05 NOTE — REASON FOR VISIT
[FreeTextEntry1] : Cardiology follow-up visit for evaluation management of hypertension, aortic stenosis, abnormal EKG.

## 2023-09-05 NOTE — DISCUSSION/SUMMARY
[FreeTextEntry1] : 63-year-old woman with hypertension, mild aortic stenosis, abnormal EKG. Medical issues include obesity, diabetes, neuroendocrine tumor. Blood pressure low normal.  There is no JVD.  Lung fields clear. EKG is sinus rhythm.  Nonspecific ST changes. Echocardiogram done today in the office.  Normal left ventricular systolic function.  Mild aortic stenosis.  Plan 1.  Current medication list is reviewed.  Due to low normal blood pressure, complaints of fatigue, reasonable to decrease dose of antihypertensives.  Decrease amlodipine 5 mg daily.  She will continue with losartan 100 mg daily. 2.  Advised her to self monitor blood pressure readings intermittently at home. 3.  Next visit with me in the office in 4 months. 4.  The above was reviewed with her, all questions answered.

## 2023-09-06 ENCOUNTER — RESULT REVIEW (OUTPATIENT)
Age: 63
End: 2023-09-06

## 2023-09-06 ENCOUNTER — APPOINTMENT (OUTPATIENT)
Dept: HEMATOLOGY ONCOLOGY | Facility: CLINIC | Age: 63
End: 2023-09-06
Payer: COMMERCIAL

## 2023-09-06 VITALS
WEIGHT: 281 LBS | HEART RATE: 82 BPM | OXYGEN SATURATION: 99 % | SYSTOLIC BLOOD PRESSURE: 115 MMHG | DIASTOLIC BLOOD PRESSURE: 75 MMHG | BODY MASS INDEX: 53.1 KG/M2 | TEMPERATURE: 97.2 F | RESPIRATION RATE: 16 BRPM

## 2023-09-06 DIAGNOSIS — C22.8 SECONDARY MALIGNANT NEOPLASM OF UNSPECIFIED SITE: ICD-10-CM

## 2023-09-06 DIAGNOSIS — C79.9 SECONDARY MALIGNANT NEOPLASM OF UNSPECIFIED SITE: ICD-10-CM

## 2023-09-06 LAB
BASOPHILS # BLD AUTO: 0.04 K/UL — SIGNIFICANT CHANGE UP (ref 0–0.2)
BASOPHILS NFR BLD AUTO: 0.7 % — SIGNIFICANT CHANGE UP (ref 0–2)
EOSINOPHIL # BLD AUTO: 0.21 K/UL — SIGNIFICANT CHANGE UP (ref 0–0.5)
EOSINOPHIL NFR BLD AUTO: 3.5 % — SIGNIFICANT CHANGE UP (ref 0–6)
HCT VFR BLD CALC: 40.3 % — SIGNIFICANT CHANGE UP (ref 34.5–45)
HGB BLD-MCNC: 12.6 G/DL — SIGNIFICANT CHANGE UP (ref 11.5–15.5)
IMM GRANULOCYTES NFR BLD AUTO: 0.3 % — SIGNIFICANT CHANGE UP (ref 0–0.9)
LYMPHOCYTES # BLD AUTO: 1.2 K/UL — SIGNIFICANT CHANGE UP (ref 1–3.3)
LYMPHOCYTES # BLD AUTO: 20.2 % — SIGNIFICANT CHANGE UP (ref 13–44)
MCHC RBC-ENTMCNC: 26.4 PG — LOW (ref 27–34)
MCHC RBC-ENTMCNC: 31.3 G/DL — LOW (ref 32–36)
MCV RBC AUTO: 84.5 FL — SIGNIFICANT CHANGE UP (ref 80–100)
MONOCYTES # BLD AUTO: 0.39 K/UL — SIGNIFICANT CHANGE UP (ref 0–0.9)
MONOCYTES NFR BLD AUTO: 6.6 % — SIGNIFICANT CHANGE UP (ref 2–14)
NEUTROPHILS # BLD AUTO: 4.08 K/UL — SIGNIFICANT CHANGE UP (ref 1.8–7.4)
NEUTROPHILS NFR BLD AUTO: 68.7 % — SIGNIFICANT CHANGE UP (ref 43–77)
NRBC # BLD: 0 /100 WBCS — SIGNIFICANT CHANGE UP (ref 0–0)
PLATELET # BLD AUTO: 283 K/UL — SIGNIFICANT CHANGE UP (ref 150–400)
RBC # BLD: 4.77 M/UL — SIGNIFICANT CHANGE UP (ref 3.8–5.2)
RBC # FLD: 16.1 % — HIGH (ref 10.3–14.5)
WBC # BLD: 5.94 K/UL — SIGNIFICANT CHANGE UP (ref 3.8–10.5)
WBC # FLD AUTO: 5.94 K/UL — SIGNIFICANT CHANGE UP (ref 3.8–10.5)

## 2023-09-06 PROCEDURE — 99213 OFFICE O/P EST LOW 20 MIN: CPT

## 2023-09-06 NOTE — HISTORY OF PRESENT ILLNESS
[Disease: _____________________] : Disease: [unfilled] [AJCC Stage: ____] : AJCC Stage: [unfilled] [de-identified] : 63-year-old F with h/o intermittent abdominal pain, n/v over the last several years (at least 9) thought to be 2/2 gallstones. She has been evaluated in the ER 1-2 per year with these attacks.  CT A/P going as far back as 2010 noted a calcified mass within the mesentery of the small bowel suspicious for carcinoid. In Nov 2017 it was found to be increasing in size from previous imaging. She saw a surgeon at the time however it is unclear if any work up was recommended.   In Nov 2018 she was referred to Dr. Foy for evaluation of gallstones. He noted the mesenteric mass which prompted a PET/DOTATATE to be performed. This showed activity in the mesentery (SUV 59.9) as well as liver, ileal  mass though to be the primary, cystic adnexal mass unchanged since 11/17, possible uptake in the skin of left breast. An MRI Abdomen was performed to evaluate the liver mets and this revealed multiple metastatic lesions. Referred to med onc for further work up.   2/14/19: Enteroscopy/[place]: no mass noted, erythema in the terminal ileum, s/p biopsy with negative path  4/22/19: CT CAP: multiple small b/l lung nodules, slight enlargement of calcified mesenteric mass.  4/23-4/26/19: admitted to [facility] with N/V post scan. 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 has been experiencing hypoglycemia episodes over the last 6 weeks.

## 2023-09-07 LAB
ALBUMIN SERPL ELPH-MCNC: 4.1 G/DL
ALP BLD-CCNC: 73 U/L
ALT SERPL-CCNC: 9 U/L
ANION GAP SERPL CALC-SCNC: 14 MMOL/L
AST SERPL-CCNC: 11 U/L
BILIRUB SERPL-MCNC: 0.4 MG/DL
BUN SERPL-MCNC: 10 MG/DL
CALCIUM SERPL-MCNC: 9.7 MG/DL
CHLORIDE SERPL-SCNC: 104 MMOL/L
CO2 SERPL-SCNC: 25 MMOL/L
CREAT SERPL-MCNC: 0.72 MG/DL
CREAT SPEC-SCNC: 133 MG/DL
EGFR: 93 ML/MIN/1.73M2
ESTIMATED AVERAGE GLUCOSE: 123 MG/DL
GLUCOSE SERPL-MCNC: 128 MG/DL
HBA1C MFR BLD HPLC: 5.9 %
MICROALBUMIN 24H UR DL<=1MG/L-MCNC: 4 MG/DL
MICROALBUMIN/CREAT 24H UR-RTO: 30 MG/G
POTASSIUM SERPL-SCNC: 4 MMOL/L
PROT SERPL-MCNC: 7.1 G/DL
SODIUM SERPL-SCNC: 143 MMOL/L

## 2023-09-13 ENCOUNTER — RESULT REVIEW (OUTPATIENT)
Age: 63
End: 2023-09-13

## 2023-09-16 PROBLEM — C79.9: Status: ACTIVE | Noted: 2020-03-05

## 2023-09-18 ENCOUNTER — APPOINTMENT (OUTPATIENT)
Dept: CT IMAGING | Facility: CLINIC | Age: 63
End: 2023-09-18
Payer: COMMERCIAL

## 2023-09-18 ENCOUNTER — APPOINTMENT (OUTPATIENT)
Dept: NEUROLOGY | Facility: CLINIC | Age: 63
End: 2023-09-18
Payer: COMMERCIAL

## 2023-09-18 ENCOUNTER — OUTPATIENT (OUTPATIENT)
Dept: OUTPATIENT SERVICES | Facility: HOSPITAL | Age: 63
LOS: 1 days | End: 2023-09-18
Payer: COMMERCIAL

## 2023-09-18 VITALS
HEIGHT: 61 IN | SYSTOLIC BLOOD PRESSURE: 125 MMHG | HEART RATE: 96 BPM | TEMPERATURE: 98.6 F | BODY MASS INDEX: 53.05 KG/M2 | OXYGEN SATURATION: 98 % | DIASTOLIC BLOOD PRESSURE: 84 MMHG | WEIGHT: 281 LBS

## 2023-09-18 DIAGNOSIS — Z98.891 HISTORY OF UTERINE SCAR FROM PREVIOUS SURGERY: Chronic | ICD-10-CM

## 2023-09-18 DIAGNOSIS — Z00.8 ENCOUNTER FOR OTHER GENERAL EXAMINATION: ICD-10-CM

## 2023-09-18 DIAGNOSIS — G24.5 BLEPHAROSPASM: ICD-10-CM

## 2023-09-18 PROCEDURE — 71260 CT THORAX DX C+: CPT | Mod: 26

## 2023-09-18 PROCEDURE — 74177 CT ABD & PELVIS W/CONTRAST: CPT | Mod: 26

## 2023-09-18 PROCEDURE — 99214 OFFICE O/P EST MOD 30 MIN: CPT | Mod: 25

## 2023-09-18 PROCEDURE — 64612 DESTROY NERVE FACE MUSCLE: CPT

## 2023-09-18 PROCEDURE — 71260 CT THORAX DX C+: CPT

## 2023-09-18 PROCEDURE — 74177 CT ABD & PELVIS W/CONTRAST: CPT

## 2023-09-18 RX ORDER — CLONAZEPAM 0.5 MG/1
0.5 TABLET ORAL
Qty: 30 | Refills: 0 | Status: COMPLETED | COMMUNITY
Start: 2022-02-07 | End: 2023-09-18

## 2023-09-18 RX ORDER — DIPHENHYDRAMINE HCL 50 MG/1
50 CAPSULE ORAL
Qty: 5 | Refills: 1 | Status: COMPLETED | COMMUNITY
Start: 2020-01-29 | End: 2023-09-18

## 2023-09-18 RX ORDER — HYALURONATE SODIUM, STABILIZED 88 MG/4 ML
88 SYRINGE (ML) INTRAARTICULAR
Qty: 2 | Refills: 0 | Status: COMPLETED | COMMUNITY
Start: 2022-01-12 | End: 2023-09-18

## 2023-09-19 ENCOUNTER — APPOINTMENT (OUTPATIENT)
Dept: ENDOCRINOLOGY | Facility: CLINIC | Age: 63
End: 2023-09-19
Payer: COMMERCIAL

## 2023-09-19 VITALS
DIASTOLIC BLOOD PRESSURE: 74 MMHG | RESPIRATION RATE: 16 BRPM | HEIGHT: 61 IN | OXYGEN SATURATION: 98 % | BODY MASS INDEX: 53.05 KG/M2 | TEMPERATURE: 98.6 F | SYSTOLIC BLOOD PRESSURE: 124 MMHG | WEIGHT: 281 LBS | HEART RATE: 81 BPM

## 2023-09-19 PROCEDURE — 99214 OFFICE O/P EST MOD 30 MIN: CPT

## 2023-09-19 RX ORDER — BLOOD-GLUCOSE METER
W/DEVICE KIT MISCELLANEOUS
Qty: 1 | Refills: 0 | Status: ACTIVE | COMMUNITY
Start: 2023-09-19 | End: 1900-01-01

## 2023-09-19 RX ORDER — LANCETS 28 GAUGE
EACH MISCELLANEOUS
Qty: 100 | Refills: 3 | Status: ACTIVE | COMMUNITY
Start: 2023-09-19 | End: 1900-01-01

## 2023-09-19 RX ORDER — BLOOD SUGAR DIAGNOSTIC
STRIP MISCELLANEOUS
Qty: 100 | Refills: 3 | Status: ACTIVE | COMMUNITY
Start: 2023-09-19 | End: 1900-01-01

## 2023-09-19 RX ORDER — ISOPROPYL ALCOHOL 0.7 ML/ML
SWAB TOPICAL
Qty: 100 | Refills: 3 | Status: ACTIVE | COMMUNITY
Start: 2023-09-19 | End: 1900-01-01

## 2023-09-21 ENCOUNTER — APPOINTMENT (OUTPATIENT)
Dept: ORTHOPEDIC SURGERY | Facility: CLINIC | Age: 63
End: 2023-09-21
Payer: COMMERCIAL

## 2023-09-21 PROCEDURE — 99214 OFFICE O/P EST MOD 30 MIN: CPT | Mod: 25

## 2023-09-21 PROCEDURE — 20610 DRAIN/INJ JOINT/BURSA W/O US: CPT | Mod: 50

## 2023-09-25 ENCOUNTER — RESULT REVIEW (OUTPATIENT)
Age: 63
End: 2023-09-25

## 2023-09-25 ENCOUNTER — OUTPATIENT (OUTPATIENT)
Dept: OUTPATIENT SERVICES | Facility: HOSPITAL | Age: 63
LOS: 1 days | End: 2023-09-25
Payer: COMMERCIAL

## 2023-09-25 ENCOUNTER — APPOINTMENT (OUTPATIENT)
Dept: MAMMOGRAPHY | Facility: IMAGING CENTER | Age: 63
End: 2023-09-25
Payer: COMMERCIAL

## 2023-09-25 ENCOUNTER — APPOINTMENT (OUTPATIENT)
Dept: ULTRASOUND IMAGING | Facility: IMAGING CENTER | Age: 63
End: 2023-09-25
Payer: COMMERCIAL

## 2023-09-25 DIAGNOSIS — Z98.891 HISTORY OF UTERINE SCAR FROM PREVIOUS SURGERY: Chronic | ICD-10-CM

## 2023-09-25 DIAGNOSIS — R92.8 OTHER ABNORMAL AND INCONCLUSIVE FINDINGS ON DIAGNOSTIC IMAGING OF BREAST: ICD-10-CM

## 2023-09-25 PROCEDURE — 77067 SCR MAMMO BI INCL CAD: CPT | Mod: 26

## 2023-09-25 PROCEDURE — 76642 ULTRASOUND BREAST LIMITED: CPT | Mod: 26,LT

## 2023-09-25 PROCEDURE — 77063 BREAST TOMOSYNTHESIS BI: CPT | Mod: 26

## 2023-09-25 PROCEDURE — 77067 SCR MAMMO BI INCL CAD: CPT

## 2023-09-25 PROCEDURE — 76642 ULTRASOUND BREAST LIMITED: CPT

## 2023-09-25 PROCEDURE — 77063 BREAST TOMOSYNTHESIS BI: CPT

## 2023-09-26 NOTE — DISCHARGE NOTE PROVIDER - NSDCADMDATE_GEN_ALL_CORE_FT
23-Apr-2019 18:14 Olanzapine Pregnancy And Lactation Text: This medication is pregnancy category C.   There are no adequate and well controlled trials with olanzapine in pregnant females.  Olanzapine should be used during pregnancy only if the potential benefit justifies the potential risk to the fetus.   In a study in lactating healthy women, olanzapine was excreted in breast milk.  It is recommended that women taking olanzapine should not breast feed.

## 2023-09-27 NOTE — REASON FOR VISIT
[facility] - Outpatient Follow-up Note  Ana Gil 53 y.o. female MRN: [id number]  Encounter: [id number]          ASSESSMENT AND PLAN:  50-year-old female with history of hypertension, hyperlipidemia who presents for follow-up evaluation. 1. Constipation, unspecified constipation type  2. Epigastric pain  She has a history of chronic constipation and abdominal pain. She underwent EGD anoscopy recently which was overall unremarkable. At her last office visit she was ordered for [address] [place] [address] and given samples. She states the samples helped her symptoms but that the pharmacy did not provide the Linzess medication. I have reordered this to her pharmacy today and we will see if she requires a prior authorization. She will continue high-fiber diet, adequate hydration for good bowel habits. She will continue omeprazole 40 mg daily for history of GERD as this is helping her abdominal discomfort. At her next office visit consider decreasing her PPI to the lowest effective dose that controls her symptoms    - linaCLOtide (Linzess) 290 MCG CAPS; Take 1 capsule by mouth daily before breakfast  Dispense: 30 capsule; Refill: 3      ______________________________________________________________________    SUBJECTIVE: 50-year-old female with history of hypertension, hyperlipidemia who presents for follow-up evaluation. She was last seen in the GI office in June 2023 for rectal bleeding, bloating and abdominal pain. She had previously undergone a CT scan this year which was overall normal however on review she had a large stool burden throughout her colon. At her last visit she was recommended to schedule EGD and colonoscopy for family history of colon cancer and work-up of her abdominal pain and start Linzess for constipation.     Interval history: EGD August 2023 showing small hiatal hernia mild antral gastritis otherwise normal.  Duodenal and gastric biopsies were overall normal. Colonoscopy showed pancolonic melanosis, subcentimeter colon polyp with pathology consistent with sessile serrated adenoma. She was recommended repeat colonoscopy in 5 years. She is having a bowel movement usually 1-2 times a day but using castor oil and a vegetable/protein drink and caffeine at times. She reports small amounts of rectal bleeding. She reports bloating symptoms. Her reflux is controlled with omeprazole    Reports family history of her father with colon cancer diagnosed in his 76s    I conducted the interview in [place]. I offered translation services to the patient. The patient declined stating that they understood the encounter and recommendations. Prior EGD/colonoscopy   2016 colonoscopy was normal   2016 EGD was normal with unremarkable gastric biopsies     2019 colonoscopy was normal with fair prep she was recommended repeat in 5 years  2019 EGD noted mild gastritis otherwise normal.  Gastric biopsies were positive for H. Pylori. Duodenal biopsy showed focal increased intraepithelial lymphocytes suggestive of Marsh 1 celiac disease. Subsequent 2019 celiac antibody profile was negative    REVIEW OF SYSTEMS IS OTHERWISE NEGATIVE. 10 point review of systems is negative other than stated as per HPI    Historical Information   Past Medical History:   Diagnosis Date   • Hyperlipidemia    • Hypertension    • JOSUÉ (obstructive sleep apnea) 2020   • Sinus pressure      Past Surgical History:   Procedure Laterality Date   •  SECTION, LOW TRANSVERSE      last assessed 8/20/15    • LAPAROSCOPIC TOTAL HYSTERECTOMY  2017   • LARYNX SURGERY      last assessed 8/20/15    • RI COLONOSCOPY FLX DX W/COLLJ SPEC WHEN PFRMD N/A 3/26/2019    Procedure: COLONOSCOPY;  Surgeon: Irma Melvin MD;  Location: [facility] GI LAB; Service: Gastroenterology   • RI ESOPHAGOGASTRODUODENOSCOPY TRANSORAL DIAGNOSTIC N/A 3/26/2019    Procedure: ESOPHAGOGASTRODUODENOSCOPY (EGD);   Surgeon: Irma Melvin MD; Location: [facility] GI LAB; Service: Gastroenterology   • TUBAL LIGATION      last assessed 8/20/15     Social History   Social History     Substance and Sexual Activity   Alcohol Use No     Social History     Substance and Sexual Activity   Drug Use No     Social History     Tobacco Use   Smoking Status Never   Smokeless Tobacco Never     Family History   Problem Relation Age of Onset   • Hypertension Mother    • Colon cancer Father    • Multiple myeloma Sister        Meds/Allergies       Current Outpatient Medications:   •  cyclobenzaprine (FLEXERIL) 10 mg tablet  •  linaCLOtide (Linzess) 290 MCG CAPS  •  lisinopril (ZESTRIL) 10 mg tablet  •  Magnesium 100 MG TABS  •  meloxicam (MOBIC) 7.5 mg tablet  •  omeprazole (PriLOSEC) 40 MG capsule  •  Probiotic Product (PROBIOTIC DAILY PO)  •  RA Vitamin D-3 125 MCG (5000 UT) capsule  •  simvastatin (ZOCOR) 20 mg tablet  •  solifenacin (VESICARE) 10 MG tablet    No Known Allergies        Objective     Blood pressure 156/86, pulse (!) 52, temperature 98.6 °F (37 °C), temperature source Tympanic, height 5' 2" (1.575 m), weight 78.6 kg (173 lb 3.2 oz), last menstrual period 07/02/2016, not currently breastfeeding. Body mass index is 31.68 kg/m². PHYSICAL EXAM:      General Appearance:   Alert, cooperative, no distress   HEENT:   Normocephalic, atraumatic, anicteric. Neck:  Supple, symmetrical, trachea midline   Lungs:   Clear to auscultation bilaterally; no rales, rhonchi or wheezing; respirations unlabored    Heart[de-identified]   Regular rate and rhythm; no murmur, rub, or gallop.    Abdomen:   Soft, mild generalized tenderness to deep palpation without rebound or guarding non-distended; normal bowel sounds; no masses, no organomegaly    Genitalia:   Deferred    Rectal:   Deferred    Extremities:  No cyanosis, clubbing or edema    Pulses:  2+ and symmetric    Skin:  No jaundice, rashes, or lesions    Lymph nodes:  No palpable cervical lymphadenopathy        Lab Results:   No visits with results within 1 Day(s) from this visit. Latest known visit with results is:   Hospital Outpatient Visit on 08/29/2023   Component Date Value   • Case Report 08/29/2023                      Value:Surgical Pathology Report                         Case: [id number]                                   Authorizing Provider:  Josseline Wayne MD       Collected:           08/29/2023 0816              Ordering Location:     [facility] [address]        Received:            08/29/2023 [address] [facility] Endoscopy                                                     Pathologist:           Chanell Bangura MD                                                       Specimens:   A) - Duodenum, bx r/o celiacs                                                                       B) - Stomach, bx r/o h pylori                                                                       C) - Colon, bx abnormal mucosa                                                                      D) - Colon, ascending polyp                                                               • Addendum 08/29/2023                      Value: This result contains rich text formatting which cannot be displayed here. • Final Diagnosis 08/29/2023                      Value: This result contains rich text formatting which cannot be displayed here. • Note 08/29/2023                      Value: This result contains rich text formatting which cannot be displayed here. • Additional Information 08/29/2023                      Value: This result contains rich text formatting which cannot be displayed here. • Synoptic Checklist 08/29/2023                      Value:                            COLON/RECTUM POLYP FORM - GI - All Specimens                                                                                     :    Serrated Adenoma     • Gross Description 08/29/2023                      Value: This result contains rich text formatting which cannot be displayed here. Radiology Results:   Colonoscopy    Result Date: 8/29/2023  Narrative: Table formatting from the original result was not included. [address] [facility] Endoscopy [address] [facility],[address] 3 [address] Floors [phone] DATE OF SERVICE: 8/29/23 PHYSICIAN(S): Attending: Parth Whitfield MD Fellow: No Staff Documented INDICATION: Rectal bleeding POST-OP DIAGNOSIS: See the impression below. HISTORY: Prior colonoscopy: 4 years ago. BOWEL PREPARATION: Golytely/Colyte/Trilyte PREPROCEDURE: Informed consent was obtained for the procedure, including sedation. Risks including but not limited to bleeding, infection, perforation, adverse drug reaction and aspiration were explained in detail. Also explained about less than 100% sensitivity with the exam and other alternatives. The patient was placed in the left lateral decubitus position. Procedure: Colonoscopy DETAILS OF PROCEDURE: Patient was taken to the procedure room where a time out was performed to confirm correct patient and correct procedure. The patient underwent monitored anesthesia care, which was administered by an anesthesia professional. The patient's blood pressure, heart rate, level of consciousness, oxygen, respirations and ECG were monitored throughout the procedure. A digital rectal exam was performed. The scope was introduced through the anus and advanced to the cecum. Retroflexion was performed in the rectum. The quality of bowel preparation was evaluated using the [facility] Bowel Preparation Scale with scores of: right colon = 2, transverse colon = 2, left colon = 2. The total BBPS score was 6. Bowel prep was adequate. The patient experienced no blood loss. The procedure was not difficult. The patient tolerated the procedure well. There were no apparent adverse events.  ANESTHESIA INFORMATION: ASA: II Anesthesia Type: IV Sedation with Anesthesia MEDICATIONS: No administrations occurring from [phone] on 08/29/23 FINDINGS: Severe pancolonic melanosis Performed multiple forceps biopsies in the cecum One sessile polyp measuring 5-9 mm in the ascending colon; completely removed en bloc by cold snare and retrieved specimen Internal small hemorrhoids Otherwise normal colonic mucosa EVENTS: Procedure Events Event Event Time SPECIMENS: ID Type Source Tests Collected by Time Destination 1 : bx r/o celiacs Tissue Duodenum TISSUE EXAM Desi Villalobos MD 8/29/2023  8:16 AM  2 : bx r/o h pylori Tissue Stomach TISSUE EXAM Desi Villalobos MD 8/29/2023  8:18 AM  3 : bx abnormal mucosa Tissue Colon TISSUE EXAM Desi Villalobos MD 8/29/2023  8:29 AM  4 : ascending polyp Tissue Colon TISSUE EXAM Desi Villalobos MD 8/29/2023  8:32 AM  EQUIPMENT: Colonoscope -[id number] ENDOCUFF VISION MED BLUE ID 11.0     Impression: Pancolonic melanosis Performed forceps biopsies in the cecum Subcentimeter polyp in the ascending colon was removed with cold snare Small hemorrhoids Otherwise normal colonic mucosa RECOMMENDATION:  Await pathology results  Repeat colonoscopy in 5 years  Personal history of colon polyps Family history of colon cancer  Continue Linzess for constipation management High-fiber diet  Desi Villalobos MD     EGD    Result Date: 8/29/2023  Narrative: Table formatting from the original result was not included. [address] [facility] Endoscopy [facility],2 And 3 S Floors [phone] DATE OF SERVICE: 8/29/23 PHYSICIAN(S): Attending: Desi Villalobos MD Fellow: No Staff Documented INDICATION: Bloating, Gastroesophageal reflux disease without esophagitis POST-OP DIAGNOSIS: See the impression below. PREPROCEDURE: Informed consent was obtained for the procedure, including sedation. Risks of perforation, hemorrhage, adverse drug reaction and aspiration were discussed. The patient was placed in the left lateral decubitus position.  Patient was explained about the risks and benefits of the procedure. Risks including but not limited to bleeding, infection, and perforation were explained in detail. Also explained about less than 100% sensitivity with the exam and other alternatives. PROCEDURE: EGD DETAILS OF PROCEDURE: Patient was taken to the procedure room where a time out was performed to confirm correct patient and correct procedure. The patient underwent monitored anesthesia care, which was administered by an anesthesia professional. The patient's blood pressure, heart rate, level of consciousness, respirations and oxygen were monitored throughout the procedure. The scope was advanced to the second part of the duodenum. Retroflexion was performed in the fundus. The patient experienced no blood loss. The procedure was not difficult. The patient tolerated the procedure well. There were no apparent adverse events. ANESTHESIA INFORMATION: ASA: II Anesthesia Type: IV Sedation with Anesthesia MEDICATIONS: No administrations occurring from 0812 to 0819 on 08/29/23 FINDINGS: Small hiatal hernia - GE junction 37 cm from the incisors, diaphragmatic impression 39 cm from the incisors:  Hill classification: Grade II Mild, localized erythematous mucosa in the stomach; performed cold forceps biopsy to rule out H. pylori The duodenal bulb and 2nd part of the duodenum appeared normal. Performed random biopsy using biopsy forceps to rule out celiac disease. SPECIMENS: ID Type Source Tests Collected by Time Destination 1 : bx r/o celiacs Tissue Duodenum TISSUE EXAM Raj Brenner MD 8/29/2023  8:16 AM  2 : bx r/o h pylori Tissue Stomach TISSUE EXAM Raj Brenner MD 8/29/2023  8:18 AM      Impression: Small hiatal hernia Mild erythematous mucosa in the stomach; performed cold forceps biopsy The duodenal bulb and 2nd part of the duodenum appeared normal. Performed random biopsy to rule out celiac disease.  RECOMMENDATION:  Await pathology results Proceed with colonoscopy   Raj Brenner MD [FreeTextEntry1] : Cardiology follow-up visit for evaluation and management of palpitations.  Patient has hypertension, aortic stenosis and abnormal EKG.\par \par

## 2023-09-29 ENCOUNTER — APPOINTMENT (OUTPATIENT)
Dept: INFUSION THERAPY | Facility: HOSPITAL | Age: 63
End: 2023-09-29

## 2023-10-25 ENCOUNTER — OUTPATIENT (OUTPATIENT)
Dept: OUTPATIENT SERVICES | Facility: HOSPITAL | Age: 63
LOS: 1 days | Discharge: HOME | End: 2023-10-25

## 2023-10-25 DIAGNOSIS — D3A.8 OTHER BENIGN NEUROENDOCRINE TUMORS: ICD-10-CM

## 2023-10-25 DIAGNOSIS — Z98.891 HISTORY OF UTERINE SCAR FROM PREVIOUS SURGERY: Chronic | ICD-10-CM

## 2023-10-27 ENCOUNTER — APPOINTMENT (OUTPATIENT)
Dept: INFUSION THERAPY | Facility: HOSPITAL | Age: 63
End: 2023-10-27

## 2023-11-06 NOTE — ED PROVIDER NOTE - NS ED NOTE AC HIGH RISK COUNTRIES
Pt well known to me from outpt setting  Awaiting Cardiomems
Cr stable  For Barostim 11/6
CardioMEMS today  He looks well
No

## 2023-11-24 ENCOUNTER — APPOINTMENT (OUTPATIENT)
Dept: INFUSION THERAPY | Facility: HOSPITAL | Age: 63
End: 2023-11-24

## 2023-12-11 ENCOUNTER — APPOINTMENT (OUTPATIENT)
Dept: INTERNAL MEDICINE | Facility: CLINIC | Age: 63
End: 2023-12-11
Payer: COMMERCIAL

## 2023-12-11 VITALS
BODY MASS INDEX: 52.49 KG/M2 | TEMPERATURE: 98 F | HEART RATE: 80 BPM | WEIGHT: 278 LBS | OXYGEN SATURATION: 99 % | RESPIRATION RATE: 17 BRPM | HEIGHT: 61 IN | DIASTOLIC BLOOD PRESSURE: 72 MMHG | SYSTOLIC BLOOD PRESSURE: 118 MMHG

## 2023-12-11 DIAGNOSIS — R10.11 RIGHT UPPER QUADRANT PAIN: ICD-10-CM

## 2023-12-11 DIAGNOSIS — R53.83 OTHER FATIGUE: ICD-10-CM

## 2023-12-11 PROCEDURE — 99215 OFFICE O/P EST HI 40 MIN: CPT

## 2023-12-11 RX ORDER — LANREOTIDE ACETATE 120 MG/.5ML
120 INJECTION SUBCUTANEOUS
Refills: 0 | Status: DISCONTINUED | COMMUNITY
End: 2023-12-11

## 2023-12-11 RX ORDER — PNV NO.95/FERROUS FUM/FOLIC AC 28MG-0.8MG
100 TABLET ORAL
Refills: 0 | Status: ACTIVE | COMMUNITY
Start: 2023-12-11

## 2023-12-12 LAB
ALBUMIN SERPL ELPH-MCNC: 4.3 G/DL
ALP BLD-CCNC: 88 U/L
ALT SERPL-CCNC: 7 U/L
ANION GAP SERPL CALC-SCNC: 13 MMOL/L
AST SERPL-CCNC: 14 U/L
BASOPHILS # BLD AUTO: 0.07 K/UL
BASOPHILS NFR BLD AUTO: 1 %
BILIRUB SERPL-MCNC: 0.4 MG/DL
BUN SERPL-MCNC: 16 MG/DL
CALCIUM SERPL-MCNC: 9.8 MG/DL
CHLORIDE SERPL-SCNC: 104 MMOL/L
CO2 SERPL-SCNC: 27 MMOL/L
CREAT SERPL-MCNC: 0.74 MG/DL
EGFR: 90 ML/MIN/1.73M2
EOSINOPHIL # BLD AUTO: 0.36 K/UL
EOSINOPHIL NFR BLD AUTO: 5.4 %
ESTIMATED AVERAGE GLUCOSE: 117 MG/DL
GLUCOSE SERPL-MCNC: 53 MG/DL
HBA1C MFR BLD HPLC: 5.7 %
HCT VFR BLD CALC: 42.4 %
HGB BLD-MCNC: 12.9 G/DL
IMM GRANULOCYTES NFR BLD AUTO: 0.3 %
LYMPHOCYTES # BLD AUTO: 1.65 K/UL
LYMPHOCYTES NFR BLD AUTO: 24.7 %
MAN DIFF?: NORMAL
MCHC RBC-ENTMCNC: 26.2 PG
MCHC RBC-ENTMCNC: 30.4 GM/DL
MCV RBC AUTO: 86.2 FL
MONOCYTES # BLD AUTO: 0.6 K/UL
MONOCYTES NFR BLD AUTO: 9 %
NEUTROPHILS # BLD AUTO: 3.98 K/UL
NEUTROPHILS NFR BLD AUTO: 59.6 %
PLATELET # BLD AUTO: 282 K/UL
POTASSIUM SERPL-SCNC: 4.3 MMOL/L
PROT SERPL-MCNC: 7.6 G/DL
RBC # BLD: 4.92 M/UL
RBC # FLD: 15.9 %
SODIUM SERPL-SCNC: 144 MMOL/L
WBC # FLD AUTO: 6.68 K/UL

## 2023-12-18 ENCOUNTER — APPOINTMENT (OUTPATIENT)
Dept: NEUROLOGY | Facility: CLINIC | Age: 63
End: 2023-12-18

## 2023-12-19 ENCOUNTER — APPOINTMENT (OUTPATIENT)
Dept: ENDOCRINOLOGY | Facility: CLINIC | Age: 63
End: 2023-12-19
Payer: COMMERCIAL

## 2023-12-19 VITALS
DIASTOLIC BLOOD PRESSURE: 78 MMHG | RESPIRATION RATE: 18 BRPM | TEMPERATURE: 97.4 F | SYSTOLIC BLOOD PRESSURE: 126 MMHG | WEIGHT: 279 LBS | BODY MASS INDEX: 52.67 KG/M2 | HEIGHT: 61 IN | OXYGEN SATURATION: 98 % | HEART RATE: 84 BPM

## 2023-12-19 PROCEDURE — 95251 CONT GLUC MNTR ANALYSIS I&R: CPT

## 2023-12-19 PROCEDURE — 99214 OFFICE O/P EST MOD 30 MIN: CPT | Mod: 25

## 2023-12-19 NOTE — PHYSICAL EXAM
[de-identified] : General: No distress, well nourished Eyes: Normal Sclera, EOMI, PERRL ENT: Normal appearance of the nose, normal oropharynx Neck/Thyroid: No cervical lymphadenopathy, thyroid gland 20 g in size, no thyroid nodules, non-tender Respiratory: No use of accessory muscles of respiration, vesicular breath sounds heard bilaterally, no crepitations or ronchi Cardiovascular: S1 and S2 heard and normal, no S3 or S4, no murmurs, radial pulse normal bilaterally Abdomen: soft, non-tender, no masses, normal bowel sounds Musculoskeletal: No swelling or deformities of joints of hands, has pedal edema Neurological: Normal range of motion in the hands, Normal brachioradialis reflexes bilaterally Psychiatry: Patient converses normally, good judgement and insight Skin: No rashes in hands, no nodules palpated in hands  [de-identified] : I defer DM foot exam to podiatry

## 2023-12-19 NOTE — HISTORY OF PRESENT ILLNESS
[FreeTextEntry1] : Problems: 1. DM type 2 2. 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  DM type 2 1. Diagnosed in 2019 2. Meds: Metformin 500 mg po bid (no side effects) Ozempic 2 mg sc once weekly (No family history of medullary thyroid cancer, no pancreatitis) 3. CGM - DEXCOM 7 - no hypoglycemic unawareness 4. Not on Aspirin, not on statin, on losartan 100 mg po daily 5. Complications: No DM nephropathy (normal creatinine, neg urine microalbumin panel in Sept 2023) No DM retinopathy (last eye exam was in 2023, patient advised on the need for annual DM eye exam) No ASCVD No foot ulcers/amputation 6. Patient never smoked cigarettes

## 2023-12-19 NOTE — PROCEDURE
[FreeTextEntry1] : CGM interpretation: Device: DEXCOM 7  Period: Dec 6th 2023 to Dec 19th 2023 Findings:  Percent in range: 86 %, Percent low/very low BGs: 1%, Percent high/very high BGs: 13% Interpretation - BGs are at goal.

## 2023-12-19 NOTE — ASSESSMENT
[FreeTextEntry1] : Target: HbA1c < 7%, BP < 130/80  HbA1c is at goal but patient has hypoglycemia so I decreased the dose of metformin. BP is at goal.  No indication for Aspirin, patient is on ARB - she does not want to use statin.   Last lipid panel - April 2023 - Trig 93, LDL 91 Last HbA1c - 12/11/2023 - 5.7% Last Vitamin B12 - April 2023 - elevated Last urine albumin panel - Sept 2023 - neg Last BMP/CMP - Dec 2023 - Cr N, K N, AST N, ALT low   Plan: 1. Decrease metformin to 500 mg po daily 2. Labs to be done in 3 months - see below 3. CGM DEXCOM 7  4. Follow up in 3 months to review meter and results.

## 2023-12-22 ENCOUNTER — APPOINTMENT (OUTPATIENT)
Dept: INFUSION THERAPY | Facility: HOSPITAL | Age: 63
End: 2023-12-22

## 2024-01-02 ENCOUNTER — APPOINTMENT (OUTPATIENT)
Dept: SURGERY | Facility: CLINIC | Age: 64
End: 2024-01-02
Payer: COMMERCIAL

## 2024-01-02 ENCOUNTER — NON-APPOINTMENT (OUTPATIENT)
Age: 64
End: 2024-01-02

## 2024-01-02 VITALS
WEIGHT: 275 LBS | RESPIRATION RATE: 18 BRPM | DIASTOLIC BLOOD PRESSURE: 75 MMHG | OXYGEN SATURATION: 98 % | HEART RATE: 79 BPM | SYSTOLIC BLOOD PRESSURE: 123 MMHG | TEMPERATURE: 97.2 F | BODY MASS INDEX: 51.92 KG/M2 | HEIGHT: 61 IN

## 2024-01-02 DIAGNOSIS — K80.20 CALCULUS OF GALLBLADDER W/OUT CHOLECYSTITIS W/OUT OBSTRUCTION: ICD-10-CM

## 2024-01-02 PROCEDURE — 99213 OFFICE O/P EST LOW 20 MIN: CPT

## 2024-01-02 RX ORDER — SEMAGLUTIDE 2.68 MG/ML
8 INJECTION, SOLUTION SUBCUTANEOUS
Qty: 3 | Refills: 3 | Status: DISCONTINUED | COMMUNITY
Start: 2023-06-19 | End: 2024-01-02

## 2024-01-02 NOTE — ASSESSMENT
[FreeTextEntry1] : In summary the patient is a pleasant 63-year-old woman with known metastatic neuroendocrine tumor with a primary of the small bowel root of the mesentery and several small metastatic liver lesions.  She saw surgical oncology in the past and was deemed unresectable.  She has known gallstones and complains of intermittent postprandial right upper quadrant pain radiating to her back.  This has been increasing in frequency.  Most recent LFTs 2 weeks ago are normal.  I suspect she has biliary colic.  She has lost 70 pounds over the past year on Ozempic.  As long as her disease is deemed stable and she is acceptable medical risk for surgery I would likely recommend a laparoscopic cholecystectomy.  I explained the risks benefits and alternatives of surgery including the risk of bleeding infection possible need for an open procedure rare incidence of bile duct injury retained common duct stones and postoperative loose stool.  Overall she seems to have a low disease burden in her liver and her diseases seems to be stable over the past few years on octreotide.  I will discuss this further with her oncologist prior to planning surgery.  I also sent her for a right upper quadrant ultrasound.  Because of her morbid obesity and medical comorbidities any surgery would need to be done in the main OR.

## 2024-01-02 NOTE — PHYSICAL EXAM
[Normal Breath Sounds] : Normal breath sounds [Normal Heart Sounds] : normal heart sounds [No Rash or Lesion] : No rash or lesion [Alert] : alert [Oriented to Person] : oriented to person [Oriented to Place] : oriented to place [Oriented to Time] : oriented to time [Calm] : calm [de-identified] : WNL [de-identified] : WNL [de-identified] : KIPL [de-identified] : Obese, soft, nontender, no palpable masses or hernias [de-identified] : Ambulates with a cane [de-identified] : WNL

## 2024-01-02 NOTE — CONSULT LETTER
[Dear  ___] : Dear  [unfilled], [Consult Letter:] : I had the pleasure of evaluating your patient, [unfilled]. [Please see my note below.] : Please see my note below. [Consult Closing:] : Thank you very much for allowing me to participate in the care of this patient.  If you have any questions, please do not hesitate to contact me. [Sincerely,] : Sincerely, [DrKelle  ___] : Dr. DU [FreeTextEntry3] : Malachi Foy M.D., F.A.C.S, F.A.S.C.R.S

## 2024-01-02 NOTE — HISTORY OF PRESENT ILLNESS
[de-identified] : Oxana is a 64 y/o female here for consultation, gallbladder  h/o Stage IV non secreting SB NET, grade 2 on Lanreotide monthly  CT A/P 22, 09/10/22, 23 (Known neuroendocrine tumor) - GALLBLADDER: Cholelithiasis.  CT A/P 23 (Neuroendocrine tumor. Restaging 6 months after treatment) - Stable mesenteric mass with calcification. Stable 5.0 cm right adnexal lesion. While one of the 2 liver lesions was not well seen on the prior study, this may be due to motion artifact in this was present on prior MRI from 2020. GALLBLADDER: Gallstones without focal inflammation.  Lab 23 - Bilirubin 0.4 ALK 88 AST 14 ALT 7 (low)  For the last 5 weeks pt reports having intermittent abdominal pain RUQ and occasional nausea after meals.  The pain travels to her back.  BMs every 2-3 days regular.  Abdominal surgery history, .  Pt states she has lost 70 lbs in the past 1 year on Ozempic.

## 2024-01-05 ENCOUNTER — NON-APPOINTMENT (OUTPATIENT)
Age: 64
End: 2024-01-05

## 2024-01-05 ENCOUNTER — APPOINTMENT (OUTPATIENT)
Dept: CARDIOLOGY | Facility: CLINIC | Age: 64
End: 2024-01-05
Payer: COMMERCIAL

## 2024-01-05 VITALS
HEART RATE: 76 BPM | DIASTOLIC BLOOD PRESSURE: 84 MMHG | HEIGHT: 61 IN | WEIGHT: 278 LBS | RESPIRATION RATE: 17 BRPM | SYSTOLIC BLOOD PRESSURE: 126 MMHG | BODY MASS INDEX: 52.49 KG/M2 | OXYGEN SATURATION: 97 %

## 2024-01-05 DIAGNOSIS — I35.0 NONRHEUMATIC AORTIC (VALVE) STENOSIS: ICD-10-CM

## 2024-01-05 DIAGNOSIS — R94.31 ABNORMAL ELECTROCARDIOGRAM [ECG] [EKG]: ICD-10-CM

## 2024-01-05 PROCEDURE — 99215 OFFICE O/P EST HI 40 MIN: CPT | Mod: 25

## 2024-01-05 PROCEDURE — 93000 ELECTROCARDIOGRAM COMPLETE: CPT

## 2024-01-05 NOTE — REASON FOR VISIT
[FreeTextEntry1] : Cardiology follow-up visit for evaluation management of hypertension, mild aortic stenosis

## 2024-01-05 NOTE — CARDIOLOGY SUMMARY
[de-identified] : Jan 5 2024. Sinus Rhythm  nonspecific ST changes  [de-identified] : Sept 5 2023. TDS. Normal LV function, mild AS  [de-identified] : May 2019.. normal coronary arteries

## 2024-01-05 NOTE — PHYSICAL EXAM
[Obese] : obese [Normal S1, S2] : normal S1, S2 [No Rub] : no rub [No Gallop] : no gallop [Murmur] : murmur [Normal] : alert and oriented, normal memory [de-identified] : ll/Vl ssytolic  [de-identified] : 1+ bilateral edema  [de-identified] : lymphedema

## 2024-01-05 NOTE — HISTORY OF PRESENT ILLNESS
[FreeTextEntry1] : Since last visit with me, she has been feeling well. She continues to have a sedentary lifestyle, but has been losing weight on Ozempic. She also has been having intermittent abdominal pain and has been seeing a surgeon regarding this and may be considered for cholecystectomy in the future. No cardiac complaints.  No chest pain or chest pressure.  No shortness of breath.  Denies palpitations.  No dizziness or lightheadedness.  No syncope.  No orthopnea.  No PND. Reports compliance to current medications.

## 2024-01-05 NOTE — DISCUSSION/SUMMARY
[FreeTextEntry1] : 63-year-old woman with hypertension, mild aortic stenosis and abnormal EKG. She has been asymptomatic with respect to cardiac issues. Medical issues include obesity, neuroendocrine tumor Blood pressure stable.  There is no JVD.  Lung fields are clear.  Lower extremity edema is unchanged from prior. EKG is sinus rhythm.  Nonspecific ST changes. Current cardiac status appears to be stable.  Plan 1.  Current medication list is reviewed, no changes. 2.  She will follow-up with me in the office in 6 months. 3.  Advised her to monitor for any cardiac symptoms and to report back to me if there are any changes or if she has any other concerns.  Cardiac issues were discussed, all questions answered.

## 2024-01-12 ENCOUNTER — OUTPATIENT (OUTPATIENT)
Dept: OUTPATIENT SERVICES | Facility: HOSPITAL | Age: 64
LOS: 1 days | Discharge: HOME | End: 2024-01-12

## 2024-01-12 ENCOUNTER — APPOINTMENT (OUTPATIENT)
Dept: ULTRASOUND IMAGING | Facility: HOSPITAL | Age: 64
End: 2024-01-12

## 2024-01-12 ENCOUNTER — OUTPATIENT (OUTPATIENT)
Dept: OUTPATIENT SERVICES | Facility: HOSPITAL | Age: 64
LOS: 1 days | End: 2024-01-12
Payer: COMMERCIAL

## 2024-01-12 DIAGNOSIS — D3A.8 OTHER BENIGN NEUROENDOCRINE TUMORS: ICD-10-CM

## 2024-01-12 DIAGNOSIS — Z98.891 HISTORY OF UTERINE SCAR FROM PREVIOUS SURGERY: Chronic | ICD-10-CM

## 2024-01-12 DIAGNOSIS — R10.11 RIGHT UPPER QUADRANT PAIN: ICD-10-CM

## 2024-01-12 PROCEDURE — 76700 US EXAM ABDOM COMPLETE: CPT

## 2024-01-12 PROCEDURE — 76700 US EXAM ABDOM COMPLETE: CPT | Mod: 26

## 2024-01-16 ENCOUNTER — NON-APPOINTMENT (OUTPATIENT)
Age: 64
End: 2024-01-16

## 2024-01-19 ENCOUNTER — APPOINTMENT (OUTPATIENT)
Dept: INFUSION THERAPY | Facility: HOSPITAL | Age: 64
End: 2024-01-19

## 2024-01-24 NOTE — ED PROVIDER NOTE - INTERNATIONAL TRAVEL
No Bed/Stretcher in lowest position, wheels locked, appropriate side rails in place/Call bell, personal items and telephone in reach/Instruct patient to call for assistance before getting out of bed/chair/stretcher/Non-slip footwear applied when patient is off stretcher/[place] to call system/Physically safe environment - no spills, clutter or unnecessary equipment/Purposeful proactive rounding/Room/bathroom lighting operational, light cord in reach

## 2024-02-07 ENCOUNTER — RX RENEWAL (OUTPATIENT)
Age: 64
End: 2024-02-07

## 2024-02-09 ENCOUNTER — OUTPATIENT (OUTPATIENT)
Dept: OUTPATIENT SERVICES | Facility: HOSPITAL | Age: 64
LOS: 1 days | End: 2024-02-09
Payer: COMMERCIAL

## 2024-02-09 VITALS
SYSTOLIC BLOOD PRESSURE: 140 MMHG | DIASTOLIC BLOOD PRESSURE: 84 MMHG | OXYGEN SATURATION: 98 % | RESPIRATION RATE: 16 BRPM | HEIGHT: 60.63 IN | HEART RATE: 70 BPM | WEIGHT: 268.96 LBS | TEMPERATURE: 98 F

## 2024-02-09 DIAGNOSIS — K80.20 CALCULUS OF GALLBLADDER WITHOUT CHOLECYSTITIS WITHOUT OBSTRUCTION: ICD-10-CM

## 2024-02-09 DIAGNOSIS — G47.33 OBSTRUCTIVE SLEEP APNEA (ADULT) (PEDIATRIC): ICD-10-CM

## 2024-02-09 DIAGNOSIS — Z98.890 OTHER SPECIFIED POSTPROCEDURAL STATES: Chronic | ICD-10-CM

## 2024-02-09 DIAGNOSIS — E66.01 MORBID (SEVERE) OBESITY DUE TO EXCESS CALORIES: ICD-10-CM

## 2024-02-09 DIAGNOSIS — Z90.89 ACQUIRED ABSENCE OF OTHER ORGANS: Chronic | ICD-10-CM

## 2024-02-09 DIAGNOSIS — Z98.891 HISTORY OF UTERINE SCAR FROM PREVIOUS SURGERY: Chronic | ICD-10-CM

## 2024-02-09 DIAGNOSIS — E11.9 TYPE 2 DIABETES MELLITUS WITHOUT COMPLICATIONS: ICD-10-CM

## 2024-02-09 LAB
A1C WITH ESTIMATED AVERAGE GLUCOSE RESULT: 5.6 % — SIGNIFICANT CHANGE UP (ref 4–5.6)
ALBUMIN SERPL ELPH-MCNC: 3.9 G/DL — SIGNIFICANT CHANGE UP (ref 3.3–5)
ALP SERPL-CCNC: 163 U/L — HIGH (ref 40–120)
ALT FLD-CCNC: 31 U/L — SIGNIFICANT CHANGE UP (ref 10–45)
ANION GAP SERPL CALC-SCNC: 10 MMOL/L — SIGNIFICANT CHANGE UP (ref 5–17)
APTT BLD: 32.9 SEC — SIGNIFICANT CHANGE UP (ref 24.5–35.6)
AST SERPL-CCNC: 37 U/L — SIGNIFICANT CHANGE UP (ref 10–40)
BILIRUB SERPL-MCNC: 0.5 MG/DL — SIGNIFICANT CHANGE UP (ref 0.2–1.2)
BUN SERPL-MCNC: 18 MG/DL — SIGNIFICANT CHANGE UP (ref 7–23)
CALCIUM SERPL-MCNC: 9.6 MG/DL — SIGNIFICANT CHANGE UP (ref 8.4–10.5)
CHLORIDE SERPL-SCNC: 103 MMOL/L — SIGNIFICANT CHANGE UP (ref 96–108)
CO2 SERPL-SCNC: 27 MMOL/L — SIGNIFICANT CHANGE UP (ref 22–31)
CREAT SERPL-MCNC: 0.81 MG/DL — SIGNIFICANT CHANGE UP (ref 0.5–1.3)
EGFR: 81 ML/MIN/1.73M2 — SIGNIFICANT CHANGE UP
ESTIMATED AVERAGE GLUCOSE: 114 MG/DL — SIGNIFICANT CHANGE UP (ref 68–114)
GLUCOSE SERPL-MCNC: 131 MG/DL — HIGH (ref 70–99)
HCT VFR BLD CALC: 41.5 % — SIGNIFICANT CHANGE UP (ref 34.5–45)
HGB BLD-MCNC: 12.7 G/DL — SIGNIFICANT CHANGE UP (ref 11.5–15.5)
INR BLD: 1.07 RATIO — SIGNIFICANT CHANGE UP (ref 0.85–1.18)
MCHC RBC-ENTMCNC: 25.8 PG — LOW (ref 27–34)
MCHC RBC-ENTMCNC: 30.6 GM/DL — LOW (ref 32–36)
MCV RBC AUTO: 84.3 FL — SIGNIFICANT CHANGE UP (ref 80–100)
NRBC # BLD: 0 /100 WBCS — SIGNIFICANT CHANGE UP (ref 0–0)
PLATELET # BLD AUTO: 263 K/UL — SIGNIFICANT CHANGE UP (ref 150–400)
POTASSIUM SERPL-MCNC: 3.9 MMOL/L — SIGNIFICANT CHANGE UP (ref 3.5–5.3)
POTASSIUM SERPL-SCNC: 3.9 MMOL/L — SIGNIFICANT CHANGE UP (ref 3.5–5.3)
PROT SERPL-MCNC: 7.6 G/DL — SIGNIFICANT CHANGE UP (ref 6–8.3)
PROTHROM AB SERPL-ACNC: 11.7 SEC — SIGNIFICANT CHANGE UP (ref 9.5–13)
RBC # BLD: 4.92 M/UL — SIGNIFICANT CHANGE UP (ref 3.8–5.2)
RBC # FLD: 15.7 % — HIGH (ref 10.3–14.5)
SODIUM SERPL-SCNC: 140 MMOL/L — SIGNIFICANT CHANGE UP (ref 135–145)
WBC # BLD: 4.77 K/UL — SIGNIFICANT CHANGE UP (ref 3.8–10.5)
WBC # FLD AUTO: 4.77 K/UL — SIGNIFICANT CHANGE UP (ref 3.8–10.5)

## 2024-02-09 PROCEDURE — 85027 COMPLETE CBC AUTOMATED: CPT

## 2024-02-09 PROCEDURE — 85610 PROTHROMBIN TIME: CPT

## 2024-02-09 PROCEDURE — 85730 THROMBOPLASTIN TIME PARTIAL: CPT

## 2024-02-09 PROCEDURE — 83036 HEMOGLOBIN GLYCOSYLATED A1C: CPT

## 2024-02-09 PROCEDURE — G0463: CPT

## 2024-02-09 PROCEDURE — 36415 COLL VENOUS BLD VENIPUNCTURE: CPT

## 2024-02-09 PROCEDURE — 80053 COMPREHEN METABOLIC PANEL: CPT

## 2024-02-09 RX ORDER — CEFAZOLIN SODIUM 1 G
3000 VIAL (EA) INJECTION ONCE
Refills: 0 | Status: COMPLETED | OUTPATIENT
Start: 2024-03-07 | End: 2024-03-07

## 2024-02-09 RX ORDER — LIDOCAINE HCL 20 MG/ML
0.2 VIAL (ML) INJECTION ONCE
Refills: 0 | Status: DISCONTINUED | OUTPATIENT
Start: 2024-03-07 | End: 2024-03-07

## 2024-02-09 RX ORDER — ACETAMINOPHEN 500 MG
0 TABLET ORAL
Qty: 0 | Refills: 0 | DISCHARGE

## 2024-02-09 RX ORDER — METFORMIN HYDROCHLORIDE 850 MG/1
0 TABLET ORAL
Qty: 0 | Refills: 0 | DISCHARGE

## 2024-02-09 RX ORDER — CHLORHEXIDINE GLUCONATE 213 G/1000ML
1 SOLUTION TOPICAL ONCE
Refills: 0 | Status: COMPLETED | OUTPATIENT
Start: 2024-03-07 | End: 2024-03-07

## 2024-02-09 RX ORDER — SODIUM CHLORIDE 9 MG/ML
3 INJECTION INTRAMUSCULAR; INTRAVENOUS; SUBCUTANEOUS EVERY 8 HOURS
Refills: 0 | Status: DISCONTINUED | OUTPATIENT
Start: 2024-03-07 | End: 2024-03-07

## 2024-02-09 NOTE — H&P PST ADULT - PROBLEM SELECTOR PLAN 1
Pt is scheduled for a laparoscopic cholecystectomy on 2/7/24 with Dr. Foy  CBC, CMP and coags ordered and obtained at [facility]  Pending M/E on 2/13/24

## 2024-02-09 NOTE — H&P PST ADULT - PROBLEM SELECTOR PLAN 2
HGA1C ordered and obtained at [facility]  FS on admit  Pt advised to hold Metformin DOS with last dose on 3/6/24 PM  Pt denies any nausea, vomiting or early satiety with ozempic use - advised to hold 1 week prior to surgery with last dose on 2/26/24 with teach back understanding

## 2024-02-09 NOTE — H&P PST ADULT - LAST ECHOCARDIOGRAM
9/2023 routinely with cardiologist - EF estimated at 55-60%, mild left ventricular hypertrophy, mild aortic stenosis

## 2024-02-09 NOTE — H&P PST ADULT - NSICDXPASTMEDICALHX_GEN_ALL_CORE_FT
PAST MEDICAL HISTORY:  2019 novel coronavirus disease (COVID-19)     Anemia Fe def    Benign hypertension     Calculus of gallbladder without cholecystitis without obstruction     Chronic back pain     Lymphedema     Mesenteric mass     Metastasis to liver     Mild aortic stenosis     Morbid obesity     Neuroendocrine tumor     Type 2 diabetes mellitus     Type 2 diabetes mellitus with other specified complication, without long-term current use of insulin      PAST MEDICAL HISTORY:  2019 novel coronavirus disease (COVID-19)     Benign hypertension     Calculus of gallbladder without cholecystitis without obstruction     Chronic back pain     Lymphedema     Mesenteric mass     Metastasis to liver     Mild aortic stenosis     Morbid obesity     Neuroendocrine tumor     JOSTIN (obstructive sleep apnea)     Osteoarthritis of knees, bilateral     Type 2 diabetes mellitus     Type 2 diabetes mellitus with other specified complication, without long-term current use of insulin      PAST MEDICAL HISTORY:  2019 novel coronavirus disease (COVID-19)     Benign hypertension     Calculus of gallbladder without cholecystitis without obstruction     Chronic back pain     Lymphedema     Mesenteric mass     Metastasis to liver     Mild aortic stenosis     Morbid obesity     Neuroendocrine tumor     JOSTIN (obstructive sleep apnea)     Osteoarthritis of knees, bilateral     Type 2 diabetes mellitus

## 2024-02-09 NOTE — H&P PST ADULT - HISTORY OF PRESENT ILLNESS
Neuroendocrine neoplasm of gastrointestinal tract (Dx in 2018 - no surgical intervention - on Lantreotide monthly - metastasis to liver)  intermittent RUQ abdominal discomfort 63 year old female with PMH neuroendocrine neoplasm of gastrointestinal tract (Dx in 2018 - no surgical intervention - on Lantreotide monthly - metastasis to liver), JOSTIN by criteria (STOP BANG 5), HTN, DMT2 (HGA1C 5.7% 12/1/23 - on Metformin), morbid obesity (BMI 51.4 - 70 lb wt loss with Ozempic over the past year) and B/L knee OA reports c/o intermittent postprandial RUQ discomfort that radiates to the right back for several years that has progressively worsened over the past 6 months. Pt now presents to PST for scheduled laparoscopic cholecystectomy with Dr. Foy on 2/7/24.

## 2024-02-09 NOTE — H&P PST ADULT - ASSESSMENT
DASI score: 5.07  DASI activity: Able to walk 2-3 blocks with cane assistance, ADLs, 1 Flight of Stairs (bedroom on 2nd floor)  Loose teeth or denture: partial upper and lower denture, denies loose teeth DASI score: 5.07 - restricted due to low back pain and B/L knee pain  DASI activity: Able to walk < 2-3 blocks with cane assistance, wheelchair for distances > 3 blocks, ADLs, 1 Flight of Stairs (bedroom on 2nd floor)  Loose teeth or denture: partial upper and lower denture, denies loose teeth

## 2024-02-09 NOTE — H&P PST ADULT - NSICDXPASTSURGICALHX_GEN_ALL_CORE_FT
PAST SURGICAL HISTORY:  H/O  section     History of myomectomy     History of tonsillectomy     S/P left knee arthroscopy

## 2024-02-09 NOTE — H&P PST ADULT - MUSCULOSKELETAL
B/L knee pain and OA, uses cane for assistance/ROM intact/decreased ROM due to pain/normal gait/strength 5/5 bilateral upper extremities/strength 5/5 bilateral lower extremities details…

## 2024-02-09 NOTE — H&P PST ADULT - NEGATIVE GASTROINTESTINAL SYMPTOMS
intermittent RLQ abdominal "discomfort" with nausea/no vomiting/no diarrhea/no constipation/no melena/no jaundice

## 2024-02-09 NOTE — H&P PST ADULT - LAST STRESS TEST
2019 Nuclear - normal wall motion, normal LV function, largemoderate to severe defects in anterior and anteroseptal, anterolateral walls that are predominantly reversible consistent with ischemia s/p cardiac angiogram

## 2024-02-09 NOTE — H&P PST ADULT - OTHER CARE PROVIDERS
Dr. Richard Murray (Cardiologist) [phone] - last visit 1/5/24 for F/U; Dr. Sandy Busch (Endo) [phone] - last visit 12/19/24 for F/U; Dr. Katalina Blackmon (Oncologist) [phone] - last visit 9/6/23 for F/U Dr. Richard Murray (Cardiologist) [phone] - last visit 1/5/24 for F/U; Dr. Sandy Busch (Endo) [phone] - last visit 12/19/23 for F/U; Dr. Katalina Blackmon (Oncologist) [phone] - last visit 9/6/23 for F/U

## 2024-02-12 ENCOUNTER — APPOINTMENT (OUTPATIENT)
Dept: INTERNAL MEDICINE | Facility: CLINIC | Age: 64
End: 2024-02-12
Payer: COMMERCIAL

## 2024-02-12 VITALS
WEIGHT: 271 LBS | DIASTOLIC BLOOD PRESSURE: 80 MMHG | TEMPERATURE: 98 F | HEIGHT: 61 IN | OXYGEN SATURATION: 96 % | BODY MASS INDEX: 51.16 KG/M2 | RESPIRATION RATE: 18 BRPM | HEART RATE: 100 BPM | SYSTOLIC BLOOD PRESSURE: 126 MMHG

## 2024-02-12 DIAGNOSIS — Z23 ENCOUNTER FOR IMMUNIZATION: ICD-10-CM

## 2024-02-12 DIAGNOSIS — Z01.818 ENCOUNTER FOR OTHER PREPROCEDURAL EXAMINATION: ICD-10-CM

## 2024-02-12 DIAGNOSIS — K80.50 CALCULUS OF BILE DUCT W/OUT CHOLANGITIS OR CHOLECYSTITIS W/OUT OBSTRUCTION: ICD-10-CM

## 2024-02-12 LAB
CREAT SPEC-SCNC: 137 MG/DL
CREAT/PROT UR: 0.2 RATIO
CRP SERPL-MCNC: 9 MG/L
ERYTHROCYTE [SEDIMENTATION RATE] IN BLOOD BY WESTERGREN METHOD: 31 MM/HR
PROT UR-MCNC: 25 MG/DL

## 2024-02-12 PROCEDURE — 99215 OFFICE O/P EST HI 40 MIN: CPT

## 2024-02-12 NOTE — HISTORY OF PRESENT ILLNESS
[Aortic Stenosis] : aortic stenosis [(Patient denies any chest pain, claudication, dyspnea on exertion, orthopnea, palpitations or syncope)] : Patient denies any chest pain, claudication, dyspnea on exertion, orthopnea, palpitations or syncope [Moderate (4-6 METs)] : Moderate (4-6 METs) [Atrial Fibrillation] : no atrial fibrillation [Coronary Artery Disease] : no coronary artery disease [Recent Myocardial Infarction] : no recent myocardial infarction [Implantable Device/Pacemaker] : no implantable device/pacemaker [FreeTextEntry1] : lap linda [FreeTextEntry2] : March 7, 2023 [FreeTextEntry3] : Law/AVRIL [FreeTextEntry4] : Most pleasant 63-year-old white female with history of 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  The patient has known drug allergy to codeine (delirium). She has tolerated other narcotics without complication. She is not an easy bleeder, and is not anticoagulated. She has not been on chronic immunosuppression such as chronic prednisone.She has history of diabetes, A1c  5.7% (metformin dose reduced, weekly ozempic continued). There is no apparent personal or family history of unprovoked VTE, malignant hyperthermia, glaucoma, or HIV/HBV/HCV. She has no history of blood transfusion. There is no known history of sleep apnea, but clinically suspected STOPBANG.  Has referral to sleep medicine, but declined to pursue.  The patient denies signs and symptoms of active infection within the last 14 days, including: fever, shaking, chills, drenching sweats, new headache, sinus pressure, purulent rhinorhea ear ache, dental thermal sensitivity, sore throat, productive cough, new wheeze, abdominal pain, change in bowel or bladder habits such as diarrhea, dysuria.  The patient denies signs and symptoms of decompensated cardiopulmonary disease including new dyspnea even with exertion, wheeze, cough, exertional chest pain, PND, orthopnea, claudication, TIA or stroke.  With regards to functional capacity, patient indicates at baseline can mount 4 METS based on their ability to climb flight of full stairs without SoB, CP, or presyncope.   Last tetanus booster remote, provided today. The patient endorses partial upper lower dentures. Patient has tolerated general anesthesia seemingly without complication at age 31 for .  She also had left knee arthroscopy in .  She specifically denies postoperative nausea vomiting dysphoria or being told that her blood pressure was difficult to control perioperatively.  RCRI=1 points, class II risk (6% 30 day risk of death, MI, arrest) due to intraperitoneal surgery, will forego pre op NTproBNP check given extense cardiac evaluation in the past 5 years (see below)   Also, would not modify or add beta blockers now that the patient is within the 30 day pre operative window; given lack of evidence by history, exam, chart review for suboptimally controlled angina, heart failure, and no history of prior MI.  The patient has only minor clinical predictors of increased perioperative cardiovascular risk. Therefore post op scheduled troponin, EKG surveillance is not indicated. [FreeTextEntry7] : Transthoracic echocardiogram 9/5/2023 mild aortic stenosis LVEF 55 to 60% mild LVH PASP 23 mm Holter January 2023.  Rate 50-1 38 average 82 0.13% PVCs. EKG January 5, 2024 normal sinus rhythm with minimal nonspecific ST segment changes, no overt evidence of LVH Coronary angiogram May 29, 2019 showed no significant coronary artery disease, no stents placed.

## 2024-02-12 NOTE — ASSESSMENT
[Patient Optimized for Surgery] : Patient optimized for surgery [No Further Testing Recommended] : no further testing recommended [As per surgery] : as per surgery [FreeTextEntry4] : Patient without signs of symptoms of active infection, without evidence by history or exam of decompensated cardiopulmonary disease, and history of tolerance albeit remotely to general anesthesia.  We are updating her tetanus shot today.  We caution close surveillance for obstructive sleep apnea given high STOP-BANG and patient decision to not obtain formal testing when previously offered.  We note her upper and lower partial dentures.  I have reviewed the patient's recent EKG, CBC CMP APTT and INR and identified no contraindication to the planned procedure. It was pleasure to visit with Ms. Ratliff. Answered all questions as best I could. Disposition follow-up 3 to 6 months Time 40 minutes.       [FreeTextEntry6] : Hold NSAIDs and aspirin 5 days prior to procedure. [FreeTextEntry7] : Hold Ozempic 2-week before procedure hold metformin day of procedure

## 2024-02-12 NOTE — PHYSICAL EXAM
[Normal Outer Ear/Nose] : the outer ears and nose were normal in appearance [Normal TMs] : both tympanic membranes were normal [Normal Nasal Mucosa] : the nasal mucosa was normal [No JVD] : no jugular venous distention [No Lymphadenopathy] : no lymphadenopathy [Supple] : supple [Thyroid Normal, No Nodules] : the thyroid was normal and there were no nodules present [No CVA Tenderness] : no CVA  tenderness [No Spinal Tenderness] : no spinal tenderness [Kyphosis] : kyphosis [Normal] : affect was normal and insight and judgment were intact [de-identified] : Mallampati class III.  No micrognathia. [de-identified] : Can extend his neck to 45 degrees above the horizontal plane [de-identified] : Obese [de-identified] : OA changes particular in the knees which are not warm red or swollen

## 2024-02-16 ENCOUNTER — APPOINTMENT (OUTPATIENT)
Dept: INFUSION THERAPY | Facility: HOSPITAL | Age: 64
End: 2024-02-16

## 2024-02-22 ENCOUNTER — APPOINTMENT (OUTPATIENT)
Dept: FAMILY MEDICINE | Facility: CLINIC | Age: 64
End: 2024-02-22

## 2024-02-23 ENCOUNTER — NON-APPOINTMENT (OUTPATIENT)
Age: 64
End: 2024-02-23

## 2024-02-23 PROBLEM — U07.1 COVID-19: Chronic | Status: ACTIVE | Noted: 2024-02-09

## 2024-02-23 PROBLEM — M17.0 BILATERAL PRIMARY OSTEOARTHRITIS OF KNEE: Chronic | Status: ACTIVE | Noted: 2024-02-09

## 2024-02-23 PROBLEM — I35.0 NONRHEUMATIC AORTIC (VALVE) STENOSIS: Chronic | Status: ACTIVE | Noted: 2024-02-09

## 2024-02-23 PROBLEM — E11.9 TYPE 2 DIABETES MELLITUS WITHOUT COMPLICATIONS: Chronic | Status: ACTIVE | Noted: 2024-02-09

## 2024-02-23 PROBLEM — G47.33 OBSTRUCTIVE SLEEP APNEA (ADULT) (PEDIATRIC): Chronic | Status: ACTIVE | Noted: 2024-02-09

## 2024-02-23 PROBLEM — K80.20 CALCULUS OF GALLBLADDER WITHOUT CHOLECYSTITIS WITHOUT OBSTRUCTION: Chronic | Status: ACTIVE | Noted: 2024-02-09

## 2024-02-23 PROBLEM — C78.7 SECONDARY MALIGNANT NEOPLASM OF LIVER AND INTRAHEPATIC BILE DUCT: Chronic | Status: ACTIVE | Noted: 2024-02-09

## 2024-02-23 PROBLEM — M54.9 DORSALGIA, UNSPECIFIED: Chronic | Status: ACTIVE | Noted: 2024-02-09

## 2024-02-23 PROBLEM — D3A.8 OTHER BENIGN NEUROENDOCRINE TUMORS: Chronic | Status: ACTIVE | Noted: 2024-02-09

## 2024-02-23 RX ORDER — OCTREOTIDE ACETATE 200 UG/ML
500 INJECTION, SOLUTION INTRAVENOUS; SUBCUTANEOUS
Qty: 500 | Refills: 0 | Status: DISCONTINUED | OUTPATIENT
Start: 2024-03-07 | End: 2024-03-07

## 2024-02-26 RX ORDER — AMLODIPINE BESYLATE 5 MG/1
5 TABLET ORAL DAILY
Qty: 90 | Refills: 3 | Status: ACTIVE | COMMUNITY
Start: 2019-07-09 | End: 1900-01-01

## 2024-03-05 ENCOUNTER — OUTPATIENT (OUTPATIENT)
Dept: OUTPATIENT SERVICES | Facility: HOSPITAL | Age: 64
LOS: 1 days | Discharge: HOME | End: 2024-03-05

## 2024-03-05 DIAGNOSIS — Z98.891 HISTORY OF UTERINE SCAR FROM PREVIOUS SURGERY: Chronic | ICD-10-CM

## 2024-03-05 DIAGNOSIS — Z98.890 OTHER SPECIFIED POSTPROCEDURAL STATES: Chronic | ICD-10-CM

## 2024-03-05 DIAGNOSIS — D3A.8 OTHER BENIGN NEUROENDOCRINE TUMORS: ICD-10-CM

## 2024-03-05 DIAGNOSIS — Z90.89 ACQUIRED ABSENCE OF OTHER ORGANS: Chronic | ICD-10-CM

## 2024-03-06 ENCOUNTER — TRANSCRIPTION ENCOUNTER (OUTPATIENT)
Age: 64
End: 2024-03-06

## 2024-03-07 ENCOUNTER — RESULT REVIEW (OUTPATIENT)
Age: 64
End: 2024-03-07

## 2024-03-07 ENCOUNTER — INPATIENT (INPATIENT)
Facility: HOSPITAL | Age: 64
LOS: 0 days | Discharge: HOME | DRG: 418 | End: 2024-03-08
Attending: COLON & RECTAL SURGERY | Admitting: COLON & RECTAL SURGERY
Payer: COMMERCIAL

## 2024-03-07 ENCOUNTER — APPOINTMENT (OUTPATIENT)
Dept: SURGERY | Facility: HOSPITAL | Age: 64
End: 2024-03-07
Payer: COMMERCIAL

## 2024-03-07 VITALS
RESPIRATION RATE: 16 BRPM | HEART RATE: 68 BPM | OXYGEN SATURATION: 97 % | SYSTOLIC BLOOD PRESSURE: 153 MMHG | WEIGHT: 268.96 LBS | DIASTOLIC BLOOD PRESSURE: 83 MMHG | HEIGHT: 60.63 IN | TEMPERATURE: 98 F

## 2024-03-07 DIAGNOSIS — Z98.890 OTHER SPECIFIED POSTPROCEDURAL STATES: Chronic | ICD-10-CM

## 2024-03-07 DIAGNOSIS — Z98.891 HISTORY OF UTERINE SCAR FROM PREVIOUS SURGERY: Chronic | ICD-10-CM

## 2024-03-07 DIAGNOSIS — Z90.89 ACQUIRED ABSENCE OF OTHER ORGANS: Chronic | ICD-10-CM

## 2024-03-07 DIAGNOSIS — K80.20 CALCULUS OF GALLBLADDER WITHOUT CHOLECYSTITIS WITHOUT OBSTRUCTION: ICD-10-CM

## 2024-03-07 LAB
GLUCOSE BLDC GLUCOMTR-MCNC: 104 MG/DL — HIGH (ref 70–99)
GLUCOSE BLDC GLUCOMTR-MCNC: 130 MG/DL — HIGH (ref 70–99)

## 2024-03-07 PROCEDURE — 88304 TISSUE EXAM BY PATHOLOGIST: CPT | Mod: 26

## 2024-03-07 PROCEDURE — 47562 LAPAROSCOPIC CHOLECYSTECTOMY: CPT

## 2024-03-07 DEVICE — LIGATING CLIPS WECK HEMOLOK POLYMER MEDIUM-LARGE (GREEN) 6: Type: IMPLANTABLE DEVICE | Status: FUNCTIONAL

## 2024-03-07 DEVICE — URETERAL CATH OPEN END 5FR 70CM: Type: IMPLANTABLE DEVICE | Status: FUNCTIONAL

## 2024-03-07 RX ORDER — SODIUM CHLORIDE 9 MG/ML
1000 INJECTION, SOLUTION INTRAVENOUS
Refills: 0 | Status: DISCONTINUED | OUTPATIENT
Start: 2024-03-07 | End: 2024-03-08

## 2024-03-07 RX ORDER — OXYCODONE HYDROCHLORIDE 5 MG/1
5 TABLET ORAL EVERY 4 HOURS
Refills: 0 | Status: DISCONTINUED | OUTPATIENT
Start: 2024-03-07 | End: 2024-03-07

## 2024-03-07 RX ORDER — IBUPROFEN 200 MG
1 TABLET ORAL
Refills: 0 | DISCHARGE

## 2024-03-07 RX ORDER — KETOROLAC TROMETHAMINE 30 MG/ML
15 SYRINGE (ML) INJECTION ONCE
Refills: 0 | Status: DISCONTINUED | OUTPATIENT
Start: 2024-03-07 | End: 2024-03-07

## 2024-03-07 RX ORDER — HYDROMORPHONE HYDROCHLORIDE 2 MG/ML
0.5 INJECTION INTRAMUSCULAR; INTRAVENOUS; SUBCUTANEOUS ONCE
Refills: 0 | Status: DISCONTINUED | OUTPATIENT
Start: 2024-03-07 | End: 2024-03-07

## 2024-03-07 RX ORDER — METFORMIN HYDROCHLORIDE 850 MG/1
1 TABLET ORAL
Refills: 0 | DISCHARGE

## 2024-03-07 RX ORDER — HYDROMORPHONE HYDROCHLORIDE 2 MG/ML
0.5 INJECTION INTRAMUSCULAR; INTRAVENOUS; SUBCUTANEOUS
Refills: 0 | Status: DISCONTINUED | OUTPATIENT
Start: 2024-03-07 | End: 2024-03-07

## 2024-03-07 RX ORDER — ENOXAPARIN SODIUM 100 MG/ML
40 INJECTION SUBCUTANEOUS EVERY 24 HOURS
Refills: 0 | Status: DISCONTINUED | OUTPATIENT
Start: 2024-03-07 | End: 2024-03-08

## 2024-03-07 RX ORDER — HYDROMORPHONE HYDROCHLORIDE 2 MG/ML
0.25 INJECTION INTRAMUSCULAR; INTRAVENOUS; SUBCUTANEOUS
Refills: 0 | Status: DISCONTINUED | OUTPATIENT
Start: 2024-03-07 | End: 2024-03-07

## 2024-03-07 RX ORDER — METHOCARBAMOL 500 MG/1
2 TABLET, FILM COATED ORAL
Refills: 0 | DISCHARGE

## 2024-03-07 RX ORDER — ONDANSETRON 8 MG/1
4 TABLET, FILM COATED ORAL EVERY 6 HOURS
Refills: 0 | Status: DISCONTINUED | OUTPATIENT
Start: 2024-03-07 | End: 2024-03-08

## 2024-03-07 RX ORDER — LOSARTAN POTASSIUM 100 MG/1
1 TABLET, FILM COATED ORAL
Qty: 0 | Refills: 0 | DISCHARGE

## 2024-03-07 RX ORDER — ACETAMINOPHEN 500 MG
650 TABLET ORAL EVERY 6 HOURS
Refills: 0 | Status: DISCONTINUED | OUTPATIENT
Start: 2024-03-07 | End: 2024-03-08

## 2024-03-07 RX ORDER — LANREOTIDE ACETATE 60 MG/.2ML
120 INJECTION SUBCUTANEOUS
Refills: 0 | DISCHARGE

## 2024-03-07 RX ORDER — SEMAGLUTIDE 0.68 MG/ML
2 INJECTION, SOLUTION SUBCUTANEOUS
Refills: 0 | DISCHARGE

## 2024-03-07 RX ORDER — ONDANSETRON 8 MG/1
4 TABLET, FILM COATED ORAL ONCE
Refills: 0 | Status: DISCONTINUED | OUTPATIENT
Start: 2024-03-07 | End: 2024-03-07

## 2024-03-07 RX ORDER — OCTREOTIDE ACETATE 200 UG/ML
50 INJECTION, SOLUTION INTRAVENOUS; SUBCUTANEOUS
Qty: 500 | Refills: 0 | Status: DISCONTINUED | OUTPATIENT
Start: 2024-03-07 | End: 2024-03-07

## 2024-03-07 RX ORDER — OXYCODONE HYDROCHLORIDE 5 MG/1
5 TABLET ORAL EVERY 4 HOURS
Refills: 0 | Status: DISCONTINUED | OUTPATIENT
Start: 2024-03-07 | End: 2024-03-08

## 2024-03-07 RX ADMIN — SODIUM CHLORIDE 40 MILLILITER(S): 9 INJECTION, SOLUTION INTRAVENOUS at 16:59

## 2024-03-07 RX ADMIN — HYDROMORPHONE HYDROCHLORIDE 0.25 MILLIGRAM(S): 2 INJECTION INTRAMUSCULAR; INTRAVENOUS; SUBCUTANEOUS at 17:00

## 2024-03-07 RX ADMIN — OXYCODONE HYDROCHLORIDE 5 MILLIGRAM(S): 5 TABLET ORAL at 21:08

## 2024-03-07 RX ADMIN — HYDROMORPHONE HYDROCHLORIDE 0.5 MILLIGRAM(S): 2 INJECTION INTRAMUSCULAR; INTRAVENOUS; SUBCUTANEOUS at 16:35

## 2024-03-07 RX ADMIN — SODIUM CHLORIDE 3 MILLILITER(S): 9 INJECTION INTRAMUSCULAR; INTRAVENOUS; SUBCUTANEOUS at 11:44

## 2024-03-07 RX ADMIN — Medication 650 MILLIGRAM(S): at 23:34

## 2024-03-07 RX ADMIN — HYDROMORPHONE HYDROCHLORIDE 0.25 MILLIGRAM(S): 2 INJECTION INTRAMUSCULAR; INTRAVENOUS; SUBCUTANEOUS at 16:42

## 2024-03-07 RX ADMIN — CHLORHEXIDINE GLUCONATE 1 APPLICATION(S): 213 SOLUTION TOPICAL at 11:47

## 2024-03-07 RX ADMIN — HYDROMORPHONE HYDROCHLORIDE 0.5 MILLIGRAM(S): 2 INJECTION INTRAMUSCULAR; INTRAVENOUS; SUBCUTANEOUS at 16:20

## 2024-03-07 RX ADMIN — OXYCODONE HYDROCHLORIDE 5 MILLIGRAM(S): 5 TABLET ORAL at 21:38

## 2024-03-07 RX ADMIN — Medication 15 MILLIGRAM(S): at 18:05

## 2024-03-07 RX ADMIN — Medication 15 MILLIGRAM(S): at 18:00

## 2024-03-07 RX ADMIN — ONDANSETRON 4 MILLIGRAM(S): 8 TABLET, FILM COATED ORAL at 20:25

## 2024-03-07 RX ADMIN — HYDROMORPHONE HYDROCHLORIDE 0.5 MILLIGRAM(S): 2 INJECTION INTRAMUSCULAR; INTRAVENOUS; SUBCUTANEOUS at 16:59

## 2024-03-07 RX ADMIN — Medication 650 MILLIGRAM(S): at 23:04

## 2024-03-07 RX ADMIN — HYDROMORPHONE HYDROCHLORIDE 0.5 MILLIGRAM(S): 2 INJECTION INTRAMUSCULAR; INTRAVENOUS; SUBCUTANEOUS at 17:19

## 2024-03-07 NOTE — CHART NOTE - NSCHARTNOTEFT_GEN_A_CORE
POST-OPERATIVE NOTE    Pt is s/p a laparoscopic cholecystectomy.               Vital Signs Last 24 Hrs  T(C): 36.7 (07 Mar 2024 19:45), Max: 36.7 (07 Mar 2024 11:05)  T(F): 98 (07 Mar 2024 19:45), Max: 98.1 (07 Mar 2024 11:05)  HR: 86 (07 Mar 2024 19:45) (68 - 87)  BP: 163/80 (07 Mar 2024 19:45) (129/60 - 163/80)  BP(mean): 92 (07 Mar 2024 19:00) (77 - 106)  RR: 18 (07 Mar 2024 19:45) (16 - 18)  SpO2: 94% (07 Mar 2024 19:45) (92% - 100%)      Physical Exam:  General: NAD, resting comfortably in bed  Pulmonary: Nonlabored breathing, no respiratory distress  Cardiovascular: NSR  Abdominal: soft, minimally tender & distended. Lap incision sites c/d/i. No rebound or guarding  Extremities: WWP    LABS:    CAPILLARY BLOOD GLUCOSE    POCT Blood Glucose.: 130 mg/dL (07 Mar 2024 19:04)  POCT Blood Glucose.: 104 mg/dL (07 Mar 2024 10:54)      Assessment:  Patient is POD#0 from a laparoscopic cholecystectomy, recovering well.    Plan:  - Ok for patient to transfer to floor  - Pain control preferentially with non-narcotics  - Advance diet as tolerated  - DVT ppx  - OOB and ambulating as tolerated  - F/u AM labs    [facility]  [phone]

## 2024-03-07 NOTE — PRE-ANESTHESIA EVALUATION ADULT - NSANTHAIRWAYFT_ENT_ALL_CORE
Abdomen,  soft, nontender, nondistended,  no guarding or rigidity,  no masses palpable,  normal bowel sounds,  Liver and Spleen,  no hepatomegaly present,  no hepatosplenomegaly,  liver nontender Rectal, deferred
mouth opening < 3 cm,   TM distance > 6 cm,   normal neck ROM

## 2024-03-07 NOTE — ASU PATIENT PROFILE, ADULT - FALL HARM RISK - HARM RISK INTERVENTIONS
Assistance with ambulation/Assistance OOB with selected safe patient handling equipment/Communicate Risk of Fall with Harm to all staff/Discuss with provider need for PT consult/Provide patient with walking aids - walker, cane, crutches/Reinforce activity limits and safety measures with patient and family/Tailored Fall Risk Interventions/Visual Cue: Yellow wristband and red socks/Bed in lowest position, wheels locked, appropriate side rails in place/Call bell, personal items and telephone in reach/Instruct patient to call for assistance before getting out of bed or chair/Non-slip footwear when patient is out of bed/[place] to call system/Physically safe environment - no spills, clutter or unnecessary equipment/Purposeful Proactive Rounding/Room/bathroom lighting operational, light cord in reach

## 2024-03-07 NOTE — PATIENT PROFILE ADULT - FALL HARM RISK - HARM RISK INTERVENTIONS
Assistance with ambulation/Assistance OOB with selected safe patient handling equipment/Communicate Risk of Fall with Harm to all staff/Discuss with provider need for PT consult/Monitor gait and stability/Provide patient with walking aids - walker, cane, crutches/Reinforce activity limits and safety measures with patient and family/Sit up slowly, dangle for a short time, stand at bedside before walking/Tailored Fall Risk Interventions/Use of alarms - bed, chair and/or voice tab/Visual Cue: Yellow wristband and red socks/Bed in lowest position, wheels locked, appropriate side rails in place/Call bell, personal items and telephone in reach/Instruct patient to call for assistance before getting out of bed or chair/Non-slip footwear when patient is out of bed/[place] to call system/Physically safe environment - no spills, clutter or unnecessary equipment/Purposeful Proactive Rounding/Room/bathroom lighting operational, light cord in reach

## 2024-03-07 NOTE — PRE-ANESTHESIA EVALUATION ADULT - NSANTHPMHFT_GEN_ALL_CORE
63yoF PMH neuroendocrine tumor with metastasis to the liver on long-acting octreotide, HTN, T2DM, obesity presenting for cholecystectomy.

## 2024-03-07 NOTE — PATIENT PROFILE ADULT - FUNCTIONAL ASSESSMENT - DAILY ACTIVITY SECTION LABEL
. no rashes , no suspicious lesions , no areas of discoloration , no jaundice present , good turgor , no masses , no tenderness on palpation

## 2024-03-08 ENCOUNTER — TRANSCRIPTION ENCOUNTER (OUTPATIENT)
Age: 64
End: 2024-03-08

## 2024-03-08 VITALS
HEART RATE: 68 BPM | SYSTOLIC BLOOD PRESSURE: 123 MMHG | RESPIRATION RATE: 18 BRPM | OXYGEN SATURATION: 99 % | DIASTOLIC BLOOD PRESSURE: 72 MMHG | TEMPERATURE: 98 F

## 2024-03-08 LAB
ALBUMIN SERPL ELPH-MCNC: 3.6 G/DL — SIGNIFICANT CHANGE UP (ref 3.3–5)
ALP SERPL-CCNC: 87 U/L — SIGNIFICANT CHANGE UP (ref 40–120)
ALT FLD-CCNC: 17 U/L — SIGNIFICANT CHANGE UP (ref 10–45)
ANION GAP SERPL CALC-SCNC: 11 MMOL/L — SIGNIFICANT CHANGE UP (ref 5–17)
AST SERPL-CCNC: 32 U/L — SIGNIFICANT CHANGE UP (ref 10–40)
BILIRUB SERPL-MCNC: 0.5 MG/DL — SIGNIFICANT CHANGE UP (ref 0.2–1.2)
BUN SERPL-MCNC: 13 MG/DL — SIGNIFICANT CHANGE UP (ref 7–23)
CALCIUM SERPL-MCNC: 9.1 MG/DL — SIGNIFICANT CHANGE UP (ref 8.4–10.5)
CHLORIDE SERPL-SCNC: 103 MMOL/L — SIGNIFICANT CHANGE UP (ref 96–108)
CO2 SERPL-SCNC: 23 MMOL/L — SIGNIFICANT CHANGE UP (ref 22–31)
CREAT SERPL-MCNC: 0.73 MG/DL — SIGNIFICANT CHANGE UP (ref 0.5–1.3)
EGFR: 92 ML/MIN/1.73M2 — SIGNIFICANT CHANGE UP
GLUCOSE BLDC GLUCOMTR-MCNC: 100 MG/DL — HIGH (ref 70–99)
GLUCOSE BLDC GLUCOMTR-MCNC: 120 MG/DL — HIGH (ref 70–99)
GLUCOSE SERPL-MCNC: 113 MG/DL — HIGH (ref 70–99)
HCT VFR BLD CALC: 38.6 % — SIGNIFICANT CHANGE UP (ref 34.5–45)
HGB BLD-MCNC: 11.8 G/DL — SIGNIFICANT CHANGE UP (ref 11.5–15.5)
MAGNESIUM SERPL-MCNC: 2 MG/DL — SIGNIFICANT CHANGE UP (ref 1.6–2.6)
MCHC RBC-ENTMCNC: 25.5 PG — LOW (ref 27–34)
MCHC RBC-ENTMCNC: 30.6 GM/DL — LOW (ref 32–36)
MCV RBC AUTO: 83.5 FL — SIGNIFICANT CHANGE UP (ref 80–100)
NRBC # BLD: 0 /100 WBCS — SIGNIFICANT CHANGE UP (ref 0–0)
PHOSPHATE SERPL-MCNC: 4.3 MG/DL — SIGNIFICANT CHANGE UP (ref 2.5–4.5)
PLATELET # BLD AUTO: 158 K/UL — SIGNIFICANT CHANGE UP (ref 150–400)
POTASSIUM SERPL-MCNC: 4.9 MMOL/L — SIGNIFICANT CHANGE UP (ref 3.5–5.3)
POTASSIUM SERPL-SCNC: 4.9 MMOL/L — SIGNIFICANT CHANGE UP (ref 3.5–5.3)
PROT SERPL-MCNC: 7.4 G/DL — SIGNIFICANT CHANGE UP (ref 6–8.3)
RBC # BLD: 4.62 M/UL — SIGNIFICANT CHANGE UP (ref 3.8–5.2)
RBC # FLD: 15.9 % — HIGH (ref 10.3–14.5)
SODIUM SERPL-SCNC: 137 MMOL/L — SIGNIFICANT CHANGE UP (ref 135–145)
WBC # BLD: 6.28 K/UL — SIGNIFICANT CHANGE UP (ref 3.8–10.5)
WBC # FLD AUTO: 6.28 K/UL — SIGNIFICANT CHANGE UP (ref 3.8–10.5)

## 2024-03-08 PROCEDURE — C1758: CPT

## 2024-03-08 PROCEDURE — 84100 ASSAY OF PHOSPHORUS: CPT

## 2024-03-08 PROCEDURE — 80053 COMPREHEN METABOLIC PANEL: CPT

## 2024-03-08 PROCEDURE — 83735 ASSAY OF MAGNESIUM: CPT

## 2024-03-08 PROCEDURE — C9399: CPT

## 2024-03-08 PROCEDURE — 85027 COMPLETE CBC AUTOMATED: CPT

## 2024-03-08 PROCEDURE — C1889: CPT

## 2024-03-08 PROCEDURE — 88304 TISSUE EXAM BY PATHOLOGIST: CPT

## 2024-03-08 PROCEDURE — 82962 GLUCOSE BLOOD TEST: CPT

## 2024-03-08 RX ORDER — DEXTROSE 50 % IN WATER 50 %
25 SYRINGE (ML) INTRAVENOUS ONCE
Refills: 0 | Status: DISCONTINUED | OUTPATIENT
Start: 2024-03-08 | End: 2024-03-08

## 2024-03-08 RX ORDER — DEXTROSE 50 % IN WATER 50 %
15 SYRINGE (ML) INTRAVENOUS ONCE
Refills: 0 | Status: DISCONTINUED | OUTPATIENT
Start: 2024-03-08 | End: 2024-03-08

## 2024-03-08 RX ORDER — INSULIN LISPRO 100/ML
VIAL (ML) SUBCUTANEOUS
Refills: 0 | Status: DISCONTINUED | OUTPATIENT
Start: 2024-03-08 | End: 2024-03-08

## 2024-03-08 RX ORDER — SODIUM CHLORIDE 9 MG/ML
250 INJECTION, SOLUTION INTRAVENOUS ONCE
Refills: 0 | Status: COMPLETED | OUTPATIENT
Start: 2024-03-08 | End: 2024-03-08

## 2024-03-08 RX ORDER — DEXTROSE 50 % IN WATER 50 %
12.5 SYRINGE (ML) INTRAVENOUS ONCE
Refills: 0 | Status: DISCONTINUED | OUTPATIENT
Start: 2024-03-08 | End: 2024-03-08

## 2024-03-08 RX ORDER — ACETAMINOPHEN 500 MG
325 TABLET ORAL ONCE
Refills: 0 | Status: COMPLETED | OUTPATIENT
Start: 2024-03-08 | End: 2024-03-08

## 2024-03-08 RX ORDER — SODIUM CHLORIDE 9 MG/ML
1000 INJECTION, SOLUTION INTRAVENOUS
Refills: 0 | Status: DISCONTINUED | OUTPATIENT
Start: 2024-03-08 | End: 2024-03-08

## 2024-03-08 RX ORDER — METOCLOPRAMIDE HCL 10 MG
10 TABLET ORAL ONCE
Refills: 0 | Status: COMPLETED | OUTPATIENT
Start: 2024-03-08 | End: 2024-03-08

## 2024-03-08 RX ORDER — OXYCODONE HYDROCHLORIDE 5 MG/1
1 TABLET ORAL
Qty: 8 | Refills: 0
Start: 2024-03-08

## 2024-03-08 RX ORDER — GLUCAGON INJECTION, SOLUTION 0.5 MG/.1ML
1 INJECTION, SOLUTION SUBCUTANEOUS ONCE
Refills: 0 | Status: DISCONTINUED | OUTPATIENT
Start: 2024-03-08 | End: 2024-03-08

## 2024-03-08 RX ORDER — ACETAMINOPHEN 500 MG
2 TABLET ORAL
Qty: 0 | Refills: 0 | DISCHARGE
Start: 2024-03-08

## 2024-03-08 RX ADMIN — ENOXAPARIN SODIUM 40 MILLIGRAM(S): 100 INJECTION SUBCUTANEOUS at 05:42

## 2024-03-08 RX ADMIN — OXYCODONE HYDROCHLORIDE 5 MILLIGRAM(S): 5 TABLET ORAL at 02:52

## 2024-03-08 RX ADMIN — Medication 325 MILLIGRAM(S): at 11:03

## 2024-03-08 RX ADMIN — Medication 10 MILLIGRAM(S): at 09:49

## 2024-03-08 RX ADMIN — Medication 650 MILLIGRAM(S): at 06:12

## 2024-03-08 RX ADMIN — OXYCODONE HYDROCHLORIDE 5 MILLIGRAM(S): 5 TABLET ORAL at 02:22

## 2024-03-08 RX ADMIN — OXYCODONE HYDROCHLORIDE 5 MILLIGRAM(S): 5 TABLET ORAL at 08:20

## 2024-03-08 RX ADMIN — SODIUM CHLORIDE 250 MILLILITER(S): 9 INJECTION, SOLUTION INTRAVENOUS at 10:25

## 2024-03-08 RX ADMIN — Medication 650 MILLIGRAM(S): at 05:42

## 2024-03-08 RX ADMIN — Medication 650 MILLIGRAM(S): at 11:04

## 2024-03-08 NOTE — DISCHARGE NOTE NURSING/CASE MANAGEMENT/SOCIAL WORK - FLU SEASON?
Problem: Patient Care Overview  Goal: Plan of Care Review  Outcome: Ongoing (interventions implemented as appropriate)  Pt to be D/C to home via personal transport.    VSS and afebrile. Free from falls and safe. Maintained AOx4.    Pt to be provided AVS c/ pertinent F/U info indicated.       Yes...

## 2024-03-08 NOTE — PROGRESS NOTE ADULT - ATTENDING COMMENTS
nausea o/w feels well  pain controlled  vss  drain o/p serous  abd nontender  lfts nl  d/c drain  isacc in pm if shashank po

## 2024-03-08 NOTE — PROGRESS NOTE ADULT - SUBJECTIVE AND OBJECTIVE BOX
Surgery Progress Note     24hour Events:   - OR for lap linda     Subjective:  Patient seen and examined.   Vital Signs:  Vital Signs Last 24 Hrs  T(C): 36.9 (08 Mar 2024 00:05), Max: 37.5 (07 Mar 2024 22:28)  T(F): 98.5 (08 Mar 2024 00:05), Max: 99.5 (07 Mar 2024 22:28)  HR: 86 (08 Mar 2024 00:05) (68 - 94)  BP: 125/68 (08 Mar 2024 00:05) (125/68 - 163/87)  BP(mean): 92 (07 Mar 2024 19:00) (77 - 106)  RR: 18 (08 Mar 2024 00:05) (16 - 18)  SpO2: 95% (08 Mar 2024 00:05) (92% - 100%)    Parameters below as of 08 Mar 2024 00:05  Patient On (Oxygen Delivery Method): room air      Physical Exam:  General: NAD, resting comfortably in bed  HEENT: Normocephalic atraumatic  Respiratory: Nonlabored respirations  Cardio: pulse present  Abdomen: soft, minimally tender & distended. Lap incision sites c/d/i. No rebound or guarding    Labs:    POD#1 from a laparoscopic cholecystectomy, recovering well.    Plan:  - discharge  - Pain control preferentially with non-narcotics  - Advance diet as tolerated  - DVT ppx  - OOB and ambulating as tolerated  - F/u AM labs    [facility]  [phone]. Surgery Progress Note     24hour Events:   - OR for lap linda     Subjective:  Patient seen and examined. Denies nausea/vomit, fever/chills, sob/chest pain. Has minimally ambulating.       Vital Signs:  Vital Signs Last 24 Hrs  T(C): 36.9 (08 Mar 2024 00:05), Max: 37.5 (07 Mar 2024 22:28)  T(F): 98.5 (08 Mar 2024 00:05), Max: 99.5 (07 Mar 2024 22:28)  HR: 86 (08 Mar 2024 00:05) (68 - 94)  BP: 125/68 (08 Mar 2024 00:05) (125/68 - 163/87)  BP(mean): 92 (07 Mar 2024 19:00) (77 - 106)  RR: 18 (08 Mar 2024 00:05) (16 - 18)  SpO2: 95% (08 Mar 2024 00:05) (92% - 100%)    Parameters below as of 08 Mar 2024 00:05  Patient On (Oxygen Delivery Method): room air      Physical Exam:  General: NAD, resting comfortably in bed  HEENT: Normocephalic atraumatic  Respiratory: Nonlabored respirations  Cardio: pulse present  Abdomen: soft, minimally tender & distended. Lap incision sites c/d/i. No rebound or guarding. LYUBOV serosanguineous.    Labs:    POD#1 from a laparoscopic cholecystectomy, recovering well. Currently hemodynamically stable, with adequate urine output, and minimal LYUBOV output. Patient meeting criteria for discharge.    Plan:  - discharge today  - Pain control preferentially with non-narcotics  - Advance diet as tolerated  - DVT ppx  - OOB and ambulating as tolerated  - F/u AM labs    [facility]  [phone].

## 2024-03-08 NOTE — DISCHARGE NOTE PROVIDER - NSDCMRMEDTOKEN_GEN_ALL_CORE_FT
acetaminophen 325 mg oral tablet: 2 tab(s) orally every 6 hours  amLODIPine 10 mg oral tablet: 0.5 tab(s) orally once a day  ibuprofen 600 mg oral tablet: 1 tab(s) orally 3 times a day as needed for  mild pain  lanreotide 120 mg/0.5 mL subcutaneous solution: 120 milligram(s) subcutaneously once a month next injection due on 2/16/24  losartan 100 mg oral tablet: 1 tab(s) orally once a day  metFORMIN 500 mg oral tablet: 1 tab(s) orally 2 times a day  methocarbamol 500 mg oral tablet: 2 tab(s) orally 3 times a day as needed for  moderate pain  oxyCODONE 5 mg oral tablet: 1 tab(s) orally every 6 hours as needed for Severe Pain (7 - 10) MDD: 4  Ozempic 8 mg/3 mL (2 mg dose) subcutaneous solution: 2 milligram(s) subcutaneously once a week on Monday&#x27;s for weight loss

## 2024-03-08 NOTE — DISCHARGE NOTE PROVIDER - NSDCFUADDAPPT_GEN_ALL_CORE_FT
Please follow up with Dr. Foy in 2 weeks upon discharge. Please call [phone] to make an appointment.

## 2024-03-08 NOTE — DISCHARGE NOTE PROVIDER - CARE PROVIDER_API CALL
Malachi Foy  Colon/Rectal Surgery  [address], [address]  [place] [address]  Phone: [phone]  Fax: [phone]  Follow Up Time: 1 week

## 2024-03-08 NOTE — DISCHARGE NOTE NURSING/CASE MANAGEMENT/SOCIAL WORK - PATIENT PORTAL LINK FT
You can access the FollowMyHealth Patient Portal offered by [facility] by registering at the following website: [email or web address]. By joining [employer]’s FollowMyHealth portal, you will also be able to view your health information using other applications (apps) compatible with our system.

## 2024-03-08 NOTE — DISCHARGE NOTE PROVIDER - HOSPITAL COURSE
Pt is a 63 year old female with PMH neuroendocrine neoplasm of gastrointestinal tract (Dx in 2018 - no surgical intervention - on Lantreotide monthly - metastasis to liver), JOSTIN, HTN, DMT2 (HGA1C 5.7% 12/1/23 - on Metformin), morbid obesity (BMI 51.4 - 70 lb wt loss with Ozempic over the past year) and B/L knee OA c/o intermittent postprandial RUQ discomfort that radiates to the right back for several years that has progressively worsened over the past 6 months. Pt now presents for scheduled laparoscopic cholecystectomy.    Pt was admitted under Surgery for further evaluation and management. Pt was taken to the OR on 03/07/24, and is s/p laparoscopic cholecystectomy with LYUBOV drain. The patient tolerated the procedure well (see operative report for full details). Pt was transferred to the PACU in stable condition. In the PACU, the patient's pain was controlled and vitals stable. The patient was given clear liquids with Ensure Clears in PACU, and encouraged with early ambulation. On POC, the patient was doing well. The patient was transferred to the surgical floor in stable condition. On POD #1, pt was stable and doing well. Pt's G[place]ay was discontinued in PACU, and passed TOV. Once IV pain control dosing complete, pt was transitioned to oral Tylenol and Motrin with Oxycodone for breakthrough pain. Diet was advanced as tolerated, and GI function returned.  LYUBOV drain removed at time of discharge.   Labs were monitored and repleted as needed.     On the day of discharge, the patient's vitals are stable, pain is controlled, voiding urine, passing gas/stool, tolerating a low fiber diet, and ambulating well. Pt will f/u with Dr. Foy   in 1-2 weeks. Pt will f/u with PCP in 1-2 weeks.

## 2024-03-08 NOTE — DISCHARGE NOTE PROVIDER - NSDCFUSCHEDAPPT_GEN_ALL_CORE_FT
Sandy Busch  [address]  Scheduled Appointment: 03/12/2024    Katalina Blackmon  [facility]  Scheduled Appointment: 03/13/2024    [facility]  Scheduled Appointment: 03/15/2024    [facility]  Scheduled Appointment: 04/12/2024    [facility]  Scheduled Appointment: 05/10/2024

## 2024-03-08 NOTE — DISCHARGE NOTE PROVIDER - NSDCCPCAREPLAN_GEN_ALL_CORE_FT
PRINCIPAL DISCHARGE DIAGNOSIS  Diagnosis: S/P cholecystectomy  Assessment and Plan of Treatment: WOUND CARE: You may shower. Do not scrub incision. Pat Dry abdomen. Can remove dressing tomorrow. Leave the white steri strips in place, they will fall off on their own in approximately 5-7 days.  BATHING: Please do not submerge wound underwater. You may shower and/or sponge bathe.  ACTIVITY: No heavy lifting anything more than 10-15lbs or straining. Otherwise, you may return to your usual level of physical activity. If you are taking narcotic pain medication (such as Percocet), do NOT drive a car, operate machinery or make important decisions.  DIET: Low fiber diet  NOTIFY YOUR SURGEON IF: You have any bleeding that does not stop, any pus draining from your wound, any fever (over 100.4 F) or chills, persistent nausea/vomiting with inability to tolerate food or liquids, persistent diarrhea, or if your pain is not controlled on your discharge pain medications.  FOLLOW-UP:  1. Please call to make a follow-up appointment within one week of discharge with Dr. Foy   2. Please follow up with your primary care physician in one week regarding your hospitalization.  Please take tylenol 1000mg every 6 hours alternating with motrin every 6 hours   for example if you take tylenol at 8am, take motrin at 11am, tylenol at 2pm, motrin at 5pm and continue around the clock for the next 2 days then can change to as needed   you cassidy take oxycodone 2.5mg (1/2 tab) as needed every 4 hours for moderate breakthrough pain or oxycodone 5mg (1 tab) every 4 hours as needed for severe breakthrough pain

## 2024-03-12 ENCOUNTER — APPOINTMENT (OUTPATIENT)
Dept: ENDOCRINOLOGY | Facility: CLINIC | Age: 64
End: 2024-03-12
Payer: COMMERCIAL

## 2024-03-12 VITALS
WEIGHT: 268 LBS | HEIGHT: 61 IN | DIASTOLIC BLOOD PRESSURE: 78 MMHG | TEMPERATURE: 97 F | OXYGEN SATURATION: 98 % | HEART RATE: 93 BPM | BODY MASS INDEX: 50.6 KG/M2 | SYSTOLIC BLOOD PRESSURE: 122 MMHG | RESPIRATION RATE: 16 BRPM

## 2024-03-12 LAB — SURGICAL PATHOLOGY STUDY: SIGNIFICANT CHANGE UP

## 2024-03-12 PROCEDURE — 95251 CONT GLUC MNTR ANALYSIS I&R: CPT

## 2024-03-12 PROCEDURE — G2211 COMPLEX E/M VISIT ADD ON: CPT

## 2024-03-12 PROCEDURE — 99214 OFFICE O/P EST MOD 30 MIN: CPT

## 2024-03-12 RX ORDER — SEMAGLUTIDE 2.68 MG/ML
8 INJECTION, SOLUTION SUBCUTANEOUS
Qty: 3 | Refills: 3 | Status: ACTIVE | COMMUNITY
Start: 1900-01-01 | End: 1900-01-01

## 2024-03-12 NOTE — ASSESSMENT
[FreeTextEntry1] : Target: HbA1c < 7%, BP < 130/80  HbA1c is at goal but patient has hypoglycemia so I discontinued metformin. BP is at goal.  No indication for Aspirin, patient is on ARB - she does not want to use statin.   Last lipid panel - April 2023 - Trig 93, LDL 91 Last HbA1c - 02/04/2024 - 5.6% Last Vitamin B12 - April 2023 - elevated Last urine albumin panel - Sept 2023 - neg Last BMP/CMP - March 2024 - Cr N, K N, AST N, ALT N  CGM INTERPRETATION DONE ON 03/12/2024   Plan: 1. Discontinue metformin 2. Labs to be done in 3 months - see below 3. CGM DEXCOM 7 - patient is connected to the clinic 4. Follow up in 3 months to review meter and results.

## 2024-03-12 NOTE — PHYSICAL EXAM
[de-identified] : General: No distress, well nourished Eyes: Normal Sclera, EOMI, PERRL ENT: Normal appearance of the nose, normal oropharynx Neck/Thyroid: No cervical lymphadenopathy, thyroid gland 20 g in size, no thyroid nodules, non-tender Respiratory: No use of accessory muscles of respiration, vesicular breath sounds heard bilaterally, no crepitations or ronchi Cardiovascular: S1 and S2 heard and normal, no S3 or S4, no murmurs, radial pulse normal bilaterally Abdomen: soft, non-tender, no masses, normal bowel sounds Musculoskeletal: No swelling or deformities of joints of hands, has pedal edema Neurological: Normal range of motion in the hands, Normal brachioradialis reflexes bilaterally Psychiatry: Patient converses normally, good judgement and insight Skin: No rashes in hands, no nodules palpated in hands  [de-identified] : I defer DM foot exam to podiatry

## 2024-03-12 NOTE — HISTORY OF PRESENT ILLNESS
[FreeTextEntry1] : Problems: 1. DM type 2 2. 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   DM type 2 1. Diagnosed in 2019 2. Meds: Metformin 500 mg po daily (no side effects) Ozempic 2 mg sc once weekly (No family history of medullary thyroid cancer, no pancreatitis) 3. CGM - DEXCOM 7 (patient is connected to the clinic) 60s to 180s, no hypoglycemic unawareness 4. Not on Aspirin, not on statin, on losartan 100 mg po daily 5. Complications: No DM nephropathy (normal creatinine, neg urine microalbumin panel in Sept 2023) No DM retinopathy (last eye exam was in Feb 2024, patient advised on the need for annual DM eye exam) No ASCVD No foot ulcers/amputation 6. Patient never smoked cigarettes

## 2024-03-12 NOTE — PROCEDURE
[FreeTextEntry1] : CGM interpretation: Device: DEXCOM  Period: Feb 28th 2024 to March 12th 2024 Findings:  Percent in range: 95 %, Percent low/very low BGs: 2%, Percent high/very high BGs: 3 % Interpretation - Patient has hypoglycemia

## 2024-03-13 ENCOUNTER — APPOINTMENT (OUTPATIENT)
Dept: HEMATOLOGY ONCOLOGY | Facility: CLINIC | Age: 64
End: 2024-03-13
Payer: COMMERCIAL

## 2024-03-13 VITALS
BODY MASS INDEX: 50.15 KG/M2 | WEIGHT: 265.43 LBS | DIASTOLIC BLOOD PRESSURE: 78 MMHG | RESPIRATION RATE: 16 BRPM | OXYGEN SATURATION: 99 % | SYSTOLIC BLOOD PRESSURE: 150 MMHG | TEMPERATURE: 97.3 F | HEART RATE: 84 BPM

## 2024-03-13 DIAGNOSIS — K74.69 OTHER CIRRHOSIS OF LIVER: ICD-10-CM

## 2024-03-13 DIAGNOSIS — D3A.8 OTHER BENIGN NEUROENDOCRINE TUMORS: ICD-10-CM

## 2024-03-13 PROCEDURE — 99213 OFFICE O/P EST LOW 20 MIN: CPT

## 2024-03-13 RX ORDER — PREDNISONE 50 MG/1
50 TABLET ORAL
Qty: 5 | Refills: 1 | Status: ACTIVE | COMMUNITY
Start: 2020-01-29 | End: 1900-01-01

## 2024-03-15 ENCOUNTER — APPOINTMENT (OUTPATIENT)
Dept: INFUSION THERAPY | Facility: HOSPITAL | Age: 64
End: 2024-03-15

## 2024-03-15 DIAGNOSIS — L03.90 CELLULITIS, UNSPECIFIED: ICD-10-CM

## 2024-03-15 RX ORDER — DOXYCYCLINE HYCLATE 100 MG/1
100 TABLET ORAL TWICE DAILY
Qty: 14 | Refills: 0 | Status: ACTIVE | COMMUNITY
Start: 2024-03-15 | End: 1900-01-01

## 2024-03-18 ENCOUNTER — APPOINTMENT (OUTPATIENT)
Dept: SURGERY | Facility: CLINIC | Age: 64
End: 2024-03-18
Payer: COMMERCIAL

## 2024-03-18 VITALS
HEART RATE: 71 BPM | TEMPERATURE: 97.6 F | RESPIRATION RATE: 16 BRPM | OXYGEN SATURATION: 99 % | DIASTOLIC BLOOD PRESSURE: 84 MMHG | SYSTOLIC BLOOD PRESSURE: 151 MMHG | BODY MASS INDEX: 50.03 KG/M2 | HEIGHT: 61 IN | WEIGHT: 265 LBS

## 2024-03-18 DIAGNOSIS — Z51.89 ENCOUNTER FOR OTHER SPECIFIED AFTERCARE: ICD-10-CM

## 2024-03-18 PROCEDURE — 99024 POSTOP FOLLOW-UP VISIT: CPT

## 2024-03-18 NOTE — HISTORY OF PRESENT ILLNESS
[de-identified] : Oxana is a 29 y/o female being seen for post-op visit, s/p Lap cholecystectomy on 03/07/24  Pathology: 1. Gallbladder, cholecystectomy: - Chronic cholecystitis with focal mucosal denudation and reactive changes - Cholelithiasis  pt complains of mild incisional discomfort. Has required few Oxycodone post operatively. Surgical site with no drainage. DSG changed daily. Denies surgical site redness, swelling or drainage. Denies fever, chills or N/V. Tolerating diet and having hard BM every 2 days. uses Neosporin. Noted some yellowish drainage. Staples in place. Currenlty on Doxycyclin.

## 2024-03-18 NOTE — PHYSICAL EXAM
[Normal Breath Sounds] : Normal breath sounds [Normal Heart Sounds] : normal heart sounds [Calm] : calm [JVD] : no jugular venous distention  [de-identified] :  nonicteric [de-identified] : Ambulating without difficulty [de-identified] : Soft nontender nondistended staples removed wounds clean dry and intact

## 2024-03-18 NOTE — ASSESSMENT
[FreeTextEntry1] : 63-year-old female status post cholecystectomy she is doing well the wounds are clean staples are removed she will follow-up with Dr. Peck all her questions were answered

## 2024-03-21 ENCOUNTER — APPOINTMENT (OUTPATIENT)
Dept: HEPATOLOGY | Facility: CLINIC | Age: 64
End: 2024-03-21
Payer: COMMERCIAL

## 2024-03-21 VITALS
BODY MASS INDEX: 50.03 KG/M2 | SYSTOLIC BLOOD PRESSURE: 127 MMHG | WEIGHT: 265 LBS | RESPIRATION RATE: 16 BRPM | OXYGEN SATURATION: 99 % | TEMPERATURE: 98.1 F | HEART RATE: 94 BPM | DIASTOLIC BLOOD PRESSURE: 80 MMHG | HEIGHT: 61 IN

## 2024-03-21 DIAGNOSIS — R16.0 HEPATOMEGALY, NOT ELSEWHERE CLASSIFIED: ICD-10-CM

## 2024-03-21 DIAGNOSIS — K74.60 UNSPECIFIED CIRRHOSIS OF LIVER: ICD-10-CM

## 2024-03-21 PROCEDURE — 99204 OFFICE O/P NEW MOD 45 MIN: CPT

## 2024-03-21 RX ORDER — METFORMIN HYDROCHLORIDE 500 MG/1
500 TABLET, COATED ORAL DAILY
Qty: 90 | Refills: 3 | Status: DISCONTINUED | COMMUNITY
Start: 2020-03-13 | End: 2024-03-21

## 2024-03-21 RX ORDER — VITAMIN K2 90 MCG
125 MCG CAPSULE ORAL
Refills: 0 | Status: ACTIVE | COMMUNITY

## 2024-03-22 ENCOUNTER — APPOINTMENT (OUTPATIENT)
Dept: SURGERY | Facility: CLINIC | Age: 64
End: 2024-03-22
Payer: COMMERCIAL

## 2024-03-22 VITALS
DIASTOLIC BLOOD PRESSURE: 76 MMHG | HEIGHT: 61 IN | TEMPERATURE: 96 F | WEIGHT: 265 LBS | HEART RATE: 77 BPM | RESPIRATION RATE: 18 BRPM | BODY MASS INDEX: 50.03 KG/M2 | OXYGEN SATURATION: 98 % | SYSTOLIC BLOOD PRESSURE: 135 MMHG

## 2024-03-22 DIAGNOSIS — Z09 ENCOUNTER FOR FOLLOW-UP EXAMINATION AFTER COMPLETED TREATMENT FOR CONDITIONS OTHER THAN MALIGNANT NEOPLASM: ICD-10-CM

## 2024-03-22 PROCEDURE — 99024 POSTOP FOLLOW-UP VISIT: CPT

## 2024-03-22 NOTE — HISTORY OF PRESENT ILLNESS
[de-identified] : Oxana is a 29 y/o female being seen for post-op visit,  h/o Stage IV non secreting SB NET, grade 2 on Lanreotide monthly  s/p Laparoscopic cholecystectomy on 03/07/24  Pathology: 1. Gallbladder, cholecystectomy: - Chronic cholecystitis with focal mucosal denudation and reactive changes - Cholelithiasis.  Last seen 03/18/24 by Dr. Mercado - 63-year-old female status post cholecystectomy she is doing well the wounds are clean staples are removed she will follow-up with Dr. Peck all her questions were answered.   Today patient denies surgical incisional pain.  BMs once every other day, in the past two days has been daily.   Good appetite.   Denies nausea, vomiting, fever or chills.  Surgical incision sites well approximated.  She finished taking ABX today.

## 2024-03-22 NOTE — ASSESSMENT
[FreeTextEntry1] : In summary the patient is doing well status post laparoscopic cholecystectomy.  Pathology revealed chronic cholecystitis and cholelithiasis.  Intraoperative findings included cirrhosis.  I instructed her that she may resume her normal diet and activities as tolerated.  From my standpoint I only need to see her as needed.

## 2024-03-25 ENCOUNTER — RESULT REVIEW (OUTPATIENT)
Age: 64
End: 2024-03-25

## 2024-03-26 ENCOUNTER — APPOINTMENT (OUTPATIENT)
Dept: CT IMAGING | Facility: CLINIC | Age: 64
End: 2024-03-26
Payer: COMMERCIAL

## 2024-03-26 ENCOUNTER — OUTPATIENT (OUTPATIENT)
Dept: OUTPATIENT SERVICES | Facility: HOSPITAL | Age: 64
LOS: 1 days | End: 2024-03-26
Payer: COMMERCIAL

## 2024-03-26 DIAGNOSIS — Z90.89 ACQUIRED ABSENCE OF OTHER ORGANS: Chronic | ICD-10-CM

## 2024-03-26 DIAGNOSIS — Z98.890 OTHER SPECIFIED POSTPROCEDURAL STATES: Chronic | ICD-10-CM

## 2024-03-26 DIAGNOSIS — Z98.891 HISTORY OF UTERINE SCAR FROM PREVIOUS SURGERY: Chronic | ICD-10-CM

## 2024-03-26 DIAGNOSIS — D3A.8 OTHER BENIGN NEUROENDOCRINE TUMORS: ICD-10-CM

## 2024-03-26 PROCEDURE — 71260 CT THORAX DX C+: CPT | Mod: 26

## 2024-03-26 PROCEDURE — 74177 CT ABD & PELVIS W/CONTRAST: CPT

## 2024-03-26 PROCEDURE — 74177 CT ABD & PELVIS W/CONTRAST: CPT | Mod: 26

## 2024-03-26 PROCEDURE — 71260 CT THORAX DX C+: CPT

## 2024-03-30 LAB
ANA SER IF-ACNC: NEGATIVE
AST SERPL W P-5'-P-CCNC: 21 IU/L
CHOLEST SERPL-MCNC: 171 MG/DL
COMMENT:: NORMAL
DEPRECATED KAPPA LC FREE/LAMBDA SER: 1.62 RATIO
FERRITIN SERPL-MCNC: 21 NG/ML
FIBROSIS STAGE SERPL QL: NORMAL
FIBROSURE ALPHA 2 MACROGLOBULINS: 242 MG/DL
FIBROSURE ALT (SGPT): 12 IU/L
FIBROSURE APOLIPOPROTEIN A1: 148 MG/DL
FIBROSURE GGT: 62 IU/L
FIBROSURE HAPTOGLOBIN: 160 MG/DL
FIBROSURE SCORING: NORMAL
FIBROSURE TOTAL BILIRUBIN: 0.3 MG/DL
GLUCOSE SERPL-MCNC: 253 MG/DL
HBV CORE IGG+IGM SER QL: NONREACTIVE
HBV SURFACE AB SER QL: NONREACTIVE
HBV SURFACE AG SER QL: NONREACTIVE
HCV AB SER QL: NONREACTIVE
HCV S/CO RATIO: 0.15 S/CO
HEPATITIS A IGG ANTIBODY: NONREACTIVE
IGA SER QL IEP: 503 MG/DL
IGG SER QL IEP: 1475 MG/DL
IGM SER QL IEP: 61 MG/DL
INTERPRETATIONS:: NORMAL
IRON SATN MFR SERPL: 11 %
IRON SERPL-MCNC: 34 UG/DL
KAPPA LC CSF-MCNC: 2.52 MG/DL
KAPPA LC SERPL-MCNC: 4.09 MG/DL
LIVER FIBR SCORE SERPL CALC.FIBROSURE: 0.27
LKM AB SER QL IF: <20.1 UNITS
Lab: NORMAL
MITOCHONDRIA AB SER IF-ACNC: NORMAL
NASH SCORING: NORMAL
NECROINFLAMMATORY ACT GRADE SERPL QL: NORMAL
NECROINFLAMMATORY ACT SCORE SERPL: 0.39
SERVICE CMNT-IMP: NORMAL
SMOOTH MUSCLE AB SER QL IF: NORMAL
STEATOSIS GRADE: NORMAL
STEATOSIS SCORE: 0.73
STEATOSIS SCORING: NORMAL
TIBC SERPL-MCNC: 320 UG/DL
TM INTERPRETATION: NORMAL
TRIGL SERPL-MCNC: 81 MG/DL
TTG IGA SER IA-ACNC: 2.5 U/ML
TTG IGA SER-ACNC: NEGATIVE
UIBC SERPL-MCNC: 286 UG/DL

## 2024-04-01 PROBLEM — R16.0 LIVER MASS: Status: ACTIVE | Noted: 2019-02-12

## 2024-04-01 PROBLEM — K74.60 CIRRHOSIS OF LIVER WITHOUT ASCITES, UNSPECIFIED HEPATIC CIRRHOSIS TYPE: Status: ACTIVE | Noted: 2024-03-21

## 2024-04-01 NOTE — ASSESSMENT
[FreeTextEntry1] : 62 yo morbidly obese (BMI 50) Female w/ Hx of HTN, controlled Type 2 DM (HbA1c 5.7 12/2023), mild aortic stenosis (TTE 9/2023), migraines, and stage IV small bowel neuroendocrine tumor metastatic to the lung and liver, on Lanreotide (s/p biopsy of liver lesion 6/5/19 showing Gr 2 well differentiated NET; followed by Dr. Blackmon and Dr. Carter) was referred because of cirrhotic morphology liver seen during gall bladder surgery for cholecystitis on 3/724.   Cirrhosis without any signs or symptoms of decompensation. - Will get MELD labs. - I have explained and educated pt at length the natural history of cirrhosis, complications of cirrhosis with portal hypertension, esophageal varices with bleeding, portosystemic encephalopathy, ascites and infection, and  risk of HCC. - No ascites during surgery, physical exam limited.  - No signs of overt hepatic encephalopathy.  - Esophageal varix (EV) screening: - HCC screening: Been followed by CTs by hem/onc b/o NET metastatic to the liver. Will ask for liver protocol as well during the MR elastography.  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 Counseled on liver healthy diet, including but not limited to avoiding alcohol, illicit drug, avoiding eating any unpasteurized dairy products; avoiding eating any raw or undercooked eggs, fish, poultry, or meat; and avoiding eating raw/steamed oysters or other shellfish to avoid risk of Vibrio infection.  Advised on avoiding use of herbal and dietary supplements due to potential hepatotoxicity, and limit use of acetaminophen to <2 grams per day. Advised on avoiding use of nonsteroidal anti-inflammatory drugs (NSAIDs) as these can precipitate renal dysfunction in patients with advanced liver disease.   RTC after MRE

## 2024-04-01 NOTE — PHYSICAL EXAM
[General Appearance - In No Acute Distress] : in no acute distress [General Appearance - Alert] : alert [General Appearance - Well Nourished] : well nourished [General Appearance - Well Developed] : well developed [Sclera] : the sclera and conjunctiva were normal [Outer Ear] : the ears and nose were normal in appearance [Neck Appearance] : the appearance of the neck was normal [Auscultation Breath Sounds / Voice Sounds] : lungs were clear to auscultation bilaterally [Heart Rate And Rhythm] : heart rate was normal and rhythm regular [Heart Sounds] : normal S1 and S2 [Abdomen Soft] : soft [Bowel Sounds] : normal bowel sounds [Abdomen Tenderness] : non-tender [] : no hepato-splenomegaly [Abdomen Mass (___ Cm)] : no abdominal mass palpated [Cervical Lymph Nodes Enlarged Posterior Bilaterally] : posterior cervical [Cervical Lymph Nodes Enlarged Anterior Bilaterally] : anterior cervical [Axillary Lymph Nodes Enlarged Bilaterally] : axillary [Supraclavicular Lymph Nodes Enlarged Bilaterally] : supraclavicular [Femoral Lymph Nodes Enlarged Bilaterally] : femoral [Inguinal Lymph Nodes Enlarged Bilaterally] : inguinal [No CVA Tenderness] : no ~M costovertebral angle tenderness [No Spinal Tenderness] : no spinal tenderness [Skin Color & Pigmentation] : normal skin color and pigmentation [Abnormal Walk] : normal gait [Impaired Insight] : insight and judgment were intact [Oriented To Time, Place, And Person] : oriented to person, place, and time [Affect] : the affect was normal [Abdominal  Ascites] : no ascites [Asterixis] : no asterixis observed [Scleral Icterus] : No Scleral Icterus [Jaundice] : No jaundice [Palmar Erythema] : no Palmar Erythema [FreeTextEntry4] : Limited exam due to obesity [FreeTextEntry1] : Grossly intact

## 2024-04-01 NOTE — REVIEW OF SYSTEMS
[Recent Weight Loss (___ Lbs)] : recent [unfilled] ~Ulb weight loss [Eyesight Problems] : eyesight problems [Lower Ext Edema] : lower extremity edema [SOB on Exertion] : shortness of breath during exertion [As Noted in HPI] : as noted in HPI [Constipation] : constipation [Arthralgias] : arthralgias [Negative] : ENT [Fever] : no fever [Palpitations] : no palpitations [Chills] : no chills [Chest Pain] : no chest pain [Shortness Of Breath] : no shortness of breath [Abdominal Pain] : no abdominal pain [Cough] : no cough [Heartburn] : no heartburn [Diarrhea] : no diarrhea [Dysuria] : no dysuria [Itching] : no itching [Melena] : no melena [Easy Bruising] : no tendency for easy bruising [Easy Bleeding] : no tendency for easy bleeding [FreeTextEntry3] : wears glasses, Hx of blepharospasm [FreeTextEntry6] : She does not wlk b/o knee pain, she is getting steroid injection to knee q 4-6 months [FreeTextEntry2] : 80 lb on Ozempic w/in 1.5 years (since end of 2022) [FreeTextEntry7] : Recent GB surgery; BM q 2 days; No hematochezia.  [FreeTextEntry9] : b/l knee pain, chronic

## 2024-04-01 NOTE — HISTORY OF PRESENT ILLNESS
[Household Contact to HBV] : household contact to HBV [Needlestick Exposure] : no needlestick exposure [Infected Sexual Partner] : no infected sexual partner [IV Drug Use] : no IV drug use [Tattoo] : no tattoos [Hemodialysis] : no hemodialysis [Transplant before 1992] : no transplant before 1992 [Transfusion before 1992] : no transfusion before 1992 [Alcohol Abuse] : no alcohol abuse [Autoimmune Disorder] : no autoimmune disorder [de-identified] : Piercing in past (ear). Mother had Hemochromatosis and possibly hepatitis B. Mother from [place], father w/ [place]  origin. Retired, office work in past.  [Cocaine Use] : no cocaine use [FreeTextEntry1] : 64 yo morbidly obese (BMI 50) Female w/ Hx of HTN, controlled Type 2 DM (HbA1c 5.7 12/2023), mild aortic stenosis (TTE 9/2023), migraines, and stage IV small bowel neuroendocrine tumor metastatic to the lung and liver, on Lanreotide (s/p biopsy of liver lesion 6/5/19 showing Gr 2 well differentiated NET; followed by Dr. Blackmon and Dr. Carter) was referred because of cirrhotic morphology liver seen during gall bladder surgery for cholecystitis on 3/724. She is here for initial evaluation.

## 2024-04-07 PROBLEM — D3A.8 NEUROENDOCRINE NEOPLASM OF GASTROINTESTINAL TRACT: Status: ACTIVE | Noted: 2018-11-30

## 2024-04-07 NOTE — HISTORY OF PRESENT ILLNESS
[Disease: _____________________] : Disease: [unfilled] [AJCC Stage: ____] : AJCC Stage: [unfilled] [Therapy: ___] : Therapy: [unfilled] [M: ___] : M[unfilled] [de-identified] : well differentiated NET [de-identified] : 63-year-old F with h/o intermittent abdominal pain, n/v over the last several years (at least 9) thought to be 2/2 gallstones. She has been evaluated in the ER 1-2 per year with these attacks.  CT A/P going as far back as 2010 noted a calcified mass within the mesentery of the small bowel suspicious for carcinoid. In Nov 2017 it was found to be increasing in size from previous imaging. She saw a surgeon at the time however it is unclear if any work up was recommended.   In Nov 2018 she was referred to Dr. Foy for evaluation of gallstones. He noted the mesenteric mass which prompted a PET/DOTATATE to be performed. This showed activity in the mesentery (SUV 59.9) as well as liver, ileal  mass though to be the primary, cystic adnexal mass unchanged since 11/17, possible uptake in the skin of left breast. An MRI Abdomen was performed to evaluate the liver mets and this revealed multiple metastatic lesions. Referred to med onc for further work up.   2/14/19: Enteroscopy/[place]: no mass noted, erythema in the terminal ileum, s/p biopsy with negative path  4/22/19: CT CAP: multiple small b/l lung nodules, slight enlargement of calcified mesenteric mass.  4/23-4/26/19: admitted to [facility] with N/V post scan. Underwent EUS with negative biopsy  6/5/19: Liver bx c/w Grade 2 well differentiated NET 6/21/19: Lanreotide 120 mg SQ  6/8/20: CT CAP: liver lesions smaller, mesenteric mass about the same size  11/27/20: CT CAP: unchanged disease 3/24/22: CT CAP: stable, no change in disease  9/10/22: CT CAP: stable  3/7/24 - surgery for cholecystitis, incidentally found to have cirrhotic appearing liver  [FreeTextEntry1] : Lanreotide 120 mg IM  [de-identified] : Patient has surgery 3/7/24 (6 days ago). Per op report and patient, she had a cirrhotic liver on appearance. She is concerned bceause her mother had hemochromatosis in her 70s. Otherwise, she feels achy at the surgical sites and has irritation where one of the drains had been placed. Otherwise, no fevers/chills, N/V, diarrhea, flushing. She has had weight loss but is on Ozempic.

## 2024-04-07 NOTE — END OF VISIT
[>50% of the face to face encounter time was spent on counseling and/or coordination of care for ___] : Greater than 50% of the face to face encounter time was spent on counseling and/or coordination of care for [unfilled] [] : Fellow [FreeTextEntry3] : 63 F w/ Stage IV non secreting SB NET, grade 2 (Ki 4%) on Lanreotide monthly   - Continue monthly lanreotide tx as directed - Continue scans Q6 months, ordered next CT CAP w/co with pre meds  - All concerns and questions were addressed and answered in full detail to the patient's apparent satisfaction and agreement. - will refer to hepatology for evaluation of cirrhosis  - RTO in 6 months.

## 2024-04-07 NOTE — PHYSICAL EXAM
[Ambulatory and capable of all self care but unable to carry out any work activities] : Status 2- Ambulatory and capable of all self care but unable to carry out any work activities. Up and about more than 50% of waking hours [Obese] : obese [Normal] : affect appropriate [de-identified] : Morbidly obese [de-identified] : surgical steri-strips on laparoscopy scars. One incision with surrounding erythema, appears urticarial but no drainage or warmth/TTP [de-identified] : trace pitting edema in LEs

## 2024-04-07 NOTE — REVIEW OF SYSTEMS
[Recent Change In Weight] : ~T recent weight change [Diarrhea: Grade 0] : Diarrhea: Grade 0 [Joint Pain] : joint pain [Negative] : Heme/Lymph [Vomiting] : no vomiting [Constipation] : no constipation [FreeTextEntry7] : post-surgical pain [FreeTextEntry9] : chronic

## 2024-04-07 NOTE — ASSESSMENT
[Palliative] : Goals of care discussed with patient: Palliative [Palliative Care Plan] : not applicable at this time [FreeTextEntry1] : Patient seen with and plan discussed with Dr. Blackmon. Aryles Hedjar, MD, PGY-6 Hematology/Oncology Fellow [facility]

## 2024-04-11 ENCOUNTER — APPOINTMENT (OUTPATIENT)
Dept: ORTHOPEDIC SURGERY | Facility: CLINIC | Age: 64
End: 2024-04-11
Payer: COMMERCIAL

## 2024-04-11 VITALS — HEIGHT: 61 IN | BODY MASS INDEX: 49.28 KG/M2 | WEIGHT: 261 LBS

## 2024-04-11 PROCEDURE — 20610 DRAIN/INJ JOINT/BURSA W/O US: CPT | Mod: RT

## 2024-04-11 PROCEDURE — 99214 OFFICE O/P EST MOD 30 MIN: CPT | Mod: 25

## 2024-04-12 ENCOUNTER — APPOINTMENT (OUTPATIENT)
Dept: INFUSION THERAPY | Facility: HOSPITAL | Age: 64
End: 2024-04-12

## 2024-04-22 DIAGNOSIS — F41.9 ANXIETY DISORDER, UNSPECIFIED: ICD-10-CM

## 2024-04-22 RX ORDER — ALPRAZOLAM 1 MG/1
1 TABLET ORAL
Qty: 1 | Refills: 0 | Status: ACTIVE | COMMUNITY
Start: 2024-04-22 | End: 1900-01-01

## 2024-04-25 ENCOUNTER — APPOINTMENT (OUTPATIENT)
Dept: ORTHOPEDIC SURGERY | Facility: CLINIC | Age: 64
End: 2024-04-25
Payer: COMMERCIAL

## 2024-04-25 DIAGNOSIS — M17.0 BILATERAL PRIMARY OSTEOARTHRITIS OF KNEE: ICD-10-CM

## 2024-04-25 PROCEDURE — 99214 OFFICE O/P EST MOD 30 MIN: CPT | Mod: 25

## 2024-04-25 PROCEDURE — 20610 DRAIN/INJ JOINT/BURSA W/O US: CPT | Mod: LT

## 2024-04-26 ENCOUNTER — RESULT REVIEW (OUTPATIENT)
Age: 64
End: 2024-04-26

## 2024-04-26 ENCOUNTER — APPOINTMENT (OUTPATIENT)
Dept: MRI IMAGING | Facility: CLINIC | Age: 64
End: 2024-04-26
Payer: COMMERCIAL

## 2024-04-26 ENCOUNTER — OUTPATIENT (OUTPATIENT)
Dept: OUTPATIENT SERVICES | Facility: HOSPITAL | Age: 64
LOS: 1 days | End: 2024-04-26
Payer: COMMERCIAL

## 2024-04-26 DIAGNOSIS — Z00.00 ENCOUNTER FOR GENERAL ADULT MEDICAL EXAMINATION WITHOUT ABNORMAL FINDINGS: ICD-10-CM

## 2024-04-26 DIAGNOSIS — Z98.890 OTHER SPECIFIED POSTPROCEDURAL STATES: Chronic | ICD-10-CM

## 2024-04-26 DIAGNOSIS — Z90.89 ACQUIRED ABSENCE OF OTHER ORGANS: Chronic | ICD-10-CM

## 2024-04-26 DIAGNOSIS — Z98.891 HISTORY OF UTERINE SCAR FROM PREVIOUS SURGERY: Chronic | ICD-10-CM

## 2024-04-26 PROCEDURE — 76391 MR ELASTOGRAPHY: CPT | Mod: 26

## 2024-04-26 PROCEDURE — 74183 MRI ABD W/O CNTR FLWD CNTR: CPT

## 2024-04-26 PROCEDURE — 74183 MRI ABD W/O CNTR FLWD CNTR: CPT | Mod: 26

## 2024-04-26 PROCEDURE — 76391 MR ELASTOGRAPHY: CPT

## 2024-04-26 PROCEDURE — A9585: CPT

## 2024-05-01 ENCOUNTER — OUTPATIENT (OUTPATIENT)
Dept: OUTPATIENT SERVICES | Facility: HOSPITAL | Age: 64
LOS: 1 days | Discharge: HOME | End: 2024-05-01

## 2024-05-01 ENCOUNTER — APPOINTMENT (OUTPATIENT)
Dept: OBGYN | Facility: CLINIC | Age: 64
End: 2024-05-01

## 2024-05-01 DIAGNOSIS — Z90.89 ACQUIRED ABSENCE OF OTHER ORGANS: Chronic | ICD-10-CM

## 2024-05-01 DIAGNOSIS — Z98.890 OTHER SPECIFIED POSTPROCEDURAL STATES: Chronic | ICD-10-CM

## 2024-05-01 DIAGNOSIS — D3A.8 OTHER BENIGN NEUROENDOCRINE TUMORS: ICD-10-CM

## 2024-05-01 DIAGNOSIS — Z98.891 HISTORY OF UTERINE SCAR FROM PREVIOUS SURGERY: Chronic | ICD-10-CM

## 2024-05-06 ENCOUNTER — RX RENEWAL (OUTPATIENT)
Age: 64
End: 2024-05-06

## 2024-05-06 RX ORDER — LOSARTAN POTASSIUM 100 MG/1
100 TABLET, FILM COATED ORAL DAILY
Qty: 90 | Refills: 1 | Status: ACTIVE | COMMUNITY
Start: 2019-06-14 | End: 1900-01-01

## 2024-05-07 ENCOUNTER — TRANSCRIPTION ENCOUNTER (OUTPATIENT)
Age: 64
End: 2024-05-07

## 2024-05-10 ENCOUNTER — APPOINTMENT (OUTPATIENT)
Dept: INFUSION THERAPY | Facility: HOSPITAL | Age: 64
End: 2024-05-10

## 2024-05-10 ENCOUNTER — OUTPATIENT (OUTPATIENT)
Dept: OUTPATIENT SERVICES | Facility: HOSPITAL | Age: 64
LOS: 1 days | Discharge: HOME | End: 2024-05-10

## 2024-05-10 DIAGNOSIS — Z90.89 ACQUIRED ABSENCE OF OTHER ORGANS: Chronic | ICD-10-CM

## 2024-05-10 DIAGNOSIS — Z98.890 OTHER SPECIFIED POSTPROCEDURAL STATES: Chronic | ICD-10-CM

## 2024-05-10 DIAGNOSIS — D3A.8 OTHER BENIGN NEUROENDOCRINE TUMORS: ICD-10-CM

## 2024-05-10 DIAGNOSIS — Z98.891 HISTORY OF UTERINE SCAR FROM PREVIOUS SURGERY: Chronic | ICD-10-CM

## 2024-05-30 ENCOUNTER — TRANSCRIPTION ENCOUNTER (OUTPATIENT)
Age: 64
End: 2024-05-30

## 2024-06-03 ENCOUNTER — APPOINTMENT (OUTPATIENT)
Dept: OBGYN | Facility: CLINIC | Age: 64
End: 2024-06-03
Payer: MEDICARE

## 2024-06-03 VITALS
WEIGHT: 261 LBS | DIASTOLIC BLOOD PRESSURE: 80 MMHG | BODY MASS INDEX: 49.28 KG/M2 | SYSTOLIC BLOOD PRESSURE: 136 MMHG | TEMPERATURE: 97.6 F | HEART RATE: 89 BPM | OXYGEN SATURATION: 97 % | HEIGHT: 61 IN

## 2024-06-03 DIAGNOSIS — G51.31 CLONIC HEMIFACIAL SPASM, RIGHT: ICD-10-CM

## 2024-06-03 DIAGNOSIS — Z01.419 ENCOUNTER FOR GYNECOLOGICAL EXAMINATION (GENERAL) (ROUTINE) W/OUT ABNORMAL FINDINGS: ICD-10-CM

## 2024-06-03 DIAGNOSIS — E66.01 MORBID (SEVERE) OBESITY DUE TO EXCESS CALORIES: ICD-10-CM

## 2024-06-03 DIAGNOSIS — N89.8 OTHER SPECIFIED NONINFLAMMATORY DISORDERS OF VAGINA: ICD-10-CM

## 2024-06-03 DIAGNOSIS — N83.201 UNSPECIFIED OVARIAN CYST, RIGHT SIDE: ICD-10-CM

## 2024-06-03 PROCEDURE — G0101: CPT

## 2024-06-03 RX ORDER — SEMAGLUTIDE 1.34 MG/ML
2 INJECTION, SOLUTION SUBCUTANEOUS
Refills: 0 | Status: ACTIVE | COMMUNITY

## 2024-06-03 NOTE — PATIENT PROFILE ADULT - FUNCTIONAL SCREEN CURRENT LEVEL: COMMUNICATION, MLM
"Instructed on date and arrival time of 1130. Come to entrance \"C\". Must have  over age 18 to drive home.  May have two visitors; however, children under 12 must stay in waiting room.  Discussed clear liquid diet (no red or purple), bowel prep, and NPO.  May take medications as usual except for blood thinners, diabetic medications, and weight loss medications.  Verbalized understanding of instructions given.  Instructed to call for questions or concerns.  " 0 = understands/communicates without difficulty

## 2024-06-03 NOTE — PHYSICAL EXAM
[Chaperone Present] : A chaperone was present in the examining room during all aspects of the physical examination [FreeTextEntry2] : STANTON Boucher [Appropriately responsive] : appropriately responsive [Alert] : alert [No Acute Distress] : no acute distress [No Lymphadenopathy] : no lymphadenopathy [No Murmurs] : no murmurs [Soft] : soft [Non-tender] : non-tender [Non-distended] : non-distended [No HSM] : No HSM [No Lesions] : no lesions [No Mass] : no mass [Oriented x3] : oriented x3 [Examination Of The Breasts] : a normal appearance [No Discharge] : no discharge [No Masses] : no breast masses were palpable [Labia Majora] : normal [Labia Minora] : normal [Discharge] : discharge [Moderate] : moderate [Foul Smelling] : foul smelling [White] : white [Thin] : thin [Normal] : normal [Normal Position] : in a normal position [Tenderness] : nontender [Enlarged ___ wks] : not enlarged [Mass ___ cm] : no uterine mass was palpated [Uterine Adnexae] : normal [FreeTextEntry9] : S/P colonoscopy

## 2024-06-03 NOTE — COUNSELING
RN re-heated pt food that mother brought for her        Pamjulius Carr RN  02/16/22 [id number] [Nutrition/ Exercise/ Weight Management] : nutrition, exercise, weight management [Vitamins/Supplements] : vitamins/supplements [Breast Self Exam] : breast self exam

## 2024-06-03 NOTE — HISTORY OF PRESENT ILLNESS
[FreeTextEntry1] : CHeck up  Without complaint S/P lap cholecystectomy 3/2024 Doing well DM2 controlled Now taking Ozempic

## 2024-06-04 ENCOUNTER — APPOINTMENT (OUTPATIENT)
Dept: INTERNAL MEDICINE | Facility: CLINIC | Age: 64
End: 2024-06-04
Payer: MEDICARE

## 2024-06-04 VITALS
WEIGHT: 261 LBS | TEMPERATURE: 97.5 F | HEART RATE: 86 BPM | RESPIRATION RATE: 18 BRPM | OXYGEN SATURATION: 96 % | SYSTOLIC BLOOD PRESSURE: 120 MMHG | BODY MASS INDEX: 49.28 KG/M2 | HEIGHT: 61 IN | DIASTOLIC BLOOD PRESSURE: 80 MMHG

## 2024-06-04 DIAGNOSIS — J06.9 ACUTE UPPER RESPIRATORY INFECTION, UNSPECIFIED: ICD-10-CM

## 2024-06-04 PROCEDURE — 99213 OFFICE O/P EST LOW 20 MIN: CPT

## 2024-06-04 RX ORDER — PREDNISONE 20 MG/1
20 TABLET ORAL
Qty: 2 | Refills: 0 | Status: ACTIVE | COMMUNITY
Start: 2024-06-04 | End: 1900-01-01

## 2024-06-04 NOTE — ASSESSMENT
[FreeTextEntry1] : *Viral URTI Swabbed for covid flu rsv No indication for antibiotics at this time Maycol med sinus rinse, mucinex, single dose 40mg prednisone to reduce inflammation. Carbamol for chronic myalgias Neck heating pad. Pt to let us know if worsening, may need ENT.  Time 25 min

## 2024-06-04 NOTE — PHYSICAL EXAM
[No Lymphadenopathy] : no lymphadenopathy [Normal] : normal rate, regular rhythm, normal S1 and S2 and no murmur heard [de-identified] : Tired ill looking [de-identified] : clear rhinorrhea B maxillary sinus tenderness TMs look normal [de-identified] : depressed looking

## 2024-06-04 NOTE — HISTORY OF PRESENT ILLNESS
[FreeTextEntry8] : Most pleasant 63-year-old white female with history of non resectable stage IV small bowel neuroendocrine tumor with metastasis to lung and liver on monthly lanreotide, hypertension, type 2 diabetes on Ozempic metformin with recent decreased dose secondary to 70 pound weight loss since October 2021, morbid obesity current BMI over 50 complicated by knee OA, fatigue, possible JOSTIN, stage 2-3  (3.5-4kPa) liver fibrosis without elevated iron or fat fraction by MRE 5/2024 who attends for 12 hour history of sinus congestion clear rhinorrhea non productive cough worsening her headache, diffuse myalgia, fever to 101. She feels miserable.

## 2024-06-05 DIAGNOSIS — U07.1 COVID-19: ICD-10-CM

## 2024-06-05 LAB
INFLUENZA A RESULT: NOT DETECTED
INFLUENZA B RESULT: NOT DETECTED
RESP SYN VIRUS RESULT: NOT DETECTED
SARS-COV-2 RESULT: DETECTED

## 2024-06-05 RX ORDER — NIRMATRELVIR AND RITONAVIR 300-100 MG
20 X 150 MG & KIT ORAL
Qty: 30 | Refills: 0 | Status: ACTIVE | COMMUNITY
Start: 2024-06-05 | End: 1900-01-01

## 2024-06-06 LAB
CANDIDA VAG CYTO: NOT DETECTED
G VAGINALIS+PREV SP MTYP VAG QL MICRO: NOT DETECTED
HPV HIGH+LOW RISK DNA PNL CVX: NOT DETECTED
T VAGINALIS VAG QL WET PREP: NOT DETECTED

## 2024-06-07 ENCOUNTER — APPOINTMENT (OUTPATIENT)
Dept: INFUSION THERAPY | Facility: HOSPITAL | Age: 64
End: 2024-06-07

## 2024-06-07 LAB — CYTOLOGY CVX/VAG DOC THIN PREP: NORMAL

## 2024-06-17 ENCOUNTER — RX RENEWAL (OUTPATIENT)
Age: 64
End: 2024-06-17

## 2024-06-17 RX ORDER — METHOCARBAMOL 500 MG/1
500 TABLET, FILM COATED ORAL
Qty: 90 | Refills: 2 | Status: ACTIVE | COMMUNITY
Start: 2021-08-19 | End: 1900-01-01

## 2024-06-23 LAB
ALBUMIN SERPL ELPH-MCNC: 3.9 G/DL
ALP BLD-CCNC: 105 U/L
ALT SERPL-CCNC: 13 U/L
ANION GAP SERPL CALC-SCNC: 14 MMOL/L
AST SERPL-CCNC: 17 U/L
BASOPHILS # BLD AUTO: 0.05 K/UL
BASOPHILS NFR BLD AUTO: 1 %
BILIRUB SERPL-MCNC: 0.4 MG/DL
BUN SERPL-MCNC: 16 MG/DL
CALCIUM SERPL-MCNC: 9.6 MG/DL
CHLORIDE SERPL-SCNC: 105 MMOL/L
CHOLEST SERPL-MCNC: 161 MG/DL
CO2 SERPL-SCNC: 25 MMOL/L
CREAT SERPL-MCNC: 0.83 MG/DL
EGFR: 78 ML/MIN/1.73M2
EOSINOPHIL # BLD AUTO: 0.33 K/UL
EOSINOPHIL NFR BLD AUTO: 6.5 %
ESTIMATED AVERAGE GLUCOSE: 123 MG/DL
GLUCOSE SERPL-MCNC: 91 MG/DL
HBA1C MFR BLD HPLC: 5.9 %
HCT VFR BLD CALC: 41.5 %
HDLC SERPL-MCNC: 48 MG/DL
HGB BLD-MCNC: 12.3 G/DL
IMM GRANULOCYTES NFR BLD AUTO: 0.4 %
LDLC SERPL CALC-MCNC: 93 MG/DL
LYMPHOCYTES # BLD AUTO: 1.78 K/UL
LYMPHOCYTES NFR BLD AUTO: 34.9 %
MAN DIFF?: NORMAL
MCHC RBC-ENTMCNC: 26 PG
MCHC RBC-ENTMCNC: 29.6 GM/DL
MCV RBC AUTO: 87.7 FL
MONOCYTES # BLD AUTO: 0.48 K/UL
MONOCYTES NFR BLD AUTO: 9.4 %
NEUTROPHILS # BLD AUTO: 2.44 K/UL
NEUTROPHILS NFR BLD AUTO: 47.8 %
NONHDLC SERPL-MCNC: 113 MG/DL
PLATELET # BLD AUTO: 253 K/UL
POTASSIUM SERPL-SCNC: 4.8 MMOL/L
PROT SERPL-MCNC: 7 G/DL
RBC # BLD: 4.73 M/UL
RBC # FLD: 16 %
SODIUM SERPL-SCNC: 143 MMOL/L
TRIGL SERPL-MCNC: 107 MG/DL
WBC # FLD AUTO: 5.1 K/UL

## 2024-06-27 ENCOUNTER — APPOINTMENT (OUTPATIENT)
Dept: ENDOCRINOLOGY | Facility: CLINIC | Age: 64
End: 2024-06-27
Payer: MEDICARE

## 2024-06-27 VITALS
HEART RATE: 77 BPM | HEIGHT: 61 IN | WEIGHT: 257 LBS | DIASTOLIC BLOOD PRESSURE: 58 MMHG | SYSTOLIC BLOOD PRESSURE: 106 MMHG | RESPIRATION RATE: 18 BRPM | BODY MASS INDEX: 48.52 KG/M2 | OXYGEN SATURATION: 97 %

## 2024-06-27 DIAGNOSIS — I10 ESSENTIAL (PRIMARY) HYPERTENSION: ICD-10-CM

## 2024-06-27 DIAGNOSIS — E11.9 TYPE 2 DIABETES MELLITUS W/OUT COMPLICATIONS: ICD-10-CM

## 2024-06-27 PROCEDURE — G2211 COMPLEX E/M VISIT ADD ON: CPT

## 2024-06-27 PROCEDURE — 95251 CONT GLUC MNTR ANALYSIS I&R: CPT

## 2024-06-27 PROCEDURE — 99204 OFFICE O/P NEW MOD 45 MIN: CPT

## 2024-07-03 RX ORDER — BLOOD-GLUCOSE SENSOR
EACH MISCELLANEOUS
Qty: 6 | Refills: 3 | Status: ACTIVE | COMMUNITY
Start: 2024-07-03 | End: 1900-01-01

## 2024-07-05 ENCOUNTER — APPOINTMENT (OUTPATIENT)
Dept: INFUSION THERAPY | Facility: HOSPITAL | Age: 64
End: 2024-07-05

## 2024-07-09 ENCOUNTER — NON-APPOINTMENT (OUTPATIENT)
Age: 64
End: 2024-07-09

## 2024-07-09 ENCOUNTER — APPOINTMENT (OUTPATIENT)
Dept: CARDIOLOGY | Facility: CLINIC | Age: 64
End: 2024-07-09
Payer: MEDICARE

## 2024-07-09 VITALS
BODY MASS INDEX: 48.33 KG/M2 | RESPIRATION RATE: 16 BRPM | OXYGEN SATURATION: 97 % | DIASTOLIC BLOOD PRESSURE: 80 MMHG | HEIGHT: 61 IN | WEIGHT: 256 LBS | HEART RATE: 78 BPM | SYSTOLIC BLOOD PRESSURE: 114 MMHG

## 2024-07-09 DIAGNOSIS — R94.31 ABNORMAL ELECTROCARDIOGRAM [ECG] [EKG]: ICD-10-CM

## 2024-07-09 DIAGNOSIS — I10 ESSENTIAL (PRIMARY) HYPERTENSION: ICD-10-CM

## 2024-07-09 DIAGNOSIS — I35.0 NONRHEUMATIC AORTIC (VALVE) STENOSIS: ICD-10-CM

## 2024-07-09 PROCEDURE — 93000 ELECTROCARDIOGRAM COMPLETE: CPT

## 2024-07-09 PROCEDURE — 99203 OFFICE O/P NEW LOW 30 MIN: CPT

## 2024-07-16 ENCOUNTER — NON-APPOINTMENT (OUTPATIENT)
Age: 64
End: 2024-07-16

## 2024-07-26 ENCOUNTER — OUTPATIENT (OUTPATIENT)
Dept: OUTPATIENT SERVICES | Facility: HOSPITAL | Age: 64
LOS: 1 days | Discharge: HOME | End: 2024-07-26

## 2024-07-26 DIAGNOSIS — Z98.891 HISTORY OF UTERINE SCAR FROM PREVIOUS SURGERY: Chronic | ICD-10-CM

## 2024-07-26 DIAGNOSIS — Z98.890 OTHER SPECIFIED POSTPROCEDURAL STATES: Chronic | ICD-10-CM

## 2024-07-26 DIAGNOSIS — D3A.8 OTHER BENIGN NEUROENDOCRINE TUMORS: ICD-10-CM

## 2024-07-26 DIAGNOSIS — Z90.89 ACQUIRED ABSENCE OF OTHER ORGANS: Chronic | ICD-10-CM

## 2024-08-01 ENCOUNTER — EMERGENCY (EMERGENCY)
Facility: HOSPITAL | Age: 64
LOS: 1 days | Discharge: HOME | End: 2024-08-01
Attending: EMERGENCY MEDICINE | Admitting: EMERGENCY MEDICINE
Payer: MEDICARE

## 2024-08-01 ENCOUNTER — NON-APPOINTMENT (OUTPATIENT)
Age: 64
End: 2024-08-01

## 2024-08-01 VITALS
OXYGEN SATURATION: 98 % | TEMPERATURE: 98 F | HEART RATE: 80 BPM | DIASTOLIC BLOOD PRESSURE: 78 MMHG | SYSTOLIC BLOOD PRESSURE: 112 MMHG | RESPIRATION RATE: 19 BRPM

## 2024-08-01 VITALS
RESPIRATION RATE: 18 BRPM | OXYGEN SATURATION: 99 % | SYSTOLIC BLOOD PRESSURE: 118 MMHG | DIASTOLIC BLOOD PRESSURE: 81 MMHG | WEIGHT: 257.94 LBS | TEMPERATURE: 98 F | HEART RATE: 77 BPM | HEIGHT: 61 IN

## 2024-08-01 DIAGNOSIS — Z98.891 HISTORY OF UTERINE SCAR FROM PREVIOUS SURGERY: Chronic | ICD-10-CM

## 2024-08-01 DIAGNOSIS — Z98.890 OTHER SPECIFIED POSTPROCEDURAL STATES: Chronic | ICD-10-CM

## 2024-08-01 DIAGNOSIS — Z90.89 ACQUIRED ABSENCE OF OTHER ORGANS: Chronic | ICD-10-CM

## 2024-08-01 LAB
ALBUMIN SERPL ELPH-MCNC: 3.1 G/DL — LOW (ref 3.3–5)
ALP SERPL-CCNC: 121 U/L — HIGH (ref 40–120)
ALT FLD-CCNC: 22 U/L — SIGNIFICANT CHANGE UP (ref 10–45)
ANION GAP SERPL CALC-SCNC: 9 MMOL/L — SIGNIFICANT CHANGE UP (ref 5–17)
APTT BLD: 23.8 SEC — LOW (ref 24.5–35.6)
AST SERPL-CCNC: 27 U/L — SIGNIFICANT CHANGE UP (ref 10–40)
BASOPHILS # BLD AUTO: 0.05 K/UL — SIGNIFICANT CHANGE UP (ref 0–0.2)
BASOPHILS NFR BLD AUTO: 0.8 % — SIGNIFICANT CHANGE UP (ref 0–2)
BILIRUB SERPL-MCNC: 0.4 MG/DL — SIGNIFICANT CHANGE UP (ref 0.2–1.2)
BUN SERPL-MCNC: 17 MG/DL — SIGNIFICANT CHANGE UP (ref 7–23)
CALCIUM SERPL-MCNC: 9.2 MG/DL — SIGNIFICANT CHANGE UP (ref 8.4–10.5)
CHLORIDE SERPL-SCNC: 104 MMOL/L — SIGNIFICANT CHANGE UP (ref 96–108)
CO2 SERPL-SCNC: 27 MMOL/L — SIGNIFICANT CHANGE UP (ref 22–31)
CREAT SERPL-MCNC: 0.8 MG/DL — SIGNIFICANT CHANGE UP (ref 0.5–1.3)
EGFR: 82 ML/MIN/1.73M2 — SIGNIFICANT CHANGE UP
EGFR: 82 ML/MIN/1.73M2 — SIGNIFICANT CHANGE UP
EOSINOPHIL # BLD AUTO: 0.28 K/UL — SIGNIFICANT CHANGE UP (ref 0–0.5)
EOSINOPHIL NFR BLD AUTO: 4.5 % — SIGNIFICANT CHANGE UP (ref 0–6)
GLUCOSE BLDC GLUCOMTR-MCNC: 113 MG/DL — HIGH (ref 70–99)
GLUCOSE SERPL-MCNC: 110 MG/DL — HIGH (ref 70–99)
HCT VFR BLD CALC: 37.5 % — SIGNIFICANT CHANGE UP (ref 34.5–45)
HGB BLD-MCNC: 12.1 G/DL — SIGNIFICANT CHANGE UP (ref 11.5–15.5)
IMM GRANULOCYTES NFR BLD AUTO: 0.2 % — SIGNIFICANT CHANGE UP (ref 0–0.9)
INR BLD: 0.95 RATIO — SIGNIFICANT CHANGE UP (ref 0.85–1.18)
LACTATE SERPL-SCNC: 1.2 MMOL/L — SIGNIFICANT CHANGE UP (ref 0.7–2)
LIDOCAIN IGE QN: 80 U/L — HIGH (ref 16–77)
LYMPHOCYTES # BLD AUTO: 1.83 K/UL — SIGNIFICANT CHANGE UP (ref 1–3.3)
LYMPHOCYTES # BLD AUTO: 29.4 % — SIGNIFICANT CHANGE UP (ref 13–44)
MCHC RBC-ENTMCNC: 26.9 PG — LOW (ref 27–34)
MCHC RBC-ENTMCNC: 32.3 GM/DL — SIGNIFICANT CHANGE UP (ref 32–36)
MCV RBC AUTO: 83.3 FL — SIGNIFICANT CHANGE UP (ref 80–100)
MONOCYTES # BLD AUTO: 0.6 K/UL — SIGNIFICANT CHANGE UP (ref 0–0.9)
MONOCYTES NFR BLD AUTO: 9.6 % — SIGNIFICANT CHANGE UP (ref 2–14)
NEUTROPHILS # BLD AUTO: 3.45 K/UL — SIGNIFICANT CHANGE UP (ref 1.8–7.4)
NEUTROPHILS NFR BLD AUTO: 55.5 % — SIGNIFICANT CHANGE UP (ref 43–77)
NRBC # BLD: 0 /100 WBCS — SIGNIFICANT CHANGE UP (ref 0–0)
NRBC BLD-RTO: 0 /100 WBCS — SIGNIFICANT CHANGE UP (ref 0–0)
PLATELET # BLD AUTO: 237 K/UL — SIGNIFICANT CHANGE UP (ref 150–400)
POTASSIUM SERPL-MCNC: 4.3 MMOL/L — SIGNIFICANT CHANGE UP (ref 3.5–5.3)
POTASSIUM SERPL-SCNC: 4.3 MMOL/L — SIGNIFICANT CHANGE UP (ref 3.5–5.3)
PROT SERPL-MCNC: 7.5 G/DL — SIGNIFICANT CHANGE UP (ref 6–8.3)
PROTHROM AB SERPL-ACNC: 11.1 SEC — SIGNIFICANT CHANGE UP (ref 9.5–13)
RBC # BLD: 4.5 M/UL — SIGNIFICANT CHANGE UP (ref 3.8–5.2)
RBC # FLD: 15.5 % — HIGH (ref 10.3–14.5)
SODIUM SERPL-SCNC: 140 MMOL/L — SIGNIFICANT CHANGE UP (ref 135–145)
WBC # BLD: 6.22 K/UL — SIGNIFICANT CHANGE UP (ref 3.8–10.5)
WBC # FLD AUTO: 6.22 K/UL — SIGNIFICANT CHANGE UP (ref 3.8–10.5)

## 2024-08-01 PROCEDURE — 99285 EMERGENCY DEPT VISIT HI MDM: CPT

## 2024-08-01 PROCEDURE — 74176 CT ABD & PELVIS W/O CONTRAST: CPT | Mod: MC

## 2024-08-01 PROCEDURE — 83690 ASSAY OF LIPASE: CPT

## 2024-08-01 PROCEDURE — 96374 THER/PROPH/DIAG INJ IV PUSH: CPT

## 2024-08-01 PROCEDURE — 83605 ASSAY OF LACTIC ACID: CPT

## 2024-08-01 PROCEDURE — 99284 EMERGENCY DEPT VISIT MOD MDM: CPT | Mod: 25

## 2024-08-01 PROCEDURE — 80053 COMPREHEN METABOLIC PANEL: CPT

## 2024-08-01 PROCEDURE — 85730 THROMBOPLASTIN TIME PARTIAL: CPT

## 2024-08-01 PROCEDURE — 82962 GLUCOSE BLOOD TEST: CPT

## 2024-08-01 PROCEDURE — 96375 TX/PRO/DX INJ NEW DRUG ADDON: CPT

## 2024-08-01 PROCEDURE — 74176 CT ABD & PELVIS W/O CONTRAST: CPT | Mod: 26,59,MC

## 2024-08-01 PROCEDURE — 85025 COMPLETE CBC W/AUTO DIFF WBC: CPT

## 2024-08-01 PROCEDURE — 85610 PROTHROMBIN TIME: CPT

## 2024-08-01 RX ORDER — ONDANSETRON HCL/PF 4 MG/2 ML
4 VIAL (ML) INJECTION ONCE
Refills: 0 | Status: COMPLETED | OUTPATIENT
Start: 2024-08-01 | End: 2024-08-01

## 2024-08-01 RX ORDER — OXYCODONE HYDROCHLORIDE AND ACETAMINOPHEN 10; 325 MG/1; MG/1
1 TABLET ORAL
Qty: 16 | Refills: 0
Start: 2024-08-01 | End: 2024-08-04

## 2024-08-01 RX ORDER — ACETAMINOPHEN 500 MG/5ML
650 LIQUID (ML) ORAL ONCE
Refills: 0 | Status: COMPLETED | OUTPATIENT
Start: 2024-08-01 | End: 2024-08-01

## 2024-08-01 RX ADMIN — Medication 4 MILLIGRAM(S): at 05:01

## 2024-08-01 RX ADMIN — Medication 1000 MILLILITER(S): at 05:02

## 2024-08-01 RX ADMIN — Medication 650 MILLIGRAM(S): at 06:30

## 2024-08-01 RX ADMIN — Medication 4 MILLIGRAM(S): at 05:52

## 2024-08-01 RX ADMIN — Medication 650 MILLIGRAM(S): at 05:51

## 2024-08-01 RX ADMIN — Medication 4 MILLIGRAM(S): at 05:39

## 2024-08-01 RX ADMIN — Medication 40 MILLIGRAM(S): at 05:01

## 2024-08-02 ENCOUNTER — APPOINTMENT (OUTPATIENT)
Dept: INFUSION THERAPY | Facility: HOSPITAL | Age: 64
End: 2024-08-02

## 2024-08-08 ENCOUNTER — RESULT REVIEW (OUTPATIENT)
Age: 64
End: 2024-08-08

## 2024-08-12 ENCOUNTER — TRANSCRIPTION ENCOUNTER (OUTPATIENT)
Age: 64
End: 2024-08-12

## 2024-08-19 ENCOUNTER — APPOINTMENT (OUTPATIENT)
Dept: INTERNAL MEDICINE | Facility: CLINIC | Age: 64
End: 2024-08-19
Payer: MEDICARE

## 2024-08-19 VITALS
HEART RATE: 97 BPM | WEIGHT: 250 LBS | TEMPERATURE: 98.3 F | RESPIRATION RATE: 14 BRPM | OXYGEN SATURATION: 98 % | DIASTOLIC BLOOD PRESSURE: 78 MMHG | BODY MASS INDEX: 47.2 KG/M2 | HEIGHT: 61 IN | SYSTOLIC BLOOD PRESSURE: 132 MMHG

## 2024-08-19 DIAGNOSIS — M54.50 LOW BACK PAIN, UNSPECIFIED: ICD-10-CM

## 2024-08-19 DIAGNOSIS — R10.9 UNSPECIFIED ABDOMINAL PAIN: ICD-10-CM

## 2024-08-19 PROCEDURE — 99496 TRANSJ CARE MGMT HIGH F2F 7D: CPT

## 2024-08-19 NOTE — PHYSICAL EXAM
[No Acute Distress] : no acute distress [Well Nourished] : well nourished [Well Developed] : well developed [Well-Appearing] : well-appearing [Normal Sclera/Conjunctiva] : normal sclera/conjunctiva [PERRL] : pupils equal round and reactive to light [EOMI] : extraocular movements intact [Normal Outer Ear/Nose] : the outer ears and nose were normal in appearance [Normal Oropharynx] : the oropharynx was normal [No JVD] : no jugular venous distention [No Lymphadenopathy] : no lymphadenopathy [Supple] : supple [Thyroid Normal, No Nodules] : the thyroid was normal and there were no nodules present [No Respiratory Distress] : no respiratory distress  [No Accessory Muscle Use] : no accessory muscle use [Clear to Auscultation] : lungs were clear to auscultation bilaterally [Normal Rate] : normal rate  [Regular Rhythm] : with a regular rhythm [Normal S1, S2] : normal S1 and S2 [No Murmur] : no murmur heard [No Carotid Bruits] : no carotid bruits [No Abdominal Bruit] : a ~M bruit was not heard ~T in the abdomen [No Varicosities] : no varicosities [Pedal Pulses Present] : the pedal pulses are present [No Palpable Aorta] : no palpable aorta [No Extremity Clubbing/Cyanosis] : no extremity clubbing/cyanosis [Soft] : abdomen soft [Non Tender] : non-tender [Non-distended] : non-distended [No Masses] : no abdominal mass palpated [No HSM] : no HSM [Normal Bowel Sounds] : normal bowel sounds [Normal Posterior Cervical Nodes] : no posterior cervical lymphadenopathy [Normal Anterior Cervical Nodes] : no anterior cervical lymphadenopathy [No CVA Tenderness] : no CVA  tenderness [No Spinal Tenderness] : no spinal tenderness [No Joint Swelling] : no joint swelling [Grossly Normal Strength/Tone] : grossly normal strength/tone [No Rash] : no rash [Coordination Grossly Intact] : coordination grossly intact [No Focal Deficits] : no focal deficits [Normal Gait] : normal gait [Deep Tendon Reflexes (DTR)] : deep tendon reflexes were 2+ and symmetric [Normal Affect] : the affect was normal [Normal Insight/Judgement] : insight and judgment were intact [de-identified] : Stable right leg swelling [de-identified] : Obese

## 2024-08-19 NOTE — ASSESSMENT
[FreeTextEntry1] : *Abdominal pain of uncertain etiology.  Pain is seemingly resolved.  She may have a visceral hypersensitivity which might invite TCA should she experience more attacks.  Fortunately dicyclomine seems to have worked adequately well the 1 time that she needed it since discharge a week ago.  *Low back pain, likely DJD.  Will improve as her weight continues to come down.  *Morbid obesity patient has lost 21 pounds in last 6 months.  Mentioned tirzepatide, discouraged liposuction.  *Migraine may have had Imitrex in the past.  Reglan working reasonably well.  Depending on frequency of refills may retrial that or Nurtec trial.  *Was pleasure visit with Ms. presacral today.  Answer question best I could. Time 25 minutes

## 2024-08-19 NOTE — HISTORY OF PRESENT ILLNESS
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  CT abdomen pelvis EGD failed to identify a pain generator, her metastatic carcinoid was radiographically stable.  She was started on Dilaudid which perhaps led to her first migraine with nausea and vomiting in 15 years.  CTA CTV head  MRI did not identify discrete cause of her headache. She was given Reglan which she used once since dismissal, has taken no Dilaudid and her abdominal pain is seemingly stable.   Discharge Medications amLODIPine 5 mg oral tablet: 1 tab(s) orally once a day dicyclomine 10 mg oral capsule: 1 cap(s) orally 3 times a day (before meals) as needed for abdominal pain as directed Dilaudid 4 mg oral tablet: 1 tab(s) orally every 6 hours as needed for moderate pain as directed / per palliative recs MDD: 4 losartan 100 mg oral tablet: 1 tab(s) orally once a day metoclopramide 10 mg oral tablet: 1 tab(s) orally every 8 hours as needed for Headache as directed naloxone 4 mg/0.1 mL nasal spray: 1 spray(s) intranasally once a day as needed for as directed / 1 spray every 2-3 min alternating between nostrils polyethylene glycol 3350 oral powder for reconstitution: 17 gram(s) orally 2 times a day PT out-pt - 2-3 x week for 2-3 weeks / Dx abdom. pain - (R10.9): as directed senna leaf extract oral tablet: 2 tab(s) orally once a day (at bedtime)

## 2024-08-21 ENCOUNTER — TRANSCRIPTION ENCOUNTER (OUTPATIENT)
Age: 64
End: 2024-08-21

## 2024-08-28 ENCOUNTER — RX RENEWAL (OUTPATIENT)
Age: 64
End: 2024-08-28

## 2024-08-29 ENCOUNTER — NON-APPOINTMENT (OUTPATIENT)
Age: 64
End: 2024-08-29

## 2024-08-30 ENCOUNTER — APPOINTMENT (OUTPATIENT)
Dept: INFUSION THERAPY | Facility: HOSPITAL | Age: 64
End: 2024-08-30

## 2024-08-30 ENCOUNTER — APPOINTMENT (OUTPATIENT)
Dept: HEMATOLOGY ONCOLOGY | Facility: CLINIC | Age: 64
End: 2024-08-30
Payer: MEDICARE

## 2024-08-30 VITALS
WEIGHT: 249.76 LBS | DIASTOLIC BLOOD PRESSURE: 78 MMHG | SYSTOLIC BLOOD PRESSURE: 117 MMHG | RESPIRATION RATE: 16 BRPM | OXYGEN SATURATION: 97 % | TEMPERATURE: 96.9 F | HEART RATE: 75 BPM | HEIGHT: 59.17 IN | BODY MASS INDEX: 50.35 KG/M2

## 2024-08-30 DIAGNOSIS — D3A.8 OTHER BENIGN NEUROENDOCRINE TUMORS: ICD-10-CM

## 2024-08-30 PROCEDURE — 99204 OFFICE O/P NEW MOD 45 MIN: CPT

## 2024-08-30 NOTE — HISTORY OF PRESENT ILLNESS
[Disease: _____________________] : Disease: [unfilled] [M: ___] : M[unfilled] [AJCC Stage: ____] : AJCC Stage: [unfilled] [Therapy: ___] : Therapy: [unfilled] [de-identified] : 63-year-old F with h/o intermittent abdominal pain, n/v over the last several years (at least 9) thought to be 2/2 gallstones. She has been evaluated in the ER 1-2 per year with these attacks.  CT A/P going as far back as 2010 noted a calcified mass within the mesentery of the small bowel suspicious for carcinoid. In Nov 2017 it was found to be increasing in size from previous imaging. She saw a surgeon at the time however it is unclear if any work up was recommended.   In Nov 2018 she was referred to Dr. Foy for evaluation of gallstones. He noted the mesenteric mass which prompted a PET/DOTATATE to be performed. This showed activity in the mesentery (SUV 59.9) as well as liver, ileal  mass though to be the primary, cystic adnexal mass unchanged since 11/17, possible uptake in the skin of left breast. An MRI Abdomen was performed to evaluate the liver mets and this revealed multiple metastatic lesions. Referred to med onc for further work up.   2/14/19: Enteroscopy/[place]: no mass noted, erythema in the terminal ileum, s/p biopsy with negative path  4/22/19: CT CAP: multiple small b/l lung nodules, slight enlargement of calcified mesenteric mass.  4/23-4/26/19: admitted to [facility] with N/V post scan. Underwent EUS with negative biopsy  6/5/19: Liver bx c/w Grade 2 well differentiated NET 6/21/19: Lanreotide 120 mg SQ  6/8/20: CT CAP: liver lesions smaller, mesenteric mass about the same size  11/27/20: CT CAP: unchanged disease 3/24/22: CT CAP: stable, no change in disease  9/10/22: CT CAP: stable  3/7/24 - surgery for cholecystitis, incidentally found to have cirrhotic appearing liver 8/1/24: Presented to [place] ED for severe abdominal pain. Discharged w/ percocet  CT AP: No acute finding in the abdomen or pelvis on this non con exam. Known hepatic mets. Slight interval increase size of central mesentery soft tissue nodules. Nonspecific increased hazy appearance to the central small bowel mesentery 8/2/24-8/12/24: Admitted at [facility] for ongoinga abdominal pain c/b severe migraines. CTA negative for mesenteric ischemia, no idenified source of pain 8/2/24: CTA LIVER: Stable 2.0 x 1.5 cm indeterminate hypodense focus in segment 7. Grossly stable size of a segment 5 focus measuring 1.9 x 1.8 cm.. PERITONEUM/RETROPERITONEUM: Small bowel mass containing calcifications, grossly stable. This measures 3.7 x 2.5 cm in the central abdomen. A low-density nodule superior and to the right of this lesion measures 2.7 x 2.6 cm, stable to minimally increased (previously 2.5 x 2.5 cm).. Persistent haziness of the lower mesenteric fat. Redemonstrated nodules within the abdomen [de-identified] : well differentiated NET [FreeTextEntry1] : Lanreotide 120 mg IM  [de-identified] : Here for clinical follow up. Admitted x 10 days w/ abdominal pain course c/b migraines  As of today, her abdominal pain is much better. Has not taken any opioids since discharge. Has taken bentyl x 2  Main complaint is low energy since discharge.  Appetite and bowels are normal.  Denies palpitations or flushing.

## 2024-08-30 NOTE — REVIEW OF SYSTEMS
[Recent Change In Weight] : ~T recent weight change [Diarrhea: Grade 0] : Diarrhea: Grade 0 [Joint Pain] : joint pain [Negative] : Allergic/Immunologic [Abdominal Pain] : abdominal pain [Vomiting] : no vomiting [Constipation] : no constipation [FreeTextEntry7] : post-surgical pain [FreeTextEntry9] : chronic

## 2024-08-30 NOTE — PHYSICAL EXAM
[Obese] : obese [Restricted in physically strenuous activity but ambulatory and able to carry out work of a light or sedentary nature] : Status 1- Restricted in physically strenuous activity but ambulatory and able to carry out work of a light or sedentary nature, e.g., light house work, office work [Normal] : normoactive bowel sounds, soft and nontender, no hepatosplenomegaly or masses appreciated

## 2024-09-05 ENCOUNTER — APPOINTMENT (OUTPATIENT)
Dept: ORTHOPEDIC SURGERY | Facility: CLINIC | Age: 64
End: 2024-09-05
Payer: MEDICARE

## 2024-09-05 VITALS — HEIGHT: 59.17 IN | WEIGHT: 249 LBS | BODY MASS INDEX: 50.2 KG/M2

## 2024-09-05 DIAGNOSIS — M17.0 BILATERAL PRIMARY OSTEOARTHRITIS OF KNEE: ICD-10-CM

## 2024-09-05 PROCEDURE — 20610 DRAIN/INJ JOINT/BURSA W/O US: CPT | Mod: RT

## 2024-09-05 PROCEDURE — 72100 X-RAY EXAM L-S SPINE 2/3 VWS: CPT

## 2024-09-05 PROCEDURE — 73562 X-RAY EXAM OF KNEE 3: CPT | Mod: 50

## 2024-09-05 PROCEDURE — 99204 OFFICE O/P NEW MOD 45 MIN: CPT | Mod: 25

## 2024-09-16 ENCOUNTER — APPOINTMENT (OUTPATIENT)
Dept: ORTHOPEDIC SURGERY | Facility: CLINIC | Age: 64
End: 2024-09-16
Payer: MEDICARE

## 2024-09-16 VITALS
SYSTOLIC BLOOD PRESSURE: 108 MMHG | DIASTOLIC BLOOD PRESSURE: 75 MMHG | HEART RATE: 81 BPM | HEIGHT: 59 IN | BODY MASS INDEX: 50.2 KG/M2 | WEIGHT: 249 LBS

## 2024-09-16 DIAGNOSIS — M47.817 SPONDYLOSIS W/OUT MYELOPATHY OR RADICULOPATHY, LUMBOSACRAL REGION: ICD-10-CM

## 2024-09-16 DIAGNOSIS — M54.50 LOW BACK PAIN, UNSPECIFIED: ICD-10-CM

## 2024-09-16 PROCEDURE — 99214 OFFICE O/P EST MOD 30 MIN: CPT

## 2024-09-16 NOTE — DISCUSSION/SUMMARY
[de-identified] : We discussed further treatment options.  We discussed obtaining an MRI but she declines at this time.  She does have a history of neuroendocrine tumors.  No obvious aggressive lesions within the CT scan or x-ray.  She would like to start with a course of physical therapy.  Follow-up afterwards.

## 2024-09-16 NOTE — HISTORY OF PRESENT ILLNESS
[de-identified] : Ms. UCHE RICHTER  is a 64 year old female who presents with a chronic history of low back pain that has gotten worse over time.  Denies any LE radicular symptoms.  Normal bowel and bladder control.   Denies any recent fevers, chills, sweats, weight loss, or infection.  She has not done any formal treatment.  The patients past medical history, past surgical history, medications, allergies, and social history were reviewed by me today with the patient and documented accordingly.  In addition, the patient's family history, which is noncontributory to their visit, was also reviewed.

## 2024-09-16 NOTE — PHYSICAL EXAM
[de-identified] : Examination of the lumbar spine reveals no midline tenderness palpation, step-offs, or skin lesions. Decreased range of motion with respect to flexion, extension, lateral bending, and rotation. No tenderness to palpation of the sciatic notch. No tenderness palpation of the bilateral greater trochanters. No pain with passive internal/external rotation of the hips. No instability of bilateral lower extremities.  Negative ELENO. Negative straight leg raise bilaterally. No bowstring. Negative femoral stretch. 5 out of 5 iliopsoas, hip abductors, hips adductors, quadriceps, hamstrings, gastrocsoleus, tibialis anterior, extensor hallucis longus, peroneals. Grossly intact sensation to light touch bilateral lower extremities. 1+ patellar and Achilles reflexes. Downgoing Babinski. No clonus. Intact proprioception. Palpable pulses. No skin lesion and no edema on the right and left lower extremities. [de-identified] : Review of her previous lumbar x-rays reveal spondylosis.  No aggressive lesions.  Review of her CT scan from the hospital does not reveal any aggressive lesions within the bone

## 2024-09-19 ENCOUNTER — OUTPATIENT (OUTPATIENT)
Dept: OUTPATIENT SERVICES | Facility: HOSPITAL | Age: 64
LOS: 1 days | Discharge: HOME | End: 2024-09-19

## 2024-09-19 ENCOUNTER — APPOINTMENT (OUTPATIENT)
Dept: ORTHOPEDIC SURGERY | Facility: CLINIC | Age: 64
End: 2024-09-19
Payer: MEDICARE

## 2024-09-19 DIAGNOSIS — M17.0 BILATERAL PRIMARY OSTEOARTHRITIS OF KNEE: ICD-10-CM

## 2024-09-19 DIAGNOSIS — Z98.890 OTHER SPECIFIED POSTPROCEDURAL STATES: Chronic | ICD-10-CM

## 2024-09-19 DIAGNOSIS — Z90.89 ACQUIRED ABSENCE OF OTHER ORGANS: Chronic | ICD-10-CM

## 2024-09-19 DIAGNOSIS — Z98.891 HISTORY OF UTERINE SCAR FROM PREVIOUS SURGERY: Chronic | ICD-10-CM

## 2024-09-19 PROCEDURE — 20610 DRAIN/INJ JOINT/BURSA W/O US: CPT | Mod: LT

## 2024-09-19 PROCEDURE — 99214 OFFICE O/P EST MOD 30 MIN: CPT | Mod: 25

## 2024-09-26 ENCOUNTER — APPOINTMENT (OUTPATIENT)
Dept: MAMMOGRAPHY | Facility: HOSPITAL | Age: 64
End: 2024-09-26
Payer: MEDICARE

## 2024-09-26 ENCOUNTER — RESULT REVIEW (OUTPATIENT)
Age: 64
End: 2024-09-26

## 2024-09-26 ENCOUNTER — APPOINTMENT (OUTPATIENT)
Dept: ULTRASOUND IMAGING | Facility: HOSPITAL | Age: 64
End: 2024-09-26
Payer: MEDICARE

## 2024-09-26 ENCOUNTER — OUTPATIENT (OUTPATIENT)
Dept: OUTPATIENT SERVICES | Facility: HOSPITAL | Age: 64
LOS: 1 days | End: 2024-09-26
Payer: MEDICARE

## 2024-09-26 DIAGNOSIS — Z98.890 OTHER SPECIFIED POSTPROCEDURAL STATES: Chronic | ICD-10-CM

## 2024-09-26 DIAGNOSIS — Z98.891 HISTORY OF UTERINE SCAR FROM PREVIOUS SURGERY: Chronic | ICD-10-CM

## 2024-09-26 DIAGNOSIS — Z90.89 ACQUIRED ABSENCE OF OTHER ORGANS: Chronic | ICD-10-CM

## 2024-09-26 DIAGNOSIS — Z01.419 ENCOUNTER FOR GYNECOLOGICAL EXAMINATION (GENERAL) (ROUTINE) WITHOUT ABNORMAL FINDINGS: ICD-10-CM

## 2024-09-26 PROCEDURE — 76641 ULTRASOUND BREAST COMPLETE: CPT | Mod: 26,50,GY

## 2024-09-26 PROCEDURE — 77067 SCR MAMMO BI INCL CAD: CPT | Mod: 26

## 2024-09-26 PROCEDURE — 76641 ULTRASOUND BREAST COMPLETE: CPT

## 2024-09-26 PROCEDURE — 77063 BREAST TOMOSYNTHESIS BI: CPT | Mod: 26

## 2024-09-26 PROCEDURE — 77067 SCR MAMMO BI INCL CAD: CPT

## 2024-09-26 PROCEDURE — 77063 BREAST TOMOSYNTHESIS BI: CPT

## 2024-09-27 ENCOUNTER — APPOINTMENT (OUTPATIENT)
Dept: INFUSION THERAPY | Facility: HOSPITAL | Age: 64
End: 2024-09-27

## 2024-10-11 ENCOUNTER — RX RENEWAL (OUTPATIENT)
Age: 64
End: 2024-10-11

## 2024-10-24 ENCOUNTER — RX RENEWAL (OUTPATIENT)
Age: 64
End: 2024-10-24

## 2024-11-06 ENCOUNTER — APPOINTMENT (OUTPATIENT)
Dept: HEMATOLOGY ONCOLOGY | Facility: CLINIC | Age: 64
End: 2024-11-06
Payer: MEDICARE

## 2024-11-06 VITALS
RESPIRATION RATE: 16 BRPM | TEMPERATURE: 97.7 F | SYSTOLIC BLOOD PRESSURE: 145 MMHG | OXYGEN SATURATION: 97 % | DIASTOLIC BLOOD PRESSURE: 82 MMHG | BODY MASS INDEX: 50.72 KG/M2 | HEART RATE: 82 BPM | WEIGHT: 251.1 LBS

## 2024-11-06 DIAGNOSIS — D3A.8 OTHER BENIGN NEUROENDOCRINE TUMORS: ICD-10-CM

## 2024-11-06 PROCEDURE — 99213 OFFICE O/P EST LOW 20 MIN: CPT

## 2024-11-06 RX ORDER — CAMPHOR 0.45 %
25 GEL (GRAM) TOPICAL
Qty: 5 | Refills: 1 | Status: ACTIVE | COMMUNITY
Start: 2024-11-06 | End: 1900-01-01

## 2024-11-12 ENCOUNTER — RX RENEWAL (OUTPATIENT)
Age: 64
End: 2024-11-12

## 2024-11-12 RX ORDER — AMLODIPINE BESYLATE 10 MG/1
10 TABLET ORAL
Qty: 90 | Refills: 1 | Status: ACTIVE | COMMUNITY
Start: 2024-11-12 | End: 1900-01-01

## 2024-11-18 ENCOUNTER — APPOINTMENT (OUTPATIENT)
Dept: ORTHOPEDIC SURGERY | Facility: CLINIC | Age: 64
End: 2024-11-18
Payer: MEDICARE

## 2024-11-18 VITALS — BODY MASS INDEX: 50.4 KG/M2 | HEIGHT: 59 IN | WEIGHT: 250 LBS

## 2024-11-18 DIAGNOSIS — M54.9 DORSALGIA, UNSPECIFIED: ICD-10-CM

## 2024-11-18 DIAGNOSIS — M47.817 SPONDYLOSIS W/OUT MYELOPATHY OR RADICULOPATHY, LUMBOSACRAL REGION: ICD-10-CM

## 2024-11-18 PROCEDURE — 99214 OFFICE O/P EST MOD 30 MIN: CPT

## 2024-11-20 ENCOUNTER — OUTPATIENT (OUTPATIENT)
Dept: OUTPATIENT SERVICES | Facility: HOSPITAL | Age: 64
LOS: 1 days | Discharge: HOME | End: 2024-11-20

## 2024-11-20 DIAGNOSIS — Z98.890 OTHER SPECIFIED POSTPROCEDURAL STATES: Chronic | ICD-10-CM

## 2024-11-20 DIAGNOSIS — Z98.891 HISTORY OF UTERINE SCAR FROM PREVIOUS SURGERY: Chronic | ICD-10-CM

## 2024-11-20 DIAGNOSIS — Z90.89 ACQUIRED ABSENCE OF OTHER ORGANS: Chronic | ICD-10-CM

## 2024-11-22 ENCOUNTER — APPOINTMENT (OUTPATIENT)
Dept: INFUSION THERAPY | Facility: HOSPITAL | Age: 64
End: 2024-11-22

## 2024-12-19 ENCOUNTER — APPOINTMENT (OUTPATIENT)
Dept: ENDOCRINOLOGY | Facility: CLINIC | Age: 64
End: 2024-12-19
Payer: MEDICARE

## 2024-12-19 VITALS
RESPIRATION RATE: 18 BRPM | HEIGHT: 59 IN | HEART RATE: 82 BPM | SYSTOLIC BLOOD PRESSURE: 124 MMHG | WEIGHT: 250 LBS | TEMPERATURE: 98 F | BODY MASS INDEX: 50.4 KG/M2 | DIASTOLIC BLOOD PRESSURE: 70 MMHG | OXYGEN SATURATION: 98 %

## 2024-12-19 DIAGNOSIS — E11.9 TYPE 2 DIABETES MELLITUS W/OUT COMPLICATIONS: ICD-10-CM

## 2024-12-19 DIAGNOSIS — I10 ESSENTIAL (PRIMARY) HYPERTENSION: ICD-10-CM

## 2024-12-19 PROCEDURE — 95251 CONT GLUC MNTR ANALYSIS I&R: CPT

## 2024-12-19 PROCEDURE — 99214 OFFICE O/P EST MOD 30 MIN: CPT

## 2024-12-19 PROCEDURE — G2211 COMPLEX E/M VISIT ADD ON: CPT

## 2024-12-19 RX ORDER — ATORVASTATIN CALCIUM 10 MG/1
10 TABLET, FILM COATED ORAL
Qty: 90 | Refills: 3 | Status: ACTIVE | COMMUNITY
Start: 2024-12-19 | End: 1900-01-01

## 2024-12-19 RX ORDER — TIRZEPATIDE 5 MG/.5ML
5 INJECTION, SOLUTION SUBCUTANEOUS
Qty: 12 | Refills: 1 | Status: ACTIVE | COMMUNITY
Start: 2024-12-19 | End: 1900-01-01

## 2024-12-20 ENCOUNTER — APPOINTMENT (OUTPATIENT)
Dept: INFUSION THERAPY | Facility: HOSPITAL | Age: 64
End: 2024-12-20

## 2024-12-20 ENCOUNTER — NON-APPOINTMENT (OUTPATIENT)
Age: 64
End: 2024-12-20

## 2024-12-20 LAB — POTASSIUM SERPL-SCNC: 4.7 MMOL/L

## 2024-12-23 ENCOUNTER — NON-APPOINTMENT (OUTPATIENT)
Age: 64
End: 2024-12-23

## 2025-01-10 ENCOUNTER — RX RENEWAL (OUTPATIENT)
Age: 65
End: 2025-01-10

## 2025-01-17 ENCOUNTER — APPOINTMENT (OUTPATIENT)
Dept: INFUSION THERAPY | Facility: HOSPITAL | Age: 65
End: 2025-01-17

## 2025-01-23 ENCOUNTER — NON-APPOINTMENT (OUTPATIENT)
Age: 65
End: 2025-01-23

## 2025-01-23 ENCOUNTER — APPOINTMENT (OUTPATIENT)
Dept: NEUROLOGY | Facility: CLINIC | Age: 65
End: 2025-01-23
Payer: MEDICARE

## 2025-01-23 VITALS
HEIGHT: 59 IN | BODY MASS INDEX: 50.4 KG/M2 | HEART RATE: 69 BPM | WEIGHT: 250 LBS | DIASTOLIC BLOOD PRESSURE: 73 MMHG | SYSTOLIC BLOOD PRESSURE: 115 MMHG

## 2025-01-23 DIAGNOSIS — G51.31 CLONIC HEMIFACIAL SPASM, RIGHT: ICD-10-CM

## 2025-01-23 PROCEDURE — 99215 OFFICE O/P EST HI 40 MIN: CPT

## 2025-01-23 PROCEDURE — G2211 COMPLEX E/M VISIT ADD ON: CPT

## 2025-01-27 ENCOUNTER — RESULT REVIEW (OUTPATIENT)
Age: 65
End: 2025-01-27

## 2025-01-27 NOTE — ASU PREOP CHECKLIST - 1.
"Anesthesia Transfer of Care Note    Patient: Naila Huerta    Procedure(s) Performed: Procedure(s) (LRB):  ARTHROPLASTY, KNEE, TOTAL (Left)    Patient location: PACU    Anesthesia Type: general    Transport from OR: Transported from OR on room air with adequate spontaneous ventilation    Post pain: adequate analgesia    Post assessment: no apparent anesthetic complications    Post vital signs: stable    Level of consciousness: awake    Nausea/Vomiting: no nausea/vomiting    Complications: none    Transfer of care protocol was followed      Last vitals: Visit Vitals  BP (!) 162/77 (BP Location: Left arm, Patient Position: Sitting)   Pulse 70   Temp 36.7 °C (98.1 °F) (Oral)   Resp 18   Ht 5' 7.5" (1.715 m)   Wt 63.5 kg (140 lb)   SpO2 99%   Breastfeeding No   BMI 21.60 kg/m²     " emotional support given to patient

## 2025-02-03 ENCOUNTER — OUTPATIENT (OUTPATIENT)
Dept: OUTPATIENT SERVICES | Facility: HOSPITAL | Age: 65
LOS: 1 days | End: 2025-02-03
Payer: MEDICARE

## 2025-02-03 ENCOUNTER — APPOINTMENT (OUTPATIENT)
Dept: CT IMAGING | Facility: CLINIC | Age: 65
End: 2025-02-03
Payer: MEDICARE

## 2025-02-03 ENCOUNTER — OUTPATIENT (OUTPATIENT)
Dept: OUTPATIENT SERVICES | Facility: HOSPITAL | Age: 65
LOS: 1 days | Discharge: HOME | End: 2025-02-03

## 2025-02-03 DIAGNOSIS — Z98.890 OTHER SPECIFIED POSTPROCEDURAL STATES: Chronic | ICD-10-CM

## 2025-02-03 DIAGNOSIS — Z98.891 HISTORY OF UTERINE SCAR FROM PREVIOUS SURGERY: Chronic | ICD-10-CM

## 2025-02-03 DIAGNOSIS — Z90.89 ACQUIRED ABSENCE OF OTHER ORGANS: Chronic | ICD-10-CM

## 2025-02-03 PROCEDURE — 71260 CT THORAX DX C+: CPT | Mod: 26

## 2025-02-03 PROCEDURE — 74177 CT ABD & PELVIS W/CONTRAST: CPT | Mod: 26

## 2025-02-03 PROCEDURE — 74177 CT ABD & PELVIS W/CONTRAST: CPT

## 2025-02-03 PROCEDURE — 71260 CT THORAX DX C+: CPT

## 2025-02-05 ENCOUNTER — APPOINTMENT (OUTPATIENT)
Dept: HEMATOLOGY ONCOLOGY | Facility: CLINIC | Age: 65
End: 2025-02-05

## 2025-02-05 VITALS
SYSTOLIC BLOOD PRESSURE: 124 MMHG | BODY MASS INDEX: 50.69 KG/M2 | DIASTOLIC BLOOD PRESSURE: 75 MMHG | TEMPERATURE: 97.5 F | WEIGHT: 254.82 LBS | RESPIRATION RATE: 18 BRPM | HEIGHT: 59.6 IN | OXYGEN SATURATION: 96 % | HEART RATE: 98 BPM

## 2025-02-05 DIAGNOSIS — D3A.8 OTHER BENIGN NEUROENDOCRINE TUMORS: ICD-10-CM

## 2025-02-05 PROCEDURE — 99214 OFFICE O/P EST MOD 30 MIN: CPT

## 2025-02-05 PROCEDURE — G2211 COMPLEX E/M VISIT ADD ON: CPT

## 2025-02-19 ENCOUNTER — RX RENEWAL (OUTPATIENT)
Age: 65
End: 2025-02-19

## 2025-02-19 PROBLEM — N94.89 ADNEXAL MASS: Status: ACTIVE | Noted: 2025-02-19

## 2025-02-19 PROBLEM — K56.1 INTUSSUSCEPTION OF COLON: Status: ACTIVE | Noted: 2025-02-19

## 2025-04-04 ENCOUNTER — OUTPATIENT (OUTPATIENT)
Dept: OUTPATIENT SERVICES | Facility: HOSPITAL | Age: 65
LOS: 1 days | End: 2025-04-04
Payer: MEDICARE

## 2025-04-04 ENCOUNTER — NON-APPOINTMENT (OUTPATIENT)
Age: 65
End: 2025-04-04

## 2025-04-04 ENCOUNTER — APPOINTMENT (OUTPATIENT)
Dept: MRI IMAGING | Facility: CLINIC | Age: 65
End: 2025-04-04
Payer: MEDICARE

## 2025-04-04 DIAGNOSIS — Z90.89 ACQUIRED ABSENCE OF OTHER ORGANS: Chronic | ICD-10-CM

## 2025-04-04 DIAGNOSIS — Z00.8 ENCOUNTER FOR OTHER GENERAL EXAMINATION: ICD-10-CM

## 2025-04-04 DIAGNOSIS — Z98.890 OTHER SPECIFIED POSTPROCEDURAL STATES: Chronic | ICD-10-CM

## 2025-04-04 DIAGNOSIS — Z98.891 HISTORY OF UTERINE SCAR FROM PREVIOUS SURGERY: Chronic | ICD-10-CM

## 2025-04-04 PROCEDURE — A9585: CPT

## 2025-04-04 PROCEDURE — 72197 MRI PELVIS W/O & W/DYE: CPT | Mod: 26

## 2025-04-04 PROCEDURE — 72197 MRI PELVIS W/O & W/DYE: CPT

## 2025-04-07 ENCOUNTER — APPOINTMENT (OUTPATIENT)
Dept: OPHTHALMOLOGY | Facility: CLINIC | Age: 65
End: 2025-04-07
Payer: MEDICARE

## 2025-04-07 ENCOUNTER — NON-APPOINTMENT (OUTPATIENT)
Age: 65
End: 2025-04-07

## 2025-04-07 PROCEDURE — 92004 COMPRE OPH EXAM NEW PT 1/>: CPT

## 2025-04-08 ENCOUNTER — OUTPATIENT (OUTPATIENT)
Dept: OUTPATIENT SERVICES | Facility: HOSPITAL | Age: 65
LOS: 1 days | Discharge: HOME | End: 2025-04-08

## 2025-04-08 DIAGNOSIS — Z90.89 ACQUIRED ABSENCE OF OTHER ORGANS: Chronic | ICD-10-CM

## 2025-04-08 DIAGNOSIS — Z98.890 OTHER SPECIFIED POSTPROCEDURAL STATES: Chronic | ICD-10-CM

## 2025-04-08 DIAGNOSIS — D3A.8 OTHER BENIGN NEUROENDOCRINE TUMORS: ICD-10-CM

## 2025-04-08 DIAGNOSIS — Z98.891 HISTORY OF UTERINE SCAR FROM PREVIOUS SURGERY: Chronic | ICD-10-CM

## 2025-04-09 ENCOUNTER — APPOINTMENT (OUTPATIENT)
Dept: ENDOCRINOLOGY | Facility: CLINIC | Age: 65
End: 2025-04-09
Payer: MEDICARE

## 2025-04-09 ENCOUNTER — NON-APPOINTMENT (OUTPATIENT)
Age: 65
End: 2025-04-09

## 2025-04-09 VITALS
OXYGEN SATURATION: 97 % | HEIGHT: 59.6 IN | RESPIRATION RATE: 16 BRPM | WEIGHT: 254.82 LBS | BODY MASS INDEX: 50.69 KG/M2 | SYSTOLIC BLOOD PRESSURE: 122 MMHG | TEMPERATURE: 97 F | HEART RATE: 67 BPM | DIASTOLIC BLOOD PRESSURE: 72 MMHG

## 2025-04-09 DIAGNOSIS — E11.9 TYPE 2 DIABETES MELLITUS W/OUT COMPLICATIONS: ICD-10-CM

## 2025-04-09 DIAGNOSIS — I10 ESSENTIAL (PRIMARY) HYPERTENSION: ICD-10-CM

## 2025-04-09 PROCEDURE — 99214 OFFICE O/P EST MOD 30 MIN: CPT

## 2025-04-09 PROCEDURE — 95251 CONT GLUC MNTR ANALYSIS I&R: CPT

## 2025-04-09 PROCEDURE — G2211 COMPLEX E/M VISIT ADD ON: CPT

## 2025-04-11 ENCOUNTER — APPOINTMENT (OUTPATIENT)
Dept: INFUSION THERAPY | Facility: HOSPITAL | Age: 65
End: 2025-04-11

## 2025-04-24 ENCOUNTER — NON-APPOINTMENT (OUTPATIENT)
Age: 65
End: 2025-04-24

## 2025-04-24 ENCOUNTER — APPOINTMENT (OUTPATIENT)
Dept: OPHTHALMOLOGY | Facility: CLINIC | Age: 65
End: 2025-04-24
Payer: MEDICARE

## 2025-04-24 DIAGNOSIS — D3A.8 OTHER BENIGN NEUROENDOCRINE TUMORS: ICD-10-CM

## 2025-04-24 PROCEDURE — 64612 DESTROY NERVE FACE MUSCLE: CPT | Mod: RT

## 2025-04-25 ENCOUNTER — APPOINTMENT (OUTPATIENT)
Dept: INTERNAL MEDICINE | Facility: CLINIC | Age: 65
End: 2025-04-25
Payer: MEDICARE

## 2025-04-25 ENCOUNTER — NON-APPOINTMENT (OUTPATIENT)
Age: 65
End: 2025-04-25

## 2025-04-25 VITALS
OXYGEN SATURATION: 98 % | WEIGHT: 252 LBS | SYSTOLIC BLOOD PRESSURE: 124 MMHG | TEMPERATURE: 97.3 F | HEIGHT: 59.6 IN | BODY MASS INDEX: 50.13 KG/M2 | HEART RATE: 76 BPM | DIASTOLIC BLOOD PRESSURE: 70 MMHG | RESPIRATION RATE: 16 BRPM

## 2025-04-25 DIAGNOSIS — J06.9 ACUTE UPPER RESPIRATORY INFECTION, UNSPECIFIED: ICD-10-CM

## 2025-04-25 DIAGNOSIS — M62.838 OTHER MUSCLE SPASM: ICD-10-CM

## 2025-04-25 PROCEDURE — 99213 OFFICE O/P EST LOW 20 MIN: CPT

## 2025-04-25 RX ORDER — FEXOFENADINE HCL 180 MG/1
180 TABLET, FILM COATED ORAL DAILY
Qty: 30 | Refills: 1 | Status: ACTIVE | COMMUNITY
Start: 2025-04-25 | End: 1900-01-01

## 2025-04-25 RX ORDER — BENZONATATE 100 MG/1
100 CAPSULE ORAL
Qty: 21 | Refills: 0 | Status: ACTIVE | COMMUNITY
Start: 2025-04-25 | End: 1900-01-01

## 2025-04-25 RX ORDER — MOMETASONE 50 UG/1
50 SPRAY, METERED NASAL DAILY
Qty: 1 | Refills: 1 | Status: ACTIVE | COMMUNITY
Start: 2025-04-25 | End: 1900-01-01

## 2025-04-25 RX ORDER — TIZANIDINE 2 MG/1
2 TABLET ORAL
Qty: 10 | Refills: 0 | Status: ACTIVE | COMMUNITY
Start: 2025-04-25 | End: 1900-01-01

## 2025-04-26 ENCOUNTER — NON-APPOINTMENT (OUTPATIENT)
Age: 65
End: 2025-04-26

## 2025-04-26 ENCOUNTER — EMERGENCY (EMERGENCY)
Facility: HOSPITAL | Age: 65
LOS: 1 days | End: 2025-04-26
Attending: STUDENT IN AN ORGANIZED HEALTH CARE EDUCATION/TRAINING PROGRAM | Admitting: STUDENT IN AN ORGANIZED HEALTH CARE EDUCATION/TRAINING PROGRAM
Payer: MEDICARE

## 2025-04-26 VITALS
HEART RATE: 78 BPM | SYSTOLIC BLOOD PRESSURE: 140 MMHG | OXYGEN SATURATION: 98 % | DIASTOLIC BLOOD PRESSURE: 77 MMHG | RESPIRATION RATE: 18 BRPM

## 2025-04-26 VITALS
RESPIRATION RATE: 17 BRPM | DIASTOLIC BLOOD PRESSURE: 62 MMHG | OXYGEN SATURATION: 99 % | WEIGHT: 250 LBS | SYSTOLIC BLOOD PRESSURE: 131 MMHG | HEART RATE: 80 BPM | TEMPERATURE: 98 F | HEIGHT: 59 IN

## 2025-04-26 DIAGNOSIS — Z98.891 HISTORY OF UTERINE SCAR FROM PREVIOUS SURGERY: Chronic | ICD-10-CM

## 2025-04-26 DIAGNOSIS — Z98.890 OTHER SPECIFIED POSTPROCEDURAL STATES: Chronic | ICD-10-CM

## 2025-04-26 DIAGNOSIS — Z90.89 ACQUIRED ABSENCE OF OTHER ORGANS: Chronic | ICD-10-CM

## 2025-04-26 LAB
ALBUMIN SERPL ELPH-MCNC: 3.3 G/DL — SIGNIFICANT CHANGE UP (ref 3.3–5)
ALP SERPL-CCNC: 116 U/L — SIGNIFICANT CHANGE UP (ref 40–120)
ALT FLD-CCNC: 18 U/L — SIGNIFICANT CHANGE UP (ref 10–45)
ANION GAP SERPL CALC-SCNC: 9 MMOL/L — SIGNIFICANT CHANGE UP (ref 5–17)
APTT BLD: 31.2 SEC — SIGNIFICANT CHANGE UP (ref 26.1–36.8)
AST SERPL-CCNC: 22 U/L — SIGNIFICANT CHANGE UP (ref 10–40)
BASOPHILS # BLD AUTO: 0.02 K/UL — SIGNIFICANT CHANGE UP (ref 0–0.2)
BASOPHILS NFR BLD AUTO: 0.6 % — SIGNIFICANT CHANGE UP (ref 0–2)
BILIRUB SERPL-MCNC: 0.7 MG/DL — SIGNIFICANT CHANGE UP (ref 0.2–1.2)
BUN SERPL-MCNC: 13 MG/DL — SIGNIFICANT CHANGE UP (ref 7–23)
CALCIUM SERPL-MCNC: 8.9 MG/DL — SIGNIFICANT CHANGE UP (ref 8.4–10.5)
CHLORIDE SERPL-SCNC: 102 MMOL/L — SIGNIFICANT CHANGE UP (ref 96–108)
CO2 SERPL-SCNC: 29 MMOL/L — SIGNIFICANT CHANGE UP (ref 22–31)
CREAT SERPL-MCNC: 1.12 MG/DL — SIGNIFICANT CHANGE UP (ref 0.5–1.3)
EGFR: 54 ML/MIN/1.73M2 — LOW
EGFR: 54 ML/MIN/1.73M2 — LOW
EOSINOPHIL # BLD AUTO: 0.07 K/UL — SIGNIFICANT CHANGE UP (ref 0–0.5)
EOSINOPHIL NFR BLD AUTO: 2 % — SIGNIFICANT CHANGE UP (ref 0–6)
FLUAV AG NPH QL: SIGNIFICANT CHANGE UP
FLUBV AG NPH QL: SIGNIFICANT CHANGE UP
GLUCOSE SERPL-MCNC: 100 MG/DL — HIGH (ref 70–99)
HCT VFR BLD CALC: 38.7 % — SIGNIFICANT CHANGE UP (ref 34.5–45)
HGB BLD-MCNC: 12.5 G/DL — SIGNIFICANT CHANGE UP (ref 11.5–15.5)
IMM GRANULOCYTES NFR BLD AUTO: 0.3 % — SIGNIFICANT CHANGE UP (ref 0–0.9)
INR BLD: 1.08 RATIO — SIGNIFICANT CHANGE UP (ref 0.85–1.16)
LACTATE SERPL-SCNC: 1.1 MMOL/L — SIGNIFICANT CHANGE UP (ref 0.7–2)
LYMPHOCYTES # BLD AUTO: 1.42 K/UL — SIGNIFICANT CHANGE UP (ref 1–3.3)
LYMPHOCYTES # BLD AUTO: 40.5 % — SIGNIFICANT CHANGE UP (ref 13–44)
MCHC RBC-ENTMCNC: 27.6 PG — SIGNIFICANT CHANGE UP (ref 27–34)
MCHC RBC-ENTMCNC: 32.3 G/DL — SIGNIFICANT CHANGE UP (ref 32–36)
MCV RBC AUTO: 85.4 FL — SIGNIFICANT CHANGE UP (ref 80–100)
MONOCYTES # BLD AUTO: 0.45 K/UL — SIGNIFICANT CHANGE UP (ref 0–0.9)
MONOCYTES NFR BLD AUTO: 12.8 % — SIGNIFICANT CHANGE UP (ref 2–14)
NEUTROPHILS # BLD AUTO: 1.54 K/UL — LOW (ref 1.8–7.4)
NEUTROPHILS NFR BLD AUTO: 43.8 % — SIGNIFICANT CHANGE UP (ref 43–77)
NRBC BLD AUTO-RTO: 0 /100 WBCS — SIGNIFICANT CHANGE UP (ref 0–0)
PLATELET # BLD AUTO: 205 K/UL — SIGNIFICANT CHANGE UP (ref 150–400)
POTASSIUM SERPL-MCNC: 4.3 MMOL/L — SIGNIFICANT CHANGE UP (ref 3.5–5.3)
POTASSIUM SERPL-SCNC: 4.3 MMOL/L — SIGNIFICANT CHANGE UP (ref 3.5–5.3)
PROT SERPL-MCNC: 7.2 G/DL — SIGNIFICANT CHANGE UP (ref 6–8.3)
PROTHROM AB SERPL-ACNC: 12.7 SEC — SIGNIFICANT CHANGE UP (ref 9.9–13.4)
RBC # BLD: 4.53 M/UL — SIGNIFICANT CHANGE UP (ref 3.8–5.2)
RBC # FLD: 14.6 % — HIGH (ref 10.3–14.5)
RESP PATH DNA+RNA PNL NPH NAA+NON-PROBE: NOT DETECTED
RSV RNA NPH QL NAA+NON-PROBE: SIGNIFICANT CHANGE UP
SARS-COV-2 RNA RESP QL NAA+PROBE: NOT DETECTED
SARS-COV-2 RNA SPEC QL NAA+PROBE: SIGNIFICANT CHANGE UP
SODIUM SERPL-SCNC: 140 MMOL/L — SIGNIFICANT CHANGE UP (ref 135–145)
SOURCE RESPIRATORY: SIGNIFICANT CHANGE UP
WBC # BLD: 3.51 K/UL — LOW (ref 3.8–10.5)
WBC # FLD AUTO: 3.51 K/UL — LOW (ref 3.8–10.5)

## 2025-04-26 PROCEDURE — 83605 ASSAY OF LACTIC ACID: CPT

## 2025-04-26 PROCEDURE — 36415 COLL VENOUS BLD VENIPUNCTURE: CPT

## 2025-04-26 PROCEDURE — 96375 TX/PRO/DX INJ NEW DRUG ADDON: CPT

## 2025-04-26 PROCEDURE — 85025 COMPLETE CBC W/AUTO DIFF WBC: CPT

## 2025-04-26 PROCEDURE — 93005 ELECTROCARDIOGRAM TRACING: CPT

## 2025-04-26 PROCEDURE — 99285 EMERGENCY DEPT VISIT HI MDM: CPT

## 2025-04-26 PROCEDURE — 87040 BLOOD CULTURE FOR BACTERIA: CPT

## 2025-04-26 PROCEDURE — 96361 HYDRATE IV INFUSION ADD-ON: CPT

## 2025-04-26 PROCEDURE — 93010 ELECTROCARDIOGRAM REPORT: CPT

## 2025-04-26 PROCEDURE — 80053 COMPREHEN METABOLIC PANEL: CPT

## 2025-04-26 PROCEDURE — 99285 EMERGENCY DEPT VISIT HI MDM: CPT | Mod: 25

## 2025-04-26 PROCEDURE — 71046 X-RAY EXAM CHEST 2 VIEWS: CPT

## 2025-04-26 PROCEDURE — 96365 THER/PROPH/DIAG IV INF INIT: CPT

## 2025-04-26 PROCEDURE — 85730 THROMBOPLASTIN TIME PARTIAL: CPT

## 2025-04-26 PROCEDURE — 85610 PROTHROMBIN TIME: CPT

## 2025-04-26 PROCEDURE — 87637 SARSCOV2&INF A&B&RSV AMP PRB: CPT

## 2025-04-26 PROCEDURE — 71046 X-RAY EXAM CHEST 2 VIEWS: CPT | Mod: 26

## 2025-04-26 RX ORDER — AMOXICILLIN AND CLAVULANATE POTASSIUM 500; 125 MG/1; MG/1
1 TABLET, FILM COATED ORAL
Qty: 20 | Refills: 0
Start: 2025-04-26 | End: 2025-05-05

## 2025-04-26 RX ORDER — CEFTRIAXONE 500 MG/1
1000 INJECTION, POWDER, FOR SOLUTION INTRAMUSCULAR; INTRAVENOUS ONCE
Refills: 0 | Status: COMPLETED | OUTPATIENT
Start: 2025-04-26 | End: 2025-04-26

## 2025-04-26 RX ORDER — ACETAMINOPHEN 500 MG/5ML
1000 LIQUID (ML) ORAL ONCE
Refills: 0 | Status: COMPLETED | OUTPATIENT
Start: 2025-04-26 | End: 2025-04-26

## 2025-04-26 RX ORDER — AZITHROMYCIN 250 MG
500 CAPSULE ORAL ONCE
Refills: 0 | Status: COMPLETED | OUTPATIENT
Start: 2025-04-26 | End: 2025-04-26

## 2025-04-26 RX ADMIN — Medication 250 MILLIGRAM(S): at 20:20

## 2025-04-26 RX ADMIN — Medication 400 MILLIGRAM(S): at 22:00

## 2025-04-26 RX ADMIN — Medication 2000 MILLILITER(S): at 20:00

## 2025-04-26 RX ADMIN — Medication 2000 MILLILITER(S): at 19:00

## 2025-04-26 RX ADMIN — Medication 500 MILLIGRAM(S): at 21:20

## 2025-04-26 RX ADMIN — CEFTRIAXONE 100 MILLIGRAM(S): 500 INJECTION, POWDER, FOR SOLUTION INTRAMUSCULAR; INTRAVENOUS at 19:56

## 2025-04-26 NOTE — ED PROVIDER NOTE - CLINICAL SUMMARY MEDICAL DECISION MAKING FREE TEXT BOX
63 yo f ho htn, hld, neuroendocrine tumor with intermittent chemo? medication pw cough fever x 3 days. Admits to cough progressively worsening yday. Went to PCP Dr Mazariegos and dc with meds including tessalon perles, decongestants. Found to have fever in .8 PO. Denies cp, sob, difficulty breathing   Denies taking anti pyretics   Exam as stated  Inititally plan for sepsis forrest in setting of fever. Does not meet sepsis criteria with results. Found to have pna.  Will txt with iv meds and dc with PO meds and trial of PO meds after shared decision making. VS stable  Patient to be discharged from ED. Any available test results were discussed with patient and/or family. Verbal instructions given, including instructions to return to ED immediately for any new, worsening, or concerning symptoms. Patient endorsed understanding. Written discharge instructions additionally given, including follow-up plan.

## 2025-04-26 NOTE — ED ADULT TRIAGE NOTE - CHIEF COMPLAINT QUOTE
Patient presents to ED with complaint of throat pain, back pain, cough, congestion x several days. Alert and oriented x 4.

## 2025-04-26 NOTE — ED ADULT NURSE NOTE - CHIEF COMPLAINT
"OSMAR pt-informed her it appears her order for \"Tilt Table\" was cancelled.    Advised her to call Dr. Galicia office (ordering doctor) to inquire about it, and call us back if there is anything we can do to help.       " The patient is a 64y Female complaining of cold symptoms.

## 2025-04-26 NOTE — ED ADULT NURSE NOTE - NSFALLUNIVINTERV_ED_ALL_ED
Bed/Stretcher in lowest position, wheels locked, appropriate side rails in place/Call bell, personal items and telephone in reach/Instruct patient to call for assistance before getting out of bed/chair/stretcher/Non-slip footwear applied when patient is off stretcher/Theodore to call system/Physically safe environment - no spills, clutter or unnecessary equipment/Purposeful proactive rounding/Room/bathroom lighting operational, light cord in reach

## 2025-04-26 NOTE — ED ADULT NURSE NOTE - PLAN OF CARE
Pt coming in tomorrow for CPAP download and to drop off paperwork for [facility] prior to VV on 1/10.  
Call bell/Fall precautions

## 2025-04-26 NOTE — ED PROVIDER NOTE - PATIENT PORTAL LINK FT
You can access the FollowMyHealth Patient Portal offered by [facility] by registering at the following website: [email or web address]. By joining [facility]’s FollowMyHealth portal, you will also be able to view your health information using other applications (apps) compatible with our system.

## 2025-04-26 NOTE — ED PROVIDER NOTE - PHYSICAL EXAMINATION
CONSTITUTIONAL: NAD  SKIN: Warm dry  HEAD: NCAT  EYES: NL inspection  ENT: MMM  CARD: RRR  RESP: Unlabored respirations  ABD: S/NT no R/G  NEURO: Grossly unremarkable  PSYCH: Cooperative, appropriate.

## 2025-04-26 NOTE — ED ADULT NURSE NOTE - OBJECTIVE STATEMENT
Pt presents to ER complaining of throat pain, back pain, cough, congestion for three days. A&OX4. Pt denies SOB, chest pain, nausea, vomiting, dizziness or headache.

## 2025-05-02 NOTE — CHART NOTE - NSCHARTNOTEFT_GEN_A_CORE
64 y o female presented to the ED on 4/26 with cold symptoms as per chart.  SW called to discuss patient safety at home and assist with scheduling recommended follow up appointments.  The patient had called her primary care provider's office and is awaiting a return call.  According to her, some of the symptoms she was experiencing have gone away.   She still has the cough.  The patient decline assistance with scheduling an appointment.

## 2025-05-04 PROBLEM — B37.31 VAGINAL YEAST INFECTION: Status: ACTIVE | Noted: 2025-05-04 | Resolved: 2025-06-03

## 2025-05-09 ENCOUNTER — APPOINTMENT (OUTPATIENT)
Dept: INFUSION THERAPY | Facility: HOSPITAL | Age: 65
End: 2025-05-09

## 2025-05-16 NOTE — ED ADULT TRIAGE NOTE - CCCP TRG CHIEF CMPLNT
History:  Past Medical History:   Diagnosis Date    Chronic constipation     Crohn's disease     History of Crohn's disease     unclear how diagnosis was made    Inflammatory bowel disease     Iron deficiency anemia, unspecified      Past Surgical History:   Procedure Laterality Date    BONE MARROW ASPIRATION N/A 2023    Procedure: ASPIRATION, BONE MARROW;  Surgeon: Gayatri Howard MD;  Location: [facility] ENDOSCOPY;  Service: General;  Laterality: N/A;     SECTION  2012, 19    COLONOSCOPY      Luzmaria had several of these procedures    COLONOSCOPY, WITH 1 OR MORE BIOPSIES N/A 2023    Procedure: COLONOSCOPY, WITH 1 OR MORE BIOPSIES;  Surgeon: Bisi Holliday MD;  Location: [facility] ENDOSCOPY;  Service: Gastroenterology;  Laterality: N/A;    EGD, WITH CLOSED BIOPSY N/A 2023    Procedure: EGD, WITH CLOSED BIOPSY;  Surgeon: Bisi Holliday MD;  Location: [facility] ENDOSCOPY;  Service: Gastroenterology;  Laterality: N/A;    INTRALUMINAL GASTROINTESTINAL TRACT IMAGING VIA CAPSULE N/A 2025    Procedure: IMAGING PROCEDURE, GI TRACT, INTRALUMINAL, VIA CAPSULE;  Surgeon: Bisi Holliday MD;  Location: [facility] ENDOSCOPY;  Service: Gastroenterology;  Laterality: N/A;    TONSILLECTOMY      TUBAL LIGATION  2019    UPPER GASTROINTESTINAL ENDOSCOPY      I've had several of these      Social History     Socioeconomic History    Marital status: Other    Number of children: 3    Highest education level: 12th grade   Occupational History    Occupation: self employed/   Tobacco Use    Smoking status: Never     Passive exposure: Never    Smokeless tobacco: Never   Substance and Sexual Activity    Alcohol use: Yes     Alcohol/week: 1.0 standard drink of alcohol     Types: 1 Glasses of wine per week    Drug use: Never    Sexual activity: Yes     Partners: Male     Birth control/protection: Other-see comments     Comment: Tubed tied     Social Drivers of Health     Financial  Resource Strain: Low Risk  (6/3/2024)    Overall Financial Resource Strain (CARDIA)     Difficulty of Paying Living Expenses: Not hard at all   Food Insecurity: No Food Insecurity (6/3/2024)    Hunger Vital Sign     Worried About Running Out of Food in the Last Year: Never true     Ran Out of Food in the Last Year: Never true   Transportation Needs: No Transportation Needs (6/3/2024)    TRANSPORTATION NEEDS     Transportation : No   Physical Activity: Insufficiently Active (6/3/2024)    Exercise Vital Sign     Days of Exercise per Week: 1 day     Minutes of Exercise per Session: 10 min   Stress: No Stress Concern Present (6/3/2024)    [employer] [place] [employer] - Occupational Stress Questionnaire     Feeling of Stress : Not at all   Housing Stability: Unknown (6/3/2024)    Housing Stability Vital Sign     Unable to Pay for Housing in the Last Year: No     Homeless in the Last Year: No      Family History   Problem Relation Name Age of Onset    Hypertension Mother Halie Saavedra     Diabetes Mother Halie Saavedra     Drug abuse Mother Halie Saavedra     No Known Problems Father      Kidney disease Maternal Grandmother Halina James     Diabetes Maternal Grandmother Halina James     Stroke Paternal Grandmother Jade Yong       Reason for Follow-up:  -chronic anemia, S/P multiple blood transfusions over several years  -vitamin B12 deficiency  -folic acid deficiency   -iron-deficiency  -Menorrhagia with regular cycle  -kappa light chain MGUS  -Abnormal bone marrow biopsy, red cell aplasia, pancytopenia  -stool occult blood positive    History of Present Illness:   Anemia      Oncologic/Hematologic History:  Oncology History    No history exists.   Past medical history:  Crohn's disease, diagnosed when she was 12 years old; diagnosed at [facility]; presenting complaint was severe bleeding; received close to 50 packed RBC transfusions; after she is turned 17, she could not get established with a GI.   fever/neck pain Symptoms have been more or less under control spontaneously.  B12 deficiency.  Iron-deficiency.  Folic acid deficiency.  Social history:  .  Lives in [place], [place].  Has 3 children.  Does not work.  No history of tobacco, alcohol, or illicit drug abuse.    Family history:  Negative for malignancy or blood dyscrasias.    Health maintenance:  Primary care provider at [facility].  She is waiting to get established with a GI for ongoing surveillance/management of Crohn's disease.      34-year-old lady, referred from [facility], with anemia.    Please refer to assessment and plan section for details.    04/06/2023:  Pleasant, healthy-appearing young lady who presents for initial hematology consultation.  In no acute discomfort.  Crohn's disease, diagnosed when she was 12 years old; diagnosed at [facility]; presenting complaint was severe bleeding; received close to 50 packed RBC transfusions; after she is turned 17, she could not get established with a GI.  Symptoms have been more or less under control spontaneously.  B12 deficiency.  Iron-deficiency.  Folic acid deficiency.  For last several years, symptoms of Crohn's disease has been stable.  For last couple of weeks, occasional crampy pains.  Couple of loose bowel movements twice a week.  No blood in stool.  No fevers, chills, weakness, or fatigue.  Diagnosed with iron, B12, and folic acid deficiency during hospitalization at [facility], in August 2022, when she was hospitalized with severe symptomatic anemia, hemoglobin 1.7, platelets 57 K, B12 level 201, ferritin 27.5, and folic acid level 6.4.  On bone marrow biopsy, there was suspicion of acute red cell aplasia, 6.4% myeloblasts, etc..    Denies recurrent fevers, chills, drenching night sweats, anorexia, unintentional weight loss, chest pain, cough, dyspnea, dizziness, abdominal pain, etc..  ECOG 0.     Interval History:  OP IV IRON THERAPY   [No matching plan found]      05/16/2025:  -04/04/2025: Hemoglobin 7.9 remains low, ferritin 7.58 low (down from 52.43 on 02/11/2025)  -s/p Feraheme 510 mg IV x2 (04/16/2025, 04/23/2025)  -gyn follow-up 05/09/2025:  AUB:  Started on Provera; if failure of improvement in 3 months, then, consideration of hysteroscopy D&C for possible intracavitary fibroid versus polyp removal  -05/16/2025: Hemoglobin 11.7 dramatically improved from 7.9 on 04/04/2025; platelets 524 K, serum iron 53 normalized, TIBC 215 suppressed; ferritin 110 0.61 normalized, transferrin saturation 25% normalized  Presents for a follow-up visit.  With normalization of iron stores and significant improvement in hemoglobin with Feraheme, feeling a lot better.  More energetic.  Now, fatigue is very mild.  Able to function more.  Very much pleased.  Just started Provera for menorrhagia.  Great appetite.  ECOG 0.    Immunization History   Administered Date(s) Administered    COVID-19, MRNA, LN-S, PF (MODERNA FULL 0.5 ML DOSE) 05/13/2021, 06/24/2021    Pneumococcal Conjugate - 20 Valent 05/28/2024    Tdap 09/29/2012, 07/13/2023     Review of patient's allergies indicates:  No Known Allergies    Medications:  Current Outpatient Medications on File Prior to Visit   Medication Sig Dispense Refill    cyanocobalamin, vitamin B-12, 1,000 mcg Lozg Place 1 tablet under the tongue every morning. 30 lozenge 6    ergocalciferol (ERGOCALCIFEROL) 50,000 unit Cap Take 1 capsule (50,000 Units total) by mouth every 7 days. for 24 doses 12 capsule 1    FEROSUL 325 mg (65 mg iron) Tab tablet Take 1 tablet (325 mg total) by mouth once daily. 90 tablet 3    folic acid (FOLVITE) 1 MG tablet Take 1 tablet (1,000 mcg total) by mouth once daily. 90 tablet 3    linaCLOtide (LINZESS) 145 mcg Cap capsule Take 1 capsule (145 mcg total) by mouth before breakfast. 30 capsule 5    medroxyPROGESTERone (PROVERA) 10 MG tablet Take 1 tablet (10 mg total) by mouth once daily. 30 tablet 12     No current  facility-administered medications on file prior to visit.     Review of Systems:   All systems reviewed and found to be negative except for the symptoms detailed above    Physical Examination:   VITAL SIGNS:   Vitals:    05/16/25 1056   BP: 122/85   Pulse: 102   Resp: 18   Temp: 98.2 °F (36.8 °C)       GENERAL:  In no apparent distress.    HEAD:  No signs of head trauma.  EYES:  Pupils are equal.  Extraocular motions intact.    EARS:  Hearing grossly intact.  MOUTH:  Oropharynx is normal.   NECK:  No adenopathy, no JVD.     CHEST:  Chest with clear breath sounds bilaterally.  No wheezes, rales, rhonchi.    CARDIAC:  Regular rate and rhythm.  S1 and S2, without murmurs, gallops, rubs.  VASCULAR:  No Edema.  Peripheral pulses normal and equal in all extremities.  ABDOMEN:  Soft, without detectable tenderness.  No sign of distention.  No   rebound or guarding, and no masses palpated.   Bowel Sounds normal.  MUSCULOSKELETAL:  Good range of motion of all major joints. Extremities without clubbing, cyanosis or edema.    NEUROLOGIC EXAM:  Alert and oriented x 3.  No focal sensory or strength deficits.   Speech normal.  Follows commands.  PSYCHIATRIC:  Mood normal.      Assessment:  Problem List Items Addressed This Visit       Abnormal urine finding - Primary    B12 deficiency    Folate deficiency    Abnormal bone marrow examination    Iron deficiency anemia    History of blood transfusion    MGUS (monoclonal gammopathy of unknown significance)    Free monoclonal light chain    Occult blood positive stool    Anemia due to chronic blood loss    Menorrhagia with regular cycle     Orders for 05/16/2025:   Mid August, recheck CBC, serum iron, TIBC, ferritin  Continue vitamin B12 1000 mcg p.o. q.day  Follow-up with gyn for management of menorrhagia  In August, SPEP, EAN, FLC assay and random urine for UPEP, urine EAN, urine FLC assay  Follow-up in August with labs    Above discussed at length with the patient.  All questions  answered.    Discussed labs.  Discussed plan of management.  Compliance emphasized.    She understands and agrees with this plan.  ===================================    # Chronic anemia:  (iron-deficiency, folate deficiency, B12 deficiency)  -Anemia for several years  -Multiple blood transfusions over the years  -Hemoglobin: 13.5 (09/27/2022); 5.4 (08/11/2022); 6.7 (12/28/2020); 4.3 (12/27/2020)   -MCV normal  -Hospitalized 08/2022 with severe symptomatic anemia, hemoglobin 1.7, MCV 72, platelets 57 K, platelets dropped to 13 K  -Diagnosed with vitamin B12 deficiency and folic acid deficiency, and iron-deficiency  (Vitamin B12 level 201; folate 6.4; ferritin 27.5)  -Bone marrow exam 08/12/2022:  Abnormalities secondary to vitamin B12 and folic acid deficiency  -(Bone marrow abnormalities resolved 05/09/2023 after B12 and folic acid replacement  -Possible menorrhagia  -EGD and colonoscopy 12/06/2023:  No active bleeding lesions  -hemoglobin: 9.2 on 01/14/2025; 98.6 on 01/07/2025; 10.0 on 09/10/2024; 5.8 on 06/03 1024; 8.2 on 05/28/2024, etc.  -05/28/2024: Ferritin 6  -05/28/2024: Folate 4.1 low, B12 level 313, borderline  -01/07/2025: Ferritin 6.45 low  -01/07/2025: B12 levels 604 normal  -01/14/2025:  Ferritin 7.69 low  -Venofer:  100 mg on 01/30/2025; 100 mg on 02/06/2025  -02/11/2025: Hemoglobin 8.5 (too soon to assess), MCV 81.7 normal, serum iron 25 low, TIBC 276 low normal, ferritin 52.43 (up from 7.69 on 01/14/2025), transferrin saturation 9% low  -02/06/2025: Pelvic ultrasound:  Uterine fibroid; ovarian cyst on the right; left ovary was not clearly identified (uterine fibroid 1.9 x 2.2 x 2.3 cm; right ovarian complex cyst 1.6 x 1.3 x 1.4 cm; a complex structure abutting the right ovary identified might represent a parapelvic cyst 1.8 x 1.5 x 1.6 cm  -02/11/2025:  Video capsule endoscopy: Normal duodenum, normal jejunum, normal ileum  -04/04/2025: Hemoglobin 7.9 remains low, ferritin 7.58 low (down from 52.43  on 02/11/2025)  -s/p Feraheme 510 mg IV x2 (04/16/2025, 04/23/2025)  -gyn follow-up 05/09/2025:  AUB:  Started on Provera; if failure of improvement in 3 months, then, consideration of hysteroscopy D&C for possible intracavitary fibroid versus polyp removal  -05/16/2025:  Dramatically positive response to Feraheme   (hemoglobin 11.7, dramatically improved; ferritin 110, normalized; transferrin saturation 25%, normalized)  >>>  Plan:  -has failed oral iron therapy in the past  -01/14/2025:  persistent iron-deficiency anemia  -inadequate parenteral iron supplementation, only 2 doses of Venofer 100 mg each  -S/P Feraheme x2 in April 2025  -05/16/2025:  Dramatically positive response to Feraheme   (hemoglobin 11.7, dramatically improved; ferritin 110, normalized; transferrin saturation 25%, normalized)  -for follow-up, recheck CBC, serum iron, TIBC, ferritin in 3 months (mid August)  -follow-up with gyn for management of menorrhagia; started on Provera on 05/09/2025  -source of iron-deficiency anemia:  Menorrhagia  -EGD, colonoscopy, and capsule endoscopy essentially noncontributory  -absorbing vitamin B12 adequately via oral route; continue vitamin B12 1000 mcg p.o. q.day    # Menorrhagia:  -01/14/2025: Tells me that 1 month or menstrual cycles are normal, the other month and they are heavy, with blood clots; cycles are regular  -02/06/2025: Pelvic ultrasound:  Uterine fibroid; ovarian cyst on the right; left ovary was not clearly identified (uterine fibroid 1.9 x 2.2 x 2.3 cm; right ovarian complex cyst 1.6 x 1.3 x 1.4 cm; a complex structure abutting the right ovary identified might represent a parapelvic cyst 1.8 x 1.5 x 1.6 cm  -follow-up with gyn for management of menorrhagia; started on Provera on 05/09/2025  >>>  -continue Provera under the direction of gyn (started 05/09/2025)      Bone marrow aspiration and core biopsy (bone marrow examination on 08/12/2022 showed 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    -B12 level 201.  Diagnosed when admitted to [facility] 08/2022, with severe anemia (see above);   -B12 level normal, 516, on 09/27/2022  -B12 deficiency can be due to terminal ileum involvement with Crohn's disease  -ever since 08/2022, has been taking 1 tablet of vitamin B12 daily  -04/06/2023: Hemoglobin 9.1.  MCV normal.  Transferrin saturation 8%.  Ferritin 26.02.  B12, folate normal.  No hemolysis.  No M protein.  Kappa/lambda ratio 1.78, elevated.  -04/06/2023:  Intrinsic factor antibody negative  -04/21/2023: Hemoglobin 8.8.  MCV normal.  Platelets 556 K  -hemoglobin: 9.2 on 01/14/2025; 98.6 on 01/07/2025; 10.0 on 09/10/2024; 5.8 on 06/03 1024; 8.2 on 05/28/2024, etc.  -05/28/2024: Ferritin 6  -05/28/2024: Folate 4.1 low, B12 level 313, borderline  -01/07/2025: Ferritin 6.45 low  -01/07/2025: B12 levels 604 normal  -01/14/2025:  Ferritin 7.69 low  -01/14/2025: B12 level 540 normal (absorbing well via oral route)  (Absorbing vitamin B12 via oral route, satisfactorily)  >>>  -continue vitamin B12 1000 mcg p.o. q.day    # Folic acid deficiency:   -folate 6.4, diagnosed when admitted to [facility] 08/2022 with severe anemia (see above)  -ever since 08/2022, has been taking folic acid tablet daily  -04/06/2023: Hemoglobin 9.1.  MCV normal.  Transferrin saturation 8%.  Ferritin 26.02.  B12, folate normal.  No hemolysis.  No M  protein.  Kappa/lambda ratio 1.78, elevated.  -04/21/2023: Hemoglobin 8.8.  MCV normal.  Platelets 556 K  (Folic acid level has normalized as of 04/06/2023)     # Iron deficiency:  -12/27/2020: Serum iron 12, low.  TIBC normal.  Ferritin not done.  Transferrin saturation 3%  -diagnosed when admitted to [facility] 08/10/2022 with severe anemia (see above)  -09/27/2022: Serum iron 48, low.  TIBC normal.  Ferritin 21.65.  Transferrin saturation 16%, low.  -ever since 08/2022, has been taking 2 iron tablets daily  -04/06/2023: Hemoglobin 9.1.  MCV normal.  Transferrin saturation 8%.  Ferritin 26.02.  B12, folate normal.  No hemolysis.  No M protein.  Kappa/lambda ratio 1.78, elevated.  -04/21/2023: Hemoglobin 8.8.  MCV normal.  Platelets 556 K  -05/04/2023:  Hemoglobin 8.7.  MCV 79.6.  Platelets 77 K. ferritin 16.27.  CMP unremarkable.  (Has failed oral iron therapy; continues to have iron-deficiency anemia as of 04/06/2023, 04/21/2023, 05/04/2023)  -S/P Feraheme 510 mg IV x2 (06/13/2023, 06/20/2023)  -08/14/2023:  Hemoglobin 8.8 (no improvement with Feraheme; was 9.4 on 05/09/2023); MCV 89.0; WBC and platelets normal; ANC 1.3, down; serum iron 15, low; TIBC 262, transferrin saturation 6%) remains low; was 8% on 05/04/2023).  CMP reviewed; ferritin level i 12.10  -08/14/2023: Her cycles last 5 days every month; every other month, she experiences blood clots with menstrual cycles.  Says that menstrual lost does not appear to be heavy as far as she can tell.  (Absolutely no response to Feraheme x2, administered in June 2023; the source of iron loss remains unclear; questionable history of menorrhagia)  -S/P Feraheme x2, 510 mg IV each, on 08/21/2023 and 08/22/2023  -11/22/2023:  Hemoglobin 10.8, improved from 8.8 on 08/14/2023; MCV normal; today's ferritin level is pending; however, transferrin saturation remains low, 7%; CMP is unremarkable  (Partial response to Feraheme x2, administered  08/2023)  -EGD and colonoscopy 12/06/2023:  No definite source of bleeding (see details)  -S/P Feraheme 510 mg IV x2 (12/08/2023, 12/15/2023)  -capsule endoscopy scheduled for 01/23/2024  -hemoglobin: 9.2 on 01/14/2025; 98.6 on 01/07/2025; 10.0 on 09/10/2024; 5.8 on 06/03 1024; 8.2 on 05/28/2024, etc.  -05/28/2024: Ferritin 6  -05/28/2024: Folate 4.1 low, B12 level 313, borderline  -01/07/2025: Ferritin 6.45 low  -01/07/2025: B12 levels 604 normal  -01/14/2025:  Ferritin 7.69 low (see above)    # New diagnosis of kappa light chain MGUS:  -04/06/2023: Hemoglobin 9.1.  MCV normal.  Transferrin saturation 8%.  Ferritin 26.02.  B12, folate normal.  No hemolysis.  No M protein.  Kappa/lambda ratio 1.78, elevated.  -11/22/2023: IgG, IgA, IgM normal; no monoclonal protein on SPEP and EAN; kappa 2.24, elevated; lambda normal; kappa/lambda ratio 2.09, elevated (was 1.78, slightly elevated, on 04/06/2023)  -skeletal survey 11/29/2023: Negative  -01/14/2025: IgG 1831 elevated; IgM, IgA normal; no monoclonal bands per SPEP and EAN; kappa/lambda ratio 2.04 elevated, kappa 3.23 elevated, lambda normal  (parametrial stable)  >>>  -repeat SPEP, EAN, FLC assay, and random urine for UPEP, urine EAN, urine FLC assay in 6 months (July 2025)    # History of Crohn's disease:   -Diagnosed when she was 12.  Presented with severe GI bleeding.  Says that she received a total of 50 packed RBC transfusions.  After she is turned 17, she could not find another GI to follow-up with.  -04/06/2023: As of 04/06/2023, symptoms are pretty much under control spontaneously; for last couple of weeks, some crampy abdominal pains; no GI bleeding; couple of loose bowels a week; more or less, asymptomatic        Follow-up:  No follow-ups on file.  Answers submitted by the patient for this visit:  Review of Systems Questionnaire (Submitted on 5/9/2025)  appetite change : No  unexpected weight change: No  mouth sores: No  visual disturbance: No  cough:  No  shortness of breath: No  chest pain: No  abdominal pain: No  diarrhea: No  frequency: No  back pain: No  rash: No  headaches: No  adenopathy: No  nervous/ anxious: No

## 2025-05-20 ENCOUNTER — APPOINTMENT (OUTPATIENT)
Dept: INTERNAL MEDICINE | Facility: CLINIC | Age: 65
End: 2025-05-20
Payer: MEDICARE

## 2025-05-20 VITALS
OXYGEN SATURATION: 99 % | DIASTOLIC BLOOD PRESSURE: 90 MMHG | WEIGHT: 257 LBS | TEMPERATURE: 97.3 F | HEART RATE: 92 BPM | HEIGHT: 59 IN | SYSTOLIC BLOOD PRESSURE: 130 MMHG | RESPIRATION RATE: 16 BRPM | BODY MASS INDEX: 51.81 KG/M2

## 2025-05-20 DIAGNOSIS — J18.9 PNEUMONIA, UNSPECIFIED ORGANISM: ICD-10-CM

## 2025-05-20 PROCEDURE — 99213 OFFICE O/P EST LOW 20 MIN: CPT

## 2025-05-27 ENCOUNTER — OUTPATIENT (OUTPATIENT)
Dept: OUTPATIENT SERVICES | Facility: HOSPITAL | Age: 65
LOS: 1 days | End: 2025-05-27
Payer: MEDICARE

## 2025-05-27 ENCOUNTER — NON-APPOINTMENT (OUTPATIENT)
Age: 65
End: 2025-05-27

## 2025-05-27 ENCOUNTER — APPOINTMENT (OUTPATIENT)
Dept: RADIOLOGY | Facility: HOSPITAL | Age: 65
End: 2025-05-27
Payer: MEDICARE

## 2025-05-27 DIAGNOSIS — Z98.891 HISTORY OF UTERINE SCAR FROM PREVIOUS SURGERY: Chronic | ICD-10-CM

## 2025-05-27 DIAGNOSIS — Z98.890 OTHER SPECIFIED POSTPROCEDURAL STATES: Chronic | ICD-10-CM

## 2025-05-27 DIAGNOSIS — Z90.89 ACQUIRED ABSENCE OF OTHER ORGANS: Chronic | ICD-10-CM

## 2025-05-27 DIAGNOSIS — J18.9 PNEUMONIA, UNSPECIFIED ORGANISM: ICD-10-CM

## 2025-05-27 PROCEDURE — 71046 X-RAY EXAM CHEST 2 VIEWS: CPT

## 2025-05-27 PROCEDURE — 71046 X-RAY EXAM CHEST 2 VIEWS: CPT | Mod: 26

## 2025-06-02 ENCOUNTER — APPOINTMENT (OUTPATIENT)
Dept: OBGYN | Facility: CLINIC | Age: 65
End: 2025-06-02

## 2025-06-06 ENCOUNTER — APPOINTMENT (OUTPATIENT)
Dept: INFUSION THERAPY | Facility: HOSPITAL | Age: 65
End: 2025-06-06

## 2025-06-12 ENCOUNTER — INPATIENT (INPATIENT)
Facility: HOSPITAL | Age: 65
LOS: 1 days | Discharge: HOME | DRG: 556 | End: 2025-06-14
Attending: STUDENT IN AN ORGANIZED HEALTH CARE EDUCATION/TRAINING PROGRAM | Admitting: STUDENT IN AN ORGANIZED HEALTH CARE EDUCATION/TRAINING PROGRAM
Payer: MEDICARE

## 2025-06-12 VITALS
TEMPERATURE: 98 F | DIASTOLIC BLOOD PRESSURE: 72 MMHG | WEIGHT: 250 LBS | RESPIRATION RATE: 15 BRPM | SYSTOLIC BLOOD PRESSURE: 147 MMHG | HEART RATE: 81 BPM | OXYGEN SATURATION: 98 % | HEIGHT: 59 IN

## 2025-06-12 DIAGNOSIS — Z98.890 OTHER SPECIFIED POSTPROCEDURAL STATES: Chronic | ICD-10-CM

## 2025-06-12 DIAGNOSIS — Z98.891 HISTORY OF UTERINE SCAR FROM PREVIOUS SURGERY: Chronic | ICD-10-CM

## 2025-06-12 DIAGNOSIS — Z90.89 ACQUIRED ABSENCE OF OTHER ORGANS: Chronic | ICD-10-CM

## 2025-06-12 LAB
ALBUMIN SERPL ELPH-MCNC: 3.3 G/DL — SIGNIFICANT CHANGE UP (ref 3.3–5)
ALP SERPL-CCNC: 134 U/L — HIGH (ref 40–120)
ALT FLD-CCNC: 22 U/L — SIGNIFICANT CHANGE UP (ref 10–45)
ANION GAP SERPL CALC-SCNC: 2 MMOL/L — LOW (ref 5–17)
AST SERPL-CCNC: 21 U/L — SIGNIFICANT CHANGE UP (ref 10–40)
BASOPHILS # BLD AUTO: 0.05 K/UL — SIGNIFICANT CHANGE UP (ref 0–0.2)
BASOPHILS NFR BLD AUTO: 0.9 % — SIGNIFICANT CHANGE UP (ref 0–2)
BILIRUB SERPL-MCNC: 0.5 MG/DL — SIGNIFICANT CHANGE UP (ref 0.2–1.2)
BUN SERPL-MCNC: 18 MG/DL — SIGNIFICANT CHANGE UP (ref 7–23)
CALCIUM SERPL-MCNC: 9.3 MG/DL — SIGNIFICANT CHANGE UP (ref 8.4–10.5)
CHLORIDE SERPL-SCNC: 106 MMOL/L — SIGNIFICANT CHANGE UP (ref 96–108)
CO2 SERPL-SCNC: 32 MMOL/L — HIGH (ref 22–31)
CREAT SERPL-MCNC: 0.92 MG/DL — SIGNIFICANT CHANGE UP (ref 0.5–1.3)
EGFR: 68 ML/MIN/1.73M2 — SIGNIFICANT CHANGE UP
EGFR: 68 ML/MIN/1.73M2 — SIGNIFICANT CHANGE UP
EOSINOPHIL # BLD AUTO: 0.26 K/UL — SIGNIFICANT CHANGE UP (ref 0–0.5)
EOSINOPHIL NFR BLD AUTO: 4.6 % — SIGNIFICANT CHANGE UP (ref 0–6)
GLUCOSE SERPL-MCNC: 83 MG/DL — SIGNIFICANT CHANGE UP (ref 70–99)
HCT VFR BLD CALC: 36.7 % — SIGNIFICANT CHANGE UP (ref 34.5–45)
HGB BLD-MCNC: 11.6 G/DL — SIGNIFICANT CHANGE UP (ref 11.5–15.5)
IMM GRANULOCYTES NFR BLD AUTO: 0.2 % — SIGNIFICANT CHANGE UP (ref 0–0.9)
INR BLD: 1.05 RATIO — SIGNIFICANT CHANGE UP (ref 0.85–1.16)
LYMPHOCYTES # BLD AUTO: 1.88 K/UL — SIGNIFICANT CHANGE UP (ref 1–3.3)
LYMPHOCYTES # BLD AUTO: 33.5 % — SIGNIFICANT CHANGE UP (ref 13–44)
MCHC RBC-ENTMCNC: 26.9 PG — LOW (ref 27–34)
MCHC RBC-ENTMCNC: 31.6 G/DL — LOW (ref 32–36)
MCV RBC AUTO: 85.2 FL — SIGNIFICANT CHANGE UP (ref 80–100)
MONOCYTES # BLD AUTO: 0.58 K/UL — SIGNIFICANT CHANGE UP (ref 0–0.9)
MONOCYTES NFR BLD AUTO: 10.3 % — SIGNIFICANT CHANGE UP (ref 2–14)
NEUTROPHILS # BLD AUTO: 2.83 K/UL — SIGNIFICANT CHANGE UP (ref 1.8–7.4)
NEUTROPHILS NFR BLD AUTO: 50.5 % — SIGNIFICANT CHANGE UP (ref 43–77)
NRBC BLD AUTO-RTO: 0 /100 WBCS — SIGNIFICANT CHANGE UP (ref 0–0)
PLATELET # BLD AUTO: 222 K/UL — SIGNIFICANT CHANGE UP (ref 150–400)
POTASSIUM SERPL-MCNC: 4.3 MMOL/L — SIGNIFICANT CHANGE UP (ref 3.5–5.3)
POTASSIUM SERPL-SCNC: 4.3 MMOL/L — SIGNIFICANT CHANGE UP (ref 3.5–5.3)
PROT SERPL-MCNC: 7 G/DL — SIGNIFICANT CHANGE UP (ref 6–8.3)
PROTHROM AB SERPL-ACNC: 12.4 SEC — SIGNIFICANT CHANGE UP (ref 9.9–13.4)
RBC # BLD: 4.31 M/UL — SIGNIFICANT CHANGE UP (ref 3.8–5.2)
RBC # FLD: 14.5 % — SIGNIFICANT CHANGE UP (ref 10.3–14.5)
SODIUM SERPL-SCNC: 140 MMOL/L — SIGNIFICANT CHANGE UP (ref 135–145)
WBC # BLD: 5.61 K/UL — SIGNIFICANT CHANGE UP (ref 3.8–10.5)
WBC # FLD AUTO: 5.61 K/UL — SIGNIFICANT CHANGE UP (ref 3.8–10.5)

## 2025-06-12 PROCEDURE — 93010 ELECTROCARDIOGRAM REPORT: CPT

## 2025-06-12 PROCEDURE — 73562 X-RAY EXAM OF KNEE 3: CPT | Mod: 26,LT

## 2025-06-12 PROCEDURE — 73700 CT LOWER EXTREMITY W/O DYE: CPT | Mod: 26,LT

## 2025-06-12 PROCEDURE — 99285 EMERGENCY DEPT VISIT HI MDM: CPT

## 2025-06-12 RX ORDER — MELATONIN 5 MG
3 TABLET ORAL AT BEDTIME
Refills: 0 | Status: DISCONTINUED | OUTPATIENT
Start: 2025-06-12 | End: 2025-06-14

## 2025-06-12 RX ORDER — IBUPROFEN 200 MG
600 TABLET ORAL ONCE
Refills: 0 | Status: COMPLETED | OUTPATIENT
Start: 2025-06-12 | End: 2025-06-12

## 2025-06-12 RX ORDER — ACETAMINOPHEN 500 MG/5ML
650 LIQUID (ML) ORAL EVERY 6 HOURS
Refills: 0 | Status: DISCONTINUED | OUTPATIENT
Start: 2025-06-12 | End: 2025-06-14

## 2025-06-12 RX ORDER — MAGNESIUM, ALUMINUM HYDROXIDE 200-200 MG
30 TABLET,CHEWABLE ORAL EVERY 4 HOURS
Refills: 0 | Status: DISCONTINUED | OUTPATIENT
Start: 2025-06-12 | End: 2025-06-14

## 2025-06-12 RX ORDER — LIDOCAINE HYDROCHLORIDE 20 MG/ML
1 JELLY TOPICAL ONCE
Refills: 0 | Status: COMPLETED | OUTPATIENT
Start: 2025-06-12 | End: 2025-06-12

## 2025-06-12 RX ORDER — ONDANSETRON HCL/PF 4 MG/2 ML
8 VIAL (ML) INJECTION ONCE
Refills: 0 | Status: COMPLETED | OUTPATIENT
Start: 2025-06-12 | End: 2025-06-12

## 2025-06-12 RX ORDER — ONDANSETRON HCL/PF 4 MG/2 ML
4 VIAL (ML) INJECTION EVERY 8 HOURS
Refills: 0 | Status: DISCONTINUED | OUTPATIENT
Start: 2025-06-12 | End: 2025-06-14

## 2025-06-12 RX ORDER — ACETAMINOPHEN 500 MG/5ML
975 LIQUID (ML) ORAL ONCE
Refills: 0 | Status: COMPLETED | OUTPATIENT
Start: 2025-06-12 | End: 2025-06-12

## 2025-06-12 RX ADMIN — Medication 8 MILLIGRAM(S): at 22:36

## 2025-06-12 RX ADMIN — Medication 600 MILLIGRAM(S): at 20:45

## 2025-06-12 RX ADMIN — LIDOCAINE HYDROCHLORIDE 1 PATCH: 20 JELLY TOPICAL at 20:16

## 2025-06-12 RX ADMIN — Medication 975 MILLIGRAM(S): at 20:15

## 2025-06-12 RX ADMIN — Medication 975 MILLIGRAM(S): at 20:45

## 2025-06-12 RX ADMIN — Medication 600 MILLIGRAM(S): at 20:15

## 2025-06-13 ENCOUNTER — APPOINTMENT (OUTPATIENT)
Dept: INTERNAL MEDICINE | Facility: CLINIC | Age: 65
End: 2025-06-13

## 2025-06-13 DIAGNOSIS — R26.2 DIFFICULTY IN WALKING, NOT ELSEWHERE CLASSIFIED: ICD-10-CM

## 2025-06-13 LAB
ALBUMIN SERPL ELPH-MCNC: 2.8 G/DL — LOW (ref 3.3–5)
ALP SERPL-CCNC: 118 U/L — SIGNIFICANT CHANGE UP (ref 40–120)
ALT FLD-CCNC: 17 U/L — SIGNIFICANT CHANGE UP (ref 10–45)
ANION GAP SERPL CALC-SCNC: 6 MMOL/L — SIGNIFICANT CHANGE UP (ref 5–17)
AST SERPL-CCNC: 20 U/L — SIGNIFICANT CHANGE UP (ref 10–40)
BASOPHILS # BLD AUTO: 0.05 K/UL — SIGNIFICANT CHANGE UP (ref 0–0.2)
BASOPHILS NFR BLD AUTO: 0.9 % — SIGNIFICANT CHANGE UP (ref 0–2)
BILIRUB SERPL-MCNC: 0.6 MG/DL — SIGNIFICANT CHANGE UP (ref 0.2–1.2)
BUN SERPL-MCNC: 17 MG/DL — SIGNIFICANT CHANGE UP (ref 7–23)
CALCIUM SERPL-MCNC: 8.9 MG/DL — SIGNIFICANT CHANGE UP (ref 8.4–10.5)
CHLORIDE SERPL-SCNC: 106 MMOL/L — SIGNIFICANT CHANGE UP (ref 96–108)
CO2 SERPL-SCNC: 29 MMOL/L — SIGNIFICANT CHANGE UP (ref 22–31)
CREAT SERPL-MCNC: 0.84 MG/DL — SIGNIFICANT CHANGE UP (ref 0.5–1.3)
EGFR: 77 ML/MIN/1.73M2 — SIGNIFICANT CHANGE UP
EGFR: 77 ML/MIN/1.73M2 — SIGNIFICANT CHANGE UP
EOSINOPHIL # BLD AUTO: 0.28 K/UL — SIGNIFICANT CHANGE UP (ref 0–0.5)
EOSINOPHIL NFR BLD AUTO: 5.2 % — SIGNIFICANT CHANGE UP (ref 0–6)
GLUCOSE BLDC GLUCOMTR-MCNC: 90 MG/DL — SIGNIFICANT CHANGE UP (ref 70–99)
GLUCOSE BLDC GLUCOMTR-MCNC: 92 MG/DL — SIGNIFICANT CHANGE UP (ref 70–99)
GLUCOSE BLDC GLUCOMTR-MCNC: 96 MG/DL — SIGNIFICANT CHANGE UP (ref 70–99)
GLUCOSE BLDC GLUCOMTR-MCNC: 98 MG/DL — SIGNIFICANT CHANGE UP (ref 70–99)
GLUCOSE SERPL-MCNC: 83 MG/DL — SIGNIFICANT CHANGE UP (ref 70–99)
HCT VFR BLD CALC: 35.6 % — SIGNIFICANT CHANGE UP (ref 34.5–45)
HGB BLD-MCNC: 11.3 G/DL — LOW (ref 11.5–15.5)
IMM GRANULOCYTES NFR BLD AUTO: 0.2 % — SIGNIFICANT CHANGE UP (ref 0–0.9)
LYMPHOCYTES # BLD AUTO: 2.26 K/UL — SIGNIFICANT CHANGE UP (ref 1–3.3)
LYMPHOCYTES # BLD AUTO: 42.3 % — SIGNIFICANT CHANGE UP (ref 13–44)
MCHC RBC-ENTMCNC: 27 PG — SIGNIFICANT CHANGE UP (ref 27–34)
MCHC RBC-ENTMCNC: 31.7 G/DL — LOW (ref 32–36)
MCV RBC AUTO: 85.2 FL — SIGNIFICANT CHANGE UP (ref 80–100)
MONOCYTES # BLD AUTO: 0.57 K/UL — SIGNIFICANT CHANGE UP (ref 0–0.9)
MONOCYTES NFR BLD AUTO: 10.7 % — SIGNIFICANT CHANGE UP (ref 2–14)
NEUTROPHILS # BLD AUTO: 2.17 K/UL — SIGNIFICANT CHANGE UP (ref 1.8–7.4)
NEUTROPHILS NFR BLD AUTO: 40.7 % — LOW (ref 43–77)
NRBC BLD AUTO-RTO: 0 /100 WBCS — SIGNIFICANT CHANGE UP (ref 0–0)
PLATELET # BLD AUTO: 204 K/UL — SIGNIFICANT CHANGE UP (ref 150–400)
POTASSIUM SERPL-MCNC: 3.9 MMOL/L — SIGNIFICANT CHANGE UP (ref 3.5–5.3)
POTASSIUM SERPL-SCNC: 3.9 MMOL/L — SIGNIFICANT CHANGE UP (ref 3.5–5.3)
PROT SERPL-MCNC: 6.5 G/DL — SIGNIFICANT CHANGE UP (ref 6–8.3)
RBC # BLD: 4.18 M/UL — SIGNIFICANT CHANGE UP (ref 3.8–5.2)
RBC # FLD: 14.6 % — HIGH (ref 10.3–14.5)
SODIUM SERPL-SCNC: 141 MMOL/L — SIGNIFICANT CHANGE UP (ref 135–145)
WBC # BLD: 5.34 K/UL — SIGNIFICANT CHANGE UP (ref 3.8–10.5)
WBC # FLD AUTO: 5.34 K/UL — SIGNIFICANT CHANGE UP (ref 3.8–10.5)

## 2025-06-13 PROCEDURE — 73721 MRI JNT OF LWR EXTRE W/O DYE: CPT | Mod: 26,LT

## 2025-06-13 PROCEDURE — 99223 1ST HOSP IP/OBS HIGH 75: CPT | Mod: GC

## 2025-06-13 RX ORDER — AMLODIPINE BESYLATE 10 MG/1
5 TABLET ORAL DAILY
Refills: 0 | Status: DISCONTINUED | OUTPATIENT
Start: 2025-06-13 | End: 2025-06-14

## 2025-06-13 RX ORDER — METHOCARBAMOL 500 MG/1
1 TABLET, FILM COATED ORAL
Refills: 0 | DISCHARGE

## 2025-06-13 RX ORDER — SENNA 187 MG
2 TABLET ORAL AT BEDTIME
Refills: 0 | Status: DISCONTINUED | OUTPATIENT
Start: 2025-06-13 | End: 2025-06-14

## 2025-06-13 RX ORDER — ATORVASTATIN CALCIUM 80 MG/1
1 TABLET, FILM COATED ORAL
Refills: 0 | DISCHARGE

## 2025-06-13 RX ORDER — DEXTROSE 50 % IN WATER 50 %
12.5 SYRINGE (ML) INTRAVENOUS ONCE
Refills: 0 | Status: DISCONTINUED | OUTPATIENT
Start: 2025-06-13 | End: 2025-06-14

## 2025-06-13 RX ORDER — GLUCAGON 3 MG/1
1 POWDER NASAL ONCE
Refills: 0 | Status: DISCONTINUED | OUTPATIENT
Start: 2025-06-13 | End: 2025-06-14

## 2025-06-13 RX ORDER — DEXTROSE 50 % IN WATER 50 %
25 SYRINGE (ML) INTRAVENOUS ONCE
Refills: 0 | Status: DISCONTINUED | OUTPATIENT
Start: 2025-06-13 | End: 2025-06-14

## 2025-06-13 RX ORDER — AMLODIPINE BESYLATE 10 MG/1
1 TABLET ORAL
Refills: 0 | DISCHARGE

## 2025-06-13 RX ORDER — INSULIN LISPRO 100 U/ML
INJECTION, SOLUTION INTRAVENOUS; SUBCUTANEOUS
Refills: 0 | Status: DISCONTINUED | OUTPATIENT
Start: 2025-06-13 | End: 2025-06-14

## 2025-06-13 RX ORDER — LOSARTAN POTASSIUM 100 MG/1
100 TABLET, FILM COATED ORAL DAILY
Refills: 0 | Status: DISCONTINUED | OUTPATIENT
Start: 2025-06-13 | End: 2025-06-14

## 2025-06-13 RX ORDER — INSULIN LISPRO 100 U/ML
INJECTION, SOLUTION INTRAVENOUS; SUBCUTANEOUS AT BEDTIME
Refills: 0 | Status: DISCONTINUED | OUTPATIENT
Start: 2025-06-13 | End: 2025-06-14

## 2025-06-13 RX ORDER — AMLODIPINE BESYLATE 10 MG/1
10 TABLET ORAL AT BEDTIME
Refills: 0 | Status: DISCONTINUED | OUTPATIENT
Start: 2025-06-13 | End: 2025-06-13

## 2025-06-13 RX ORDER — SODIUM CHLORIDE 9 G/1000ML
1000 INJECTION, SOLUTION INTRAVENOUS
Refills: 0 | Status: DISCONTINUED | OUTPATIENT
Start: 2025-06-13 | End: 2025-06-14

## 2025-06-13 RX ORDER — ENOXAPARIN SODIUM 100 MG/ML
60 INJECTION SUBCUTANEOUS EVERY 12 HOURS
Refills: 0 | Status: DISCONTINUED | OUTPATIENT
Start: 2025-06-13 | End: 2025-06-14

## 2025-06-13 RX ORDER — CELECOXIB 50 MG/1
200 CAPSULE ORAL DAILY
Refills: 0 | Status: DISCONTINUED | OUTPATIENT
Start: 2025-06-13 | End: 2025-06-14

## 2025-06-13 RX ORDER — TIRZEPATIDE 7.5 MG/.5ML
5 INJECTION, SOLUTION SUBCUTANEOUS
Refills: 0 | DISCHARGE

## 2025-06-13 RX ORDER — DEXTROSE 50 % IN WATER 50 %
15 SYRINGE (ML) INTRAVENOUS ONCE
Refills: 0 | Status: DISCONTINUED | OUTPATIENT
Start: 2025-06-13 | End: 2025-06-14

## 2025-06-13 RX ORDER — ATORVASTATIN CALCIUM 80 MG/1
10 TABLET, FILM COATED ORAL AT BEDTIME
Refills: 0 | Status: DISCONTINUED | OUTPATIENT
Start: 2025-06-13 | End: 2025-06-14

## 2025-06-13 RX ORDER — POLYETHYLENE GLYCOL 3350 17 G/17G
17 POWDER, FOR SOLUTION ORAL DAILY
Refills: 0 | Status: DISCONTINUED | OUTPATIENT
Start: 2025-06-13 | End: 2025-06-14

## 2025-06-13 RX ADMIN — LIDOCAINE HYDROCHLORIDE 1 PATCH: 20 JELLY TOPICAL at 08:28

## 2025-06-13 RX ADMIN — LOSARTAN POTASSIUM 100 MILLIGRAM(S): 100 TABLET, FILM COATED ORAL at 05:42

## 2025-06-13 RX ADMIN — Medication 650 MILLIGRAM(S): at 22:31

## 2025-06-13 RX ADMIN — Medication 2 TABLET(S): at 21:15

## 2025-06-13 RX ADMIN — Medication 2 MILLIGRAM(S): at 09:33

## 2025-06-13 RX ADMIN — ENOXAPARIN SODIUM 60 MILLIGRAM(S): 100 INJECTION SUBCUTANEOUS at 05:42

## 2025-06-13 RX ADMIN — CELECOXIB 200 MILLIGRAM(S): 50 CAPSULE ORAL at 12:53

## 2025-06-13 RX ADMIN — CELECOXIB 200 MILLIGRAM(S): 50 CAPSULE ORAL at 13:53

## 2025-06-13 RX ADMIN — AMLODIPINE BESYLATE 5 MILLIGRAM(S): 10 TABLET ORAL at 05:42

## 2025-06-13 RX ADMIN — ENOXAPARIN SODIUM 60 MILLIGRAM(S): 100 INJECTION SUBCUTANEOUS at 18:27

## 2025-06-13 RX ADMIN — ATORVASTATIN CALCIUM 10 MILLIGRAM(S): 80 TABLET, FILM COATED ORAL at 21:15

## 2025-06-13 RX ADMIN — LIDOCAINE HYDROCHLORIDE 1 PATCH: 20 JELLY TOPICAL at 08:29

## 2025-06-13 RX ADMIN — Medication 2 MILLIGRAM(S): at 08:33

## 2025-06-14 ENCOUNTER — TRANSCRIPTION ENCOUNTER (OUTPATIENT)
Age: 65
End: 2025-06-14

## 2025-06-14 VITALS
DIASTOLIC BLOOD PRESSURE: 68 MMHG | HEART RATE: 64 BPM | SYSTOLIC BLOOD PRESSURE: 113 MMHG | OXYGEN SATURATION: 96 % | RESPIRATION RATE: 17 BRPM | TEMPERATURE: 97 F

## 2025-06-14 LAB
A1C WITH ESTIMATED AVERAGE GLUCOSE RESULT: 5.9 % — HIGH (ref 4–5.6)
ANION GAP SERPL CALC-SCNC: 8 MMOL/L — SIGNIFICANT CHANGE UP (ref 5–17)
BUN SERPL-MCNC: 14 MG/DL — SIGNIFICANT CHANGE UP (ref 7–23)
CALCIUM SERPL-MCNC: 8.9 MG/DL — SIGNIFICANT CHANGE UP (ref 8.4–10.5)
CHLORIDE SERPL-SCNC: 105 MMOL/L — SIGNIFICANT CHANGE UP (ref 96–108)
CO2 SERPL-SCNC: 24 MMOL/L — SIGNIFICANT CHANGE UP (ref 22–31)
CREAT SERPL-MCNC: 0.84 MG/DL — SIGNIFICANT CHANGE UP (ref 0.5–1.3)
EGFR: 76 ML/MIN/1.73M2 — SIGNIFICANT CHANGE UP
EGFR: 76 ML/MIN/1.73M2 — SIGNIFICANT CHANGE UP
ESTIMATED AVERAGE GLUCOSE: 123 MG/DL — HIGH (ref 68–114)
GLUCOSE BLDC GLUCOMTR-MCNC: 89 MG/DL — SIGNIFICANT CHANGE UP (ref 70–99)
GLUCOSE BLDC GLUCOMTR-MCNC: 90 MG/DL — SIGNIFICANT CHANGE UP (ref 70–99)
GLUCOSE SERPL-MCNC: 78 MG/DL — SIGNIFICANT CHANGE UP (ref 70–99)
POTASSIUM SERPL-MCNC: 4.1 MMOL/L — SIGNIFICANT CHANGE UP (ref 3.5–5.3)
POTASSIUM SERPL-SCNC: 4.1 MMOL/L — SIGNIFICANT CHANGE UP (ref 3.5–5.3)
SODIUM SERPL-SCNC: 137 MMOL/L — SIGNIFICANT CHANGE UP (ref 135–145)

## 2025-06-14 PROCEDURE — 97116 GAIT TRAINING THERAPY: CPT

## 2025-06-14 PROCEDURE — 73700 CT LOWER EXTREMITY W/O DYE: CPT

## 2025-06-14 PROCEDURE — 80048 BASIC METABOLIC PNL TOTAL CA: CPT

## 2025-06-14 PROCEDURE — 83036 HEMOGLOBIN GLYCOSYLATED A1C: CPT

## 2025-06-14 PROCEDURE — 36415 COLL VENOUS BLD VENIPUNCTURE: CPT

## 2025-06-14 PROCEDURE — 85025 COMPLETE CBC W/AUTO DIFF WBC: CPT

## 2025-06-14 PROCEDURE — 73721 MRI JNT OF LWR EXTRE W/O DYE: CPT

## 2025-06-14 PROCEDURE — 73562 X-RAY EXAM OF KNEE 3: CPT

## 2025-06-14 PROCEDURE — 82962 GLUCOSE BLOOD TEST: CPT

## 2025-06-14 PROCEDURE — 99285 EMERGENCY DEPT VISIT HI MDM: CPT

## 2025-06-14 PROCEDURE — 85610 PROTHROMBIN TIME: CPT

## 2025-06-14 PROCEDURE — 99239 HOSP IP/OBS DSCHRG MGMT >30: CPT

## 2025-06-14 PROCEDURE — 80053 COMPREHEN METABOLIC PANEL: CPT

## 2025-06-14 PROCEDURE — 93005 ELECTROCARDIOGRAM TRACING: CPT

## 2025-06-14 RX ORDER — CELECOXIB 50 MG/1
1 CAPSULE ORAL
Qty: 10 | Refills: 0
Start: 2025-06-14 | End: 2025-06-23

## 2025-06-14 RX ADMIN — Medication 650 MILLIGRAM(S): at 05:59

## 2025-06-14 RX ADMIN — ENOXAPARIN SODIUM 60 MILLIGRAM(S): 100 INJECTION SUBCUTANEOUS at 05:30

## 2025-06-14 RX ADMIN — Medication 650 MILLIGRAM(S): at 05:29

## 2025-06-14 RX ADMIN — CELECOXIB 200 MILLIGRAM(S): 50 CAPSULE ORAL at 12:01

## 2025-06-14 RX ADMIN — LOSARTAN POTASSIUM 100 MILLIGRAM(S): 100 TABLET, FILM COATED ORAL at 05:30

## 2025-06-14 RX ADMIN — AMLODIPINE BESYLATE 5 MILLIGRAM(S): 10 TABLET ORAL at 05:30

## 2025-06-17 ENCOUNTER — APPOINTMENT (OUTPATIENT)
Dept: CARDIOLOGY | Facility: CLINIC | Age: 65
End: 2025-06-17

## 2025-06-19 ENCOUNTER — APPOINTMENT (OUTPATIENT)
Dept: ENDOCRINOLOGY | Facility: CLINIC | Age: 65
End: 2025-06-19
Payer: MEDICARE

## 2025-06-19 ENCOUNTER — APPOINTMENT (OUTPATIENT)
Dept: ORTHOPEDIC SURGERY | Facility: CLINIC | Age: 65
End: 2025-06-19
Payer: MEDICARE

## 2025-06-19 VITALS — HEIGHT: 59 IN | WEIGHT: 262 LBS | BODY MASS INDEX: 52.82 KG/M2

## 2025-06-19 VITALS — WEIGHT: 257 LBS | BODY MASS INDEX: 51.81 KG/M2 | HEIGHT: 59 IN

## 2025-06-19 VITALS
HEART RATE: 87 BPM | SYSTOLIC BLOOD PRESSURE: 126 MMHG | BODY MASS INDEX: 51.41 KG/M2 | WEIGHT: 255 LBS | DIASTOLIC BLOOD PRESSURE: 72 MMHG | TEMPERATURE: 98 F | HEIGHT: 59 IN | RESPIRATION RATE: 16 BRPM | OXYGEN SATURATION: 96 %

## 2025-06-19 PROCEDURE — 99214 OFFICE O/P EST MOD 30 MIN: CPT

## 2025-06-19 PROCEDURE — 20610 DRAIN/INJ JOINT/BURSA W/O US: CPT | Mod: LT

## 2025-06-19 PROCEDURE — G2211 COMPLEX E/M VISIT ADD ON: CPT

## 2025-06-19 PROCEDURE — 99214 OFFICE O/P EST MOD 30 MIN: CPT | Mod: 25

## 2025-06-30 ENCOUNTER — RX RENEWAL (OUTPATIENT)
Age: 65
End: 2025-06-30

## 2025-07-22 ENCOUNTER — APPOINTMENT (OUTPATIENT)
Dept: NEUROLOGY | Facility: CLINIC | Age: 65
End: 2025-07-22

## 2025-07-24 RX ORDER — REPAGLINIDE 0.5 MG/1
0.5 TABLET ORAL
Qty: 270 | Refills: 3 | Status: ACTIVE | COMMUNITY
Start: 2025-07-24 | End: 1900-01-01

## 2025-08-04 ENCOUNTER — RX RENEWAL (OUTPATIENT)
Age: 65
End: 2025-08-04

## 2025-08-04 ENCOUNTER — APPOINTMENT (OUTPATIENT)
Dept: CT IMAGING | Facility: CLINIC | Age: 65
End: 2025-08-04
Payer: MEDICARE

## 2025-08-04 ENCOUNTER — OUTPATIENT (OUTPATIENT)
Dept: OUTPATIENT SERVICES | Facility: HOSPITAL | Age: 65
LOS: 1 days | End: 2025-08-04
Payer: MEDICARE

## 2025-08-04 DIAGNOSIS — Z98.890 OTHER SPECIFIED POSTPROCEDURAL STATES: Chronic | ICD-10-CM

## 2025-08-04 DIAGNOSIS — Z00.00 ENCOUNTER FOR GENERAL ADULT MEDICAL EXAMINATION WITHOUT ABNORMAL FINDINGS: ICD-10-CM

## 2025-08-04 DIAGNOSIS — Z98.891 HISTORY OF UTERINE SCAR FROM PREVIOUS SURGERY: Chronic | ICD-10-CM

## 2025-08-04 DIAGNOSIS — Z90.89 ACQUIRED ABSENCE OF OTHER ORGANS: Chronic | ICD-10-CM

## 2025-08-04 PROCEDURE — 74177 CT ABD & PELVIS W/CONTRAST: CPT

## 2025-08-04 PROCEDURE — 71260 CT THORAX DX C+: CPT

## 2025-08-04 PROCEDURE — 74177 CT ABD & PELVIS W/CONTRAST: CPT | Mod: 26

## 2025-08-04 PROCEDURE — 71260 CT THORAX DX C+: CPT | Mod: 26

## 2025-08-06 ENCOUNTER — APPOINTMENT (OUTPATIENT)
Dept: HEMATOLOGY ONCOLOGY | Facility: CLINIC | Age: 65
End: 2025-08-06

## 2025-08-06 VITALS
DIASTOLIC BLOOD PRESSURE: 64 MMHG | BODY MASS INDEX: 52.93 KG/M2 | SYSTOLIC BLOOD PRESSURE: 146 MMHG | TEMPERATURE: 97.4 F | WEIGHT: 262.55 LBS | HEIGHT: 59 IN | HEART RATE: 81 BPM | OXYGEN SATURATION: 99 % | RESPIRATION RATE: 16 BRPM

## 2025-08-06 DIAGNOSIS — D3A.8 OTHER BENIGN NEUROENDOCRINE TUMORS: ICD-10-CM

## 2025-08-06 DIAGNOSIS — S83.209A UNSPECIFIED TEAR OF UNSPECIFIED MENISCUS, CURRENT INJURY, UNSPECIFIED KNEE, INITIAL ENCOUNTER: ICD-10-CM

## 2025-08-06 PROCEDURE — G2211 COMPLEX E/M VISIT ADD ON: CPT

## 2025-08-06 PROCEDURE — 99213 OFFICE O/P EST LOW 20 MIN: CPT

## 2025-08-06 RX ORDER — CELECOXIB 200 MG/1
200 CAPSULE ORAL DAILY
Refills: 0 | Status: ACTIVE | COMMUNITY
Start: 2025-08-06

## 2025-08-11 ENCOUNTER — TRANSCRIPTION ENCOUNTER (OUTPATIENT)
Age: 65
End: 2025-08-11

## 2025-08-12 ENCOUNTER — APPOINTMENT (OUTPATIENT)
Dept: CARDIOLOGY | Facility: CLINIC | Age: 65
End: 2025-08-12
Payer: MEDICARE

## 2025-08-12 VITALS
BODY MASS INDEX: 52.82 KG/M2 | HEIGHT: 59 IN | HEART RATE: 60 BPM | OXYGEN SATURATION: 98 % | DIASTOLIC BLOOD PRESSURE: 76 MMHG | RESPIRATION RATE: 18 BRPM | WEIGHT: 262 LBS | SYSTOLIC BLOOD PRESSURE: 120 MMHG

## 2025-08-12 DIAGNOSIS — R06.09 OTHER FORMS OF DYSPNEA: ICD-10-CM

## 2025-08-12 DIAGNOSIS — R94.31 ABNORMAL ELECTROCARDIOGRAM [ECG] [EKG]: ICD-10-CM

## 2025-08-12 DIAGNOSIS — I10 ESSENTIAL (PRIMARY) HYPERTENSION: ICD-10-CM

## 2025-08-12 DIAGNOSIS — I35.0 NONRHEUMATIC AORTIC (VALVE) STENOSIS: ICD-10-CM

## 2025-08-12 PROCEDURE — 93000 ELECTROCARDIOGRAM COMPLETE: CPT

## 2025-08-12 PROCEDURE — 99215 OFFICE O/P EST HI 40 MIN: CPT

## 2025-08-14 ENCOUNTER — RX RENEWAL (OUTPATIENT)
Age: 65
End: 2025-08-14

## 2025-08-15 ENCOUNTER — RX RENEWAL (OUTPATIENT)
Age: 65
End: 2025-08-15

## 2025-08-15 ENCOUNTER — APPOINTMENT (OUTPATIENT)
Dept: CARDIOLOGY | Facility: CLINIC | Age: 65
End: 2025-08-15
Payer: MEDICARE

## 2025-08-15 PROCEDURE — 93306 TTE W/DOPPLER COMPLETE: CPT

## 2025-08-18 ENCOUNTER — APPOINTMENT (OUTPATIENT)
Dept: CARDIOLOGY | Facility: CLINIC | Age: 65
End: 2025-08-18
Payer: MEDICARE

## 2025-08-18 PROCEDURE — ZZZZZ: CPT

## 2025-08-19 ENCOUNTER — APPOINTMENT (OUTPATIENT)
Dept: CARDIOLOGY | Facility: CLINIC | Age: 65
End: 2025-08-19
Payer: MEDICARE

## 2025-08-19 PROCEDURE — 78452 HT MUSCLE IMAGE SPECT MULT: CPT

## 2025-08-19 PROCEDURE — 93015 CV STRESS TEST SUPVJ I&R: CPT

## 2025-08-19 PROCEDURE — A9500: CPT

## 2025-08-20 ENCOUNTER — TRANSCRIPTION ENCOUNTER (OUTPATIENT)
Age: 65
End: 2025-08-20

## 2025-08-20 DIAGNOSIS — T75.3XXA MOTION SICKNESS, INITIAL ENCOUNTER: ICD-10-CM

## 2025-08-20 RX ORDER — SCOPOLAMINE 1 MG/3D
1 PATCH, EXTENDED RELEASE TRANSDERMAL
Qty: 14 | Refills: 0 | Status: ACTIVE | COMMUNITY
Start: 2025-08-20 | End: 1900-01-01

## 2025-08-29 ENCOUNTER — APPOINTMENT (OUTPATIENT)
Dept: INFUSION THERAPY | Facility: HOSPITAL | Age: 65
End: 2025-08-29

## 2025-09-15 ENCOUNTER — RX RENEWAL (OUTPATIENT)
Age: 65
End: 2025-09-15

## (undated) DEVICE — SOL IRR POUR NS 0.9% 500ML

## (undated) DEVICE — DRAPE IOBAN 23" X 23"

## (undated) DEVICE — D HELP - CLEARVIEW CLEARIFY SYSTEM

## (undated) DEVICE — SOL IRR POUR H2O 250ML

## (undated) DEVICE — BLADE SCALPEL SAFETYLOCK #10

## (undated) DEVICE — TUBING TUR 2 PRONG

## (undated) DEVICE — SPECIMEN CONTAINER 100ML

## (undated) DEVICE — INSUFFLATION NDL COVIDIEN STEP 14G FOR STEP/VERSASTEP

## (undated) DEVICE — TROCAR COVIDIEN VERSAPORT BLADELESS OPTICAL 5MM STANDARD

## (undated) DEVICE — GLV 8 PROTEXIS (WHITE)

## (undated) DEVICE — SUT POLYSORB 0 36" GU-46

## (undated) DEVICE — CATH IV SAFE INSYTE 14G X 1.75" (ORANGE)

## (undated) DEVICE — LIGASURE BLUNT TIP 37CM

## (undated) DEVICE — DRAPE TOWEL BLUE 17" X 24"

## (undated) DEVICE — SHEARS COVIDIEN ENDO SHEAR 5MM X 31CM W UNIPOLAR CAUTERY

## (undated) DEVICE — APPLICATOR VISTASEAL LAP DUAL 35CM RIGID

## (undated) DEVICE — GLV 7 PROTEXIS (WHITE)

## (undated) DEVICE — GLV 7.5 PROTEXIS (WHITE)

## (undated) DEVICE — GOWN TRIMAX LG

## (undated) DEVICE — WARMING BLANKET UPPER ADULT

## (undated) DEVICE — ELCTR CORD FOOTSWITCH 1PLR LAPSCP 10FT

## (undated) DEVICE — POSITIONER FOAM EGG CRATE ULNAR 2PCS (PINK)

## (undated) DEVICE — TUBING TRUWAVE PRESSURE MALE/FEMALE 72"

## (undated) DEVICE — TUBING IRRIGATION DAVOL SYSTEM X STREAM

## (undated) DEVICE — TROCAR COVIDIEN VERSASTEP PLUS 11MM STANDARD

## (undated) DEVICE — DRSG STERISTRIPS 0.5 X 4"

## (undated) DEVICE — GLV 8.5 PROTEXIS (WHITE)

## (undated) DEVICE — ENDOCATCH II 15MM

## (undated) DEVICE — TROCAR COVIDIEN VERSAPORT BLADELESS OPTICAL 15MM STANDARD

## (undated) DEVICE — Device

## (undated) DEVICE — MEDICATION LABELS W MARKER

## (undated) DEVICE — LIGASURE MARYLAND 37CM

## (undated) DEVICE — GLV 6.5 PROTEXIS (WHITE)

## (undated) DEVICE — DRAPE C ARM UNIVERSAL

## (undated) DEVICE — TROCAR COVIDIEN VERSAONE FIXATION CANNULA 5MM

## (undated) DEVICE — ENDOCATCH 10MM SPECIMEN POUCH

## (undated) DEVICE — VENODYNE/SCD SLEEVE CALF MEDIUM

## (undated) DEVICE — PACK ADVANCED LAPAROSCOPIC NS

## (undated) DEVICE — TROCAR APPLIED MEDICAL KII BALLOON BLUNT TIP 12MM X 100MM

## (undated) DEVICE — PREP CHLORAPREP HI-LITE ORANGE 26ML

## (undated) DEVICE — ELCTR BOVIE PENCIL SMOKE EVACUATION

## (undated) DEVICE — TROCAR APPLIED MEDICAL KII BALLOON BLUNT TIP 12MM X 130MM

## (undated) DEVICE — DRAPE INSTRUMENT POUCH 6.75" X 11"

## (undated) DEVICE — TUBING TRUWAVE PRESSURE MALE/FEMALE 12"

## (undated) DEVICE — SUT MONOCRYL 4-0 18" PS-2

## (undated) DEVICE — DRSG OPSITE 2.5 X 2"

## (undated) DEVICE — DISSECTOR ENDO PEANUT 5MM

## (undated) DEVICE — TUBING INSUFFLATION LAP FILTER 10FT